# Patient Record
Sex: FEMALE | Race: WHITE | Employment: UNEMPLOYED | URBAN - METROPOLITAN AREA
[De-identification: names, ages, dates, MRNs, and addresses within clinical notes are randomized per-mention and may not be internally consistent; named-entity substitution may affect disease eponyms.]

---

## 2023-03-28 DIAGNOSIS — F41.9 ANXIETY: Primary | ICD-10-CM

## 2023-03-29 RX ORDER — DULOXETIN HYDROCHLORIDE 60 MG/1
CAPSULE, DELAYED RELEASE ORAL
Qty: 30 CAPSULE | Refills: 10 | Status: SHIPPED | OUTPATIENT
Start: 2023-03-29 | End: 2023-05-18 | Stop reason: SDUPTHER

## 2023-03-29 RX ORDER — DULOXETIN HYDROCHLORIDE 60 MG/1
1 CAPSULE, DELAYED RELEASE ORAL DAILY
COMMUNITY
Start: 2013-08-01 | End: 2023-05-18

## 2023-04-06 ENCOUNTER — APPOINTMENT (OUTPATIENT)
Dept: PRIMARY CARE | Facility: CLINIC | Age: 73
End: 2023-04-06
Payer: MEDICAID

## 2023-04-06 PROBLEM — N20.0 NEPHROLITHIASIS: Status: ACTIVE | Noted: 2023-04-06

## 2023-04-06 PROBLEM — F11.21 OPIOID USE DISORDER, MODERATE, IN SUSTAINED REMISSION (MULTI): Status: ACTIVE | Noted: 2023-04-06

## 2023-04-06 PROBLEM — B49 FUNGAL INFECTION: Status: ACTIVE | Noted: 2023-04-06

## 2023-04-06 PROBLEM — K43.5 PARASTOMAL HERNIA WITHOUT OBSTRUCTION OR GANGRENE: Status: ACTIVE | Noted: 2023-04-06

## 2023-04-06 PROBLEM — L24.A9 WOUND DRAINAGE: Status: ACTIVE | Noted: 2023-04-06

## 2023-04-06 PROBLEM — M25.551 CHRONIC PAIN OF BOTH HIPS: Status: ACTIVE | Noted: 2023-04-06

## 2023-04-06 PROBLEM — J34.3 HYPERTROPHY OF BOTH INFERIOR NASAL TURBINATES: Status: ACTIVE | Noted: 2023-04-06

## 2023-04-06 PROBLEM — R35.0 URINARY FREQUENCY: Status: ACTIVE | Noted: 2023-04-06

## 2023-04-06 PROBLEM — Z99.3 DEPENDENT ON WHEELCHAIR: Status: ACTIVE | Noted: 2023-04-06

## 2023-04-06 PROBLEM — R35.89 POLYURIA: Status: ACTIVE | Noted: 2023-04-06

## 2023-04-06 PROBLEM — N36.2 URETHRAL CARUNCLE: Status: ACTIVE | Noted: 2023-04-06

## 2023-04-06 PROBLEM — G56.02 CARPAL TUNNEL SYNDROME OF LEFT WRIST: Status: ACTIVE | Noted: 2023-04-06

## 2023-04-06 PROBLEM — E46 PROTEIN-CALORIE MALNUTRITION (MULTI): Status: ACTIVE | Noted: 2023-04-06

## 2023-04-06 PROBLEM — M47.816 DEGENERATIVE ARTHRITIS OF LUMBAR SPINE: Status: ACTIVE | Noted: 2023-04-06

## 2023-04-06 PROBLEM — G25.2 COARSE TREMOR: Status: ACTIVE | Noted: 2023-04-06

## 2023-04-06 PROBLEM — R07.89 ATYPICAL CHEST PAIN: Status: ACTIVE | Noted: 2023-04-06

## 2023-04-06 PROBLEM — R74.8 ELEVATED CREATINE KINASE: Status: ACTIVE | Noted: 2023-04-06

## 2023-04-06 PROBLEM — K64.9 HEMORRHOIDS: Status: ACTIVE | Noted: 2023-04-06

## 2023-04-06 PROBLEM — J32.8 OTHER CHRONIC SINUSITIS: Status: ACTIVE | Noted: 2023-04-06

## 2023-04-06 PROBLEM — N20.0 BILATERAL NEPHROLITHIASIS: Status: ACTIVE | Noted: 2023-04-06

## 2023-04-06 PROBLEM — D68.9 COAGULOPATHY (MULTI): Status: ACTIVE | Noted: 2023-04-06

## 2023-04-06 PROBLEM — F32.5 MAJOR DEPRESSION IN REMISSION (CMS-HCC): Status: ACTIVE | Noted: 2023-04-06

## 2023-04-06 PROBLEM — R91.8 LUNG NODULES: Status: RESOLVED | Noted: 2023-04-06 | Resolved: 2023-04-06

## 2023-04-06 PROBLEM — R00.0 RAPID HEARTBEAT: Status: ACTIVE | Noted: 2023-04-06

## 2023-04-06 PROBLEM — N95.2 ATROPHIC VAGINITIS: Status: ACTIVE | Noted: 2023-04-06

## 2023-04-06 PROBLEM — H52.00 HYPEROPIA WITH ASTIGMATISM AND PRESBYOPIA: Status: ACTIVE | Noted: 2023-04-06

## 2023-04-06 PROBLEM — F11.90 CHRONIC, CONTINUOUS USE OF OPIOIDS: Status: ACTIVE | Noted: 2023-04-06

## 2023-04-06 PROBLEM — M48.062 SPINAL STENOSIS OF LUMBAR REGION WITH NEUROGENIC CLAUDICATION: Status: ACTIVE | Noted: 2023-04-06

## 2023-04-06 PROBLEM — R30.0 DYSURIA: Status: ACTIVE | Noted: 2023-04-06

## 2023-04-06 PROBLEM — M54.30 SCIATICA: Status: ACTIVE | Noted: 2023-04-06

## 2023-04-06 PROBLEM — L85.3 XEROSIS OF SKIN: Status: ACTIVE | Noted: 2023-04-06

## 2023-04-06 PROBLEM — H35.433 PAVING STONE DEGENERATION OF BOTH RETINAS: Status: ACTIVE | Noted: 2023-04-06

## 2023-04-06 PROBLEM — N20.1 CALCULUS OF RIGHT URETER: Status: ACTIVE | Noted: 2023-04-06

## 2023-04-06 PROBLEM — M53.3 SACRO-ILIAC PAIN: Status: ACTIVE | Noted: 2023-04-06

## 2023-04-06 PROBLEM — M54.32 SCIATICA OF LEFT SIDE: Status: ACTIVE | Noted: 2023-04-06

## 2023-04-06 PROBLEM — I71.40 ABDOMINAL AORTIC ANEURYSM (AAA) WITHOUT RUPTURE (CMS-HCC): Status: ACTIVE | Noted: 2023-04-06

## 2023-04-06 PROBLEM — F41.9 ANXIETY: Status: ACTIVE | Noted: 2023-04-06

## 2023-04-06 PROBLEM — R21 PAPULAR ERUPTION: Status: ACTIVE | Noted: 2023-04-06

## 2023-04-06 PROBLEM — H25.13 NUCLEAR SCLEROSIS OF BOTH EYES: Status: ACTIVE | Noted: 2023-04-06

## 2023-04-06 PROBLEM — Z98.890 HISTORY OF LASER REFRACTIVE SURGERY: Status: ACTIVE | Noted: 2023-04-06

## 2023-04-06 PROBLEM — M25.562 ARTHRALGIA OF LEFT KNEE: Status: ACTIVE | Noted: 2023-04-06

## 2023-04-06 PROBLEM — J34.2 DEVIATED NASAL SEPTUM: Status: ACTIVE | Noted: 2023-04-06

## 2023-04-06 PROBLEM — I25.10 CAD (CORONARY ARTERY DISEASE): Status: ACTIVE | Noted: 2023-04-06

## 2023-04-06 PROBLEM — M43.16 SPONDYLOLISTHESIS, LUMBAR REGION: Status: ACTIVE | Noted: 2023-04-06

## 2023-04-06 PROBLEM — M46.1 SACROILIITIS (CMS-HCC): Status: ACTIVE | Noted: 2023-04-06

## 2023-04-06 PROBLEM — M54.16 LUMBAR RADICULOPATHY: Status: ACTIVE | Noted: 2023-04-06

## 2023-04-06 PROBLEM — N18.32 STAGE 3B CHRONIC KIDNEY DISEASE (MULTI): Status: ACTIVE | Noted: 2023-04-06

## 2023-04-06 PROBLEM — N23 RENAL COLIC ON LEFT SIDE: Status: ACTIVE | Noted: 2023-04-06

## 2023-04-06 PROBLEM — R21 RASH: Status: ACTIVE | Noted: 2023-04-06

## 2023-04-06 PROBLEM — R60.1 GENERALIZED EDEMA: Status: ACTIVE | Noted: 2023-04-06

## 2023-04-06 PROBLEM — K21.9 ESOPHAGEAL REFLUX: Status: ACTIVE | Noted: 2023-04-06

## 2023-04-06 PROBLEM — H16.223 KERATOCONJUNCTIVITIS SICCA DUE TO DECREASED TEAR PRODUCTION, BILATERAL: Status: ACTIVE | Noted: 2023-04-06

## 2023-04-06 PROBLEM — M54.50 BILATERAL LOW BACK PAIN: Status: ACTIVE | Noted: 2023-04-06

## 2023-04-06 PROBLEM — D72.829 ELEVATED WHITE BLOOD CELL COUNT: Status: ACTIVE | Noted: 2023-04-06

## 2023-04-06 PROBLEM — M81.0 OSTEOPOROSIS: Status: ACTIVE | Noted: 2023-04-06

## 2023-04-06 PROBLEM — H50.34 ALTERNATING INTERMITTENT EXOTROPIA: Status: ACTIVE | Noted: 2023-04-06

## 2023-04-06 PROBLEM — M17.12 PRIMARY OSTEOARTHRITIS OF LEFT KNEE: Status: ACTIVE | Noted: 2023-04-06

## 2023-04-06 PROBLEM — G56.03 CARPAL TUNNEL SYNDROME, BILATERAL UPPER LIMBS: Status: ACTIVE | Noted: 2023-04-06

## 2023-04-06 PROBLEM — Z93.3 COLOSTOMY IN PLACE (MULTI): Status: ACTIVE | Noted: 2023-04-06

## 2023-04-06 PROBLEM — M47.26 OSTEOARTHRITIS OF SPINE WITH RADICULOPATHY, LUMBAR REGION: Status: ACTIVE | Noted: 2023-04-06

## 2023-04-06 PROBLEM — L71.9 ACNE ROSACEA: Status: ACTIVE | Noted: 2023-04-06

## 2023-04-06 PROBLEM — J30.2 SEASONAL ALLERGIC RHINITIS: Status: ACTIVE | Noted: 2023-04-06

## 2023-04-06 PROBLEM — R31.0 GROSS HEMATURIA: Status: ACTIVE | Noted: 2023-04-06

## 2023-04-06 PROBLEM — B37.31 YEAST VAGINITIS: Status: ACTIVE | Noted: 2023-04-06

## 2023-04-06 PROBLEM — N18.9 CHRONIC KIDNEY DISEASE: Status: ACTIVE | Noted: 2023-04-06

## 2023-04-06 PROBLEM — I10 BENIGN ESSENTIAL HYPERTENSION: Status: ACTIVE | Noted: 2023-04-06

## 2023-04-06 PROBLEM — F11.90 OPIOID USE DISORDER: Status: ACTIVE | Noted: 2023-04-06

## 2023-04-06 PROBLEM — Z86.0100 PERSONAL HISTORY OF COLONIC POLYPS: Status: ACTIVE | Noted: 2023-04-06

## 2023-04-06 PROBLEM — F11.93 OPIOID USE WITH WITHDRAWAL (MULTI): Status: ACTIVE | Noted: 2023-04-06

## 2023-04-06 PROBLEM — K57.32 DIVERTICULITIS, COLON: Status: ACTIVE | Noted: 2023-04-06

## 2023-04-06 PROBLEM — H90.3 ASYMMETRICAL SENSORINEURAL HEARING LOSS: Status: ACTIVE | Noted: 2023-04-06

## 2023-04-06 PROBLEM — R39.15 URINARY URGENCY: Status: ACTIVE | Noted: 2023-04-06

## 2023-04-06 PROBLEM — K58.9 IRRITABLE BOWEL SYNDROME: Status: ACTIVE | Noted: 2023-04-06

## 2023-04-06 PROBLEM — R53.83 FATIGUE: Status: ACTIVE | Noted: 2023-04-06

## 2023-04-06 PROBLEM — E78.5 HYPERLIPIDEMIA: Status: ACTIVE | Noted: 2023-04-06

## 2023-04-06 PROBLEM — R10.9 FLANK PAIN: Status: ACTIVE | Noted: 2023-04-06

## 2023-04-06 PROBLEM — R25.1 TREMOR OF UNKNOWN ORIGIN: Status: ACTIVE | Noted: 2023-04-06

## 2023-04-06 PROBLEM — D12.4 ADENOMATOUS POLYP OF DESCENDING COLON: Status: ACTIVE | Noted: 2023-04-06

## 2023-04-06 PROBLEM — N20.1 LEFT URETERAL CALCULUS: Status: ACTIVE | Noted: 2023-04-06

## 2023-04-06 PROBLEM — H35.439 COBBLESTONE RETINAL DEGENERATION: Status: ACTIVE | Noted: 2023-04-06

## 2023-04-06 PROBLEM — M25.552 CHRONIC PAIN OF BOTH HIPS: Status: ACTIVE | Noted: 2023-04-06

## 2023-04-06 PROBLEM — R79.89 ABNORMAL TSH: Status: ACTIVE | Noted: 2023-04-06

## 2023-04-06 PROBLEM — N39.0 RECURRENT UTI: Status: ACTIVE | Noted: 2023-04-06

## 2023-04-06 PROBLEM — R31.9 HEMATURIA: Status: ACTIVE | Noted: 2023-04-06

## 2023-04-06 PROBLEM — K59.00 CONSTIPATION: Status: ACTIVE | Noted: 2023-04-06

## 2023-04-06 PROBLEM — R10.30 LOWER ABDOMINAL PAIN: Status: ACTIVE | Noted: 2023-04-06

## 2023-04-06 PROBLEM — L23.89 ALLERGIC CONTACT DERMATITIS DUE TO OTHER AGENTS: Status: ACTIVE | Noted: 2023-04-06

## 2023-04-06 PROBLEM — G25.0 FAMILIAL TREMOR: Status: ACTIVE | Noted: 2023-04-06

## 2023-04-06 PROBLEM — Z86.010 PERSONAL HISTORY OF COLONIC POLYPS: Status: ACTIVE | Noted: 2023-04-06

## 2023-04-06 PROBLEM — R63.5 ABNORMAL WEIGHT GAIN: Status: ACTIVE | Noted: 2023-04-06

## 2023-04-06 PROBLEM — H52.4 HYPEROPIA WITH ASTIGMATISM AND PRESBYOPIA: Status: ACTIVE | Noted: 2023-04-06

## 2023-04-06 PROBLEM — M17.9 OSTEOARTHRITIS OF KNEE: Status: ACTIVE | Noted: 2023-04-06

## 2023-04-06 PROBLEM — K14.3 BLACK HAIRY TONGUE: Status: ACTIVE | Noted: 2023-04-06

## 2023-04-06 PROBLEM — M79.7 FIBROMYALGIA: Status: ACTIVE | Noted: 2023-04-06

## 2023-04-06 PROBLEM — B36.9 FUNGAL DERMATITIS: Status: ACTIVE | Noted: 2023-04-06

## 2023-04-06 PROBLEM — H26.9 CATARACT: Status: ACTIVE | Noted: 2023-04-06

## 2023-04-06 PROBLEM — G89.29 CHRONIC PAIN OF BOTH HIPS: Status: ACTIVE | Noted: 2023-04-06

## 2023-04-06 PROBLEM — D12.3 ADENOMATOUS POLYP OF TRANSVERSE COLON: Status: ACTIVE | Noted: 2023-04-06

## 2023-04-06 PROBLEM — R11.2 NON-INTRACTABLE VOMITING WITH NAUSEA: Status: ACTIVE | Noted: 2023-04-06

## 2023-04-06 PROBLEM — B00.1 HERPES LABIALIS: Status: ACTIVE | Noted: 2023-04-06

## 2023-04-06 PROBLEM — N95.1 MENOPAUSAL SYMPTOMS: Status: ACTIVE | Noted: 2023-04-06

## 2023-04-06 PROBLEM — Z98.890 S/P LASIK (LASER ASSISTED IN SITU KERATOMILEUSIS) OF BOTH EYES: Status: ACTIVE | Noted: 2023-04-06

## 2023-04-06 PROBLEM — R06.00 DYSPNEA: Status: ACTIVE | Noted: 2023-04-06

## 2023-04-06 PROBLEM — R79.89 ELEVATED SERUM CREATININE: Status: ACTIVE | Noted: 2023-04-06

## 2023-04-06 PROBLEM — H52.209 HYPEROPIA WITH ASTIGMATISM AND PRESBYOPIA: Status: ACTIVE | Noted: 2023-04-06

## 2023-04-06 PROBLEM — B37.2 CANDIDAL INTERTRIGO: Status: ACTIVE | Noted: 2023-04-06

## 2023-04-06 PROBLEM — H91.90 HEARING LOSS: Status: ACTIVE | Noted: 2023-04-06

## 2023-04-06 RX ORDER — CLONIDINE HYDROCHLORIDE 0.1 MG/1
1 TABLET ORAL NIGHTLY PRN
COMMUNITY
Start: 2020-12-14 | End: 2023-05-18 | Stop reason: SDDI

## 2023-04-06 RX ORDER — LIDOCAINE 560 MG/1
1 PATCH PERCUTANEOUS; TOPICAL; TRANSDERMAL EVERY 12 HOURS
COMMUNITY
Start: 2020-12-23

## 2023-04-06 RX ORDER — NYSTATIN 100000 [USP'U]/G
POWDER TOPICAL DAILY PRN
COMMUNITY
Start: 2022-01-07 | End: 2024-04-10 | Stop reason: WASHOUT

## 2023-04-06 RX ORDER — NALOXONE HYDROCHLORIDE 4 MG/.1ML
SPRAY NASAL AS NEEDED
COMMUNITY
Start: 2020-02-24 | End: 2023-06-05 | Stop reason: SDUPTHER

## 2023-04-06 RX ORDER — KETOCONAZOLE 20 MG/G
CREAM TOPICAL 2 TIMES DAILY
COMMUNITY
End: 2024-04-10 | Stop reason: WASHOUT

## 2023-04-06 RX ORDER — TRAMADOL HYDROCHLORIDE 50 MG/1
2 TABLET ORAL 3 TIMES DAILY
COMMUNITY
Start: 2022-08-15 | End: 2023-04-28 | Stop reason: SDUPTHER

## 2023-04-06 RX ORDER — CETIRIZINE HYDROCHLORIDE 10 MG/1
1 TABLET ORAL DAILY
COMMUNITY
Start: 2013-08-01 | End: 2023-08-30 | Stop reason: SDUPTHER

## 2023-04-06 RX ORDER — CALCIUM CARB, CITRATE, MALATE 250 MG
1 CAPSULE ORAL DAILY
COMMUNITY
End: 2024-04-10 | Stop reason: WASHOUT

## 2023-04-06 RX ORDER — ASPIRIN 81 MG/1
1 TABLET ORAL DAILY
COMMUNITY
Start: 2020-07-28 | End: 2023-08-30 | Stop reason: SDUPTHER

## 2023-04-06 RX ORDER — FUROSEMIDE 20 MG/1
1 TABLET ORAL DAILY
COMMUNITY
Start: 2020-03-09 | End: 2024-04-10 | Stop reason: WASHOUT

## 2023-04-06 RX ORDER — FLUTICASONE PROPIONATE 50 MCG
1 SPRAY, SUSPENSION (ML) NASAL DAILY
COMMUNITY
Start: 2017-04-19

## 2023-04-06 RX ORDER — PROPRANOLOL HYDROCHLORIDE 20 MG/1
1 TABLET ORAL 2 TIMES DAILY
COMMUNITY
Start: 2023-03-29 | End: 2023-07-26 | Stop reason: SDUPTHER

## 2023-04-06 RX ORDER — ZOSTER VACCINE RECOMBINANT, ADJUVANTED 50 MCG/0.5
KIT INTRAMUSCULAR ONCE
COMMUNITY
Start: 2021-11-08 | End: 2024-04-10 | Stop reason: WASHOUT

## 2023-04-06 RX ORDER — ONDANSETRON 4 MG/1
1 TABLET, ORALLY DISINTEGRATING ORAL 4 TIMES DAILY
COMMUNITY
Start: 2022-05-23 | End: 2024-04-10 | Stop reason: WASHOUT

## 2023-04-06 RX ORDER — HYDROCORTISONE 25 MG/G
CREAM TOPICAL 2 TIMES DAILY
COMMUNITY
Start: 2013-08-01 | End: 2023-06-28

## 2023-04-06 RX ORDER — CLOTRIMAZOLE AND BETAMETHASONE DIPROPIONATE 10; .64 MG/G; MG/G
1 CREAM TOPICAL 2 TIMES DAILY
COMMUNITY
Start: 2020-11-25 | End: 2024-04-10 | Stop reason: WASHOUT

## 2023-04-06 RX ORDER — ALENDRONATE SODIUM 70 MG/1
1 TABLET ORAL
COMMUNITY
Start: 2021-03-26 | End: 2024-04-10 | Stop reason: WASHOUT

## 2023-04-06 RX ORDER — ASCORBIC ACID/MULTIVIT-MIN 1000 MG
EFFERVESCENT POWDER IN PACKET ORAL
COMMUNITY
End: 2024-04-10 | Stop reason: WASHOUT

## 2023-04-06 RX ORDER — ZOSTER VACCINE RECOMBINANT, ADJUVANTED 50 MCG/0.5
KIT INTRAMUSCULAR ONCE
COMMUNITY
Start: 2020-11-25 | End: 2024-04-10 | Stop reason: WASHOUT

## 2023-04-06 RX ORDER — TAMSULOSIN HYDROCHLORIDE 0.4 MG/1
1 CAPSULE ORAL DAILY
COMMUNITY
Start: 2020-01-20 | End: 2023-08-30 | Stop reason: SDUPTHER

## 2023-04-20 LAB
CALCIUM OXALATE CRYSTALS, URINE: ABNORMAL /HPF
MUCUS, URINE: ABNORMAL /LPF
RBC, URINE: 5 /HPF (ref 0–5)
SQUAMOUS EPITHELIAL CELLS, URINE: 2 /HPF
WBC, URINE: 158 /HPF (ref 0–5)

## 2023-04-23 LAB — URINE CULTURE: ABNORMAL

## 2023-04-28 ENCOUNTER — OFFICE VISIT (OUTPATIENT)
Dept: PRIMARY CARE | Facility: CLINIC | Age: 73
End: 2023-04-28
Payer: MEDICAID

## 2023-04-28 VITALS
DIASTOLIC BLOOD PRESSURE: 72 MMHG | OXYGEN SATURATION: 95 % | SYSTOLIC BLOOD PRESSURE: 122 MMHG | HEIGHT: 62 IN | TEMPERATURE: 97.6 F | WEIGHT: 184.9 LBS | BODY MASS INDEX: 34.03 KG/M2 | HEART RATE: 59 BPM

## 2023-04-28 DIAGNOSIS — R10.9 FLANK PAIN: Primary | ICD-10-CM

## 2023-04-28 DIAGNOSIS — N20.0 KIDNEY STONES: ICD-10-CM

## 2023-04-28 PROCEDURE — 3074F SYST BP LT 130 MM HG: CPT | Performed by: STUDENT IN AN ORGANIZED HEALTH CARE EDUCATION/TRAINING PROGRAM

## 2023-04-28 PROCEDURE — 1159F MED LIST DOCD IN RCRD: CPT | Performed by: STUDENT IN AN ORGANIZED HEALTH CARE EDUCATION/TRAINING PROGRAM

## 2023-04-28 PROCEDURE — 1036F TOBACCO NON-USER: CPT | Performed by: STUDENT IN AN ORGANIZED HEALTH CARE EDUCATION/TRAINING PROGRAM

## 2023-04-28 PROCEDURE — 99214 OFFICE O/P EST MOD 30 MIN: CPT | Performed by: STUDENT IN AN ORGANIZED HEALTH CARE EDUCATION/TRAINING PROGRAM

## 2023-04-28 PROCEDURE — 3078F DIAST BP <80 MM HG: CPT | Performed by: STUDENT IN AN ORGANIZED HEALTH CARE EDUCATION/TRAINING PROGRAM

## 2023-04-28 RX ORDER — TRAMADOL HYDROCHLORIDE 50 MG/1
100 TABLET ORAL 3 TIMES DAILY
Qty: 84 TABLET | Refills: 0 | Status: SHIPPED | OUTPATIENT
Start: 2023-04-28 | End: 2023-05-11 | Stop reason: SDUPTHER

## 2023-04-28 RX ORDER — TRAMADOL HYDROCHLORIDE 50 MG/1
100 TABLET ORAL 3 TIMES DAILY
Qty: 84 TABLET | Refills: 0 | Status: SHIPPED | OUTPATIENT
Start: 2023-04-28 | End: 2023-04-28 | Stop reason: SDUPTHER

## 2023-04-28 ASSESSMENT — PAIN SCALES - GENERAL: PAINLEVEL: 6

## 2023-04-28 NOTE — PROGRESS NOTES
HPI    Medication refill   - Takes Tramadol for relief of chronic kidney stones     OARRS:  I have personally reviewed the OARRS report for Sandra Velasquez. I have considered the risks of abuse, dependence, addiction and diversion    Is the patient prescribed a combination of a benzodiazepine and opioid?  No    Last Urine Drug Screen / ordered today: No  Recent Results (from the past 94738 hour(s))   OPIATE/OPIOID/BENZO PRESCRIPTION COMPLIANCE    Collection Time: 11/30/22  2:02 PM   Result Value Ref Range    DRUG SCREEN COMMENT URINE SEE BELOW     Creatine, Urine 102.9 mg/dL    Amphetamine Screen, Urine PRESUMPTIVE NEGATIVE NEGATIVE    Barbiturate Screen, Urine PRESUMPTIVE NEGATIVE NEGATIVE    Cannabinoid Screen, Urine PRESUMPTIVE NEGATIVE NEGATIVE    Cocaine Screen, Urine PRESUMPTIVE NEGATIVE NEGATIVE    PCP Screen, Urine PRESUMPTIVE NEGATIVE NEGATIVE    7-Aminoclonazepam <25 Cutoff <25 ng/mL    Alpha-Hydroxyalprazolam <25 Cutoff <25 ng/mL    Alpha-Hydroxymidazolam <25 Cutoff <25 ng/mL    Alprazolam <25 Cutoff <25 ng/mL    Chlordiazepoxide <25 Cutoff <25 ng/mL    Clonazepam <25 Cutoff <25 ng/mL    Diazepam <25 Cutoff <25 ng/mL    Lorazepam <25 Cutoff <25 ng/mL    Midazolam <25 Cutoff <25 ng/mL    Nordiazepam <25 Cutoff <25 ng/mL    Oxazepam <25 Cutoff <25 ng/mL    Temazepam <25 Cutoff <25 ng/mL    Zolpidem <25 Cutoff <25 ng/mL    Zolpidem Metabolite (ZCA) <25 Cutoff <25 ng/mL    6-Acetylmorphine <25 Cutoff <25 ng/mL    Codeine <50 Cutoff <50 ng/mL    Hydrocodone <25 Cutoff <25 ng/mL    Hydromorphone <25 Cutoff <25 ng/mL    Morphine Urine <50 Cutoff <50 ng/mL    Norhydrocodone <25 Cutoff <25 ng/mL    Noroxycodone <25 Cutoff <25 ng/mL    Oxycodone <25 Cutoff <25 ng/mL    Oxymorphone <25 Cutoff <25 ng/mL    Tramadol >1000 (A) Cutoff <50 ng/mL    O-Desmethyltramadol >1000 (A) Cutoff <50 ng/mL    Fentanyl <2.5 Cutoff<2.5 ng/mL    Norfentanyl <2.5 Cutoff<2.5 ng/mL    METHADONE CONFIRMATION,URINE <25 Cutoff <25 ng/mL     EDDP <25 Cutoff <25 ng/mL     Results are as expected.     Controlled Substance Agreement:  Date of the Last Agreement: 06/22/23  Reviewed Controlled Substance Agreement including but not limited to the benefits, risks, and alternatives to treatment with a Controlled Substance medication(s).    Opioids:  What is the patient's goal of therapy? Improving pain associated with kidney stone  Is this being achieved with current treatment? Believes that medication is helping control her kidney stone however, she instances where the medications don't help. Usually occurs when she in the process of passing a stone     I have calculated the patient's Morphine Dose Equivalent (MED):   I have considered referral to Pain Management and/or a specialist, and do not feel it is necessary at this time.    I feel that it is clinically indicated to continue this current medication regimen after consideration of alternative therapies, and other non-opioid treatment.    Pain Assessment:  - 7/10 in quality without medication  - 5/10 in quality when taking tramadol     72 year old F presenting to the clinic for refill of controlled substance. Patient has a contractual agreement with the clinic.    Discussed with Dr. Corey Wu MD PGY-3  Family Medicine   [facility]

## 2023-05-05 NOTE — PROGRESS NOTES
I reviewed with the resident the medical history and the resident’s findings on physical examination.  I discussed with the resident the patient’s diagnosis and concur with the treatment plan as documented in the resident note.     Tito Nicole MD

## 2023-05-11 DIAGNOSIS — N20.0 KIDNEY STONES: ICD-10-CM

## 2023-05-11 RX ORDER — TRAMADOL HYDROCHLORIDE 50 MG/1
100 TABLET ORAL 3 TIMES DAILY
Qty: 84 TABLET | Refills: 1 | Status: SHIPPED | OUTPATIENT
Start: 2023-05-11 | End: 2023-05-18 | Stop reason: SDUPTHER

## 2023-05-12 ENCOUNTER — APPOINTMENT (OUTPATIENT)
Dept: PRIMARY CARE | Facility: CLINIC | Age: 73
End: 2023-05-12
Payer: MEDICAID

## 2023-05-18 ENCOUNTER — OFFICE VISIT (OUTPATIENT)
Dept: PRIMARY CARE | Facility: CLINIC | Age: 73
End: 2023-05-18
Payer: MEDICAID

## 2023-05-18 VITALS
OXYGEN SATURATION: 98 % | BODY MASS INDEX: 33.73 KG/M2 | TEMPERATURE: 98.1 F | SYSTOLIC BLOOD PRESSURE: 109 MMHG | WEIGHT: 184.4 LBS | HEART RATE: 51 BPM | DIASTOLIC BLOOD PRESSURE: 74 MMHG

## 2023-05-18 DIAGNOSIS — F32.A DEPRESSION, UNSPECIFIED DEPRESSION TYPE: ICD-10-CM

## 2023-05-18 DIAGNOSIS — N20.0 KIDNEY STONES: ICD-10-CM

## 2023-05-18 DIAGNOSIS — G89.4 CHRONIC PAIN SYNDROME: Primary | ICD-10-CM

## 2023-05-18 PROCEDURE — 1159F MED LIST DOCD IN RCRD: CPT | Performed by: FAMILY MEDICINE

## 2023-05-18 PROCEDURE — 3074F SYST BP LT 130 MM HG: CPT | Performed by: FAMILY MEDICINE

## 2023-05-18 PROCEDURE — 1036F TOBACCO NON-USER: CPT | Performed by: FAMILY MEDICINE

## 2023-05-18 PROCEDURE — 99214 OFFICE O/P EST MOD 30 MIN: CPT | Performed by: FAMILY MEDICINE

## 2023-05-18 PROCEDURE — 3078F DIAST BP <80 MM HG: CPT | Performed by: FAMILY MEDICINE

## 2023-05-18 RX ORDER — TRAMADOL HYDROCHLORIDE 50 MG/1
100 TABLET ORAL 3 TIMES DAILY
Qty: 84 TABLET | Refills: 3 | Status: SHIPPED | OUTPATIENT
Start: 2023-06-08 | End: 2023-06-02 | Stop reason: SDUPTHER

## 2023-05-18 RX ORDER — ESCITALOPRAM OXALATE 10 MG/1
10 TABLET ORAL DAILY
Qty: 30 TABLET | Refills: 11 | Status: SHIPPED | OUTPATIENT
Start: 2023-05-18 | End: 2023-05-18

## 2023-05-18 RX ORDER — DULOXETIN HYDROCHLORIDE 30 MG/1
30 CAPSULE, DELAYED RELEASE ORAL SEE ADMIN INSTRUCTIONS
Qty: 21 CAPSULE | Refills: 0 | Status: SHIPPED | OUTPATIENT
Start: 2023-05-18 | End: 2023-06-20 | Stop reason: DRUGHIGH

## 2023-05-18 ASSESSMENT — PAIN SCALES - GENERAL: PAINLEVEL: 6

## 2023-05-18 NOTE — PROGRESS NOTES
HPI:  Chronic pain due to chronic nephrolithiasis, lumbar osteoarthritis without radiculopathy  2.  Reviewed preventive care needs  3.  Depression recently evaluated by psychologist, not yet in remission.  Queries if changing SSRI/SNRI would be helpful.   Patient has been on duloxetine primarily for chronic pain for several years, reports it does not seem helpful for mood and may not need for pain.     Medications reviewed and reconciled      OARRS:  Rose Shah MD on 5/18/2023  2:46 PM  I have personally reviewed the OARRS report for Sandra Eva. I have considered the risks of abuse, dependence, addiction and diversion    Is the patient prescribed a combination of a benzodiazepine and opioid?  Yes, I feel it is clincially indicated to continue the medication and have discussed with the patient risks/benefits/alternatives.    Last Urine Drug Screen / ordered today: No  Recent Results (from the past 18823 hour(s))   OPIATE/OPIOID/BENZO PRESCRIPTION COMPLIANCE    Collection Time: 11/30/22  2:02 PM   Result Value Ref Range    DRUG SCREEN COMMENT URINE SEE BELOW     Creatine, Urine 102.9 mg/dL    Amphetamine Screen, Urine PRESUMPTIVE NEGATIVE NEGATIVE    Barbiturate Screen, Urine PRESUMPTIVE NEGATIVE NEGATIVE    Cannabinoid Screen, Urine PRESUMPTIVE NEGATIVE NEGATIVE    Cocaine Screen, Urine PRESUMPTIVE NEGATIVE NEGATIVE    PCP Screen, Urine PRESUMPTIVE NEGATIVE NEGATIVE    7-Aminoclonazepam <25 Cutoff <25 ng/mL    Alpha-Hydroxyalprazolam <25 Cutoff <25 ng/mL    Alpha-Hydroxymidazolam <25 Cutoff <25 ng/mL    Alprazolam <25 Cutoff <25 ng/mL    Chlordiazepoxide <25 Cutoff <25 ng/mL    Clonazepam <25 Cutoff <25 ng/mL    Diazepam <25 Cutoff <25 ng/mL    Lorazepam <25 Cutoff <25 ng/mL    Midazolam <25 Cutoff <25 ng/mL    Nordiazepam <25 Cutoff <25 ng/mL    Oxazepam <25 Cutoff <25 ng/mL    Temazepam <25 Cutoff <25 ng/mL    Zolpidem <25 Cutoff <25 ng/mL    Zolpidem Metabolite (ZCA) <25 Cutoff <25 ng/mL     6-Acetylmorphine <25 Cutoff <25 ng/mL    Codeine <50 Cutoff <50 ng/mL    Hydrocodone <25 Cutoff <25 ng/mL    Hydromorphone <25 Cutoff <25 ng/mL    Morphine Urine <50 Cutoff <50 ng/mL    Norhydrocodone <25 Cutoff <25 ng/mL    Noroxycodone <25 Cutoff <25 ng/mL    Oxycodone <25 Cutoff <25 ng/mL    Oxymorphone <25 Cutoff <25 ng/mL    Tramadol >1000 (A) Cutoff <50 ng/mL    O-Desmethyltramadol >1000 (A) Cutoff <50 ng/mL    Fentanyl <2.5 Cutoff<2.5 ng/mL    Norfentanyl <2.5 Cutoff<2.5 ng/mL    METHADONE CONFIRMATION,URINE <25 Cutoff <25 ng/mL    EDDP <25 Cutoff <25 ng/mL     Results are as expected.     Controlled Substance Agreement:  Date of the Last Agreement: 5/18/23  Reviewed Controlled Substance Agreement including but not limited to the benefits, risks, and alternatives to treatment with a Controlled Substance medication(s).    Opioids:  What is the patient's goal of therapy? Goals include ambulation, exercise, reduction in opoid-responsive pain and improved sleep.  .  Is this being achieved with current treatment? yes    I have calculated the patient's Morphine Dose Equivalent (MED):   I have considered referral to Pain Management and/or a specialist, and do not feel it is necessary at this time.    I feel that it is clinically indicated to continue this current medication regimen after consideration of alternative therapies, and other non-opioid treatment.    Opioid Risk Screening: Previously recorded in all scripts, no change      Pain Assessment:  Analgesia  What was your pain level on average during the past week?: 7  What was your pain level at its worst during the past week?: 6  What percentage of your pain has been relieved during the past week?: 50 %  Is the amount of pain relief you are now obtaining from your current pain relievers enough to make a real difference in your life?: Y  Query to Clinician: Is the patient's pain relief clinically significant?: Yes    Activities of Daily Living  Physical  Functioning: Same  Family Relationships: Same  Mood: Better  Sleep Patterns: Same  Overall Functioning: Same    Adverse Events  Is patient experiencing any side effects from current pain relievers?: N      Assessment  Is your overall impression that this patient is benefiting from opioid therapy?: Yes  Specific Analgesic Plan: Continue present regimen    Review of Systems:   No withdrawal symptoms,  which include: chills, diaphoresis, rhinorrhea, lacrimation, sneezing, yawning, nausea, GI cramping, diarrhea, abnormal back and leg pain, tremulousness, WD associated anxiety or apprehension. No  side effects including marked constipation, sedation, or leg edema. No symptoms suggestive of intoxication or sedation.  Intermittent dysuria, none now.     Physical exam:  Well groomed, comfortable at rest.   BP: 109/74   HR 51   Eyes: JOSE, normal pupils  CV: Normal heart sounds, RRR  Neuro: Tone, power symmetric, no tremor  MSK: No joint tenderness or swelling  Integuement: No diaphoresis, no rash,   Extremities: no leg edema       Assessment and Plan:  #1. Chronic pain due to   nephrolithiasis and lumbar arthritis without radiculopathy       Responsive to opioid medication      Patient is using opioid medication as prescribed with adequate control of chronic pain to permit achievement of functional goals. There are no aberrant behaviors noted, including sedation, loss of prescriptions or medications, running out early, multiple prescribers, use of illicit drugs or alcohol, deterioration of function.   See remainder of chronic pain assessment in HPI  Controlled Medication Agreement signed, in record    Treatment Plan:  Continue medications: Tramadol 50 mg tabs, 2 tabs 3 times a day  Continue:   Urine drug screen and TA  up to date   Return 8 weeks for chronic pain/opioid management  Return to Family Medicine to address other concerns   Plan:  #2.  Preassessed  depression, chronic   Plan: Reduce duloxetine from 60 mg to 30 mg  daily for 2 weeks, then every other day for 2 weeks then stop.  Start escitalopram 10 mg now, so that it will be effective when duloxetine is tapered.  If well-tolerated, consider increasing to 20 mg at next visit, June 8 /23   #3.  Review preventive care needs at next visit, plan HM       See  me or another [facility] doctor for your next visit in  6 weeks

## 2023-05-18 NOTE — PATIENT INSTRUCTIONS
Thanks for coming to Family Medicine for assessment and care of your chronic pain  Our goal is to provide enough pain control for you to exercise, work and enjoy life.  Since opioid medications can have serious side effects and can lose their effectiveness over time we will also focus on non-opioid  medications, counselling and other ways to control chronic pain.  Use your medications as they are prescribed; do not use alcohol or other sedating medications while using an opioid medication, and tell your doctor if you feel sedated or if the medication is no longer effective.  NARCAN (Naloxone) is a medication given in the nose to reverse an overdose of an opioid  and prevent an overdose related death.  Signs of overdose are: pinpoint pupils, inability to wake a person, snoring and slow breathing.  [place] law requires you to have a prescription for this medication if you are prescribed an opioid medication like morphine, oxycodone, tramadol and others.   If you see a person with these signs, GIVE NARCAN, 1 SPRAY IN ONE NOSTRIL.  REPEAT IN 1-2  MINUTES,  AND CALL 911.  Teach a family member how to use it, in case you yourself experience an accidental overdose.   If you use your medications as they are prescribed, this is less likely to happen.     Please attend all of your scheduled appointments, to increase your safety and to get the most effective care for your chronic pain.

## 2023-05-23 ENCOUNTER — APPOINTMENT (OUTPATIENT)
Dept: LAB | Facility: LAB | Age: 73
End: 2023-05-23
Payer: MEDICAID

## 2023-05-23 LAB
ALANINE AMINOTRANSFERASE (SGPT) (U/L) IN SER/PLAS: 26 U/L (ref 7–45)
ALBUMIN (G/DL) IN SER/PLAS: 3.5 G/DL (ref 3.4–5)
ALKALINE PHOSPHATASE (U/L) IN SER/PLAS: 93 U/L (ref 33–136)
ANION GAP IN SER/PLAS: 12 MMOL/L (ref 10–20)
ASPARTATE AMINOTRANSFERASE (SGOT) (U/L) IN SER/PLAS: 29 U/L (ref 9–39)
BILIRUBIN TOTAL (MG/DL) IN SER/PLAS: 0.3 MG/DL (ref 0–1.2)
CALCIUM (MG/DL) IN SER/PLAS: 9 MG/DL (ref 8.6–10.6)
CARBON DIOXIDE, TOTAL (MMOL/L) IN SER/PLAS: 25 MMOL/L (ref 21–32)
CHLORIDE (MMOL/L) IN SER/PLAS: 109 MMOL/L (ref 98–107)
COBALAMIN (VITAMIN B12) (PG/ML) IN SER/PLAS: 1139 PG/ML (ref 211–911)
CREATININE (MG/DL) IN SER/PLAS: 1.15 MG/DL (ref 0.5–1.05)
ESTIMATED AVERAGE GLUCOSE FOR HBA1C: 117 MG/DL
GFR FEMALE: 50 ML/MIN/1.73M2
GLUCOSE (MG/DL) IN SER/PLAS: 111 MG/DL (ref 74–99)
HEMOGLOBIN A1C/HEMOGLOBIN TOTAL IN BLOOD: 5.7 %
POTASSIUM (MMOL/L) IN SER/PLAS: 4 MMOL/L (ref 3.5–5.3)
PROTEIN TOTAL: 6.7 G/DL (ref 6.4–8.2)
SEDIMENTATION RATE, ERYTHROCYTE: 62 MM/H (ref 0–30)
SODIUM (MMOL/L) IN SER/PLAS: 142 MMOL/L (ref 136–145)
THYROTROPIN (MIU/L) IN SER/PLAS BY DETECTION LIMIT <= 0.05 MIU/L: 1.07 MIU/L (ref 0.44–3.98)
UREA NITROGEN (MG/DL) IN SER/PLAS: 24 MG/DL (ref 6–23)
URINE CULTURE: NORMAL

## 2023-06-01 DIAGNOSIS — N20.0 KIDNEY STONES: Primary | ICD-10-CM

## 2023-06-01 LAB — METHYLMALONIC ACID, S: 0.21 UMOL/L (ref 0–0.4)

## 2023-06-02 DIAGNOSIS — R73.03 PREDIABETES: Primary | ICD-10-CM

## 2023-06-02 RX ORDER — TRAMADOL HYDROCHLORIDE 50 MG/1
100 TABLET ORAL 3 TIMES DAILY
Qty: 84 TABLET | Refills: 0 | Status: SHIPPED | OUTPATIENT
Start: 2023-06-04 | End: 2023-06-05 | Stop reason: SDUPTHER

## 2023-06-02 RX ORDER — METFORMIN HYDROCHLORIDE 500 MG/1
500 TABLET ORAL
Qty: 30 TABLET | Refills: 11 | OUTPATIENT
Start: 2023-06-02 | End: 2024-04-11

## 2023-06-02 NOTE — PROGRESS NOTES
Patient reports that her tramadol will be due on June 4 not June 8.  I have discontinued the prescription for June 8 and substituted 1 for Veronica for.  She has an appointment on June 8 and we will manage subsequent prescriptions at that appointment.   
Mother/Patient

## 2023-06-02 NOTE — PROGRESS NOTES
Scription sent for metformin 500 mg once daily.  If tolerated may increase as we monitor A1c which is now in prediabetic range

## 2023-06-05 DIAGNOSIS — M51.9 LUMBAR DISC DISEASE: Primary | ICD-10-CM

## 2023-06-05 DIAGNOSIS — F11.90 CHRONIC, CONTINUOUS USE OF OPIOIDS: Primary | ICD-10-CM

## 2023-06-05 RX ORDER — NALOXONE HYDROCHLORIDE 4 MG/.1ML
4 SPRAY NASAL AS NEEDED
Qty: 2 EACH | Refills: 2 | Status: SHIPPED | OUTPATIENT
Start: 2023-06-05 | End: 2023-06-05

## 2023-06-05 RX ORDER — TRAMADOL HYDROCHLORIDE 50 MG/1
100 TABLET ORAL 3 TIMES DAILY
Qty: 84 TABLET | Refills: 3 | Status: SHIPPED | OUTPATIENT
Start: 2023-06-05 | End: 2023-07-26 | Stop reason: SDUPTHER

## 2023-06-05 NOTE — PROGRESS NOTES
Today I canceled prescription for tramadol 50 mg tablets, 2 tablets 3 times a day accidentally routed to [facility] care, and reordered it to [facility] for 14 days (84 tabs) with 3 refills.  I will call the pharmacy to confirm receipt and we will notify the patient.

## 2023-06-08 ENCOUNTER — OFFICE VISIT (OUTPATIENT)
Dept: PRIMARY CARE | Facility: CLINIC | Age: 73
End: 2023-06-08
Payer: MEDICAID

## 2023-06-08 VITALS
SYSTOLIC BLOOD PRESSURE: 114 MMHG | TEMPERATURE: 97.3 F | DIASTOLIC BLOOD PRESSURE: 69 MMHG | HEART RATE: 53 BPM | OXYGEN SATURATION: 96 %

## 2023-06-08 DIAGNOSIS — Z12.31 SCREENING MAMMOGRAM, ENCOUNTER FOR: Primary | ICD-10-CM

## 2023-06-08 DIAGNOSIS — L01.00 IMPETIGO: ICD-10-CM

## 2023-06-08 DIAGNOSIS — L85.3 XEROSIS CUTIS: ICD-10-CM

## 2023-06-08 DIAGNOSIS — L29.9 GENERALIZED PRURITUS: ICD-10-CM

## 2023-06-08 DIAGNOSIS — M51.9 LUMBAR DISC DISEASE: ICD-10-CM

## 2023-06-08 PROCEDURE — 99214 OFFICE O/P EST MOD 30 MIN: CPT | Performed by: FAMILY MEDICINE

## 2023-06-08 PROCEDURE — 1036F TOBACCO NON-USER: CPT | Performed by: FAMILY MEDICINE

## 2023-06-08 PROCEDURE — 3078F DIAST BP <80 MM HG: CPT | Performed by: FAMILY MEDICINE

## 2023-06-08 PROCEDURE — 3074F SYST BP LT 130 MM HG: CPT | Performed by: FAMILY MEDICINE

## 2023-06-08 PROCEDURE — 1159F MED LIST DOCD IN RCRD: CPT | Performed by: FAMILY MEDICINE

## 2023-06-08 RX ORDER — MUPIROCIN CALCIUM 20 MG/G
CREAM TOPICAL 3 TIMES DAILY
Qty: 15 G | Refills: 0 | Status: SHIPPED | OUTPATIENT
Start: 2023-06-08 | End: 2023-06-18

## 2023-06-08 ASSESSMENT — PAIN SCALES - GENERAL: PAINLEVEL: 10-WORST PAIN EVER

## 2023-06-08 NOTE — PROGRESS NOTES
"HPI:  Tramadol for chronic pain   2. Itching - diffuse, intermittent   3. \"I'm not tracking\"-notes some short-term memory disturbance since transition to escitalopram  4. Nephrolithiasis- seeing Urology, reports stones too small for lithotripsy but still irritating  Has had pain with ambulation, attributes to for lithiasis    Medications reviewed and reconcile    OARRS:    I have personally reviewed the OARRS report for Sandra Owensgast. I have considered the risks of abuse, dependence, addiction and diversion    Is the patient prescribed a combination of a benzodiazepine and opioid?  Yes, I feel it is clincially indicated to continue the medication and have discussed with the patient risks/benefits/alternatives.    Last Urine Drug Screen / ordered today: Yes  Recent Results (from the past 65879 hour(s))   OPIATE/OPIOID/BENZO PRESCRIPTION COMPLIANCE    Collection Time: 11/30/22  2:02 PM   Result Value Ref Range    DRUG SCREEN COMMENT URINE SEE BELOW     Creatine, Urine 102.9 mg/dL    Amphetamine Screen, Urine PRESUMPTIVE NEGATIVE NEGATIVE    Barbiturate Screen, Urine PRESUMPTIVE NEGATIVE NEGATIVE    Cannabinoid Screen, Urine PRESUMPTIVE NEGATIVE NEGATIVE    Cocaine Screen, Urine PRESUMPTIVE NEGATIVE NEGATIVE    PCP Screen, Urine PRESUMPTIVE NEGATIVE NEGATIVE    7-Aminoclonazepam <25 Cutoff <25 ng/mL    Alpha-Hydroxyalprazolam <25 Cutoff <25 ng/mL    Alpha-Hydroxymidazolam <25 Cutoff <25 ng/mL    Alprazolam <25 Cutoff <25 ng/mL    Chlordiazepoxide <25 Cutoff <25 ng/mL    Clonazepam <25 Cutoff <25 ng/mL    Diazepam <25 Cutoff <25 ng/mL    Lorazepam <25 Cutoff <25 ng/mL    Midazolam <25 Cutoff <25 ng/mL    Nordiazepam <25 Cutoff <25 ng/mL    Oxazepam <25 Cutoff <25 ng/mL    Temazepam <25 Cutoff <25 ng/mL    Zolpidem <25 Cutoff <25 ng/mL    Zolpidem Metabolite (ZCA) <25 Cutoff <25 ng/mL    6-Acetylmorphine <25 Cutoff <25 ng/mL    Codeine <50 Cutoff <50 ng/mL    Hydrocodone <25 Cutoff <25 ng/mL    Hydromorphone <25 " Cutoff <25 ng/mL    Morphine Urine <50 Cutoff <50 ng/mL    Norhydrocodone <25 Cutoff <25 ng/mL    Noroxycodone <25 Cutoff <25 ng/mL    Oxycodone <25 Cutoff <25 ng/mL    Oxymorphone <25 Cutoff <25 ng/mL    Tramadol >1000 (A) Cutoff <50 ng/mL    O-Desmethyltramadol >1000 (A) Cutoff <50 ng/mL    Fentanyl <2.5 Cutoff<2.5 ng/mL    Norfentanyl <2.5 Cutoff<2.5 ng/mL    METHADONE CONFIRMATION,URINE <25 Cutoff <25 ng/mL    EDDP <25 Cutoff <25 ng/mL     Results are as expected.     Controlled Substance Agreement:  Date of the Last Agreement: 5/8/23  Reviewed Controlled Substance Agreement including but not limited to the benefits, risks, and alternatives to treatment with a Controlled Substance medication(s).    Opioids:  What is the patient's goal of therapy? Goals include ambulation, exercise, reduction in opoid-responsive pain and improved sleep.     Is this being achieved with current treatment? Yes    I have calculated the patient's Morphine Dose Equivalent (MED):   I have considered referral to Pain Management and/or a specialist, and do not feel it is necessary at this time.    I feel that it is clinically indicated to continue this current medication regimen after consideration of alternative therapies, and other non-opioid treatment.    Opioid Risk Screening:  No data recorded    Pain Assessment:  Analgesia  What was your pain level on average during the past week?: 8  What was your pain level at its worst during the past week?: 6  What percentage of your pain has been relieved during the past week?: 50 %  Is the amount of pain relief you are now obtaining from your current pain relievers enough to make a real difference in your life?: Y    Activities of Daily Living  Physical Functioning: Worse  Family Relationships: Same  Social Relationships: Same  Mood: Same  Sleep Patterns: Same  Overall Functioning: Same    Adverse Events  Is patient experiencing any side effects from current pain relievers?: N  Patient's  Overall Severity of Side Effects: None      Assessment  Is your overall impression that this patient is benefiting from opioid therapy?: Yes  Specific Analgesic Plan: Continue present regimen    Review of systems    Review of systems:   Const: No fever, fatigue,  malaise,   Eyes: No discharge/lachrimation  ENT: No congestion,  no sore throat  Neck: no mass, no swelling  CV: No chest pain, palpitations, dyspnea, new edema  Resp: No cough, sputum, wheeze, dyspnea at rest and on exertion,   GI: No dysphagia, epigastric pain, nausea, vomiting, anorexia, diarrhea,  constipation  : No Frequency, urgency, + dysuria chronic   MSK: No myalgia, joint pain , back pain,  joint swelling, joint stiffness  Integument: +itching, diffuse, no rash, axillary lesion   Neuro: No vision, change, headache, no numbness, tingling, or weakness  Psych: No confusion, + memory disturbabce, + anxiety, no depressed  affect;   Endo: No change is appetite, weight gain, weight loss, heat/cold intolerance, polydipsia, polyuria  Heme/lymph: no easy bleeding, easy bruising, recurrent infection, swollen lymph nodes    See HPI and assessment for comments on positives    Physical exam:  Well groomed, comfortable at rest.   BP: 114/69  Eyes: JOSE, normal pupils  ENT: Normal nasal  mucosa  CV: Normal heart sounds, RRR  Lungs clear, no wheeze or crackles  Neuro: Tone, power symmetric, no tremor  MSK: No joint tenderness or swelling  Integuement: No diaphoresis, several erythematous ulcerated lesions 1 in left axilla, 1 in the ear and 1 in nose  Extremities: no leg edema      Assessment and Plan:  #1.  Chronic pain, tramadol use-appears helpful for patient's accomplishment of functional goals, and not implicated in current symptoms.  OARRS, UDS and treatment agreement are in order.   She has enough to last till her next appointment, not reordered today  Plan: Return 8 weeks to reassess  #2.  Memory disturbance-not affect appear to be affecting executive  function  Ddx: Side effect of conversion from duloxetine to escitalopram; secondary to sleep disruption  Secondary to anxiety about planned move, although she is looking forward to that change.  Plan: Patient is tapering duloxetine while converting to escitalopram and will notify me when duloxetine is complete.  At that time if escitalopram is tolerated well we will increase to 20 mg and reassess.   #3.  Impetigo-strep versus staph; no major risks for MRSA but if not responsive would revise treatment  Plan Topical mupirocin TID 10 days, consider treating for MRSA if not resolving    See  me for your next visit in   8 weeks to follow-up chronic pain, memory disturbance and depression

## 2023-06-08 NOTE — PATIENT INSTRUCTIONS
Thanks for coming to Family Medicine for assessment and care of your chronic pain  Our goal is to provide enough pain control for you to exercise, work and enjoy life.  Since opioid medications can have serious side effects and can lose their effectiveness over time we will also focus on non-opioid  medications, counselling and other ways to control chronic pain.  Use your medications as they are prescribed; do not use alcohol or other sedating medications while using an opioid medication, and tell your doctor if you feel sedated or if the medication is no longer effective.  NARCAN (Naloxone) is a medication given in the nose to reverse an overdose of an opioid  and prevent an overdose related death.  Signs of overdose are: pinpoint pupils, inability to wake a person, snoring and slow breathing.  [place] law requires you to have a prescription for this medication if you are prescribed an opioid medication like morphine, oxycodone, tramadol and others.   If you see a person with these signs, GIVE NARCAN, 1 SPRAY IN ONE NOSTRIL.  REPEAT IN 1-2  MINUTES,  AND CALL 911.  Teach a family member how to use it, in case you yourself experience an accidental overdose.   If you use your medications as they are prescribed, this is less likely to happen.     Please attend all of your scheduled appointments, to increase your safety and to get the most effective care for your chronic pain.    Use mupirocin 3 times a day for 10 days on sores

## 2023-06-15 ENCOUNTER — LAB (OUTPATIENT)
Dept: LAB | Facility: LAB | Age: 73
End: 2023-06-15
Payer: MEDICAID

## 2023-06-15 DIAGNOSIS — L29.9 GENERALIZED PRURITUS: ICD-10-CM

## 2023-06-15 LAB
BASOPHILS (10*3/UL) IN BLOOD BY AUTOMATED COUNT: 0.07 X10E9/L (ref 0–0.1)
BASOPHILS/100 LEUKOCYTES IN BLOOD BY AUTOMATED COUNT: 0.7 % (ref 0–2)
EOSINOPHILS (10*3/UL) IN BLOOD BY AUTOMATED COUNT: 0.16 X10E9/L (ref 0–0.4)
EOSINOPHILS/100 LEUKOCYTES IN BLOOD BY AUTOMATED COUNT: 1.6 % (ref 0–6)
ERYTHROCYTE DISTRIBUTION WIDTH (RATIO) BY AUTOMATED COUNT: 15.9 % (ref 11.5–14.5)
ERYTHROCYTE MEAN CORPUSCULAR HEMOGLOBIN CONCENTRATION (G/DL) BY AUTOMATED: 29.5 G/DL (ref 32–36)
ERYTHROCYTE MEAN CORPUSCULAR VOLUME (FL) BY AUTOMATED COUNT: 85 FL (ref 80–100)
ERYTHROCYTES (10*6/UL) IN BLOOD BY AUTOMATED COUNT: 5.16 X10E12/L (ref 4–5.2)
HEMATOCRIT (%) IN BLOOD BY AUTOMATED COUNT: 43.8 % (ref 36–46)
HEMOGLOBIN (G/DL) IN BLOOD: 12.9 G/DL (ref 12–16)
IMMATURE GRANULOCYTES/100 LEUKOCYTES IN BLOOD BY AUTOMATED COUNT: 0.4 % (ref 0–0.9)
LEUKOCYTES (10*3/UL) IN BLOOD BY AUTOMATED COUNT: 10.3 X10E9/L (ref 4.4–11.3)
LYMPHOCYTES (10*3/UL) IN BLOOD BY AUTOMATED COUNT: 3.19 X10E9/L (ref 0.8–3)
LYMPHOCYTES/100 LEUKOCYTES IN BLOOD BY AUTOMATED COUNT: 31.1 % (ref 13–44)
MONOCYTES (10*3/UL) IN BLOOD BY AUTOMATED COUNT: 0.84 X10E9/L (ref 0.05–0.8)
MONOCYTES/100 LEUKOCYTES IN BLOOD BY AUTOMATED COUNT: 8.2 % (ref 2–10)
NEUTROPHILS (10*3/UL) IN BLOOD BY AUTOMATED COUNT: 5.95 X10E9/L (ref 1.6–5.5)
NEUTROPHILS/100 LEUKOCYTES IN BLOOD BY AUTOMATED COUNT: 58 % (ref 40–80)
NRBC (PER 100 WBCS) BY AUTOMATED COUNT: 0 /100 WBC (ref 0–0)
PLATELETS (10*3/UL) IN BLOOD AUTOMATED COUNT: 294 X10E9/L (ref 150–450)

## 2023-06-15 PROCEDURE — 85025 COMPLETE CBC W/AUTO DIFF WBC: CPT

## 2023-06-15 PROCEDURE — 36415 COLL VENOUS BLD VENIPUNCTURE: CPT

## 2023-06-20 DIAGNOSIS — G89.4 CHRONIC PAIN SYNDROME: Primary | ICD-10-CM

## 2023-06-20 RX ORDER — DULOXETIN HYDROCHLORIDE 60 MG/1
60 CAPSULE, DELAYED RELEASE ORAL DAILY
Qty: 30 CAPSULE | Refills: 11 | Status: SHIPPED | OUTPATIENT
Start: 2023-06-20 | End: 2023-06-27 | Stop reason: SDUPTHER

## 2023-06-27 DIAGNOSIS — G89.4 CHRONIC PAIN SYNDROME: ICD-10-CM

## 2023-06-27 RX ORDER — DULOXETIN HYDROCHLORIDE 60 MG/1
60 CAPSULE, DELAYED RELEASE ORAL DAILY
Qty: 30 CAPSULE | Refills: 0 | Status: SHIPPED | OUTPATIENT
Start: 2023-06-27 | End: 2023-07-26 | Stop reason: SDUPTHER

## 2023-06-27 NOTE — PROGRESS NOTES
Duloxetine 60 mg daily [facility] for [facility] days while awaiting new prescription of the same from [facility].

## 2023-06-28 DIAGNOSIS — L30.9 ECZEMA, UNSPECIFIED TYPE: Primary | ICD-10-CM

## 2023-06-28 RX ORDER — HYDROCORTISONE 25 MG/G
CREAM TOPICAL
Qty: 20 G | Refills: 10 | OUTPATIENT
Start: 2023-06-28 | End: 2024-04-11

## 2023-07-26 DIAGNOSIS — G89.4 CHRONIC PAIN SYNDROME: ICD-10-CM

## 2023-07-26 DIAGNOSIS — M51.9 LUMBAR DISC DISEASE: ICD-10-CM

## 2023-07-26 DIAGNOSIS — I25.10 CORONARY ARTERY DISEASE INVOLVING NATIVE HEART WITHOUT ANGINA PECTORIS, UNSPECIFIED VESSEL OR LESION TYPE: Primary | ICD-10-CM

## 2023-07-26 RX ORDER — DULOXETIN HYDROCHLORIDE 60 MG/1
60 CAPSULE, DELAYED RELEASE ORAL DAILY
Qty: 90 CAPSULE | Refills: 3 | Status: SHIPPED | OUTPATIENT
Start: 2023-07-26 | End: 2023-07-26 | Stop reason: SDUPTHER

## 2023-07-26 RX ORDER — TRAMADOL HYDROCHLORIDE 50 MG/1
100 TABLET ORAL 3 TIMES DAILY
Qty: 84 TABLET | Refills: 1 | Status: SHIPPED | OUTPATIENT
Start: 2023-07-26 | End: 2023-08-17 | Stop reason: SDUPTHER

## 2023-07-26 RX ORDER — DULOXETIN HYDROCHLORIDE 60 MG/1
60 CAPSULE, DELAYED RELEASE ORAL DAILY
Qty: 90 CAPSULE | Refills: 3 | Status: SHIPPED | OUTPATIENT
Start: 2023-07-26 | End: 2023-08-01 | Stop reason: SDUPTHER

## 2023-07-26 RX ORDER — PROPRANOLOL HYDROCHLORIDE 20 MG/1
20 TABLET ORAL 2 TIMES DAILY
Qty: 180 TABLET | Refills: 3 | Status: SHIPPED | OUTPATIENT
Start: 2023-07-26 | End: 2024-01-17 | Stop reason: ALTCHOICE

## 2023-08-01 RX ORDER — DULOXETIN HYDROCHLORIDE 60 MG/1
60 CAPSULE, DELAYED RELEASE ORAL DAILY
Qty: 30 CAPSULE | Refills: 0 | Status: SHIPPED | OUTPATIENT
Start: 2023-08-01 | End: 2023-08-29

## 2023-08-17 ENCOUNTER — OFFICE VISIT (OUTPATIENT)
Dept: PRIMARY CARE | Facility: CLINIC | Age: 73
End: 2023-08-17
Payer: MEDICAID

## 2023-08-17 VITALS
HEART RATE: 65 BPM | OXYGEN SATURATION: 98 % | BODY MASS INDEX: 33.07 KG/M2 | TEMPERATURE: 96.8 F | WEIGHT: 180.8 LBS | DIASTOLIC BLOOD PRESSURE: 68 MMHG | SYSTOLIC BLOOD PRESSURE: 112 MMHG

## 2023-08-17 DIAGNOSIS — L85.3 XEROSIS CUTIS: ICD-10-CM

## 2023-08-17 DIAGNOSIS — M51.9 LUMBAR DISC DISEASE: Primary | ICD-10-CM

## 2023-08-17 PROCEDURE — 99214 OFFICE O/P EST MOD 30 MIN: CPT | Performed by: FAMILY MEDICINE

## 2023-08-17 PROCEDURE — 1036F TOBACCO NON-USER: CPT | Performed by: FAMILY MEDICINE

## 2023-08-17 PROCEDURE — 3074F SYST BP LT 130 MM HG: CPT | Performed by: FAMILY MEDICINE

## 2023-08-17 PROCEDURE — 1125F AMNT PAIN NOTED PAIN PRSNT: CPT | Performed by: FAMILY MEDICINE

## 2023-08-17 PROCEDURE — 1159F MED LIST DOCD IN RCRD: CPT | Performed by: FAMILY MEDICINE

## 2023-08-17 PROCEDURE — 1160F RVW MEDS BY RX/DR IN RCRD: CPT | Performed by: FAMILY MEDICINE

## 2023-08-17 PROCEDURE — 3078F DIAST BP <80 MM HG: CPT | Performed by: FAMILY MEDICINE

## 2023-08-17 RX ORDER — TRAMADOL HYDROCHLORIDE 50 MG/1
100 TABLET ORAL 3 TIMES DAILY
Qty: 84 TABLET | Refills: 3 | Status: SHIPPED | OUTPATIENT
Start: 2023-08-20 | End: 2023-08-20

## 2023-08-17 RX ORDER — MELATONIN 3 MG
3 CAPSULE ORAL NIGHTLY
Qty: 30 CAPSULE | Refills: 2 | Status: SHIPPED | OUTPATIENT
Start: 2023-08-17 | End: 2023-12-21 | Stop reason: SDUPTHER

## 2023-08-17 NOTE — PROGRESS NOTES
HPI:  Opioid prescription for chronic pain due to lumbar radiculopathy  2. Changing all prescriptions to [facility]   3. Some insomnia while  coping with stress of moving; continues counselling    Medications reviewed and reconciled     Patient presents for assessment and continuation of buprenorphine/naloxone for OUD, and other issues as described below    Review of Systems:     Const: No  chills, diaphoresis,   ENT: No rhinorrhea, lacrimation, sneezing,   GI: No nausea, GI cramping, diarrhea, abnormal back and leg pain, tremulousness,   Opioid: No  opioid side effects including marked constipation, sedation, or leg edema.   No symptoms suggestive of intoxication or sedation.   Psych: NO major dysphoria;     OARRS:  Rose Shah MD on 8/17/2023  2:34 PM  I have personally reviewed the OARRS report for Sandra Eva. I have considered the risks of abuse, dependence, addiction and diversion    Is the patient prescribed a combination of a benzodiazepine and opioid?  Yes, I feel it is clincially indicated to continue the medication and have discussed with the patient risks/benefits/alternatives.    Last Urine Drug Screen / ordered today: No  Recent Results (from the past [id number] hour(s))   OPIATE/OPIOID/BENZO PRESCRIPTION COMPLIANCE    Collection Time: 11/30/22  2:02 PM   Result Value Ref Range    DRUG SCREEN COMMENT URINE SEE BELOW     Creatine, Urine 102.9 mg/dL    Amphetamine Screen, Urine PRESUMPTIVE NEGATIVE NEGATIVE    Barbiturate Screen, Urine PRESUMPTIVE NEGATIVE NEGATIVE    Cannabinoid Screen, Urine PRESUMPTIVE NEGATIVE NEGATIVE    Cocaine Screen, Urine PRESUMPTIVE NEGATIVE NEGATIVE    PCP Screen, Urine PRESUMPTIVE NEGATIVE NEGATIVE    7-Aminoclonazepam <25 Cutoff <25 ng/mL    Alpha-Hydroxyalprazolam <25 Cutoff <25 ng/mL    Alpha-Hydroxymidazolam <25 Cutoff <25 ng/mL    Alprazolam <25 Cutoff <25 ng/mL    Chlordiazepoxide <25 Cutoff <25 ng/mL    Clonazepam <25 Cutoff <25 ng/mL    Diazepam <25 Cutoff <25  ng/mL    Lorazepam <25 Cutoff <25 ng/mL    Midazolam <25 Cutoff <25 ng/mL    Nordiazepam <25 Cutoff <25 ng/mL    Oxazepam <25 Cutoff <25 ng/mL    Temazepam <25 Cutoff <25 ng/mL    Zolpidem <25 Cutoff <25 ng/mL    Zolpidem Metabolite (ZCA) <25 Cutoff <25 ng/mL    6-Acetylmorphine <25 Cutoff <25 ng/mL    Codeine <50 Cutoff <50 ng/mL    Hydrocodone <25 Cutoff <25 ng/mL    Hydromorphone <25 Cutoff <25 ng/mL    Morphine Urine <50 Cutoff <50 ng/mL    Norhydrocodone <25 Cutoff <25 ng/mL    Noroxycodone <25 Cutoff <25 ng/mL    Oxycodone <25 Cutoff <25 ng/mL    Oxymorphone <25 Cutoff <25 ng/mL    Tramadol >1000 (A) Cutoff <50 ng/mL    O-Desmethyltramadol >1000 (A) Cutoff <50 ng/mL    Fentanyl <2.5 Cutoff<2.5 ng/mL    Norfentanyl <2.5 Cutoff<2.5 ng/mL    METHADONE CONFIRMATION,URINE <25 Cutoff <25 ng/mL    EDDP <25 Cutoff <25 ng/mL     Results are as expected.     Controlled Substance Agreement:  Date of the Last Agreement: 8=5/18/2023  Reviewed Controlled Substance Agreement including but not limited to the benefits, risks, and alternatives to treatment with a Controlled Substance medication(s).    Opioids:  What is the patient's goal of therapy? Goals include ambulation, exercise, reduction in opoid-responsive pain, improved sleep, working in studio and moving     Is this being achieved with current treatment?     I have calculated the patient's Morphine Dose Equivalent (MED):   I have considered referral to Pain Management and/or a specialist, and do not feel it is necessary at this time.    I feel that it is clinically indicated to continue this current medication regimen after consideration of alternative therapies, and other non-opioid treatment.    Opioid Risk Screening:  No data recorded    Pain Assessment:  Assessment and Treatment plan:     Physical exam:  Well groomed, comfortable at rest.   BP: 112/68  Eyes: JOSE, normal pupils  Neck: Normal thyroid contour, nontender  CV: Normal heart sounds, RRR  Lungs clear, no  wheeze or crackles  Neuro: Tone, power symmetric, no tremor  MSK: No joint tenderness or swelling  Integuement: No diaphoresis, no rash,   Extremities: no leg edema    Analgesia  What was your pain level on average during the past week?: 5  What was your pain level at its worst during the past week?: 10 - Pain as bad as it can be  What percentage of your pain has been relieved during the past week?: 50 %  Query to Clinician: Is the patient's pain relief clinically significant?: Yes    Activities of Daily Living  Physical Functioning: Same  Family Relationships: Same  Social Relationships: Same  Mood: Same  Sleep Patterns: Same  Overall Functioning: Same    Adverse Events  Is patient experiencing any side effects from current pain relievers?: N  Patient's Overall Severity of Side Effects: None    Assessment  Is your overall impression that this patient is benefiting from opioid therapy?: Yes  Specific Analgesic Plan: Continue present regimen    Chronic pain due to  lumbar radiculopathy        Responsive to opioid medication      Patient is using opioid medication as prescribed with adequate control of chronic pain to permit achievement of functional goals. There are no aberrant behaviors noted, including sedation, loss of prescriptions or medications, running out early, multiple prescribers, use of illicit drugs or alcohol, deterioration of function.   See remainder of chronic pain assessment in HPI    Controlled Medication Agreement signed, in record  Continue medications: Tramadol 50 mg, 2 tabs TID  Non-pharmacologic treatments: Continues meetings, seeking a new sponsor   Urine drug screen and TA  up to date     Insomnia  Advised against diphenhydramine   Trail of melatonin 3 mg bedtime   Continue counselling       Return 8 weeks for chronic pain/opioid management  Last YAP 1-2022; plan to schedule at next visit   Return to Family Medicine to address other concerns

## 2023-08-18 ENCOUNTER — APPOINTMENT (OUTPATIENT)
Dept: PRIMARY CARE | Facility: CLINIC | Age: 73
End: 2023-08-18
Payer: MEDICAID

## 2023-08-28 DIAGNOSIS — G89.4 CHRONIC PAIN SYNDROME: ICD-10-CM

## 2023-08-29 RX ORDER — DULOXETIN HYDROCHLORIDE 60 MG/1
60 CAPSULE, DELAYED RELEASE ORAL DAILY
Qty: 30 CAPSULE | Refills: 0 | Status: SHIPPED | OUTPATIENT
Start: 2023-08-29 | End: 2023-08-29

## 2023-08-30 DIAGNOSIS — L29.9 GENERALIZED PRURITUS: Primary | ICD-10-CM

## 2023-08-30 DIAGNOSIS — N20.0 KIDNEY STONES: ICD-10-CM

## 2023-08-30 DIAGNOSIS — I25.10 CORONARY ARTERY DISEASE INVOLVING NATIVE HEART WITHOUT ANGINA PECTORIS, UNSPECIFIED VESSEL OR LESION TYPE: ICD-10-CM

## 2023-08-30 RX ORDER — TAMSULOSIN HYDROCHLORIDE 0.4 MG/1
0.4 CAPSULE ORAL DAILY
Qty: 90 CAPSULE | Refills: 3 | Status: SHIPPED | OUTPATIENT
Start: 2023-08-30 | End: 2023-08-30

## 2023-08-30 RX ORDER — CETIRIZINE HYDROCHLORIDE 10 MG/1
10 TABLET ORAL DAILY
Qty: 90 TABLET | Refills: 1 | Status: SHIPPED | OUTPATIENT
Start: 2023-08-30 | End: 2023-08-30

## 2023-08-30 RX ORDER — ASPIRIN 81 MG/1
81 TABLET ORAL DAILY
Qty: 90 TABLET | Refills: 3 | Status: SHIPPED | OUTPATIENT
Start: 2023-08-30 | End: 2023-08-30

## 2023-10-02 ENCOUNTER — TELEMEDICINE (OUTPATIENT)
Dept: BEHAVIORAL HEALTH | Facility: CLINIC | Age: 73
End: 2023-10-02
Payer: MEDICAID

## 2023-10-02 DIAGNOSIS — F43.10 PTSD (POST-TRAUMATIC STRESS DISORDER): Primary | ICD-10-CM

## 2023-10-02 DIAGNOSIS — F11.21 OPIOID USE DISORDER, MODERATE, IN SUSTAINED REMISSION (MULTI): ICD-10-CM

## 2023-10-02 PROCEDURE — 90837 PSYTX W PT 60 MINUTES: CPT | Performed by: PSYCHOLOGIST

## 2023-10-02 NOTE — PROGRESS NOTES
START TIME:  2 PM  END TIME:  3 PM    Diagnoses/Problems  PTSD  Opioid (heroin) use disorder, in sustained remission (since 3/25/99)      Likely narcissistic personality disorder traits, borderline personality disorder      Orders  Patient agreed to return for individual psychotherapy with this provider.    Note dictated with [employer] transcription software. Completed without full typed error editing and sent to avoid delay.      Past Medical History  Problems    · History of Abdominal pain, RUQ (right upper quadrant) (789.01) (R10.11)   · Resolved Date: 10 Apr 2019   · History of Acute cystitis without hematuria (595.0) (N30.00)   · Resolved Date: 10 Apr 2019   · History of Allergic rhinitis due to pollen, unspecified seasonality (477.0) (J30.1)   · Resolved Date: 10 Apr 2019   · History of Arteriolosclerosis (440.9) (I70.90)   · History of Bilateral dry eyes (375.15) (H04.123)   · History of Blepharitis, ulcerative (373.01) (H01.019)   · Resolved Date: 02 Aug 2019   · History of Bluish skin discoloration (782.5) (R23.0)   · Resolved Date: 11 Apr 2019   · History of Cataract of left eye (366.9) (H26.9)   · History of Cataract of left eye (366.9) (H26.9)   · History of Cataract of right eye (366.9) (H26.9)   · History of Cataract of right eye (366.9) (H26.9)   · History of Cataract, nuclear sclerotic, left eye (366.16) (H25.12)   · Resolved Date: 02 Aug 2019   · History of Cataract, nuclear sclerotic, right eye (366.16) (H25.11)   · Resolved Date: 02 Aug 2019   · History of Change in bowel habits (787.99) (R19.4)   · Resolved Date: 02 Aug 2019   · History of Combined form of age-related cataract, left eye (366.19) (H25.812)   · History of Combined form of age-related cataract, left eye (366.19) (H25.812)   · History of Cortical age-related cataract of left eye (366.15) (H25.012)   · Resolved Date: 02 Aug 2019   · History of Dentalgia (525.9) (K08.89)   · Resolved Date: 02 Aug 2019   · History of Dry eyes,  bilateral (375.15) (H04.123)   · Resolved Date: 02 Aug 2019   · History of actinic keratosis (V13.3) (Z87.2)   · Resolved Date: 11 Apr 2019   · History of acute sinusitis (V12.69) (Z87.09)   · Resolved Date: 03 Feb 2017   · History of acute sinusitis (V12.69) (Z87.09)   · Resolved Date: 04 Sep 2015   · History of acute sinusitis (V12.69) (Z87.09)   · Resolved Date: 10 Apr 2019   · History of acute sinusitis (V12.69) (Z87.09)   · Resolved Date: 29 Dec 2017   · History of anemia (V12.3) (Z86.2)   · Resolved Date: 04 Sep 2015   · Added by Problem List Migration; 2013-6-13; Moved to Suppressed Nov 23 2013  9:02PM   · History of chest pain (V13.89) (Z87.898)   · Resolved Date: 11 Apr 2019   · Added by Problem List Migration; 2013-6-13; Moved to Suppressed Nov 23 2013  9:02PM   · History of constipation (V12.79) (Z87.19)   · Resolved Date: 11 Apr 2019   · History of dizziness (V13.89) (Z87.898)   · Resolved Date: 04 Sep 2015   · History of dyspnea (V12.69) (Z87.898)   · Resolved Date: 10 Aug 2017   · History of epistaxis (V12.69) (Z87.898)   · Resolved Date: 01 Jul 2019   · History of fall (V15.88) (Z91.81)   · Resolved Date: 10 Apr 2019   · History of gastroesophageal reflux (GERD) (V12.79) (Z87.19)   · History of hemoptysis (V12.69) (Z87.898)   · Resolved Date: 11 Apr 2019   · History of hypertension (V12.59) (Z86.79)   · History of influenza vaccination (V49.89) (Z92.29)   · Resolved Date: 11 Apr 2019   · History of nasal discharge (V12.69) (Z87.09)   · Resolved Date: 10 Apr 2019   · History of paranasal sinus congestion (V12.69) (Z87.09)   · Resolved Date: 11 Apr 2019   · History of pneumococcal vaccination (V49.89) (Z92.29)   · Resolved Date: 01 Jul 2019   · History of pneumonia (V12.61) (Z87.01)   · Resolved Date: 02 Aug 2019   · History of sore throat (V12.69) (Z87.09)   · Resolved Date: 11 Apr 2019   · History of tinea corporis (V12.09) (Z86.19)   · Resolved Date: 02 Aug 2019   · History of urinary tract infection  (V13.02) (Z87.440)   · Resolved Date: 02 Aug 2019   · Hyperlipidemia (272.4) (E78.5)   · History of Immunization due (V05.9) (Z23)   · Resolved Date: 02 Aug 2019   · History of Leg pain (729.5) (M79.606)   · Resolved Date: 02 Aug 2019   · History of Light stools (792.1) (R19.5)   · Resolved Date: 02 Aug 2019   · History of MGD (meibomian gland disease) (373.00) (H02.889)   · Resolved Date: 02 Aug 2019   · History of Moderate opioid use disorder (304.00) (F11.20)   · Resolved Date: 09 Mar 2020   · History of Nasal crusting (478.19) (J34.89)   · Resolved Date: 11 Apr 2019   · History of Nasal septal perforation (478.19) (J34.89)   · Resolved Date: 24 Oct 2018   · History of Nasal septal perforation (478.19) (J34.89)   · Resolved Date: 02 Aug 2019   · History of Nausea in adult (787.02) (R11.0)   · Resolved Date: 11 Apr 2019   · History of Need for vaccination for zoster (V04.89) (Z23)   · Resolved Date: 02 Aug 2019   · History of Other chronic sinusitis (473.8) (J32.8)   · Resolved Date: 15 Gus 2018   · Personal history of arthritis (V13.4) (Z87.39)   · History of Pyuria (791.9) (R82.81)   · Resolved Date: 10 Apr 2019   · History of Radial nerve irritation (354.3) (G56.30)   · Resolved Date: 02 Aug 2019   · History of Right nephrolithiasis (592.0) (N20.0)   · History of Throat pain (784.1) (R07.0)   · Resolved Date: 11 Apr 2019    Family History  Mother    · Family history of alcoholism (V17.0) (Z81.1)   · Family history of cardiac disorder (V17.49) (Z82.49)   · Family history of diabetes mellitus (V18.0) (Z83.3)   · Family history of glaucoma (V19.11) (Z83.511)   · Family history of Mesothelioma  Father    · Family history of alcoholism (V17.0) (Z81.1)   · Family history of melanoma (V16.8) (Z80.8)  Sister    · Family history of macular degeneration (V19.19) (Z83.518)   · Family history of malignant neoplasm of breast (V16.3) (Z80.3)  Aunt    · Family history of macular degeneration (V19.19) (Z83.518)  Family History     · Family history of Malignant Melanoma Of The Skin (V16.8)    Social History  Problems    · Does not use illicit drugs (V49.89) (Z78.9)   · Ex-cigarette smoker (V15.82) (Z87.891)   · Feels safe at home   · No alcohol use   · Denied: History of Social alcohol use    Current Meds    Medication Name Instruction   Alendronate Sodium 70 MG Oral Tablet 1 TABLET A WEEK IN THE AM AT LEAST 30 MIN BEFORE 1ST FOOD,BEVERAGE,ORMEDICATIONS OF DAY   Aspirin Low Dose 81 MG Oral Tablet Delayed Release TAKE 1 TABLET BY MOUTH EVERY DAY   Cetirizine HCl - 10 MG Oral Tablet take 1 tablet by mouth once daily   Clotrimazole-Betamethasone 1-0.05 % External Cream APPLY  AND RUB  IN A THIN FILM TO AFFECTED AREAS TWICE DAILY.(AM AND PM).   Disability Placard Disability placard for vehicle, duration 5 years till 12-5-2019  DX: [id number]   DULoxetine HCl - 60 MG Oral Capsule Delayed Release Particles (Cymbalta) TAKE ONE (1) CAPSULE BY MOUTH ONCE DAILY   [employer] [place] Oral Packet TAKE AS DIRECTED.   Fluticasone Propionate 50 MCG/ACT Nasal Suspension (Flonase Allergy Relief) INSTILL ONE (1) SPRAY IN EACH NOSTRIL DAILY *DO NOT USE AFTER 5 DAYS*   Furosemide 20 MG Oral Tablet TAKE 1 TABLET BY MOUTH EVERY DAY AS DIRECTED FOR 3 DAYS (REPEAT AS DIRECTED)   Hydrocortisone 2.5 % External Cream APPLY TOPICALLY TO AFFECTED AREA TWICE DAILY   Ketoconazole 2 % External Cream APPLY SPARINGLY TO AFFECTED AREA(S) TWICE DAILY   Lidocaine 5 % External Patch APPLY 1 PATCH TO AFFECTED AREA ONCE DAILY *LEAVE IN PLACE FOR 12 HOURS, THEN REMOVE AND LEAVE OFF FOR 12 HOURS   Narcan 4 MG/0.1ML Nasal Liquid (Naloxone HCl) USE ONE SPRAY AS NEEDED. REPEAT IN 2 MINUTES IF NEEDED CALL EMS   Nystatin 587433 UNIT/GM External Powder Apply to the affected area daily as needed.   Ondansetron 4 MG Oral Tablet Disintegrating TAKE 1 MG 4 times daily   PEG 3350-KCl-Na Bicarb-NaCl 420 GM Oral Solution Reconstituted Take 1/2 of prep the evening prior to the procedure and  1/2 of prep the morning of the procedure.  Stay on clear liquid diet the enitre day.   Potassium Citrate 99 MG CAPS TAKE 1 CAPSULE Daily   Propranolol HCl - 20 MG Oral Tablet 20 mg AM, 40 mg PM   Shingrix 50 MCG/0.5ML Intramuscular Suspension Reconstituted (Shingrix 50 MCG/0.5ML Intramuscular Suspension Reconstituted) One dose IM   Shingrix 50 MCG/0.5ML Intramuscular Suspension Reconstituted (Shingrix 50 MCG/0.5ML Intramuscular Suspension Reconstituted) Please administer one dose now, and the secodn dose in 2-6 months   Sulfamethoxazole-Trimethoprim 800-160 MG Oral Tablet (Bactrim DS) TAKE 1 TABLET TWICE DAILY WITH FOOD.   Tamsulosin HCl - 0.4 MG Oral Capsule take 1 capsule by mouth once daily   traMADol HCl - 50 MG Oral Tablet TAKE 2 TABLETS 3 TIMES DAILY.   Vitamin B Complex Oral Tablet TAKE 1 TABLET DAILY.   Wrist Splint/Right Medium USE AS DIRECTED.     Mental Status Exam  No suicidal ideation nor intent.       Narrative    Telephone/Televideo Informed Consent for Psychotherapy was reviewed with the patient as follows:  There are potential benefits and risks of the use of telephone or video-conferencing that differ from in-person sessions. Specifically, the telephone or televideo system we are using may not be HIPAA compliant and may present limits to patient confidentiality. Confidentiality still applies for telepsychology services, and nobody will record the session without your permission. You agree to use the telephone or video-conferencing platform selected for our virtual sessions, and I will explain how to use it.  1) You need to use a webcam or smartphone during the session.  2) It is important that you be in a quiet, private space that is free of distractions (including cell phone or other devices) during the session.  3) It is important to use a secure internet connection rather than public/free Wi-Fi.  4) It is important to be on time. If you need to cancel or change your tele-appointment, you must  notify the psychologist in advance by phone or email.  5) We need a back-up plan (e.g., phone number where you can be reached) to restart the session or to reschedule it, in the event of technical problems.  6) We need a safety plan that includes at least one emergency contact and the closest emergency room to your location, in the event of a crisis situation.  7) If you are not an adult, we need the permission of your parent or legal guardian (and their contact information) for you to participate in telepsychology sessions.  Understanding and verbal agreement was attested to by the patient.    Patient was reached via video for today's session.  Patient reported that she still has not been able to get out of the house as much as she would like.  Her scooter is still not fixed and her brother just made fun of her when she tried to send him pictures of it.  She did ask her new sponsor if she knew anyone who could help possibly fix it because this new sponsor is a .  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  Patient wondered about if she needs to get a new one or if she can lease 1.  She talked again about how she feels that no one likes her or wants to be around her and continues to believe that this is entirely her fault.  We processed the patient's feelings related to this and agreed to follow up on it some more in the next session.

## 2023-10-03 ENCOUNTER — APPOINTMENT (OUTPATIENT)
Dept: PRIMARY CARE | Facility: CLINIC | Age: 73
End: 2023-10-03
Payer: MEDICAID

## 2023-10-10 DIAGNOSIS — M51.9 LUMBAR DISC DISEASE: ICD-10-CM

## 2023-10-10 DIAGNOSIS — G89.4 CHRONIC PAIN SYNDROME: ICD-10-CM

## 2023-10-11 ENCOUNTER — TELEPHONE (OUTPATIENT)
Dept: DERMATOLOGY | Facility: CLINIC | Age: 73
End: 2023-10-11

## 2023-10-11 PROBLEM — F43.10 PTSD (POST-TRAUMATIC STRESS DISORDER): Status: ACTIVE | Noted: 2023-10-11

## 2023-10-11 RX ORDER — DULOXETIN HYDROCHLORIDE 60 MG/1
60 CAPSULE, DELAYED RELEASE ORAL DAILY
Qty: 30 CAPSULE | Refills: 0 | Status: SHIPPED | OUTPATIENT
Start: 2023-10-11 | End: 2024-02-05 | Stop reason: WASHOUT

## 2023-10-11 RX ORDER — TRAMADOL HYDROCHLORIDE 50 MG/1
TABLET ORAL
Qty: 84 TABLET | Refills: 3 | Status: SHIPPED | OUTPATIENT
Start: 2023-10-11 | End: 2023-10-12 | Stop reason: SDUPTHER

## 2023-10-11 NOTE — TELEPHONE ENCOUNTER
Pt called today requesting refill of tramadol and duloxetine. Orders pended and routed to provider. Pt also stated believes she has he flu. Encouraged pt to go to ER/urgent care for assessment. Pt refused. Will schedule with primary once absent symptoms and afebrile.

## 2023-10-12 ENCOUNTER — PHARMACY VISIT (OUTPATIENT)
Dept: PHARMACY | Facility: CLINIC | Age: 73
End: 2023-10-12
Payer: MEDICAID

## 2023-10-12 DIAGNOSIS — G89.4 CHRONIC PAIN SYNDROME: ICD-10-CM

## 2023-10-12 DIAGNOSIS — M51.9 LUMBAR DISC DISEASE: ICD-10-CM

## 2023-10-12 DIAGNOSIS — G89.4 CHRONIC PAIN SYNDROME: Primary | ICD-10-CM

## 2023-10-12 PROCEDURE — RXMED WILLOW AMBULATORY MEDICATION CHARGE

## 2023-10-12 RX ORDER — DULOXETIN HYDROCHLORIDE 60 MG/1
60 CAPSULE, DELAYED RELEASE ORAL DAILY
Qty: 30 CAPSULE | Refills: 11 | Status: SHIPPED | OUTPATIENT
Start: 2023-11-08 | End: 2024-11-07

## 2023-10-12 RX ORDER — TRAMADOL HYDROCHLORIDE 50 MG/1
50 TABLET ORAL
Qty: 56 TABLET | Refills: 3 | OUTPATIENT
Start: 2023-10-12 | End: 2023-11-02 | Stop reason: DRUGHIGH

## 2023-10-13 ENCOUNTER — PHARMACY VISIT (OUTPATIENT)
Dept: PHARMACY | Facility: CLINIC | Age: 73
End: 2023-10-13
Payer: MEDICAID

## 2023-10-13 PROCEDURE — RXMED WILLOW AMBULATORY MEDICATION CHARGE

## 2023-10-26 ENCOUNTER — TELEPHONE (OUTPATIENT)
Dept: OTHER | Age: 73
End: 2023-10-26
Payer: MEDICAID

## 2023-10-26 ENCOUNTER — PHARMACY VISIT (OUTPATIENT)
Dept: PHARMACY | Facility: CLINIC | Age: 73
End: 2023-10-26
Payer: MEDICAID

## 2023-10-26 PROCEDURE — RXMED WILLOW AMBULATORY MEDICATION CHARGE

## 2023-10-26 NOTE — TELEPHONE ENCOUNTER
Asking for a phone call when possible.   Says having a crisis so when you can speak with her?  [phone].

## 2023-10-27 ENCOUNTER — PHARMACY VISIT (OUTPATIENT)
Dept: PHARMACY | Facility: CLINIC | Age: 73
End: 2023-10-27
Payer: MEDICAID

## 2023-10-27 ENCOUNTER — OFFICE VISIT (OUTPATIENT)
Dept: PRIMARY CARE | Facility: CLINIC | Age: 73
End: 2023-10-27
Payer: MEDICAID

## 2023-10-27 VITALS
HEIGHT: 62 IN | BODY MASS INDEX: 32.2 KG/M2 | DIASTOLIC BLOOD PRESSURE: 80 MMHG | WEIGHT: 175 LBS | SYSTOLIC BLOOD PRESSURE: 138 MMHG | HEART RATE: 58 BPM | OXYGEN SATURATION: 95 % | TEMPERATURE: 98.1 F

## 2023-10-27 DIAGNOSIS — Z23 IMMUNIZATION DUE: ICD-10-CM

## 2023-10-27 DIAGNOSIS — R09.81 SINUS CONGESTION: Primary | ICD-10-CM

## 2023-10-27 DIAGNOSIS — H61.23 BILATERAL IMPACTED CERUMEN: ICD-10-CM

## 2023-10-27 PROCEDURE — 1160F RVW MEDS BY RX/DR IN RCRD: CPT | Performed by: STUDENT IN AN ORGANIZED HEALTH CARE EDUCATION/TRAINING PROGRAM

## 2023-10-27 PROCEDURE — 3079F DIAST BP 80-89 MM HG: CPT | Performed by: STUDENT IN AN ORGANIZED HEALTH CARE EDUCATION/TRAINING PROGRAM

## 2023-10-27 PROCEDURE — 1125F AMNT PAIN NOTED PAIN PRSNT: CPT | Performed by: STUDENT IN AN ORGANIZED HEALTH CARE EDUCATION/TRAINING PROGRAM

## 2023-10-27 PROCEDURE — 90471 IMMUNIZATION ADMIN: CPT | Performed by: STUDENT IN AN ORGANIZED HEALTH CARE EDUCATION/TRAINING PROGRAM

## 2023-10-27 PROCEDURE — RXMED WILLOW AMBULATORY MEDICATION CHARGE

## 2023-10-27 PROCEDURE — 99213 OFFICE O/P EST LOW 20 MIN: CPT | Performed by: STUDENT IN AN ORGANIZED HEALTH CARE EDUCATION/TRAINING PROGRAM

## 2023-10-27 PROCEDURE — 1036F TOBACCO NON-USER: CPT | Performed by: STUDENT IN AN ORGANIZED HEALTH CARE EDUCATION/TRAINING PROGRAM

## 2023-10-27 PROCEDURE — 3075F SYST BP GE 130 - 139MM HG: CPT | Performed by: STUDENT IN AN ORGANIZED HEALTH CARE EDUCATION/TRAINING PROGRAM

## 2023-10-27 PROCEDURE — 1159F MED LIST DOCD IN RCRD: CPT | Performed by: STUDENT IN AN ORGANIZED HEALTH CARE EDUCATION/TRAINING PROGRAM

## 2023-10-27 PROCEDURE — 90662 IIV NO PRSV INCREASED AG IM: CPT | Performed by: STUDENT IN AN ORGANIZED HEALTH CARE EDUCATION/TRAINING PROGRAM

## 2023-10-27 RX ORDER — PSEUDOEPHEDRINE HCL 30 MG
30 TABLET ORAL EVERY 4 HOURS PRN
Qty: 30 TABLET | Refills: 0 | OUTPATIENT
Start: 2023-10-27 | End: 2024-04-11

## 2023-10-27 ASSESSMENT — PAIN SCALES - GENERAL: PAINLEVEL: 6

## 2023-10-27 ASSESSMENT — ENCOUNTER SYMPTOMS
DEPRESSION: 1
OCCASIONAL FEELINGS OF UNSTEADINESS: 1
LOSS OF SENSATION IN FEET: 0

## 2023-10-28 PROBLEM — H61.23 BILATERAL IMPACTED CERUMEN: Status: ACTIVE | Noted: 2023-10-28

## 2023-10-28 PROBLEM — Z23 IMMUNIZATION DUE: Status: ACTIVE | Noted: 2023-10-28

## 2023-10-28 PROBLEM — R09.81 SINUS CONGESTION: Status: ACTIVE | Noted: 2023-10-28

## 2023-11-01 ENCOUNTER — TELEPHONE (OUTPATIENT)
Dept: PRIMARY CARE | Facility: CLINIC | Age: 73
End: 2023-11-01
Payer: MEDICAID

## 2023-11-01 NOTE — PROGRESS NOTES
I saw and evaluated the patient. I personally obtained the key and critical portions of the history and physical exam or was physically present for key and critical portions performed by the resident/fellow. I reviewed the resident/fellow's documentation and discussed the patient with the resident/fellow. 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     Rose Shah MD

## 2023-11-01 NOTE — TELEPHONE ENCOUNTER
Discussed prescription discrepancy with insurer.  We were able to have the correct Sigg (tramadol 50 mg 2 tabs 3 times a day) approved for today, so the patient will not have any days without medication.  Her next appointment is December 21 and I will continue to prescribe tramadol until that time at 50 mg, 2 tablets 3 times a day, number 84 tablets for 14 days.     Patient called and requested for her TraMADol medication she said her mg is wrong and you stated that you would fix it

## 2023-11-02 ENCOUNTER — PHARMACY VISIT (OUTPATIENT)
Dept: PHARMACY | Facility: CLINIC | Age: 73
End: 2023-11-02
Payer: MEDICAID

## 2023-11-02 ENCOUNTER — HOSPITAL ENCOUNTER (OUTPATIENT)
Facility: HOSPITAL | Age: 73
Setting detail: OBSERVATION
Discharge: LEFT BEFORE DISCHARGE | End: 2023-11-03
Attending: EMERGENCY MEDICINE | Admitting: EMERGENCY MEDICINE
Payer: MEDICAID

## 2023-11-02 ENCOUNTER — APPOINTMENT (OUTPATIENT)
Dept: RADIOLOGY | Facility: HOSPITAL | Age: 73
End: 2023-11-02
Payer: MEDICAID

## 2023-11-02 VITALS
SYSTOLIC BLOOD PRESSURE: 108 MMHG | OXYGEN SATURATION: 95 % | RESPIRATION RATE: 18 BRPM | HEART RATE: 66 BPM | TEMPERATURE: 99.5 F | DIASTOLIC BLOOD PRESSURE: 57 MMHG

## 2023-11-02 DIAGNOSIS — E87.6 HYPOKALEMIA: ICD-10-CM

## 2023-11-02 DIAGNOSIS — J18.9 PNEUMONIA DUE TO INFECTIOUS ORGANISM, UNSPECIFIED LATERALITY, UNSPECIFIED PART OF LUNG: Primary | ICD-10-CM

## 2023-11-02 DIAGNOSIS — Z59.819 HOUSING SITUATION UNSTABLE: ICD-10-CM

## 2023-11-02 DIAGNOSIS — M51.9 LUMBAR DISC DISEASE: Primary | ICD-10-CM

## 2023-11-02 PROBLEM — R07.9 CHEST PAIN: Status: ACTIVE | Noted: 2023-11-02

## 2023-11-02 LAB
ALBUMIN SERPL BCP-MCNC: 2.9 G/DL (ref 3.4–5)
ALP SERPL-CCNC: 81 U/L (ref 33–136)
ALT SERPL W P-5'-P-CCNC: 21 U/L (ref 7–45)
ANION GAP SERPL CALC-SCNC: 13 MMOL/L (ref 10–20)
APPEARANCE UR: ABNORMAL
AST SERPL W P-5'-P-CCNC: 66 U/L (ref 9–39)
BILIRUB SERPL-MCNC: 0.4 MG/DL (ref 0–1.2)
BILIRUB UR STRIP.AUTO-MCNC: NEGATIVE MG/DL
BUN SERPL-MCNC: 22 MG/DL (ref 6–23)
CALCIUM SERPL-MCNC: 9.6 MG/DL (ref 8.6–10.6)
CAOX CRY #/AREA UR COMP ASSIST: ABNORMAL /HPF
CARDIAC TROPONIN I PNL SERPL HS: 4 NG/L (ref 0–34)
CHLORIDE SERPL-SCNC: 102 MMOL/L (ref 98–107)
CO2 SERPL-SCNC: 26 MMOL/L (ref 21–32)
COLOR UR: YELLOW
CREAT SERPL-MCNC: 1.09 MG/DL (ref 0.5–1.05)
ERYTHROCYTE [DISTWIDTH] IN BLOOD BY AUTOMATED COUNT: 14 % (ref 11.5–14.5)
FLUAV RNA RESP QL NAA+PROBE: NOT DETECTED
FLUBV RNA RESP QL NAA+PROBE: NOT DETECTED
GFR SERPL CREATININE-BSD FRML MDRD: 54 ML/MIN/1.73M*2
GLUCOSE SERPL-MCNC: 74 MG/DL (ref 74–99)
GLUCOSE UR STRIP.AUTO-MCNC: NEGATIVE MG/DL
HCT VFR BLD AUTO: 32.6 % (ref 36–46)
HGB BLD-MCNC: 9.9 G/DL (ref 12–16)
HYALINE CASTS #/AREA URNS AUTO: ABNORMAL /LPF
KETONES UR STRIP.AUTO-MCNC: ABNORMAL MG/DL
LEUKOCYTE ESTERASE UR QL STRIP.AUTO: NEGATIVE
MAGNESIUM SERPL-MCNC: 1.18 MG/DL (ref 1.6–2.4)
MCH RBC QN AUTO: 24.5 PG (ref 26–34)
MCHC RBC AUTO-ENTMCNC: 30.4 G/DL (ref 32–36)
MCV RBC AUTO: 81 FL (ref 80–100)
MUCOUS THREADS #/AREA URNS AUTO: ABNORMAL /LPF
NITRITE UR QL STRIP.AUTO: NEGATIVE
NRBC BLD-RTO: 0 /100 WBCS (ref 0–0)
PH UR STRIP.AUTO: 6 [PH]
PLATELET # BLD AUTO: 365 X10*3/UL (ref 150–450)
POTASSIUM SERPL-SCNC: 2.7 MMOL/L (ref 3.5–5.3)
POTASSIUM SERPL-SCNC: 6.2 MMOL/L (ref 3.5–5.3)
PROT SERPL-MCNC: 7.6 G/DL (ref 6.4–8.2)
PROT UR STRIP.AUTO-MCNC: ABNORMAL MG/DL
RBC # BLD AUTO: 4.04 X10*6/UL (ref 4–5.2)
RBC # UR STRIP.AUTO: NEGATIVE /UL
RBC #/AREA URNS AUTO: >20 /HPF
SARS-COV-2 RNA RESP QL NAA+PROBE: NOT DETECTED
SODIUM SERPL-SCNC: 135 MMOL/L (ref 136–145)
SP GR UR STRIP.AUTO: 1.02
SQUAMOUS #/AREA URNS AUTO: ABNORMAL /HPF
UROBILINOGEN UR STRIP.AUTO-MCNC: 2 MG/DL
WBC # BLD AUTO: 17.7 X10*3/UL (ref 4.4–11.3)
WBC #/AREA URNS AUTO: ABNORMAL /HPF

## 2023-11-02 PROCEDURE — 71046 X-RAY EXAM CHEST 2 VIEWS: CPT | Mod: FY

## 2023-11-02 PROCEDURE — 84484 ASSAY OF TROPONIN QUANT: CPT | Performed by: EMERGENCY MEDICINE

## 2023-11-02 PROCEDURE — 36415 COLL VENOUS BLD VENIPUNCTURE: CPT | Performed by: EMERGENCY MEDICINE

## 2023-11-02 PROCEDURE — 87636 SARSCOV2 & INF A&B AMP PRB: CPT | Performed by: EMERGENCY MEDICINE

## 2023-11-02 PROCEDURE — 99285 EMERGENCY DEPT VISIT HI MDM: CPT | Performed by: EMERGENCY MEDICINE

## 2023-11-02 PROCEDURE — 80053 COMPREHEN METABOLIC PANEL: CPT | Performed by: EMERGENCY MEDICINE

## 2023-11-02 PROCEDURE — 2500000004 HC RX 250 GENERAL PHARMACY W/ HCPCS (ALT 636 FOR OP/ED): Mod: SE | Performed by: EMERGENCY MEDICINE

## 2023-11-02 PROCEDURE — 99285 EMERGENCY DEPT VISIT HI MDM: CPT | Mod: 25 | Performed by: EMERGENCY MEDICINE

## 2023-11-02 PROCEDURE — 84132 ASSAY OF SERUM POTASSIUM: CPT | Performed by: EMERGENCY MEDICINE

## 2023-11-02 PROCEDURE — 71046 X-RAY EXAM CHEST 2 VIEWS: CPT | Performed by: RADIOLOGY

## 2023-11-02 PROCEDURE — 83735 ASSAY OF MAGNESIUM: CPT | Performed by: EMERGENCY MEDICINE

## 2023-11-02 PROCEDURE — 2500000001 HC RX 250 WO HCPCS SELF ADMINISTERED DRUGS (ALT 637 FOR MEDICARE OP): Mod: SE | Performed by: EMERGENCY MEDICINE

## 2023-11-02 PROCEDURE — G0378 HOSPITAL OBSERVATION PER HR: HCPCS

## 2023-11-02 PROCEDURE — 85027 COMPLETE CBC AUTOMATED: CPT | Performed by: EMERGENCY MEDICINE

## 2023-11-02 PROCEDURE — RXMED WILLOW AMBULATORY MEDICATION CHARGE

## 2023-11-02 PROCEDURE — 81001 URINALYSIS AUTO W/SCOPE: CPT | Performed by: EMERGENCY MEDICINE

## 2023-11-02 RX ORDER — DEXTROSE 50 % IN WATER (D50W) INTRAVENOUS SYRINGE
25 ONCE
Status: CANCELLED | OUTPATIENT
Start: 2023-11-02 | End: 2023-11-02

## 2023-11-02 RX ORDER — POTASSIUM CHLORIDE 20 MEQ/1
40 TABLET, EXTENDED RELEASE ORAL ONCE
Status: COMPLETED | OUTPATIENT
Start: 2023-11-02 | End: 2023-11-02

## 2023-11-02 RX ORDER — CALCIUM GLUCONATE 20 MG/ML
2 INJECTION, SOLUTION INTRAVENOUS ONCE
Status: CANCELLED | OUTPATIENT
Start: 2023-11-02 | End: 2023-11-02

## 2023-11-02 RX ORDER — DEXTROSE MONOHYDRATE 100 MG/ML
50 INJECTION, SOLUTION INTRAVENOUS CONTINUOUS
Status: CANCELLED | OUTPATIENT
Start: 2023-11-02 | End: 2023-11-03

## 2023-11-02 RX ORDER — TRAMADOL HYDROCHLORIDE 50 MG/1
100 TABLET ORAL 3 TIMES DAILY
Qty: 84 TABLET | Refills: 0 | Status: SHIPPED | OUTPATIENT
Start: 2023-11-02 | End: 2023-11-10 | Stop reason: SDUPTHER

## 2023-11-02 RX ORDER — MIDAZOLAM HYDROCHLORIDE 1 MG/ML
5 INJECTION INTRAMUSCULAR; INTRAVENOUS ONCE
Status: COMPLETED | OUTPATIENT
Start: 2023-11-02 | End: 2023-11-03

## 2023-11-02 RX ORDER — MAGNESIUM SULFATE HEPTAHYDRATE 40 MG/ML
2 INJECTION, SOLUTION INTRAVENOUS ONCE
Status: COMPLETED | OUTPATIENT
Start: 2023-11-02 | End: 2023-11-03

## 2023-11-02 RX ORDER — AZITHROMYCIN 500 MG/1
500 TABLET, FILM COATED ORAL ONCE
Status: COMPLETED | OUTPATIENT
Start: 2023-11-02 | End: 2023-11-02

## 2023-11-02 RX ORDER — LEVOFLOXACIN 5 MG/ML
750 INJECTION, SOLUTION INTRAVENOUS EVERY 24 HOURS
Status: CANCELLED | OUTPATIENT
Start: 2023-11-02 | End: 2023-11-03

## 2023-11-02 RX ORDER — CEFTRIAXONE 1 G/50ML
1 INJECTION, SOLUTION INTRAVENOUS ONCE
Status: COMPLETED | OUTPATIENT
Start: 2023-11-02 | End: 2023-11-02

## 2023-11-02 RX ADMIN — AZITHROMYCIN DIHYDRATE 500 MG: 500 TABLET ORAL at 20:51

## 2023-11-02 RX ADMIN — CEFTRIAXONE SODIUM 1 G: 1 INJECTION, SOLUTION INTRAVENOUS at 20:13

## 2023-11-02 RX ADMIN — POTASSIUM CHLORIDE 40 MEQ: 1500 TABLET, EXTENDED RELEASE ORAL at 22:23

## 2023-11-02 SDOH — ECONOMIC STABILITY - HOUSING INSECURITY: HOUSING INSTABILITY UNSPECIFIED: Z59.819

## 2023-11-02 ASSESSMENT — COLUMBIA-SUICIDE SEVERITY RATING SCALE - C-SSRS
1. IN THE PAST MONTH, HAVE YOU WISHED YOU WERE DEAD OR WISHED YOU COULD GO TO SLEEP AND NOT WAKE UP?: NO
6. HAVE YOU EVER DONE ANYTHING, STARTED TO DO ANYTHING, OR PREPARED TO DO ANYTHING TO END YOUR LIFE?: NO
2. HAVE YOU ACTUALLY HAD ANY THOUGHTS OF KILLING YOURSELF?: NO

## 2023-11-02 ASSESSMENT — LIFESTYLE VARIABLES
REASON UNABLE TO ASSESS: YES
EVER HAD A DRINK FIRST THING IN THE MORNING TO STEADY YOUR NERVES TO GET RID OF A HANGOVER: NO
HAVE PEOPLE ANNOYED YOU BY CRITICIZING YOUR DRINKING: NO
EVER FELT BAD OR GUILTY ABOUT YOUR DRINKING: NO
HAVE YOU EVER FELT YOU SHOULD CUT DOWN ON YOUR DRINKING: NO

## 2023-11-02 ASSESSMENT — PAIN DESCRIPTION - PROGRESSION: CLINICAL_PROGRESSION: NOT CHANGED

## 2023-11-02 ASSESSMENT — PAIN - FUNCTIONAL ASSESSMENT: PAIN_FUNCTIONAL_ASSESSMENT: 0-10

## 2023-11-02 ASSESSMENT — PAIN SCALES - GENERAL: PAINLEVEL_OUTOF10: 3

## 2023-11-02 NOTE — ED TRIAGE NOTES
Pt arrives via EMS. When patient arrived to her ball bed - she became verbally aggressive and threatened to leave because she refused to be in a ball bed and only wanted to stay if she was in a regular room. RN informed patient that there was no rooms available at this time and if she wanted to leave she was able to.     Pt reports left sided CP that began yesterday. She is unable to describe the pain any further but states it goes up her neck and down her arm.     At this time, patient is refusing all care except an EKG. She is aware of her low grade fever and refused tylenol. She is also refusing blood work.
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
## 2023-11-03 ENCOUNTER — CLINICAL SUPPORT (OUTPATIENT)
Dept: EMERGENCY MEDICINE | Facility: HOSPITAL | Age: 73
End: 2023-11-03
Payer: MEDICAID

## 2023-11-03 LAB
ATRIAL RATE: 66 BPM
ATRIAL RATE: 72 BPM
P AXIS: 17 DEGREES
P AXIS: 33 DEGREES
P OFFSET: 203 MS
P OFFSET: 207 MS
P ONSET: 155 MS
P ONSET: 171 MS
PR INTERVAL: 126 MS
PR INTERVAL: 94 MS
Q ONSET: 218 MS
Q ONSET: 218 MS
QRS COUNT: 11 BEATS
QRS COUNT: 12 BEATS
QRS DURATION: 72 MS
QRS DURATION: 74 MS
QT INTERVAL: 380 MS
QT INTERVAL: 390 MS
QTC CALCULATION(BAZETT): 398 MS
QTC CALCULATION(BAZETT): 427 MS
QTC FREDERICIA: 392 MS
QTC FREDERICIA: 414 MS
R AXIS: 76 DEGREES
R AXIS: 77 DEGREES
T AXIS: 16 DEGREES
T AXIS: 33 DEGREES
T OFFSET: 408 MS
T OFFSET: 413 MS
VENTRICULAR RATE: 66 BPM
VENTRICULAR RATE: 72 BPM

## 2023-11-03 PROCEDURE — 96366 THER/PROPH/DIAG IV INF ADDON: CPT

## 2023-11-03 PROCEDURE — 93005 ELECTROCARDIOGRAM TRACING: CPT

## 2023-11-03 PROCEDURE — 96375 TX/PRO/DX INJ NEW DRUG ADDON: CPT

## 2023-11-03 PROCEDURE — G0378 HOSPITAL OBSERVATION PER HR: HCPCS

## 2023-11-03 PROCEDURE — 2500000004 HC RX 250 GENERAL PHARMACY W/ HCPCS (ALT 636 FOR OP/ED): Mod: SE | Performed by: EMERGENCY MEDICINE

## 2023-11-03 PROCEDURE — 96367 TX/PROPH/DG ADDL SEQ IV INF: CPT

## 2023-11-03 PROCEDURE — 96365 THER/PROPH/DIAG IV INF INIT: CPT

## 2023-11-03 PROCEDURE — 93005 ELECTROCARDIOGRAM TRACING: CPT | Mod: 59

## 2023-11-03 RX ADMIN — MIDAZOLAM HYDROCHLORIDE 5 MG: 1 INJECTION, SOLUTION INTRAMUSCULAR; INTRAVENOUS at 00:17

## 2023-11-03 RX ADMIN — MAGNESIUM SULFATE HEPTAHYDRATE 2 G: 40 INJECTION, SOLUTION INTRAVENOUS at 00:20

## 2023-11-03 NOTE — ED NOTES
Patient yelling in hallway stating we are keeping her against her will. Patient is alert and oriented x4 upset she is admitted and does not have an admission bed. Patient is verbally abusive and aggressive to staff. Therapeutic communication attempted by multiple RN's without success. ED providers aware. Patient stated she is leaving and refuses to sign AMA paperwork. Patient was told she would have to find her own ride home if leaving AMA. Patient said she doesn't have anyone but wanted to leave anyway. Patient was advised she would be provided a ride home if she stayed and continued to receive care. Patient refused and attempted to roll herself via the wheelchair to the waiting room. Patient removed her own IV and escorted to the waiting room to set-up her own transportation with the assistance of PD. Note patient ambulated in the hallway independently with the use of her cane.      Claudia Cook RN  11/03/23 [id number]
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
## 2023-11-10 DIAGNOSIS — M51.9 LUMBAR DISC DISEASE: ICD-10-CM

## 2023-11-10 RX ORDER — TRAMADOL HYDROCHLORIDE 50 MG/1
100 TABLET ORAL 3 TIMES DAILY
Qty: 84 TABLET | Refills: 2 | Status: SHIPPED | OUTPATIENT
Start: 2023-11-16 | End: 2023-12-21 | Stop reason: SDUPTHER

## 2023-11-14 ENCOUNTER — TELEPHONE (OUTPATIENT)
Dept: PRIMARY CARE | Facility: CLINIC | Age: 73
End: 2023-11-14
Payer: MEDICAID

## 2023-11-14 DIAGNOSIS — J18.9 PNEUMONIA OF BOTH LOWER LOBES DUE TO INFECTIOUS ORGANISM: Primary | ICD-10-CM
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
## 2023-11-16 ENCOUNTER — PHARMACY VISIT (OUTPATIENT)
Dept: PHARMACY | Facility: CLINIC | Age: 73
End: 2023-11-16
Payer: MEDICAID

## 2023-11-16 PROCEDURE — RXMED WILLOW AMBULATORY MEDICATION CHARGE

## 2023-11-16 RX ORDER — LEVOFLOXACIN 750 MG/1
750 TABLET ORAL DAILY
Qty: 7 TABLET | Refills: 0 | Status: SHIPPED | OUTPATIENT
Start: 2023-11-16 | End: 2023-11-29

## 2023-11-19 ENCOUNTER — PHARMACY VISIT (OUTPATIENT)
Dept: PHARMACY | Facility: CLINIC | Age: 73
End: 2023-11-19
Payer: MEDICAID

## 2023-11-19 PROCEDURE — RXMED WILLOW AMBULATORY MEDICATION CHARGE

## 2023-11-30 ENCOUNTER — PHARMACY VISIT (OUTPATIENT)
Dept: PHARMACY | Facility: CLINIC | Age: 73
End: 2023-11-30
Payer: MEDICAID

## 2023-11-30 PROCEDURE — RXMED WILLOW AMBULATORY MEDICATION CHARGE

## 2023-12-11 ENCOUNTER — APPOINTMENT (OUTPATIENT)
Dept: PRIMARY CARE | Facility: CLINIC | Age: 73
End: 2023-12-11
Payer: MEDICAID

## 2023-12-13 ENCOUNTER — PHARMACY VISIT (OUTPATIENT)
Dept: PHARMACY | Facility: CLINIC | Age: 73
End: 2023-12-13
Payer: MEDICAID

## 2023-12-13 PROCEDURE — RXMED WILLOW AMBULATORY MEDICATION CHARGE

## 2023-12-21 ENCOUNTER — OFFICE VISIT (OUTPATIENT)
Dept: PRIMARY CARE | Facility: CLINIC | Age: 73
End: 2023-12-21
Payer: MEDICAID

## 2023-12-21 ENCOUNTER — PHARMACY VISIT (OUTPATIENT)
Dept: PHARMACY | Facility: CLINIC | Age: 73
End: 2023-12-21
Payer: MEDICAID

## 2023-12-21 VITALS
TEMPERATURE: 97.5 F | BODY MASS INDEX: 31.88 KG/M2 | WEIGHT: 174.3 LBS | OXYGEN SATURATION: 97 % | HEART RATE: 87 BPM | DIASTOLIC BLOOD PRESSURE: 79 MMHG | SYSTOLIC BLOOD PRESSURE: 161 MMHG

## 2023-12-21 DIAGNOSIS — J34.89 TENDERNESS OVER FRONTAL SINUS: ICD-10-CM

## 2023-12-21 DIAGNOSIS — M51.9 LUMBAR DISC DISEASE: ICD-10-CM

## 2023-12-21 DIAGNOSIS — R05.8 SPUTUM PRODUCTION: ICD-10-CM

## 2023-12-21 DIAGNOSIS — F43.24 ADJUSTMENT DISORDER WITH DISTURBANCE OF CONDUCT: ICD-10-CM

## 2023-12-21 DIAGNOSIS — Z79.899 HIGH RISK MEDICATION USE: Primary | ICD-10-CM

## 2023-12-21 PROCEDURE — 1160F RVW MEDS BY RX/DR IN RCRD: CPT | Performed by: FAMILY MEDICINE

## 2023-12-21 PROCEDURE — 1159F MED LIST DOCD IN RCRD: CPT | Performed by: FAMILY MEDICINE

## 2023-12-21 PROCEDURE — 1036F TOBACCO NON-USER: CPT | Performed by: FAMILY MEDICINE

## 2023-12-21 PROCEDURE — RXMED WILLOW AMBULATORY MEDICATION CHARGE

## 2023-12-21 PROCEDURE — 1125F AMNT PAIN NOTED PAIN PRSNT: CPT | Performed by: FAMILY MEDICINE

## 2023-12-21 PROCEDURE — 99215 OFFICE O/P EST HI 40 MIN: CPT | Performed by: FAMILY MEDICINE

## 2023-12-21 PROCEDURE — 3078F DIAST BP <80 MM HG: CPT | Performed by: FAMILY MEDICINE

## 2023-12-21 PROCEDURE — 3077F SYST BP >= 140 MM HG: CPT | Performed by: FAMILY MEDICINE

## 2023-12-21 RX ORDER — TRAMADOL HYDROCHLORIDE 50 MG/1
100 TABLET ORAL 3 TIMES DAILY
Qty: 84 TABLET | Refills: 2 | Status: SHIPPED | OUTPATIENT
Start: 2023-12-27 | End: 2024-02-05 | Stop reason: SDUPTHER

## 2023-12-21 RX ORDER — TALC
3 POWDER (GRAM) TOPICAL NIGHTLY
Qty: 30 TABLET | Refills: 2 | Status: SHIPPED | OUTPATIENT
Start: 2023-12-21 | End: 2024-03-13 | Stop reason: SDUPTHER

## 2023-12-21 ASSESSMENT — ENCOUNTER SYMPTOMS
EYE PAIN: 0
CONSTIPATION: 0
BACK PAIN: 1
HEADACHES: 0
SORE THROAT: 0
ACTIVITY CHANGE: 1
DYSPHORIC MOOD: 1
SINUS PRESSURE: 1
CHOKING: 0
CONFUSION: 0
SHORTNESS OF BREATH: 0
DYSURIA: 0
ADENOPATHY: 0
STRIDOR: 0
DIARRHEA: 0
COUGH: 1
APPETITE CHANGE: 1
PALPITATIONS: 0
UNEXPECTED WEIGHT CHANGE: 0
CHEST TIGHTNESS: 0
WHEEZING: 0
APNEA: 0
TROUBLE SWALLOWING: 0
SLEEP DISTURBANCE: 1
FEVER: 0
EYE DISCHARGE: 0
WEAKNESS: 1
FATIGUE: 1
NERVOUS/ANXIOUS: 0
EYE REDNESS: 0
ARTHRALGIAS: 0
LIGHT-HEADEDNESS: 1
FACIAL SWELLING: 0
DIZZINESS: 0
SINUS PAIN: 1
RHINORRHEA: 0
VOICE CHANGE: 0
AGITATION: 0

## 2023-12-21 NOTE — LETTER
To whom it may concern,    Sandra Velasquez is our patient in the [facility]. She is severely ill and has been referred for treatment for her illness-related stress and for anger management.    Sincerely,     Rose Shah MD

## 2023-12-21 NOTE — PROGRESS NOTES
"Subjective   Patient ID: Sandra Velasquez is a 73 y.o. female who presents for Med Refill and Follow-up.    HPI:  Cough with sputum like \"pieeces of plastic with fibers\" since treated for pneumonia in early November.  COVID-negative at that time.  She was treated with azithromycin and ceftriaxone, noting penicillin allergy, we had phone call November 20 indicating she was still symptomatic with cough sputum congestion and frontal tenderness although somewhat better than she had been.   2.  She describes increased anger and outbursts like the one in the ED that led to her leaving AMA.  She feels that these episodes are due to increased irritability because she is not feeling well.  She would like to seek specific counseling for anger management and has been required to do so by the manager of the building where she now lives  3.  Tramadol continues to be effective for chronic pain and she would like to continue, see note below.       OARRS:  No data recorded  I have personally reviewed the OARRS report for Sandra Velasquez. I have considered the risks of abuse, dependence, addiction and diversion    Is the patient prescribed a combination of a benzodiazepine and opioid?  No    Last Urine Drug Screen / ordered today: Yes    N/A      Controlled Substance Agreement:  Date of the Last Agreement: 5/18/23  Reviewed Controlled Substance Agreement including but not limited to the benefits, risks, and alternatives to treatment with a Controlled Substance medication(s).    Opioids:  What is the patient's goal of therapy? Goals include remaining independent, able to use transport, reduction in opoid-responsive pain and improved sleep.     Is this being achieved with current treatment? Yes    I have calculated the patient's Morphine Dose Equivalent (MED):   I have considered referral to Pain Management and/or a specialist, and do not feel it is necessary at this time.    I feel that it is clinically indicated to continue this " current medication regimen after consideration of alternative therapies, and other non-opioid treatment.    Opioid Risk Screening:  No data recorded    Pain Assessment:  Analgesia  What was your pain level on average during the past week?: 3  What was your pain level at its worst during the past week?: 7  Is the amount of pain relief you are now obtaining from your current pain relievers enough to make a real difference in your life?: Y  Query to Clinician: Is the patient's pain relief clinically significant?: Yes    Activities of Daily Living  Physical Functioning: Same  Family Relationships: Same  Social Relationships: Worse  Mood: Worse  Sleep Patterns: Same  Overall Functioning: Same    Adverse Events  Is patient experiencing any side effects from current pain relievers?: N  Patient's Overall Severity of Side Effects: None      Assessment  Is your overall impression that this patient is benefiting from opioid therapy?: Yes  Specific Analgesic Plan: Continue present regimen      Medications reviewed and reconciled      Review of Systems   Constitutional:  Positive for activity change, appetite change and fatigue. Negative for fever and unexpected weight change.   HENT:  Positive for congestion, sinus pressure and sinus pain. Negative for facial swelling, hearing loss, nosebleeds, rhinorrhea, sneezing, sore throat, trouble swallowing and voice change.    Eyes:  Negative for pain, discharge, redness and visual disturbance.   Respiratory:  Positive for cough. Negative for apnea, choking, chest tightness, shortness of breath, wheezing and stridor.    Cardiovascular:  Negative for chest pain, palpitations and leg swelling.   Gastrointestinal:  Negative for constipation and diarrhea.   Genitourinary:  Negative for dysuria.   Musculoskeletal:  Positive for back pain. Negative for arthralgias.   Neurological:  Positive for weakness and light-headedness. Negative for dizziness, syncope and headaches.   Hematological:   Negative for adenopathy.   Psychiatric/Behavioral:  Positive for behavioral problems, dysphoric mood and sleep disturbance. Negative for agitation, confusion, self-injury and suicidal ideas. The patient is not nervous/anxious.        Objective   /79 (BP Location: Left arm, Patient Position: Sitting, BP Cuff Size: Adult)   Pulse 87   Temp 36.4 °C (97.5 °F) (Temporal)   Wt 79.1 kg (174 lb 4.8 oz)   SpO2 97%   BMI 31.88 kg/m²     Physical exam:  Well groomed, comfortable at rest.   BP: 161/79, afebrile RR 18  Eyes: JOSE, normal pupils, no discharge  ENT: Normal nasal  mucosa  Bifrontal tenderness   Neck: Normal thyroid contour, nontender  CV: Normal heart sounds, RRR  Lungs clear, no wheeze or crackles; + upper airway noise   Abdomen soft, colostomy bag in place; intertrigo  Neuro: Tone, power symmetric, no tremor  MSK: No joint tenderness or swelling  Integuement: Intertrigo at pannus; full thickness ulcer R upper arm   Extremities: no leg edema          Assessment/Plan     Oioid for chronic pain:       Responsive to opioid medication      Patient is using opioid medication as prescribed with adequate control of chronic pain to permit achievement of functional goals. There are no aberrant behaviors noted, including sedation, loss of prescriptions or medications, running out early, multiple prescribers, use of illicit drugs or alcohol, deterioration of function.   See remainder of chronic pain assessment in HPI    Controlled Medication Agreement signed, in record  Continue medications: Tramadol 50 mg, 2 tabs TID   Non-paharmacologic treatments:   Urine drug screen and TA  up to date   Return 8 weeks for chronic pain/opioid management  Return to Family Medicine to address other concerns    Sputum production, fatigue, malaise, Tenderness over frontal sinus  Exam suggests an upper respiratory source, Ddx viral   pneumonia; longrecovery from viral bronchitis/sinusitis     -     Renal function panel;   -     CBC  and Auto Differential;   -     Legionella Antigen, Urine;   -     Fungal Culture/Smear  -     Respiratory Culture/Smear  -     CT sinus w and wo IV contrast;     Defer further antibiotics since pt is afebrile (had ceftriaxone, azithro) till workup determines need.     Adjustment disorder with disturbance of conduct  -     New referral to Psychology foranger management- patient has an appointment on 1/2/23  Letter released to patient electronically     Other orders  -     Follow Up In Primary Care - Has melissa't in January with Dr. Corrales.

## 2023-12-27 ENCOUNTER — PHARMACY VISIT (OUTPATIENT)
Dept: PHARMACY | Facility: CLINIC | Age: 73
End: 2023-12-27
Payer: MEDICAID

## 2023-12-27 PROCEDURE — RXMED WILLOW AMBULATORY MEDICATION CHARGE

## 2024-01-02 ENCOUNTER — TELEMEDICINE (OUTPATIENT)
Dept: BEHAVIORAL HEALTH | Facility: CLINIC | Age: 74
End: 2024-01-02
Payer: MEDICAID

## 2024-01-02 DIAGNOSIS — F11.21 OPIOID USE DISORDER, MODERATE, IN SUSTAINED REMISSION (MULTI): ICD-10-CM

## 2024-01-02 DIAGNOSIS — F43.10 PTSD (POST-TRAUMATIC STRESS DISORDER): Primary | ICD-10-CM

## 2024-01-02 PROCEDURE — 90837 PSYTX W PT 60 MINUTES: CPT | Performed by: PSYCHOLOGIST

## 2024-01-02 NOTE — PROGRESS NOTES
START TIME:  11 AM  END TIME:  11:55 AM    Diagnoses/Problems  PTSD  Opioid (heroin) use disorder, in sustained remission (since 3/25/99)      Likely narcissistic personality disorder traits, borderline personality disorder      Orders  Patient agreed to return for individual psychotherapy with this provider.    Note dictated with [email or web address] transcription software. Completed without full typed error editing and sent to avoid delay.      Past Medical History  Problems    · History of Abdominal pain, RUQ (right upper quadrant) (789.01) (R10.11)   · Resolved Date: 10 Apr 2019   · History of Acute cystitis without hematuria (595.0) (N30.00)   · Resolved Date: 10 Apr 2019   · History of Allergic rhinitis due to pollen, unspecified seasonality (477.0) (J30.1)   · Resolved Date: 10 Apr 2019   · History of Arteriolosclerosis (440.9) (I70.90)   · History of Bilateral dry eyes (375.15) (H04.123)   · History of Blepharitis, ulcerative (373.01) (H01.019)   · Resolved Date: 02 Aug 2019   · History of Bluish skin discoloration (782.5) (R23.0)   · Resolved Date: 11 Apr 2019   · History of Cataract of left eye (366.9) (H26.9)   · History of Cataract of left eye (366.9) (H26.9)   · History of Cataract of right eye (366.9) (H26.9)   · History of Cataract of right eye (366.9) (H26.9)   · History of Cataract, nuclear sclerotic, left eye (366.16) (H25.12)   · Resolved Date: 02 Aug 2019   · History of Cataract, nuclear sclerotic, right eye (366.16) (H25.11)   · Resolved Date: 02 Aug 2019   · History of Change in bowel habits (787.99) (R19.4)   · Resolved Date: 02 Aug 2019   · History of Combined form of age-related cataract, left eye (366.19) (H25.812)   · History of Combined form of age-related cataract, left eye (366.19) (H25.812)   · History of Cortical age-related cataract of left eye (366.15) (H25.012)   · Resolved Date: 02 Aug 2019   · History of Dentalgia (525.9) (K08.89)   · Resolved Date: 02 Aug 2019   · History of Dry  eyes, bilateral (375.15) (H04.123)   · Resolved Date: 02 Aug 2019   · History of actinic keratosis (V13.3) (Z87.2)   · Resolved Date: 11 Apr 2019   · History of acute sinusitis (V12.69) (Z87.09)   · Resolved Date: 03 Feb 2017   · History of acute sinusitis (V12.69) (Z87.09)   · Resolved Date: 04 Sep 2015   · History of acute sinusitis (V12.69) (Z87.09)   · Resolved Date: 10 Apr 2019   · History of acute sinusitis (V12.69) (Z87.09)   · Resolved Date: 29 Dec 2017   · History of anemia (V12.3) (Z86.2)   · Resolved Date: 04 Sep 2015   · Added by Problem List Migration; 2013-6-13; Moved to Suppressed Nov 23 2013  9:02PM   · History of chest pain (V13.89) (Z87.898)   · Resolved Date: 11 Apr 2019   · Added by Problem List Migration; 2013-6-13; Moved to Suppressed Nov 23 2013  9:02PM   · History of constipation (V12.79) (Z87.19)   · Resolved Date: 11 Apr 2019   · History of dizziness (V13.89) (Z87.898)   · Resolved Date: 04 Sep 2015   · History of dyspnea (V12.69) (Z87.898)   · Resolved Date: 10 Aug 2017   · History of epistaxis (V12.69) (Z87.898)   · Resolved Date: 01 Jul 2019   · History of fall (V15.88) (Z91.81)   · Resolved Date: 10 Apr 2019   · History of gastroesophageal reflux (GERD) (V12.79) (Z87.19)   · History of hemoptysis (V12.69) (Z87.898)   · Resolved Date: 11 Apr 2019   · History of hypertension (V12.59) (Z86.79)   · History of influenza vaccination (V49.89) (Z92.29)   · Resolved Date: 11 Apr 2019   · History of nasal discharge (V12.69) (Z87.09)   · Resolved Date: 10 Apr 2019   · History of paranasal sinus congestion (V12.69) (Z87.09)   · Resolved Date: 11 Apr 2019   · History of pneumococcal vaccination (V49.89) (Z92.29)   · Resolved Date: 01 Jul 2019   · History of pneumonia (V12.61) (Z87.01)   · Resolved Date: 02 Aug 2019   · History of sore throat (V12.69) (Z87.09)   · Resolved Date: 11 Apr 2019   · History of tinea corporis (V12.09) (Z86.19)   · Resolved Date: 02 Aug 2019   · History of urinary tract  infection (V13.02) (Z87.440)   · Resolved Date: 02 Aug 2019   · Hyperlipidemia (272.4) (E78.5)   · History of Immunization due (V05.9) (Z23)   · Resolved Date: 02 Aug 2019   · History of Leg pain (729.5) (M79.606)   · Resolved Date: 02 Aug 2019   · History of Light stools (792.1) (R19.5)   · Resolved Date: 02 Aug 2019   · History of MGD (meibomian gland disease) (373.00) (H02.889)   · Resolved Date: 02 Aug 2019   · History of Moderate opioid use disorder (304.00) (F11.20)   · Resolved Date: 09 Mar 2020   · History of Nasal crusting (478.19) (J34.89)   · Resolved Date: 11 Apr 2019   · History of Nasal septal perforation (478.19) (J34.89)   · Resolved Date: 24 Oct 2018   · History of Nasal septal perforation (478.19) (J34.89)   · Resolved Date: 02 Aug 2019   · History of Nausea in adult (787.02) (R11.0)   · Resolved Date: 11 Apr 2019   · History of Need for vaccination for zoster (V04.89) (Z23)   · Resolved Date: 02 Aug 2019   · History of Other chronic sinusitis (473.8) (J32.8)   · Resolved Date: 15 Gus 2018   · Personal history of arthritis (V13.4) (Z87.39)   · History of Pyuria (791.9) (R82.81)   · Resolved Date: 10 Apr 2019   · History of Radial nerve irritation (354.3) (G56.30)   · Resolved Date: 02 Aug 2019   · History of Right nephrolithiasis (592.0) (N20.0)   · History of Throat pain (784.1) (R07.0)   · Resolved Date: 11 Apr 2019    Family History  Mother    · Family history of alcoholism (V17.0) (Z81.1)   · Family history of cardiac disorder (V17.49) (Z82.49)   · Family history of diabetes mellitus (V18.0) (Z83.3)   · Family history of glaucoma (V19.11) (Z83.511)   · Family history of Mesothelioma  Father    · Family history of alcoholism (V17.0) (Z81.1)   · Family history of melanoma (V16.8) (Z80.8)  Sister    · Family history of macular degeneration (V19.19) (Z83.518)   · Family history of malignant neoplasm of breast (V16.3) (Z80.3)  Aunt    · Family history of macular degeneration (V19.19)  (Z83.518)  Family History    · Family history of Malignant Melanoma Of The Skin (V16.8)    Social History  Problems    · Does not use illicit drugs (V49.89) (Z78.9)   · Ex-cigarette smoker (V15.82) (Z87.891)   · Feels safe at home   · No alcohol use   · Denied: History of Social alcohol use    Mental Status Exam  No suicidal ideation nor intent.       Narrative    Telephone/Televideo Informed Consent for Psychotherapy was reviewed with the patient as follows:  There are potential benefits and risks of the use of telephone or video-conferencing that differ from in-person sessions. Specifically, the telephone or televideo system we are using may not be HIPAA compliant and may present limits to patient confidentiality. Confidentiality still applies for telepsychology services, and nobody will record the session without your permission. You agree to use the telephone or video-conferencing platform selected for our virtual sessions, and I will explain how to use it.  1) You need to use a webcam or smartphone during the session.  2) It is important that you be in a quiet, private space that is free of distractions (including cell phone or other devices) during the session.  3) It is important to use a secure internet connection rather than public/free Wi-Fi.  4) It is important to be on time. If you need to cancel or change your tele-appointment, you must notify the psychologist in advance by phone or email.  5) We need a back-up plan (e.g., phone number where you can be reached) to restart the session or to reschedule it, in the event of technical problems.  6) We need a safety plan that includes at least one emergency contact and the closest emergency room to your location, in the event of a crisis situation.  7) If you are not an adult, we need the permission of your parent or legal guardian (and their contact information) for you to participate in telepsychology sessions.  Understanding and verbal agreement was  "attested to by the patient.    Patient was reached via video for today's session.  Patient reported that she has been struggling with her health significantly over the last 3 months or so.  The last time we met was at the beginning of October.  Patient stated that she was diagnosed with pneumonia shortly after that and has been continuing to deal with severe symptoms ever since.  Patient stated that the  has threatened to evict her if she does not attend \"in person\" anger management classes and provide her with a certificate.  We talked about the legality of this (it does not seem legal or enforceable).  Patient does not seem to want to deal with challenging it.  Initially, she thought that she would have to see someone else for this.  I informed her that I could very easily do anger management work with her as well.  We agreed that I would and I would send her  a letter.  The letter was typed up in today's session and included asking for written confirmation of this requirement from the .  This was mailed to the patient's apartment building after the session.  We plan to follow up on this and continue our discussion about her anger and begin working on this in the next session.  "

## 2024-01-09 ENCOUNTER — PHARMACY VISIT (OUTPATIENT)
Dept: PHARMACY | Facility: CLINIC | Age: 74
End: 2024-01-09
Payer: MEDICAID

## 2024-01-09 PROCEDURE — RXMED WILLOW AMBULATORY MEDICATION CHARGE

## 2024-01-14 PROCEDURE — RXMED WILLOW AMBULATORY MEDICATION CHARGE

## 2024-01-16 DIAGNOSIS — G25.0 ESSENTIAL TREMOR: ICD-10-CM

## 2024-01-16 DIAGNOSIS — I10 BENIGN ESSENTIAL HYPERTENSION: Primary | ICD-10-CM

## 2024-01-17 ENCOUNTER — APPOINTMENT (OUTPATIENT)
Dept: BEHAVIORAL HEALTH | Facility: CLINIC | Age: 74
End: 2024-01-17
Payer: MEDICAID

## 2024-01-17 ENCOUNTER — PHARMACY VISIT (OUTPATIENT)
Dept: PHARMACY | Facility: CLINIC | Age: 74
End: 2024-01-17
Payer: MEDICAID

## 2024-01-17 PROCEDURE — RXMED WILLOW AMBULATORY MEDICATION CHARGE

## 2024-01-17 RX ORDER — PROPRANOLOL HYDROCHLORIDE 20 MG/1
TABLET ORAL
Qty: 270 TABLET | Refills: 2 | Status: SHIPPED | OUTPATIENT
Start: 2024-01-17

## 2024-01-22 ENCOUNTER — APPOINTMENT (OUTPATIENT)
Dept: BEHAVIORAL HEALTH | Facility: CLINIC | Age: 74
End: 2024-01-22
Payer: MEDICAID

## 2024-01-22 ENCOUNTER — PHARMACY VISIT (OUTPATIENT)
Dept: PHARMACY | Facility: CLINIC | Age: 74
End: 2024-01-22
Payer: MEDICAID

## 2024-01-22 PROCEDURE — RXMED WILLOW AMBULATORY MEDICATION CHARGE

## 2024-01-25 PROCEDURE — RXMED WILLOW AMBULATORY MEDICATION CHARGE

## 2024-01-30 ENCOUNTER — OFFICE VISIT (OUTPATIENT)
Dept: PRIMARY CARE | Facility: CLINIC | Age: 74
End: 2024-01-30
Payer: MEDICAID

## 2024-01-30 ENCOUNTER — PHARMACY VISIT (OUTPATIENT)
Dept: PHARMACY | Facility: CLINIC | Age: 74
End: 2024-01-30
Payer: MEDICAID

## 2024-01-30 VITALS
DIASTOLIC BLOOD PRESSURE: 72 MMHG | OXYGEN SATURATION: 96 % | WEIGHT: 170 LBS | BODY MASS INDEX: 31.28 KG/M2 | SYSTOLIC BLOOD PRESSURE: 127 MMHG | HEIGHT: 62 IN | TEMPERATURE: 97 F | HEART RATE: 55 BPM

## 2024-01-30 DIAGNOSIS — R05.8 RECURRENT PRODUCTIVE COUGH: Primary | ICD-10-CM

## 2024-01-30 PROCEDURE — 1159F MED LIST DOCD IN RCRD: CPT | Performed by: STUDENT IN AN ORGANIZED HEALTH CARE EDUCATION/TRAINING PROGRAM

## 2024-01-30 PROCEDURE — 3074F SYST BP LT 130 MM HG: CPT | Performed by: STUDENT IN AN ORGANIZED HEALTH CARE EDUCATION/TRAINING PROGRAM

## 2024-01-30 PROCEDURE — 1036F TOBACCO NON-USER: CPT | Performed by: STUDENT IN AN ORGANIZED HEALTH CARE EDUCATION/TRAINING PROGRAM

## 2024-01-30 PROCEDURE — 1125F AMNT PAIN NOTED PAIN PRSNT: CPT | Performed by: STUDENT IN AN ORGANIZED HEALTH CARE EDUCATION/TRAINING PROGRAM

## 2024-01-30 PROCEDURE — 99213 OFFICE O/P EST LOW 20 MIN: CPT | Performed by: STUDENT IN AN ORGANIZED HEALTH CARE EDUCATION/TRAINING PROGRAM

## 2024-01-30 PROCEDURE — 3078F DIAST BP <80 MM HG: CPT | Performed by: STUDENT IN AN ORGANIZED HEALTH CARE EDUCATION/TRAINING PROGRAM

## 2024-01-30 RX ORDER — MUPIROCIN 20 MG/G
OINTMENT TOPICAL
COMMUNITY
Start: 2023-06-08 | End: 2024-04-10 | Stop reason: WASHOUT

## 2024-01-30 RX ORDER — CLONIDINE HYDROCHLORIDE 0.1 MG/1
0.1 TABLET ORAL NIGHTLY PRN
COMMUNITY
End: 2024-04-10 | Stop reason: WASHOUT

## 2024-01-30 RX ORDER — CITALOPRAM 40 MG/1
40 TABLET, FILM COATED ORAL
COMMUNITY
Start: 2014-12-12 | End: 2024-04-10 | Stop reason: WASHOUT

## 2024-01-30 RX ORDER — SILVER SULFADIAZINE 10 G/1000G
CREAM TOPICAL
COMMUNITY
Start: 2020-12-29 | End: 2024-04-10 | Stop reason: WASHOUT

## 2024-01-30 RX ORDER — FLUOCINONIDE 0.5 MG/G
CREAM TOPICAL
COMMUNITY
Start: 2020-12-29 | End: 2024-04-10 | Stop reason: WASHOUT

## 2024-01-30 RX ORDER — HYOSCYAMINE SULFATE 0.12 MG/1
0.12 TABLET, ORALLY DISINTEGRATING ORAL
COMMUNITY
Start: 2019-01-11 | End: 2024-04-10 | Stop reason: WASHOUT

## 2024-01-30 RX ORDER — CLINDAMYCIN PHOSPHATE 11.9 MG/ML
SOLUTION TOPICAL
COMMUNITY
Start: 2021-05-24 | End: 2024-04-10 | Stop reason: WASHOUT

## 2024-01-30 ASSESSMENT — ENCOUNTER SYMPTOMS
DIZZINESS: 1
CONSTIPATION: 1
HEMATURIA: 0
ARTHRALGIAS: 1
HEADACHES: 1
MYALGIAS: 1
SHORTNESS OF BREATH: 1
COUGH: 1
DIARRHEA: 1
FATIGUE: 1
FEVER: 0
RHINORRHEA: 1
ABDOMINAL PAIN: 1
VOMITING: 0
SORE THROAT: 0
BLOOD IN STOOL: 0
NAUSEA: 1
CHILLS: 0
SLEEP DISTURBANCE: 1

## 2024-01-30 ASSESSMENT — PAIN SCALES - GENERAL: PAINLEVEL: 8

## 2024-01-30 NOTE — PROGRESS NOTES
"Subjective   Patient ID: Sandra Velasquez is a 73 y.o. female who presents for Follow-up.    HPI     #Cough and sputum production  - continued cough since pneumonia diagnosis 11/2023 in ED; left, was not admitted; given PO levoquine outpatient w/ Dr. Shah  - green sputum production with cough with occasional blood  - endorses fatigue, cough, green sputum production, SOB and orthopnea   - Denies fever, chills    #Sinusitis  - ibuprofen and flonase given last visit 10/27/23  - no sinus pain; rhinorrhea present with green-color    Review of Systems   Constitutional:  Positive for fatigue. Negative for chills and fever.   HENT:  Positive for rhinorrhea. Negative for sore throat.    Respiratory:  Positive for cough and shortness of breath.    Cardiovascular:  Negative for chest pain.   Gastrointestinal:  Positive for abdominal pain, constipation, diarrhea and nausea. Negative for blood in stool and vomiting.   Genitourinary:  Negative for hematuria.   Musculoskeletal:  Positive for arthralgias and myalgias.   Neurological:  Positive for dizziness and headaches.   Psychiatric/Behavioral:  Positive for sleep disturbance.        Objective   /72 (BP Location: Left arm, Patient Position: Sitting)   Pulse 55   Temp 36.1 °C (97 °F) (Temporal)   Ht 1.575 m (5' 2\")   Wt 77.1 kg (170 lb)   SpO2 96%   BMI 31.09 kg/m²     Physical Exam  Constitutional:       Appearance: She is well-developed and overweight.      Comments:   Pt in mild distress, visibly in pain with specific movements   HENT:      Head: Normocephalic and atraumatic.      Nose: Nose normal.      Mouth/Throat:      Mouth: Mucous membranes are moist.      Pharynx: Oropharynx is clear. No oropharyngeal exudate or posterior oropharyngeal erythema.   Cardiovascular:      Rate and Rhythm: Normal rate and regular rhythm.      Heart sounds: Normal heart sounds.   Pulmonary:      Effort: Pulmonary effort is normal.      Breath sounds: Rhonchi (able to be cleared " after coughing) present.   Abdominal:      Palpations: Abdomen is soft.   Musculoskeletal:      Cervical back: Normal. No tenderness or bony tenderness.      Thoracic back: No bony tenderness.      Lumbar back: No bony tenderness.   Neurological:      Mental Status: She is alert.   Psychiatric:         Behavior: Behavior is cooperative.       Assessment/Plan   Sandra Velasquez is a 73 year old female with complex pmhx including pneumonia (diag. 11/2023), sinus infection, kidney stones, and headache who presents for follow-up appointment with acute concerns for cough with green sputum production      #Cough  Hx and PE are concerning for prolonged cough, sputum production, and rales on lung auscultation. Concern for secondary lung infection vs recovery of previous pneumonia vs bronchitis. CXR ordered for further investigation. Also, CT sinus ordered in previous visit still standing for evaluation of sinusitis and continued rhinorrhea.     Diagnoses and all orders for this visit:  Recurrent productive cough  Comments:  Excessive mucus production and cough. Dx w/ PNA 11/2023. Sx likely untreated PNA resolving PNA, or new PNA. Ordered CXR for evaluation.  Orders:  -     Follow Up In Primary Care - Established  -     XR chest 2 views; Future  -     Follow Up In Primary Care - Established; Future      Seen and discussed with Dr. Pablo Rodriguez, MS3  I saw and evaluated the patient. I personally obtained the key and critical portions of the history and physical exam. I reviewed and modified the student's documentation and discussed the patient with the medical student. I agree with the above documentation and medical decision making.    Follow up/Return to the clinic in 4-6 weeks    Bambi Corrales MD  Family Medicine, PGY-3

## 2024-01-30 NOTE — PROGRESS NOTES
"Subjective   Patient ID: Sandra Velasquez is a 73 y.o. female who presents for Follow-up.    HPI    #Pain  - c/f   - lower back  - r kidney pain?  - gravel in toliet?  - cough  - sharp, dull achy pain  - 8/10 pain  - no blood in urine  - tramadol  - constipation      #Sinusitis  - dark green mucus  - needs sputum test  - SOB  - cannot lie flat    #PNA  - 11/2023 CXR w/    Review of Systems   All other systems reviewed and are negative.    Objective   /72 (BP Location: Left arm, Patient Position: Sitting)   Pulse 55   Temp 36.1 °C (97 °F) (Temporal)   Ht 1.575 m (5' 2\")   Wt 77.1 kg (170 lb)   SpO2 96%   BMI 31.09 kg/m²      Physical Exam  Constitutional:       General: She is not in acute distress.  HENT:      Head: Normocephalic and atraumatic.   Eyes:      Conjunctiva/sclera: Conjunctivae normal.   Cardiovascular:      Heart sounds: Normal heart sounds. No murmur heard.  Pulmonary:      Effort: Pulmonary effort is normal.      Breath sounds: Rhonchi (cleared by cough and mucus excretion) present. No wheezing.      Comments: Productive cough w/ yellow, greenish tinged mucus  Abdominal:      Palpations: Abdomen is soft.   Musculoskeletal:         General: Normal range of motion.   Neurological:      Mental Status: She is alert and oriented to person, place, and time.      Comments: CN 2-12 grossly intact   Psychiatric:         Mood and Affect: Mood normal.       Assessment/Plan   Diagnoses and all orders for this visit:  Recurrent productive cough  Comments:  Excessive mucus production and cough. Dx w/ PNA 11/2023. Sx likely untreated PNA resolving PNA, or new PNA. Ordered CXR for evaluation.  Orders:  -     Follow Up In Primary Care - Established  -     XR chest 2 views; Future  -     Follow Up In Primary Care - Established; Future      Follow up/Return to the clinic in 4-6 weeks    Attending Supervision: discussed with attending physician, Dr. Pablo Corrales MD  Family Medicine, PGY-3    "

## 2024-02-01 ENCOUNTER — APPOINTMENT (OUTPATIENT)
Dept: BEHAVIORAL HEALTH | Facility: CLINIC | Age: 74
End: 2024-02-01
Payer: MEDICAID

## 2024-02-02 DIAGNOSIS — M51.9 LUMBAR DISC DISEASE: ICD-10-CM

## 2024-02-05 ENCOUNTER — PHARMACY VISIT (OUTPATIENT)
Dept: PHARMACY | Facility: CLINIC | Age: 74
End: 2024-02-05
Payer: MEDICAID

## 2024-02-05 DIAGNOSIS — M51.9 LUMBAR DISC DISEASE: ICD-10-CM

## 2024-02-05 PROBLEM — N20.1 LEFT URETERAL CALCULUS: Status: RESOLVED | Noted: 2023-04-06 | Resolved: 2024-02-05

## 2024-02-05 PROBLEM — R21 PAPULAR ERUPTION: Status: RESOLVED | Noted: 2023-04-06 | Resolved: 2024-02-05

## 2024-02-05 PROBLEM — B37.31 YEAST VAGINITIS: Status: RESOLVED | Noted: 2023-04-06 | Resolved: 2024-02-05

## 2024-02-05 PROBLEM — R05.8 RECURRENT PRODUCTIVE COUGH: Status: ACTIVE | Noted: 2024-02-05

## 2024-02-05 PROBLEM — R21 RASH: Status: RESOLVED | Noted: 2023-04-06 | Resolved: 2024-02-05

## 2024-02-05 PROBLEM — N20.0 NEPHROLITHIASIS: Status: RESOLVED | Noted: 2023-04-06 | Resolved: 2024-02-05

## 2024-02-05 PROBLEM — K14.3 BLACK HAIRY TONGUE: Status: RESOLVED | Noted: 2023-04-06 | Resolved: 2024-02-05

## 2024-02-05 PROBLEM — N20.1 CALCULUS OF RIGHT URETER: Status: RESOLVED | Noted: 2023-04-06 | Resolved: 2024-02-05

## 2024-02-05 PROBLEM — R07.9 CHEST PAIN: Status: RESOLVED | Noted: 2023-11-02 | Resolved: 2024-02-05

## 2024-02-05 PROCEDURE — RXMED WILLOW AMBULATORY MEDICATION CHARGE

## 2024-02-05 RX ORDER — TRAMADOL HYDROCHLORIDE 50 MG/1
100 TABLET ORAL 3 TIMES DAILY
Qty: 84 TABLET | Refills: 2 | Status: SHIPPED | OUTPATIENT
Start: 2024-02-07 | End: 2024-03-13 | Stop reason: SDUPTHER

## 2024-02-05 RX ORDER — TRAMADOL HYDROCHLORIDE 50 MG/1
100 TABLET ORAL 3 TIMES DAILY
Qty: 84 TABLET | Refills: 2 | OUTPATIENT
Start: 2024-02-05 | End: 2024-03-18

## 2024-02-05 NOTE — PROGRESS NOTES
Renewed tramadol 50 mg tablets, 2 tablets 3 times a day for 14 days with 2 refills to [facility]

## 2024-02-06 DIAGNOSIS — N20.1 URETERAL STONE: Primary | ICD-10-CM

## 2024-02-06 PROCEDURE — RXMED WILLOW AMBULATORY MEDICATION CHARGE

## 2024-02-06 RX ORDER — OXYCODONE HYDROCHLORIDE 5 MG/1
5 TABLET ORAL EVERY 6 HOURS PRN
Qty: 15 TABLET | Refills: 0 | Status: SHIPPED | OUTPATIENT
Start: 2024-02-06 | End: 2024-02-12

## 2024-02-06 NOTE — TELEPHONE ENCOUNTER
Pt called requesting refill of tramadol. Noted rx at pharmacy ready to dispense tomorrow. Pt made aware.

## 2024-02-07 NOTE — PROGRESS NOTES
I saw and evaluated the patient. I personally obtained the key and critical portions of the history and physical exam or was physically present for key and critical portions performed by the resident/fellow. I reviewed the resident/fellow's documentation and discussed the patient with the resident/fellow. I agree with the resident/fellow's medical decision making as documented in the note with the addition of the following:  Patient is afebrile and saturating well- agree there is no indication for ED or admission at this time         Rose Shah MD

## 2024-02-08 ENCOUNTER — PHARMACY VISIT (OUTPATIENT)
Dept: PHARMACY | Facility: CLINIC | Age: 74
End: 2024-02-08
Payer: MEDICAID

## 2024-02-12 ENCOUNTER — OFFICE VISIT (OUTPATIENT)
Dept: BEHAVIORAL HEALTH | Facility: CLINIC | Age: 74
End: 2024-02-12
Payer: MEDICAID

## 2024-02-12 DIAGNOSIS — F11.21 OPIOID USE DISORDER, MODERATE, IN SUSTAINED REMISSION (MULTI): ICD-10-CM

## 2024-02-12 DIAGNOSIS — F43.10 PTSD (POST-TRAUMATIC STRESS DISORDER): Primary | ICD-10-CM

## 2024-02-12 PROCEDURE — 1125F AMNT PAIN NOTED PAIN PRSNT: CPT | Performed by: PSYCHOLOGIST

## 2024-02-12 PROCEDURE — 90837 PSYTX W PT 60 MINUTES: CPT | Performed by: PSYCHOLOGIST

## 2024-02-12 PROCEDURE — 1036F TOBACCO NON-USER: CPT | Performed by: PSYCHOLOGIST

## 2024-02-12 PROCEDURE — 1159F MED LIST DOCD IN RCRD: CPT | Performed by: PSYCHOLOGIST

## 2024-02-12 NOTE — PROGRESS NOTES
START TIME:  10 AM  END TIME:  10:55 AM    Diagnoses/Problems  PTSD  Opioid (heroin) use disorder, in sustained remission (since 3/25/99)      Likely narcissistic personality disorder traits, borderline personality disorder      Orders  Patient agreed to return for individual psychotherapy with this provider.    Note dictated with [employer] transcription software. Completed without full typed error editing and sent to avoid delay.      Past Medical History  Problems    · History of Abdominal pain, RUQ (right upper quadrant) (789.01) (R10.11)   · Resolved Date: 10 Apr 2019   · History of Acute cystitis without hematuria (595.0) (N30.00)   · Resolved Date: 10 Apr 2019   · History of Allergic rhinitis due to pollen, unspecified seasonality (477.0) (J30.1)   · Resolved Date: 10 Apr 2019   · History of Arteriolosclerosis (440.9) (I70.90)   · History of Bilateral dry eyes (375.15) (H04.123)   · History of Blepharitis, ulcerative (373.01) (H01.019)   · Resolved Date: 02 Aug 2019   · History of Bluish skin discoloration (782.5) (R23.0)   · Resolved Date: 11 Apr 2019   · History of Cataract of left eye (366.9) (H26.9)   · History of Cataract of left eye (366.9) (H26.9)   · History of Cataract of right eye (366.9) (H26.9)   · History of Cataract of right eye (366.9) (H26.9)   · History of Cataract, nuclear sclerotic, left eye (366.16) (H25.12)   · Resolved Date: 02 Aug 2019   · History of Cataract, nuclear sclerotic, right eye (366.16) (H25.11)   · Resolved Date: 02 Aug 2019   · History of Change in bowel habits (787.99) (R19.4)   · Resolved Date: 02 Aug 2019   · History of Combined form of age-related cataract, left eye (366.19) (H25.812)   · History of Combined form of age-related cataract, left eye (366.19) (H25.812)   · History of Cortical age-related cataract of left eye (366.15) (H25.012)   · Resolved Date: 02 Aug 2019   · History of Dentalgia (525.9) (K08.89)   · Resolved Date: 02 Aug 2019   · History of Dry  eyes, bilateral (375.15) (H04.123)   · Resolved Date: 02 Aug 2019   · History of actinic keratosis (V13.3) (Z87.2)   · Resolved Date: 11 Apr 2019   · History of acute sinusitis (V12.69) (Z87.09)   · Resolved Date: 03 Feb 2017   · History of acute sinusitis (V12.69) (Z87.09)   · Resolved Date: 04 Sep 2015   · History of acute sinusitis (V12.69) (Z87.09)   · Resolved Date: 10 Apr 2019   · History of acute sinusitis (V12.69) (Z87.09)   · Resolved Date: 29 Dec 2017   · History of anemia (V12.3) (Z86.2)   · Resolved Date: 04 Sep 2015   · Added by Problem List Migration; 2013-6-13; Moved to Suppressed Nov 23 2013  9:02PM   · History of chest pain (V13.89) (Z87.898)   · Resolved Date: 11 Apr 2019   · Added by Problem List Migration; 2013-6-13; Moved to Suppressed Nov 23 2013  9:02PM   · History of constipation (V12.79) (Z87.19)   · Resolved Date: 11 Apr 2019   · History of dizziness (V13.89) (Z87.898)   · Resolved Date: 04 Sep 2015   · History of dyspnea (V12.69) (Z87.898)   · Resolved Date: 10 Aug 2017   · History of epistaxis (V12.69) (Z87.898)   · Resolved Date: 01 Jul 2019   · History of fall (V15.88) (Z91.81)   · Resolved Date: 10 Apr 2019   · History of gastroesophageal reflux (GERD) (V12.79) (Z87.19)   · History of hemoptysis (V12.69) (Z87.898)   · Resolved Date: 11 Apr 2019   · History of hypertension (V12.59) (Z86.79)   · History of influenza vaccination (V49.89) (Z92.29)   · Resolved Date: 11 Apr 2019   · History of nasal discharge (V12.69) (Z87.09)   · Resolved Date: 10 Apr 2019   · History of paranasal sinus congestion (V12.69) (Z87.09)   · Resolved Date: 11 Apr 2019   · History of pneumococcal vaccination (V49.89) (Z92.29)   · Resolved Date: 01 Jul 2019   · History of pneumonia (V12.61) (Z87.01)   · Resolved Date: 02 Aug 2019   · History of sore throat (V12.69) (Z87.09)   · Resolved Date: 11 Apr 2019   · History of tinea corporis (V12.09) (Z86.19)   · Resolved Date: 02 Aug 2019   · History of urinary tract  infection (V13.02) (Z87.440)   · Resolved Date: 02 Aug 2019   · Hyperlipidemia (272.4) (E78.5)   · History of Immunization due (V05.9) (Z23)   · Resolved Date: 02 Aug 2019   · History of Leg pain (729.5) (M79.606)   · Resolved Date: 02 Aug 2019   · History of Light stools (792.1) (R19.5)   · Resolved Date: 02 Aug 2019   · History of MGD (meibomian gland disease) (373.00) (H02.889)   · Resolved Date: 02 Aug 2019   · History of Moderate opioid use disorder (304.00) (F11.20)   · Resolved Date: 09 Mar 2020   · History of Nasal crusting (478.19) (J34.89)   · Resolved Date: 11 Apr 2019   · History of Nasal septal perforation (478.19) (J34.89)   · Resolved Date: 24 Oct 2018   · History of Nasal septal perforation (478.19) (J34.89)   · Resolved Date: 02 Aug 2019   · History of Nausea in adult (787.02) (R11.0)   · Resolved Date: 11 Apr 2019   · History of Need for vaccination for zoster (V04.89) (Z23)   · Resolved Date: 02 Aug 2019   · History of Other chronic sinusitis (473.8) (J32.8)   · Resolved Date: 15 Gus 2018   · Personal history of arthritis (V13.4) (Z87.39)   · History of Pyuria (791.9) (R82.81)   · Resolved Date: 10 Apr 2019   · History of Radial nerve irritation (354.3) (G56.30)   · Resolved Date: 02 Aug 2019   · History of Right nephrolithiasis (592.0) (N20.0)   · History of Throat pain (784.1) (R07.0)   · Resolved Date: 11 Apr 2019    Family History  Mother    · Family history of alcoholism (V17.0) (Z81.1)   · Family history of cardiac disorder (V17.49) (Z82.49)   · Family history of diabetes mellitus (V18.0) (Z83.3)   · Family history of glaucoma (V19.11) (Z83.511)   · Family history of Mesothelioma  Father    · Family history of alcoholism (V17.0) (Z81.1)   · Family history of melanoma (V16.8) (Z80.8)  Sister    · Family history of macular degeneration (V19.19) (Z83.518)   · Family history of malignant neoplasm of breast (V16.3) (Z80.3)  Aunt    · Family history of macular degeneration (V19.19)  "(Z83.518)  Family History    · Family history of Malignant Melanoma Of The Skin (V16.8)    Social History  Problems    · Does not use illicit drugs (V49.89) (Z78.9)   · Ex-cigarette smoker (V15.82) (Z87.891)   · Feels safe at home   · No alcohol use   · Denied: History of Social alcohol use    Mental Status Exam  No suicidal ideation nor intent.       Narrative  Patient arrived on time and in person for today's appointment.  This is the first time we have met since the beginning of January.  Patient said that she continues to be anxious about her  who is apparently still \"requiring\" that she have anger management classes in person.  I did send a letter to the  but never received a response.  Patient said that she feels like she has been crying more lately.  She said that she believes this is a good thing actually because she is coming to terms with some things and is more easily able to access her emotions.  She stated that she discovered that her trust fund money ran out about a year ago but she was still getting payments.  Apparently, these payments stopped as well and her rent has not been getting paid.  It is unclear exactly what she means by this because she did not seem concerned about being evicted.  Patient talked about her family and what they are currently doing.  She is the only one who has a sibling that is in charge of paying her the money from the trust rather than getting a lump-sum like the rest of her siblings.  They also never told her how much she had in the account.  Patient is amazed that she turned out so much different than the rest of her family.  We agreed to meet again in person next week.  We plan to follow up on these things more at that time    "

## 2024-02-16 ENCOUNTER — PHARMACY VISIT (OUTPATIENT)
Dept: PHARMACY | Facility: CLINIC | Age: 74
End: 2024-02-16
Payer: MEDICAID

## 2024-02-16 PROCEDURE — RXMED WILLOW AMBULATORY MEDICATION CHARGE

## 2024-02-19 ENCOUNTER — PHARMACY VISIT (OUTPATIENT)
Dept: PHARMACY | Facility: CLINIC | Age: 74
End: 2024-02-19
Payer: MEDICAID

## 2024-02-19 PROCEDURE — RXMED WILLOW AMBULATORY MEDICATION CHARGE

## 2024-02-20 ENCOUNTER — TELEPHONE (OUTPATIENT)
Dept: UROLOGY | Facility: CLINIC | Age: 74
End: 2024-02-20
Payer: MEDICAID
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
## 2024-02-21 ENCOUNTER — PHARMACY VISIT (OUTPATIENT)
Dept: PHARMACY | Facility: CLINIC | Age: 74
End: 2024-02-21
Payer: MEDICAID

## 2024-02-21 ENCOUNTER — OFFICE VISIT (OUTPATIENT)
Dept: BEHAVIORAL HEALTH | Facility: CLINIC | Age: 74
End: 2024-02-21
Payer: MEDICAID

## 2024-02-21 DIAGNOSIS — F43.10 PTSD (POST-TRAUMATIC STRESS DISORDER): Primary | ICD-10-CM

## 2024-02-21 DIAGNOSIS — F11.21 OPIOID USE DISORDER, MODERATE, IN SUSTAINED REMISSION (MULTI): ICD-10-CM

## 2024-02-21 DIAGNOSIS — N20.0 NEPHROLITHIASIS: ICD-10-CM

## 2024-02-21 PROCEDURE — 90837 PSYTX W PT 60 MINUTES: CPT | Performed by: PSYCHOLOGIST

## 2024-02-21 PROCEDURE — RXMED WILLOW AMBULATORY MEDICATION CHARGE

## 2024-02-21 PROCEDURE — 1159F MED LIST DOCD IN RCRD: CPT | Performed by: PSYCHOLOGIST

## 2024-02-21 PROCEDURE — 1036F TOBACCO NON-USER: CPT | Performed by: PSYCHOLOGIST

## 2024-02-21 PROCEDURE — 1125F AMNT PAIN NOTED PAIN PRSNT: CPT | Performed by: PSYCHOLOGIST

## 2024-02-21 RX ORDER — OXYCODONE HYDROCHLORIDE 5 MG/1
5 TABLET ORAL EVERY 6 HOURS PRN
Qty: 12 TABLET | Refills: 0 | Status: SHIPPED | OUTPATIENT
Start: 2024-02-21 | End: 2024-02-28

## 2024-02-21 NOTE — PROGRESS NOTES
START TIME:  1 PM  END TIME:  1:55 PM    Diagnoses/Problems  PTSD  Opioid (heroin) use disorder, in sustained remission (since 3/25/99)      Likely narcissistic personality disorder traits, borderline personality disorder      Orders  Patient agreed to return for individual psychotherapy with this provider.    Note dictated with Ref[employer] transcription software. Completed without full typed error editing and sent to avoid delay.      Past Medical History  Problems    · History of Abdominal pain, RUQ (right upper quadrant) (789.01) (R10.11)   · Resolved Date: 10 Apr 2019   · History of Acute cystitis without hematuria (595.0) (N30.00)   · Resolved Date: 10 Apr 2019   · History of Allergic rhinitis due to pollen, unspecified seasonality (477.0) (J30.1)   · Resolved Date: 10 Apr 2019   · History of Arteriolosclerosis (440.9) (I70.90)   · History of Bilateral dry eyes (375.15) (H04.123)   · History of Blepharitis, ulcerative (373.01) (H01.019)   · Resolved Date: 02 Aug 2019   · History of Bluish skin discoloration (782.5) (R23.0)   · Resolved Date: 11 Apr 2019   · History of Cataract of left eye (366.9) (H26.9)   · History of Cataract of left eye (366.9) (H26.9)   · History of Cataract of right eye (366.9) (H26.9)   · History of Cataract of right eye (366.9) (H26.9)   · History of Cataract, nuclear sclerotic, left eye (366.16) (H25.12)   · Resolved Date: 02 Aug 2019   · History of Cataract, nuclear sclerotic, right eye (366.16) (H25.11)   · Resolved Date: 02 Aug 2019   · History of Change in bowel habits (787.99) (R19.4)   · Resolved Date: 02 Aug 2019   · History of Combined form of age-related cataract, left eye (366.19) (H25.812)   · History of Combined form of age-related cataract, left eye (366.19) (H25.812)   · History of Cortical age-related cataract of left eye (366.15) (H25.012)   · Resolved Date: 02 Aug 2019   · History of Dentalgia (525.9) (K08.89)   · Resolved Date: 02 Aug 2019   · History of Dry  eyes, bilateral (375.15) (H04.123)   · Resolved Date: 02 Aug 2019   · History of actinic keratosis (V13.3) (Z87.2)   · Resolved Date: 11 Apr 2019   · History of acute sinusitis (V12.69) (Z87.09)   · Resolved Date: 03 Feb 2017   · History of acute sinusitis (V12.69) (Z87.09)   · Resolved Date: 04 Sep 2015   · History of acute sinusitis (V12.69) (Z87.09)   · Resolved Date: 10 Apr 2019   · History of acute sinusitis (V12.69) (Z87.09)   · Resolved Date: 29 Dec 2017   · History of anemia (V12.3) (Z86.2)   · Resolved Date: 04 Sep 2015   · Added by Problem List Migration; 2013-6-13; Moved to Suppressed Nov 23 2013  9:02PM   · History of chest pain (V13.89) (Z87.898)   · Resolved Date: 11 Apr 2019   · Added by Problem List Migration; 2013-6-13; Moved to Suppressed Nov 23 2013  9:02PM   · History of constipation (V12.79) (Z87.19)   · Resolved Date: 11 Apr 2019   · History of dizziness (V13.89) (Z87.898)   · Resolved Date: 04 Sep 2015   · History of dyspnea (V12.69) (Z87.898)   · Resolved Date: 10 Aug 2017   · History of epistaxis (V12.69) (Z87.898)   · Resolved Date: 01 Jul 2019   · History of fall (V15.88) (Z91.81)   · Resolved Date: 10 Apr 2019   · History of gastroesophageal reflux (GERD) (V12.79) (Z87.19)   · History of hemoptysis (V12.69) (Z87.898)   · Resolved Date: 11 Apr 2019   · History of hypertension (V12.59) (Z86.79)   · History of influenza vaccination (V49.89) (Z92.29)   · Resolved Date: 11 Apr 2019   · History of nasal discharge (V12.69) (Z87.09)   · Resolved Date: 10 Apr 2019   · History of paranasal sinus congestion (V12.69) (Z87.09)   · Resolved Date: 11 Apr 2019   · History of pneumococcal vaccination (V49.89) (Z92.29)   · Resolved Date: 01 Jul 2019   · History of pneumonia (V12.61) (Z87.01)   · Resolved Date: 02 Aug 2019   · History of sore throat (V12.69) (Z87.09)   · Resolved Date: 11 Apr 2019   · History of tinea corporis (V12.09) (Z86.19)   · Resolved Date: 02 Aug 2019   · History of urinary tract  infection (V13.02) (Z87.440)   · Resolved Date: 02 Aug 2019   · Hyperlipidemia (272.4) (E78.5)   · History of Immunization due (V05.9) (Z23)   · Resolved Date: 02 Aug 2019   · History of Leg pain (729.5) (M79.606)   · Resolved Date: 02 Aug 2019   · History of Light stools (792.1) (R19.5)   · Resolved Date: 02 Aug 2019   · History of MGD (meibomian gland disease) (373.00) (H02.889)   · Resolved Date: 02 Aug 2019   · History of Moderate opioid use disorder (304.00) (F11.20)   · Resolved Date: 09 Mar 2020   · History of Nasal crusting (478.19) (J34.89)   · Resolved Date: 11 Apr 2019   · History of Nasal septal perforation (478.19) (J34.89)   · Resolved Date: 24 Oct 2018   · History of Nasal septal perforation (478.19) (J34.89)   · Resolved Date: 02 Aug 2019   · History of Nausea in adult (787.02) (R11.0)   · Resolved Date: 11 Apr 2019   · History of Need for vaccination for zoster (V04.89) (Z23)   · Resolved Date: 02 Aug 2019   · History of Other chronic sinusitis (473.8) (J32.8)   · Resolved Date: 15 Gus 2018   · Personal history of arthritis (V13.4) (Z87.39)   · History of Pyuria (791.9) (R82.81)   · Resolved Date: 10 Apr 2019   · History of Radial nerve irritation (354.3) (G56.30)   · Resolved Date: 02 Aug 2019   · History of Right nephrolithiasis (592.0) (N20.0)   · History of Throat pain (784.1) (R07.0)   · Resolved Date: 11 Apr 2019    Family History  Mother    · Family history of alcoholism (V17.0) (Z81.1)   · Family history of cardiac disorder (V17.49) (Z82.49)   · Family history of diabetes mellitus (V18.0) (Z83.3)   · Family history of glaucoma (V19.11) (Z83.511)   · Family history of Mesothelioma  Father    · Family history of alcoholism (V17.0) (Z81.1)   · Family history of melanoma (V16.8) (Z80.8)  Sister    · Family history of macular degeneration (V19.19) (Z83.518)   · Family history of malignant neoplasm of breast (V16.3) (Z80.3)  Aunt    · Family history of macular degeneration (V19.19)  (Z83.518)  Family History    · Family history of Malignant Melanoma Of The Skin (V16.8)    Social History  Problems    · Does not use illicit drugs (V49.89) (Z78.9)   · Ex-cigarette smoker (V15.82) (Z87.891)   · Feels safe at home   · No alcohol use   · Denied: History of Social alcohol use    Mental Status Exam  No suicidal ideation nor intent.       Narrative  Patient arrived on time and in person for today's appointment.  She stated that she believes she might have experienced a TIA last week.  She said that her memory has been poor ever since.  Patient has no other evidence for this but plans to talk to her doctors about it.  Her voice seemed to be even more hoarse than it was in the last session.  She did have difficulty staying focused, with word-finding and simple forgetfulness but it is unclear how much of this is caused by a significant increase in her pain as well.  Patient said that there is a  that she is apparently working with from [facility].  She would like me to send this  a note saying that she arrived and participated in treatment.  I asked her to give this  my email address so I can further understand why she needs this and to potentially send it to her.  Overall, patient is becoming increasingly more concerned about her autonomy.  We plan to follow up on this more in the next session.

## 2024-02-21 NOTE — TELEPHONE ENCOUNTER
Pt calling for pain medication from kidney stones. Dr Gonzales alerted . Pt given oxycodone for severe pain as needed . Pt has a clinic apt 2/26

## 2024-02-26 ENCOUNTER — APPOINTMENT (OUTPATIENT)
Dept: UROLOGY | Facility: CLINIC | Age: 74
End: 2024-02-26
Payer: MEDICAID

## 2024-02-27 ENCOUNTER — HOSPITAL ENCOUNTER (OUTPATIENT)
Dept: RADIOLOGY | Facility: HOSPITAL | Age: 74
End: 2024-02-27
Payer: MEDICAID

## 2024-03-02 ENCOUNTER — PHARMACY VISIT (OUTPATIENT)
Dept: PHARMACY | Facility: CLINIC | Age: 74
End: 2024-03-02
Payer: MEDICAID

## 2024-03-02 PROCEDURE — RXMED WILLOW AMBULATORY MEDICATION CHARGE

## 2024-03-04 ENCOUNTER — OFFICE VISIT (OUTPATIENT)
Dept: BEHAVIORAL HEALTH | Facility: CLINIC | Age: 74
End: 2024-03-04
Payer: MEDICAID

## 2024-03-04 ENCOUNTER — APPOINTMENT (OUTPATIENT)
Dept: RADIOLOGY | Facility: HOSPITAL | Age: 74
End: 2024-03-04
Payer: MEDICAID

## 2024-03-04 DIAGNOSIS — F11.21 OPIOID USE DISORDER, MODERATE, IN SUSTAINED REMISSION (MULTI): ICD-10-CM

## 2024-03-04 DIAGNOSIS — F43.10 PTSD (POST-TRAUMATIC STRESS DISORDER): Primary | ICD-10-CM

## 2024-03-04 PROCEDURE — 1125F AMNT PAIN NOTED PAIN PRSNT: CPT | Performed by: PSYCHOLOGIST

## 2024-03-04 PROCEDURE — 90834 PSYTX W PT 45 MINUTES: CPT | Performed by: PSYCHOLOGIST

## 2024-03-04 PROCEDURE — 1036F TOBACCO NON-USER: CPT | Performed by: PSYCHOLOGIST

## 2024-03-04 PROCEDURE — 1159F MED LIST DOCD IN RCRD: CPT | Performed by: PSYCHOLOGIST

## 2024-03-04 NOTE — PROGRESS NOTES
START TIME:  12 PM  END TIME:  12:45 PM    Diagnoses/Problems  PTSD  Opioid (heroin) use disorder, in sustained remission (since 3/25/99)      Likely narcissistic personality disorder traits, borderline personality disorder      Orders  Patient agreed to return for individual psychotherapy with this provider.    Note dictated with Pro[employer] transcription software. Completed without full typed error editing and sent to avoid delay.      Past Medical History  Problems    · History of Abdominal pain, RUQ (right upper quadrant) (789.01) (R10.11)   · Resolved Date: 10 Apr 2019   · History of Acute cystitis without hematuria (595.0) (N30.00)   · Resolved Date: 10 Apr 2019   · History of Allergic rhinitis due to pollen, unspecified seasonality (477.0) (J30.1)   · Resolved Date: 10 Apr 2019   · History of Arteriolosclerosis (440.9) (I70.90)   · History of Bilateral dry eyes (375.15) (H04.123)   · History of Blepharitis, ulcerative (373.01) (H01.019)   · Resolved Date: 02 Aug 2019   · History of Bluish skin discoloration (782.5) (R23.0)   · Resolved Date: 11 Apr 2019   · History of Cataract of left eye (366.9) (H26.9)   · History of Cataract of left eye (366.9) (H26.9)   · History of Cataract of right eye (366.9) (H26.9)   · History of Cataract of right eye (366.9) (H26.9)   · History of Cataract, nuclear sclerotic, left eye (366.16) (H25.12)   · Resolved Date: 02 Aug 2019   · History of Cataract, nuclear sclerotic, right eye (366.16) (H25.11)   · Resolved Date: 02 Aug 2019   · History of Change in bowel habits (787.99) (R19.4)   · Resolved Date: 02 Aug 2019   · History of Combined form of age-related cataract, left eye (366.19) (H25.812)   · History of Combined form of age-related cataract, left eye (366.19) (H25.812)   · History of Cortical age-related cataract of left eye (366.15) (H25.012)   · Resolved Date: 02 Aug 2019   · History of Dentalgia (525.9) (K08.89)   · Resolved Date: 02 Aug 2019   · History of Dry  eyes, bilateral (375.15) (H04.123)   · Resolved Date: 02 Aug 2019   · History of actinic keratosis (V13.3) (Z87.2)   · Resolved Date: 11 Apr 2019   · History of acute sinusitis (V12.69) (Z87.09)   · Resolved Date: 03 Feb 2017   · History of acute sinusitis (V12.69) (Z87.09)   · Resolved Date: 04 Sep 2015   · History of acute sinusitis (V12.69) (Z87.09)   · Resolved Date: 10 Apr 2019   · History of acute sinusitis (V12.69) (Z87.09)   · Resolved Date: 29 Dec 2017   · History of anemia (V12.3) (Z86.2)   · Resolved Date: 04 Sep 2015   · Added by Problem List Migration; 2013-6-13; Moved to Suppressed Nov 23 2013  9:02PM   · History of chest pain (V13.89) (Z87.898)   · Resolved Date: 11 Apr 2019   · Added by Problem List Migration; 2013-6-13; Moved to Suppressed Nov 23 2013  9:02PM   · History of constipation (V12.79) (Z87.19)   · Resolved Date: 11 Apr 2019   · History of dizziness (V13.89) (Z87.898)   · Resolved Date: 04 Sep 2015   · History of dyspnea (V12.69) (Z87.898)   · Resolved Date: 10 Aug 2017   · History of epistaxis (V12.69) (Z87.898)   · Resolved Date: 01 Jul 2019   · History of fall (V15.88) (Z91.81)   · Resolved Date: 10 Apr 2019   · History of gastroesophageal reflux (GERD) (V12.79) (Z87.19)   · History of hemoptysis (V12.69) (Z87.898)   · Resolved Date: 11 Apr 2019   · History of hypertension (V12.59) (Z86.79)   · History of influenza vaccination (V49.89) (Z92.29)   · Resolved Date: 11 Apr 2019   · History of nasal discharge (V12.69) (Z87.09)   · Resolved Date: 10 Apr 2019   · History of paranasal sinus congestion (V12.69) (Z87.09)   · Resolved Date: 11 Apr 2019   · History of pneumococcal vaccination (V49.89) (Z92.29)   · Resolved Date: 01 Jul 2019   · History of pneumonia (V12.61) (Z87.01)   · Resolved Date: 02 Aug 2019   · History of sore throat (V12.69) (Z87.09)   · Resolved Date: 11 Apr 2019   · History of tinea corporis (V12.09) (Z86.19)   · Resolved Date: 02 Aug 2019   · History of urinary tract  infection (V13.02) (Z87.440)   · Resolved Date: 02 Aug 2019   · Hyperlipidemia (272.4) (E78.5)   · History of Immunization due (V05.9) (Z23)   · Resolved Date: 02 Aug 2019   · History of Leg pain (729.5) (M79.606)   · Resolved Date: 02 Aug 2019   · History of Light stools (792.1) (R19.5)   · Resolved Date: 02 Aug 2019   · History of MGD (meibomian gland disease) (373.00) (H02.889)   · Resolved Date: 02 Aug 2019   · History of Moderate opioid use disorder (304.00) (F11.20)   · Resolved Date: 09 Mar 2020   · History of Nasal crusting (478.19) (J34.89)   · Resolved Date: 11 Apr 2019   · History of Nasal septal perforation (478.19) (J34.89)   · Resolved Date: 24 Oct 2018   · History of Nasal septal perforation (478.19) (J34.89)   · Resolved Date: 02 Aug 2019   · History of Nausea in adult (787.02) (R11.0)   · Resolved Date: 11 Apr 2019   · History of Need for vaccination for zoster (V04.89) (Z23)   · Resolved Date: 02 Aug 2019   · History of Other chronic sinusitis (473.8) (J32.8)   · Resolved Date: 15 Gus 2018   · Personal history of arthritis (V13.4) (Z87.39)   · History of Pyuria (791.9) (R82.81)   · Resolved Date: 10 Apr 2019   · History of Radial nerve irritation (354.3) (G56.30)   · Resolved Date: 02 Aug 2019   · History of Right nephrolithiasis (592.0) (N20.0)   · History of Throat pain (784.1) (R07.0)   · Resolved Date: 11 Apr 2019    Family History  Mother    · Family history of alcoholism (V17.0) (Z81.1)   · Family history of cardiac disorder (V17.49) (Z82.49)   · Family history of diabetes mellitus (V18.0) (Z83.3)   · Family history of glaucoma (V19.11) (Z83.511)   · Family history of Mesothelioma  Father    · Family history of alcoholism (V17.0) (Z81.1)   · Family history of melanoma (V16.8) (Z80.8)  Sister    · Family history of macular degeneration (V19.19) (Z83.518)   · Family history of malignant neoplasm of breast (V16.3) (Z80.3)  Aunt    · Family history of macular degeneration (V19.19)  (Z83.518)  Family History    · Family history of Malignant Melanoma Of The Skin (V16.8)    Social History  Problems    · Does not use illicit drugs (V49.89) (Z78.9)   · Ex-cigarette smoker (V15.82) (Z87.891)   · Feels safe at home   · No alcohol use   · Denied: History of Social alcohol use    Mental Status Exam  No suicidal ideation nor intent.       Narrative  Patient arrived over an hour early and unaccompanied for today's session.  She was very exhausted today.  She was nearly falling asleep initially at the start of the session but as we talked, she seemed to get a little bit more energy.  Patient said that she was grateful for 2 of her friends who helped her move things from her studio into her apartment.  Patient said that she had to give up her studio.  She stated that the owner of the building and the board of directors agreed that she would still have access to the building and one of the open spaces whenever she wants.  These 2 friends are also artists in their early 30s who had studio space in the building.  She was surprised that they were willing to help her move things.  Patient said that one of her friends asked her why she was surprised.  She talked about how she always believes that no one cares about her and this is primarily because she is alone so often and people do not often reach out to her.  She did say that these experiences with the friends who helped her move and also the building owner have helped her feel more cared about.  She said that she recently learned that some people talk about her in public in a good way which made her feel good.  We agreed to continue meeting on a weekly basis.  We plan to follow up on these things more next time.

## 2024-03-11 ENCOUNTER — APPOINTMENT (OUTPATIENT)
Dept: BEHAVIORAL HEALTH | Facility: CLINIC | Age: 74
End: 2024-03-11
Payer: MEDICAID

## 2024-03-13 DIAGNOSIS — M51.9 LUMBAR DISC DISEASE: ICD-10-CM

## 2024-03-13 PROCEDURE — RXMED WILLOW AMBULATORY MEDICATION CHARGE

## 2024-03-13 RX ORDER — TALC
3 POWDER (GRAM) TOPICAL NIGHTLY
Qty: 30 TABLET | Refills: 2 | Status: SHIPPED | OUTPATIENT
Start: 2024-03-13 | End: 2024-03-13 | Stop reason: SDUPTHER

## 2024-03-13 RX ORDER — TRAMADOL HYDROCHLORIDE 50 MG/1
100 TABLET ORAL 3 TIMES DAILY
Qty: 84 TABLET | Refills: 3 | Status: SHIPPED | OUTPATIENT
Start: 2024-03-16 | End: 2024-04-24 | Stop reason: SDUPTHER

## 2024-03-13 RX ORDER — TALC
3 POWDER (GRAM) TOPICAL NIGHTLY
Qty: 30 TABLET | Refills: 2 | Status: SHIPPED | OUTPATIENT
Start: 2024-03-13 | End: 2024-06-13

## 2024-03-13 NOTE — PROGRESS NOTES
Patient called with update. She continues a wet, paroxysmal cough without fever or malaise, but now with dysphagia for solids.   A/ now consider laryngeal or esophageal dysfunction affecting swallowing and resulting in coulg due to accumulating secretions  CT sinus scheduled 3-15-24 to exclude chronic sinusitis, polyp as source of secretions   Follow up plost studies

## 2024-03-14 ENCOUNTER — TELEPHONE (OUTPATIENT)
Dept: PRIMARY CARE | Facility: CLINIC | Age: 74
End: 2024-03-14
Payer: MEDICAID

## 2024-03-14 NOTE — TELEPHONE ENCOUNTER
Copied from CRM #[id number]. Topic: Transfer to Department for Scheduling  >> Mar 12, 2024 12:49 PM Raven MCGREGOR wrote:  CRM created and been sent to appropriate department.  Pt has pneumonia since November --cannot make todays apptmt, needs meds ordered.   Please call patient--[phone]

## 2024-03-15 ENCOUNTER — PHARMACY VISIT (OUTPATIENT)
Dept: PHARMACY | Facility: CLINIC | Age: 74
End: 2024-03-15
Payer: MEDICAID

## 2024-03-15 ENCOUNTER — APPOINTMENT (OUTPATIENT)
Dept: RADIOLOGY | Facility: HOSPITAL | Age: 74
End: 2024-03-15
Payer: MEDICAID

## 2024-03-15 PROCEDURE — RXMED WILLOW AMBULATORY MEDICATION CHARGE

## 2024-03-29 ENCOUNTER — PHARMACY VISIT (OUTPATIENT)
Dept: PHARMACY | Facility: CLINIC | Age: 74
End: 2024-03-29
Payer: MEDICAID

## 2024-03-29 PROCEDURE — RXMED WILLOW AMBULATORY MEDICATION CHARGE

## 2024-04-02 ENCOUNTER — APPOINTMENT (OUTPATIENT)
Dept: UROLOGY | Facility: CLINIC | Age: 74
End: 2024-04-02
Payer: MEDICAID

## 2024-04-10 ENCOUNTER — CLINICAL SUPPORT (OUTPATIENT)
Dept: EMERGENCY MEDICINE | Facility: HOSPITAL | Age: 74
End: 2024-04-10
Payer: MEDICAID

## 2024-04-10 ENCOUNTER — HOSPITAL ENCOUNTER (OUTPATIENT)
Facility: HOSPITAL | Age: 74
Setting detail: OBSERVATION
Discharge: HOME HEALTH | End: 2024-04-12
Attending: STUDENT IN AN ORGANIZED HEALTH CARE EDUCATION/TRAINING PROGRAM | Admitting: STUDENT IN AN ORGANIZED HEALTH CARE EDUCATION/TRAINING PROGRAM
Payer: MEDICAID

## 2024-04-10 ENCOUNTER — APPOINTMENT (OUTPATIENT)
Dept: RADIOLOGY | Facility: HOSPITAL | Age: 74
End: 2024-04-10
Payer: MEDICAID

## 2024-04-10 DIAGNOSIS — N28.1 RENAL CYST: ICD-10-CM

## 2024-04-10 DIAGNOSIS — R11.2 NAUSEA AND VOMITING, UNSPECIFIED VOMITING TYPE: Primary | ICD-10-CM

## 2024-04-10 DIAGNOSIS — J18.9 COMMUNITY ACQUIRED PNEUMONIA, UNSPECIFIED LATERALITY: ICD-10-CM

## 2024-04-10 DIAGNOSIS — L73.2 HIDRADENITIS: ICD-10-CM

## 2024-04-10 DIAGNOSIS — R00.1 BRADYCARDIA: ICD-10-CM

## 2024-04-10 PROBLEM — A41.9 SEPSIS (MULTI): Status: ACTIVE | Noted: 2024-04-10

## 2024-04-10 LAB
ALBUMIN SERPL BCP-MCNC: 2.7 G/DL (ref 3.4–5)
ALP SERPL-CCNC: 71 U/L (ref 33–136)
ALT SERPL W P-5'-P-CCNC: 16 U/L (ref 7–52)
ANION GAP SERPL CALC-SCNC: 14 MMOL/L (ref 10–20)
APPEARANCE UR: ABNORMAL
AST SERPL W P-5'-P-CCNC: 27 U/L (ref 9–39)
BACTERIA #/AREA URNS AUTO: ABNORMAL /HPF
BASOPHILS # BLD AUTO: 0.09 X10*3/UL (ref 0–0.1)
BASOPHILS NFR BLD AUTO: 0.7 %
BILIRUB SERPL-MCNC: 0.3 MG/DL (ref 0–1.2)
BILIRUB UR STRIP.AUTO-MCNC: NEGATIVE MG/DL
BUN SERPL-MCNC: 13 MG/DL (ref 6–23)
CALCIUM SERPL-MCNC: 8.7 MG/DL (ref 8.6–10.6)
CAOX CRY #/AREA UR COMP ASSIST: ABNORMAL /HPF
CARDIAC TROPONIN I PNL SERPL HS: 30 NG/L (ref 0–53)
CHLORIDE SERPL-SCNC: 104 MMOL/L (ref 98–107)
CO2 SERPL-SCNC: 26 MMOL/L (ref 21–32)
COLOR UR: ABNORMAL
CREAT SERPL-MCNC: 1.06 MG/DL (ref 0.5–1.3)
EGFRCR SERPLBLD CKD-EPI 2021: 56 ML/MIN/1.73M*2
EOSINOPHIL # BLD AUTO: 0.05 X10*3/UL (ref 0–0.4)
EOSINOPHIL NFR BLD AUTO: 0.4 %
ERYTHROCYTE [DISTWIDTH] IN BLOOD BY AUTOMATED COUNT: 18.4 % (ref 11.5–14.5)
FLUAV RNA RESP QL NAA+PROBE: NOT DETECTED
FLUBV RNA RESP QL NAA+PROBE: NOT DETECTED
GLUCOSE BLD MANUAL STRIP-MCNC: 109 MG/DL (ref 74–99)
GLUCOSE BLD MANUAL STRIP-MCNC: 109 MG/DL (ref 74–99)
GLUCOSE SERPL-MCNC: 140 MG/DL (ref 74–99)
GLUCOSE UR STRIP.AUTO-MCNC: NORMAL MG/DL
HCT VFR BLD AUTO: 34.1 % (ref 36–52)
HGB BLD-MCNC: 10.5 G/DL (ref 12–17.5)
IMM GRANULOCYTES # BLD AUTO: 0.09 X10*3/UL (ref 0–0.5)
IMM GRANULOCYTES NFR BLD AUTO: 0.7 % (ref 0–0.9)
KETONES UR STRIP.AUTO-MCNC: NEGATIVE MG/DL
LEUKOCYTE ESTERASE UR QL STRIP.AUTO: ABNORMAL
LYMPHOCYTES # BLD AUTO: 3.07 X10*3/UL (ref 0.8–3)
LYMPHOCYTES NFR BLD AUTO: 22.3 %
MAGNESIUM SERPL-MCNC: 1.9 MG/DL (ref 1.6–2.4)
MCH RBC QN AUTO: 22.4 PG (ref 26–34)
MCHC RBC AUTO-ENTMCNC: 30.8 G/DL (ref 32–36)
MCV RBC AUTO: 73 FL (ref 80–100)
MONOCYTES # BLD AUTO: 0.82 X10*3/UL (ref 0.05–0.8)
MONOCYTES NFR BLD AUTO: 6 %
MRSA DNA SPEC QL NAA+PROBE: NOT DETECTED
MUCOUS THREADS #/AREA URNS AUTO: ABNORMAL /LPF
NEUTROPHILS # BLD AUTO: 9.64 X10*3/UL (ref 1.6–5.5)
NEUTROPHILS NFR BLD AUTO: 69.9 %
NITRITE UR QL STRIP.AUTO: NEGATIVE
NRBC BLD-RTO: 0 /100 WBCS (ref 0–0)
PH UR STRIP.AUTO: 7.5 [PH]
PLATELET # BLD AUTO: 393 X10*3/UL (ref 150–450)
POTASSIUM SERPL-SCNC: 4 MMOL/L (ref 3.5–5.3)
PROT SERPL-MCNC: 7.6 G/DL (ref 6.4–8.2)
PROT UR STRIP.AUTO-MCNC: ABNORMAL MG/DL
RBC # BLD AUTO: 4.68 X10*6/UL (ref 4–5.9)
RBC # UR STRIP.AUTO: ABNORMAL /UL
RBC #/AREA URNS AUTO: ABNORMAL /HPF
SARS-COV-2 RNA RESP QL NAA+PROBE: NOT DETECTED
SODIUM SERPL-SCNC: 140 MMOL/L (ref 136–145)
SP GR UR STRIP.AUTO: >1.05
SQUAMOUS #/AREA URNS AUTO: ABNORMAL /HPF
TSH SERPL-ACNC: 0.45 MIU/L (ref 0.44–3.98)
UROBILINOGEN UR STRIP.AUTO-MCNC: NORMAL MG/DL
WBC # BLD AUTO: 13.8 X10*3/UL (ref 4.4–11.3)
WBC #/AREA URNS AUTO: ABNORMAL /HPF

## 2024-04-10 PROCEDURE — 80053 COMPREHEN METABOLIC PANEL: CPT

## 2024-04-10 PROCEDURE — 74177 CT ABD & PELVIS W/CONTRAST: CPT

## 2024-04-10 PROCEDURE — 81001 URINALYSIS AUTO W/SCOPE: CPT

## 2024-04-10 PROCEDURE — 2500000004 HC RX 250 GENERAL PHARMACY W/ HCPCS (ALT 636 FOR OP/ED): Mod: SE | Performed by: NURSE PRACTITIONER

## 2024-04-10 PROCEDURE — 2500000005 HC RX 250 GENERAL PHARMACY W/O HCPCS: Mod: SE

## 2024-04-10 PROCEDURE — 83735 ASSAY OF MAGNESIUM: CPT

## 2024-04-10 PROCEDURE — G0378 HOSPITAL OBSERVATION PER HR: HCPCS

## 2024-04-10 PROCEDURE — 2500000001 HC RX 250 WO HCPCS SELF ADMINISTERED DRUGS (ALT 637 FOR MEDICARE OP): Mod: SE

## 2024-04-10 PROCEDURE — 96376 TX/PRO/DX INJ SAME DRUG ADON: CPT | Mod: 59

## 2024-04-10 PROCEDURE — 93005 ELECTROCARDIOGRAM TRACING: CPT

## 2024-04-10 PROCEDURE — 87636 SARSCOV2 & INF A&B AMP PRB: CPT

## 2024-04-10 PROCEDURE — 99285 EMERGENCY DEPT VISIT HI MDM: CPT | Mod: 25

## 2024-04-10 PROCEDURE — 99285 EMERGENCY DEPT VISIT HI MDM: CPT | Performed by: STUDENT IN AN ORGANIZED HEALTH CARE EDUCATION/TRAINING PROGRAM

## 2024-04-10 PROCEDURE — 2500000004 HC RX 250 GENERAL PHARMACY W/ HCPCS (ALT 636 FOR OP/ED): Mod: SE

## 2024-04-10 PROCEDURE — 87340 HEPATITIS B SURFACE AG IA: CPT | Performed by: STUDENT IN AN ORGANIZED HEALTH CARE EDUCATION/TRAINING PROGRAM

## 2024-04-10 PROCEDURE — 36415 COLL VENOUS BLD VENIPUNCTURE: CPT

## 2024-04-10 PROCEDURE — 87040 BLOOD CULTURE FOR BACTERIA: CPT

## 2024-04-10 PROCEDURE — 2500000004 HC RX 250 GENERAL PHARMACY W/ HCPCS (ALT 636 FOR OP/ED): Mod: SE | Performed by: STUDENT IN AN ORGANIZED HEALTH CARE EDUCATION/TRAINING PROGRAM

## 2024-04-10 PROCEDURE — 71260 CT THORAX DX C+: CPT | Performed by: RADIOLOGY

## 2024-04-10 PROCEDURE — 82947 ASSAY GLUCOSE BLOOD QUANT: CPT | Mod: 91

## 2024-04-10 PROCEDURE — 96375 TX/PRO/DX INJ NEW DRUG ADDON: CPT | Mod: 59

## 2024-04-10 PROCEDURE — 84443 ASSAY THYROID STIM HORMONE: CPT

## 2024-04-10 PROCEDURE — 87040 BLOOD CULTURE FOR BACTERIA: CPT | Performed by: NURSE PRACTITIONER

## 2024-04-10 PROCEDURE — 84484 ASSAY OF TROPONIN QUANT: CPT

## 2024-04-10 PROCEDURE — 96365 THER/PROPH/DIAG IV INF INIT: CPT | Mod: 59

## 2024-04-10 PROCEDURE — 2500000002 HC RX 250 W HCPCS SELF ADMINISTERED DRUGS (ALT 637 FOR MEDICARE OP, ALT 636 FOR OP/ED): Mod: SE

## 2024-04-10 PROCEDURE — 87449 NOS EACH ORGANISM AG IA: CPT

## 2024-04-10 PROCEDURE — 93010 ELECTROCARDIOGRAM REPORT: CPT | Performed by: STUDENT IN AN ORGANIZED HEALTH CARE EDUCATION/TRAINING PROGRAM

## 2024-04-10 PROCEDURE — 87899 AGENT NOS ASSAY W/OPTIC: CPT

## 2024-04-10 PROCEDURE — 2550000001 HC RX 255 CONTRASTS: Mod: SE | Performed by: STUDENT IN AN ORGANIZED HEALTH CARE EDUCATION/TRAINING PROGRAM

## 2024-04-10 PROCEDURE — 87640 STAPH A DNA AMP PROBE: CPT | Mod: 59

## 2024-04-10 PROCEDURE — 85025 COMPLETE CBC W/AUTO DIFF WBC: CPT

## 2024-04-10 PROCEDURE — 99214 OFFICE O/P EST MOD 30 MIN: CPT | Performed by: STUDENT IN AN ORGANIZED HEALTH CARE EDUCATION/TRAINING PROGRAM

## 2024-04-10 PROCEDURE — 74177 CT ABD & PELVIS W/CONTRAST: CPT | Performed by: RADIOLOGY

## 2024-04-10 PROCEDURE — 96367 TX/PROPH/DG ADDL SEQ IV INF: CPT | Mod: 59

## 2024-04-10 PROCEDURE — 99222 1ST HOSP IP/OBS MODERATE 55: CPT

## 2024-04-10 RX ORDER — CARBOXYMETHYLCELLULOSE SODIUM 5 MG/ML
1 SOLUTION/ DROPS OPHTHALMIC AS NEEDED
COMMUNITY

## 2024-04-10 RX ORDER — ACETAMINOPHEN 325 MG/1
975 TABLET ORAL EVERY 8 HOURS PRN
Status: DISCONTINUED | OUTPATIENT
Start: 2024-04-10 | End: 2024-04-12 | Stop reason: HOSPADM

## 2024-04-10 RX ORDER — TALC
3 POWDER (GRAM) TOPICAL NIGHTLY
Status: DISCONTINUED | OUTPATIENT
Start: 2024-04-10 | End: 2024-04-12 | Stop reason: HOSPADM

## 2024-04-10 RX ORDER — LEVOFLOXACIN 5 MG/ML
750 INJECTION, SOLUTION INTRAVENOUS EVERY 24 HOURS
Status: DISCONTINUED | OUTPATIENT
Start: 2024-04-10 | End: 2024-04-10

## 2024-04-10 RX ORDER — NALOXONE HYDROCHLORIDE 4 MG/.1ML
4 SPRAY NASAL AS NEEDED
Status: CANCELLED | OUTPATIENT
Start: 2024-04-10

## 2024-04-10 RX ORDER — VANCOMYCIN HYDROCHLORIDE 1 G/20ML
INJECTION, POWDER, LYOPHILIZED, FOR SOLUTION INTRAVENOUS DAILY PRN
Status: DISCONTINUED | OUTPATIENT
Start: 2024-04-10 | End: 2024-04-11

## 2024-04-10 RX ORDER — ASPIRIN 81 MG/1
81 TABLET ORAL DAILY
Status: DISCONTINUED | OUTPATIENT
Start: 2024-04-10 | End: 2024-04-12 | Stop reason: HOSPADM

## 2024-04-10 RX ORDER — TRAMADOL HYDROCHLORIDE 50 MG/1
100 TABLET ORAL 3 TIMES DAILY
Status: DISCONTINUED | OUTPATIENT
Start: 2024-04-10 | End: 2024-04-12 | Stop reason: HOSPADM

## 2024-04-10 RX ORDER — ASCORBIC ACID 500 MG
500 TABLET ORAL DAILY
COMMUNITY

## 2024-04-10 RX ORDER — METFORMIN HYDROCHLORIDE 500 MG/1
500 TABLET ORAL
Status: DISCONTINUED | OUTPATIENT
Start: 2024-04-11 | End: 2024-04-10

## 2024-04-10 RX ORDER — TAMSULOSIN HYDROCHLORIDE 0.4 MG/1
0.4 CAPSULE ORAL DAILY
Status: DISCONTINUED | OUTPATIENT
Start: 2024-04-10 | End: 2024-04-12 | Stop reason: HOSPADM

## 2024-04-10 RX ORDER — LEVOFLOXACIN 750 MG/1
750 TABLET ORAL
Status: DISCONTINUED | OUTPATIENT
Start: 2024-04-10 | End: 2024-04-10

## 2024-04-10 RX ORDER — ONDANSETRON HYDROCHLORIDE 2 MG/ML
4 INJECTION, SOLUTION INTRAVENOUS ONCE
Status: COMPLETED | OUTPATIENT
Start: 2024-04-10 | End: 2024-04-10

## 2024-04-10 RX ORDER — DULOXETIN HYDROCHLORIDE 30 MG/1
60 CAPSULE, DELAYED RELEASE ORAL DAILY
Status: DISCONTINUED | OUTPATIENT
Start: 2024-04-10 | End: 2024-04-12 | Stop reason: HOSPADM

## 2024-04-10 RX ORDER — VANCOMYCIN HYDROCHLORIDE 750 MG/150ML
1500 INJECTION, SOLUTION INTRAVENOUS ONCE
Status: COMPLETED | OUTPATIENT
Start: 2024-04-10 | End: 2024-04-10

## 2024-04-10 RX ORDER — METFORMIN HYDROCHLORIDE 500 MG/1
500 TABLET ORAL
Status: CANCELLED | OUTPATIENT
Start: 2024-04-11

## 2024-04-10 RX ORDER — LIDOCAINE 560 MG/1
1 PATCH PERCUTANEOUS; TOPICAL; TRANSDERMAL EVERY 12 HOURS
Status: DISCONTINUED | OUTPATIENT
Start: 2024-04-10 | End: 2024-04-12 | Stop reason: HOSPADM

## 2024-04-10 RX ORDER — ENOXAPARIN SODIUM 100 MG/ML
40 INJECTION SUBCUTANEOUS EVERY 24 HOURS
Status: DISCONTINUED | OUTPATIENT
Start: 2024-04-10 | End: 2024-04-12 | Stop reason: HOSPADM

## 2024-04-10 RX ORDER — ONDANSETRON HYDROCHLORIDE 2 MG/ML
INJECTION, SOLUTION INTRAVENOUS
Status: COMPLETED
Start: 2024-04-10 | End: 2024-04-10

## 2024-04-10 RX ORDER — INSULIN LISPRO 100 [IU]/ML
0-10 INJECTION, SOLUTION INTRAVENOUS; SUBCUTANEOUS
Status: DISCONTINUED | OUTPATIENT
Start: 2024-04-10 | End: 2024-04-12 | Stop reason: HOSPADM

## 2024-04-10 RX ORDER — DEXTROSE 50 % IN WATER (D50W) INTRAVENOUS SYRINGE
25
Status: DISCONTINUED | OUTPATIENT
Start: 2024-04-10 | End: 2024-04-10

## 2024-04-10 RX ORDER — METOCLOPRAMIDE HYDROCHLORIDE 5 MG/ML
10 INJECTION INTRAMUSCULAR; INTRAVENOUS EVERY 6 HOURS PRN
Status: DISCONTINUED | OUTPATIENT
Start: 2024-04-10 | End: 2024-04-12 | Stop reason: HOSPADM

## 2024-04-10 RX ORDER — DEXTROSE 50 % IN WATER (D50W) INTRAVENOUS SYRINGE
12.5
Status: DISCONTINUED | OUTPATIENT
Start: 2024-04-10 | End: 2024-04-12 | Stop reason: HOSPADM

## 2024-04-10 RX ORDER — TALC
3 POWDER (GRAM) TOPICAL NIGHTLY
Status: CANCELLED | OUTPATIENT
Start: 2024-04-10

## 2024-04-10 RX ORDER — ASPIRIN 81 MG/1
81 TABLET ORAL DAILY
Status: CANCELLED | OUTPATIENT
Start: 2024-04-10

## 2024-04-10 RX ORDER — LEVOFLOXACIN 750 MG/1
750 TABLET ORAL
Status: DISCONTINUED | OUTPATIENT
Start: 2024-04-12 | End: 2024-04-12 | Stop reason: HOSPADM

## 2024-04-10 RX ORDER — METOCLOPRAMIDE HYDROCHLORIDE 5 MG/ML
10 INJECTION INTRAMUSCULAR; INTRAVENOUS ONCE
Status: COMPLETED | OUTPATIENT
Start: 2024-04-10 | End: 2024-04-10

## 2024-04-10 RX ORDER — NALOXONE HYDROCHLORIDE 4 MG/.1ML
4 SPRAY NASAL AS NEEDED
Status: DISCONTINUED | OUTPATIENT
Start: 2024-04-10 | End: 2024-04-12 | Stop reason: HOSPADM

## 2024-04-10 RX ORDER — ONDANSETRON HYDROCHLORIDE 2 MG/ML
4 INJECTION, SOLUTION INTRAVENOUS EVERY 6 HOURS PRN
Status: DISCONTINUED | OUTPATIENT
Start: 2024-04-10 | End: 2024-04-12 | Stop reason: HOSPADM

## 2024-04-10 RX ADMIN — ONDANSETRON 4 MG: 2 INJECTION INTRAMUSCULAR; INTRAVENOUS at 11:10

## 2024-04-10 RX ADMIN — MELATONIN 3 MG: 3 TAB ORAL at 20:39

## 2024-04-10 RX ADMIN — LEVOFLOXACIN 750 MG: 5 INJECTION, SOLUTION INTRAVENOUS at 10:20

## 2024-04-10 RX ADMIN — ONDANSETRON 4 MG: 2 INJECTION INTRAMUSCULAR; INTRAVENOUS at 06:19

## 2024-04-10 RX ADMIN — TRAMADOL HYDROCHLORIDE 100 MG: 50 TABLET, COATED ORAL at 20:42

## 2024-04-10 RX ADMIN — ASPIRIN 81 MG: 81 TABLET, COATED ORAL at 14:35

## 2024-04-10 RX ADMIN — LIDOCAINE 1 PATCH: 4 PATCH TOPICAL at 14:35

## 2024-04-10 RX ADMIN — TRAMADOL HYDROCHLORIDE 100 MG: 50 TABLET, COATED ORAL at 18:26

## 2024-04-10 RX ADMIN — ONDANSETRON HYDROCHLORIDE 4 MG: 2 INJECTION, SOLUTION INTRAVENOUS at 06:19

## 2024-04-10 RX ADMIN — METOCLOPRAMIDE 10 MG: 5 INJECTION, SOLUTION INTRAMUSCULAR; INTRAVENOUS at 07:24

## 2024-04-10 RX ADMIN — VANCOMYCIN HYDROCHLORIDE 1500 MG: 750 INJECTION, SOLUTION INTRAVENOUS at 14:35

## 2024-04-10 RX ADMIN — TAMSULOSIN HYDROCHLORIDE 0.4 MG: 0.4 CAPSULE ORAL at 14:35

## 2024-04-10 RX ADMIN — IOHEXOL 80 ML: 350 INJECTION, SOLUTION INTRAVENOUS at 08:56

## 2024-04-10 RX ADMIN — ONDANSETRON HYDROCHLORIDE 4 MG: 2 INJECTION, SOLUTION INTRAVENOUS at 11:10

## 2024-04-10 RX ADMIN — DULOXETINE HYDROCHLORIDE 60 MG: 30 CAPSULE, DELAYED RELEASE ORAL at 14:35

## 2024-04-10 ASSESSMENT — ENCOUNTER SYMPTOMS
CONSTIPATION: 0
BACK PAIN: 0
WEAKNESS: 0
HEADACHES: 0
POLYPHAGIA: 0
FEVER: 0
FLANK PAIN: 0
CONFUSION: 0
JOINT SWELLING: 0
DIARRHEA: 1
FREQUENCY: 0
NERVOUS/ANXIOUS: 0
NAUSEA: 1
ABDOMINAL PAIN: 0
TROUBLE SWALLOWING: 0
SORE THROAT: 0
EYE DISCHARGE: 0
EYE ITCHING: 0
POLYDIPSIA: 0
CHILLS: 0
SLEEP DISTURBANCE: 0
SEIZURES: 0
DIFFICULTY URINATING: 0
HEMATURIA: 0
VOICE CHANGE: 0
COUGH: 1
DYSURIA: 0
FACIAL SWELLING: 0
ABDOMINAL DISTENTION: 0
TREMORS: 0
VOMITING: 1
DIZZINESS: 0
CHEST TIGHTNESS: 0
SPEECH DIFFICULTY: 0
SHORTNESS OF BREATH: 0
WHEEZING: 0
EYE REDNESS: 0
FATIGUE: 1
BLOOD IN STOOL: 0
PALPITATIONS: 0
ARTHRALGIAS: 0

## 2024-04-10 ASSESSMENT — COLUMBIA-SUICIDE SEVERITY RATING SCALE - C-SSRS
2. HAVE YOU ACTUALLY HAD ANY THOUGHTS OF KILLING YOURSELF?: NO
1. IN THE PAST MONTH, HAVE YOU WISHED YOU WERE DEAD OR WISHED YOU COULD GO TO SLEEP AND NOT WAKE UP?: NO
6. HAVE YOU EVER DONE ANYTHING, STARTED TO DO ANYTHING, OR PREPARED TO DO ANYTHING TO END YOUR LIFE?: NO

## 2024-04-10 ASSESSMENT — LIFESTYLE VARIABLES
TOTAL SCORE: 0
EVER HAD A DRINK FIRST THING IN THE MORNING TO STEADY YOUR NERVES TO GET RID OF A HANGOVER: NO
HAVE YOU EVER FELT YOU SHOULD CUT DOWN ON YOUR DRINKING: NO
EVER FELT BAD OR GUILTY ABOUT YOUR DRINKING: NO
HAVE PEOPLE ANNOYED YOU BY CRITICIZING YOUR DRINKING: NO

## 2024-04-10 ASSESSMENT — PAIN SCALES - GENERAL
PAINLEVEL_OUTOF10: 0 - NO PAIN
PAINLEVEL_OUTOF10: 8

## 2024-04-10 ASSESSMENT — PAIN - FUNCTIONAL ASSESSMENT: PAIN_FUNCTIONAL_ASSESSMENT: 0-10

## 2024-04-10 NOTE — CONSULTS
"Vancomycin Dosing by Pharmacy- INITIAL    Sandra Velasquez is a 73 y.o. year old adult who Pharmacy has been consulted for vancomycin dosing for pneumonia. Based on the patient's indication and renal status this patient will be dosed based on a goal AUC of 500-600.     Renal function is currently stable.    Visit Vitals  BP (!) 170/106   Pulse 56   Temp 36.9 °C (98.4 °F) (Oral)   Resp 18        Lab Results   Component Value Date    CREATININE 1.06 04/10/2024    CREATININE 1.09 (H) 11/02/2023    CREATININE 1.15 (H) 05/23/2023    CREATININE 1.19 (H) 12/14/2022    CREATININE 1.06 (H) 07/12/2022    CREATININE 1.36 (H) 05/26/2022        Patient weight is No results found for: \"PTWEIGHT\"    No results found for: \"CULTURE\"     No intake/output data recorded.  [unfilled]    No results found for: \"PATIENTTEMP\"       Assessment/Plan     Patient will be given a loading dose of 1500 mg.  Will initiate vancomycin maintenance,  1250 mg every 24 hours.    This dosing regimen is predicted by [employer] to result in the following pharmacokinetic parameters:  Loading dose: 1500 mg at 00:00 04/10/2024.  Regimen: 1250 mg IV every 24 hours.  Start time: 00:00 on 04/11/2024  Exposure target: AUC24 (range)400-600 mg/L.hr   AUC24,ss: 540 mg/L.hr  Probability of AUC24 > 400: 81 %  Ctrough,ss: 16.2 mg/L  Probability of Ctrough,ss > 20: 32 %  Probability of nephrotoxicity (Lodise THAIS 2009): 12 %      Follow-up level will be ordered on 4/11/24 at 1st AM labs unless clinically indicated sooner.  Will continue to monitor renal function daily while on vancomycin and order serum creatinine at least every 48 hours if not already ordered.  Follow for continued vancomycin needs, clinical response, and signs/symptoms of toxicity.       Jameson Galdamez, PharmD       "

## 2024-04-10 NOTE — PROGRESS NOTES
Emergency Medicine Transition of Care Note.    I received Sandra Velasquez in signout from Dr. Brooks.  Please see the previous ED provider note for all HPI, PE and MDM up to the time of signout at 0700. This is in addition to the primary record.    In brief Sandra Velasquez is an 73 y.o. adult presenting for   Chief Complaint   Patient presents with    Nausea     At the time of signout we were awaiting:    ED Course as of 04/10/24 1051   Wed Apr 10, 2024   0622 EKG shows sinus bradycardia with a rate of 53 bpm, T wave inversions in V1 and V2.  No ST elevations concerning for ischemia.  Normal WA and QTc.  Compared to prior November 2023, T wave inversions were not present in V2, patient was not bradycardic at that point [AW]   1023 Creatinine: 1.06 [JZ]      ED Course User Index  [AW] Cely Brooks DO  [JZ] Chris Mcmillan, APRN-CNP         Diagnoses as of 04/10/24 1051   Bradycardia   Nausea and vomiting, unspecified vomiting type       Medical Decision Making  Assumes care for patient after receiving sign out from outgoing shift at 0700. Plan was to re-evaluate following labs and CT scans of her chest, abdomen, and pelvis. She reported feeling improved after receiving Zofran and Reglan. Noted to have an elevated WBC count of 13.8. Anemia is at baseline and normal platelets. Electrolytes, kidney function, TSH, and liver function were unremarkable. Normal troponin and EKG. Influenza and COVID were both negative. CT scans of her chest, abdomen, and pelvis showed extensive consolidations throughout both lungs suggestive of multifocal pneumonia. At that time, she was treated with Levaquin according to the pneumonia protocol (QTc was normal on EKG). Blood cultures x 2 were drawn prior to receiving the antibiotics and pending. Later, she had an episode of vomiting and was given an additional dose of Zofran with improvement in her symptoms. We discussed the case with the DACR who agrees to admit the patient. She  will be transferred to the medical floor in stable condition.     Diagnostic Impression:    1. Acute multifocal pneumonia    2. Intractable nausea and vomiting    3. IV meds in ED        Final diagnoses:   [R00.1] Bradycardia   [R11.2] Nausea and vomiting, unspecified vomiting type           Procedure  Procedures    Chris Mcmillan, APRN-CNP

## 2024-04-10 NOTE — ACP (ADVANCE CARE PLANNING)
ACP Note    Had a lengthy discussion with patient regarding her code status. Patient notes that due to her age, she does not want to be resuscitated nor would she want to be intubated. She also doesn't want to be a burden if she is resuscitated as she knows the recovery would be very difficult so she would not want to do that.     She notes she has no one in her life who could make decisions on her behalf and if she's unable to make medical decisions, she defers to the judgement of the doctors.     Code status changed to DNR/DNI.    ELIGIO Medley MD, PGY 3  Internal Medicine

## 2024-04-10 NOTE — ED PROVIDER NOTES
CC: Nausea     History provided by: Patient  Limitations to History: None    HPI:  Sandra Velasquez is a 73 y.o. female with past medical history of AAA, coronary artery disease status post LAD stent, CKD, colostomy, nephrolithiasis presenting with a chief complaint of nausea and vomiting.  She states this is happened in the last 12 hours.  She is unsure if she has had fevers or chills at home.  She does state that she previously had abdominal pain which is resolved.  Additionally she is concerned about multiple wounds, 1 underneath her umbilicus which she states has been there since her colectomy in 2020.  The others on the back of her arm, is not painful and not draining, she thinks this has been there for the last few weeks.  She states she lives alone.  No sick contacts.  Additionally on review of systems she does endorse chest pain, shortness of breath, cough and congestion which have been present since her diagnosis of pneumonia.    External Records Reviewed: Patient seen and evaluated on 11 2-2023 and diagnosed with pneumonia, also was notably hypokalemic on this visit.  She left AMA at this point.  ???????????????????????????????????????????????????????????????  Triage Vitals:  T 36.9 °C (98.4 °F)  HR 59  /76  RR 18  O2 99 % None (Room air)    Vital signs reviewed in nursing triage note, EMR flow sheets, and at patient's bedside.   General: Awake, alert, in no acute distress  Eyes: Gaze conjugate.  No scleral icterus or injection  HENT: Normo-cephalic, atraumatic. No stridor. No rhinorrhea or epistaxis.  CV: Regular rhythm. No murmurs appreciated. Radial pulses 2+ bilaterally  Respiratory: Breathing non-labored, speaking in full sentences.  Clear to auscultation bilaterally  GI: Soft, non-distended, non-tender.  Colostomy with liquid stool in the left lower quadrant.  There is a 4 x 3 cm wound to the mid abdomen.  No surrounding erythema, drainage.  Appears to have some packing within the  center.  MSK/Extremities: No gross bony deformities. Moving all extremities. No lower extremity edema.  : external rectal exam with chaperone present. No gómez drainage around rectum or on underwear. No sacral wounds   Skin: Warm.  Pale.  Wound to the posterior left arm that has no surrounding erythema or drainage, appears chronic but is very tender to palpation (image in chart)  Neuro: Alert. Oriented. Face symmetric. Speech is fluent.    Psych: Answering questions appropriately, not currently agitated  ???????????????????????????????????????????????????????????????  ED Course/Treatment/Medical Decision Making  MDM:  Sandra Velasquez is a 73 y.o. female presenting with a chief complaint of nausea and vomiting.  She is afebrile and hypertensive and bradycardic on arrival although has not been taking her medications.  She is saturating 99% on room air and lungs are clear to auscultation.  She has multiple medical complaints, most concerning is the active vomiting in the room and reported green discharge from the rectum after colectomy.  Along with EKG, Trop, UA, COVID flu along with a CT abdomen pelvis.     Differential diagnoses considered include but are not limited to: Flulike illness, obstruction, worsening diverticular disease, sepsis, pneumonia, ACS, osteomyelitis     Independent Interpretation of Studies:  I independently interpreted: See ED Course Below  -EKG    ED Course:  ED Course as of 04/10/24 0656   Wed Apr 10, 2024   0622 EKG shows sinus bradycardia with a rate of 53 bpm, T wave inversions in V1 and V2.  No ST elevations concerning for ischemia.  Normal ME and QTc.  Compared to prior November 2023, T wave inversions were not present in V2, patient was not bradycardic at that point [AW]      ED Course User Index  [AW] Cely Brooks DO         Diagnoses as of 04/10/24 0656   Bradycardia   Nausea and vomiting, unspecified vomiting type       The patient was monitored for any change in vital signs or  symptoms throughout the ED course.  Patient was pending lab work, symptomatic improvement and CT imaging.  Their care was discussed with the oncoming provider, please see their note for final diagnosis and disposition.    Social Determinants Limiting Care:  None identified        Disposition:  Signout    Assessment and plan discussed with Dr. Corey Brooks,    Emergency Medicine, PGY-1     Disclaimer: This note was dictated by speech recognition. Minor errors in transcription may be present.     Procedures ? SmartLinks last updated 4/10/2024 6:56 AM          Cely Brooks DO  Resident  04/10/24 0656

## 2024-04-10 NOTE — PROGRESS NOTES
Wound Care Progress Note     Visit Date: 4/10/2024      Patient Name: Sandra Velasquez         MRN: [id number]                Reason for Visit: Colostomy assessment and pouch change; wound to ABD LRQ; wound to posterior Right upper arm       Wound Assessment:  Wound 04/10/24 Incision Abdomen Right;Anterior;Lower (Active)   Date First Assessed/Time First Assessed: 04/10/24 1300   Present on Original Admission: Yes  Hand Hygiene Completed: Yes  Primary Wound Type: Incision  Location: Abdomen  Wound Location Orientation: Right;Anterior;Lower      Assessments 4/10/2024  2:30 PM   Site Assessment Denuded;Red   Emma-Wound Assessment (!) Denuded;Scarred   Wound Length (cm) 4.5 cm   Wound Width (cm) 1.8 cm   Wound Surface Area (cm^2) 8.1 cm^2   Wound Depth (cm) 1.8 cm   Wound Volume (cm^3) 14.58 cm^3   State of Healing Non-healing   Margins Attached edges   Drainage Description Serous;Tan;Yellow   Drainage Amount Moderate   Dressing Alginate;Silver dressing;Packed;Silicone border dressing   Dressing Changed New   Dressing Status Clean;Dry;Occlusive       No associated orders.       Wound 04/10/24 Other (comment) Arm Dorsal;Left;Proximal;Upper (Active)   Date First Assessed/Time First Assessed: 04/10/24 1300   Present on Original Admission: Yes  Hand Hygiene Completed: Yes  Primary Wound Type: Other (comment)  Location: Arm  Wound Location Orientation: Dorsal;Left;Proximal;Upper      Assessments 4/10/2024  2:33 PM   Site Assessment Fibrinous;Red;Sloughing   Emma-Wound Assessment Blanchable erythema;Dry   Non-staged Wound Description Full thickness   Wound Length (cm) 4 cm   Wound Width (cm) 4 cm   Wound Surface Area (cm^2) 16 cm^2   State of Healing Non-healing   Drainage Description None   Drainage Amount None   Dressing Alginate;Silver dressing;Silicone border dressing   Dressing Changed New   Dressing Status Clean;Dry;Occlusive       No associated orders.     Colostomy LUQ (Active)   Stomal Appliance 1 piece;Changed  04/10/24 1434   Site/Stoma Assessment Clean;Intact 04/10/24 1434   Peristomal Assessment Clean;Intact 04/10/24 1434   Treatment Pouch change 04/10/24 1434   Drainage Characteristics Brown 04/10/24 1434     Wound 04/10/24 Incision Abdomen Right;Anterior;Lower (Active)   Date First Assessed/Time First Assessed: 04/10/24 1300   Present on Original Admission: Yes  Hand Hygiene Completed: Yes  Primary Wound Type: Incision  Location: Abdomen  Wound Location Orientation: Right;Anterior;Lower   Number of days: 0       Wound 04/10/24 Other (comment) Arm Dorsal;Left;Proximal;Upper (Active)   Date First Assessed/Time First Assessed: 04/10/24 1300   Present on Original Admission: Yes  Hand Hygiene Completed: Yes  Primary Wound Type: Other (comment)  Location: Arm  Wound Location Orientation: Dorsal;Left;Proximal;Upper   Number of days: 0     Wound 04/10/24 Incision Abdomen Right;Anterior;Lower (Active)   Site Assessment Denuded;Red 04/10/24 1430   Emma-Wound Assessment Denuded;Scarred 04/10/24 1430   Wound Length (cm) 4.5 cm 04/10/24 1430   Wound Width (cm) 1.8 cm 04/10/24 1430   Wound Surface Area (cm^2) 8.1 cm^2 04/10/24 1430   Wound Depth (cm) 1.8 cm 04/10/24 1430   Wound Volume (cm^3) 14.58 cm^3 04/10/24 1430   State of Healing Non-healing 04/10/24 1430   Margins Attached edges 04/10/24 1430   Drainage Description Serous;Tan;Yellow 04/10/24 1430   Drainage Amount Moderate 04/10/24 1430   Dressing Alginate;Silver dressing;Packed;Silicone border dressing 04/10/24 1430   Dressing Changed New 04/10/24 1430   Dressing Status Clean;Dry;Occlusive 04/10/24 1430       Wound 04/10/24 Other (comment) Arm Dorsal;Left;Proximal;Upper (Active)   Site Assessment Fibrinous;Red;Sloughing 04/10/24 1433   Emma-Wound Assessment Blanchable erythema;Dry 04/10/24 1433   Non-staged Wound Description Full thickness 04/10/24 1433   Wound Length (cm) 4 cm 04/10/24 1433   Wound Width (cm) 4 cm 04/10/24 1433   Wound Surface Area (cm^2) 16 cm^2 04/10/24  "1433   State of Healing Non-healing 04/10/24 1433   Drainage Description None 04/10/24 1433   Drainage Amount None 04/10/24 1433   Dressing Alginate;Silver dressing;Silicone border dressing 04/10/24 1433   Dressing Changed New 04/10/24 1433   Dressing Status Clean;Dry;Occlusive 04/10/24 1433     Wound location: Right posterior upper arm         Recommendations: DAILY      Irrigate with normal saline or wound cleanser      Cover with Aquacel Ag ([facility] [employer] order #[id number])       Cover with Mepilex lite dressing ([facility] [employer] order #[id number])      **HIGHLY recommend dermatology consult to assess RUE wound.     Wound location: RLQ abdomen         Recommendations: DAILY      Irrigate with normal saline or wound cleanser      Pack with Aquacel Ag (Silver) ([facility] [employer] order #[id number])       Cover with Mepilex border dressing     Ostomy type: Colostomy       Size: 1 1/4\"     Color: red  Protruding: budded  Functioning: soft brown stool  Mucocutaneous junction: Clean, dry, and intact   Peristomal skin: Clean, dry, and intact   Pouching: Flat 1 piece active life  Ostomy Education:  The patient is knowledgeable in ostomy care, but needs assistance while inpatient.   Plan: Assess stoma/pouching twice a week and as needed while inpatient.      While in bed patient should only be on one EHOB air mattress overlay, a fitted sheet, and one EHOB repositioning sheet with appropriate white chux. Please do not use brief while patient is resting in bed. Turn and reposition patient at least every 2 hours.       Wound Team Plan: **HIGHLY recommend dermatology consult to assess RUE wound. Primary provider, please review recommendation. If you agree with recommendation please enter as wound orders in EMR. Thank you.       Lexis Gipson RN, CWON  4/10/2024  2:34 PM          "

## 2024-04-10 NOTE — PROGRESS NOTES
Pharmacy Medication History Review    Sandra Velasquez is a 73 y.o. adult admitted for Sepsis (CMS/[facility]). Pharmacy reviewed the patient's [id number] medications and allergies for accuracy.    Medications ADDED:  Lubricant eye drop  Medications REMOVED:   Alendronate    Vitamin C  Citalopram   Lotrisone  Clonidine  Fluocinonide cream  Furosemide   Hydrocortisone cream  Hyoscyamine  Keoconazole  Lanolin  Nystatin powder  Zofran  Potassium citrate   Zoster vaccine  Ticagrelor  Silver sulfadiazine cream     The list below reflects the updated PTA list. Comments regarding how patient may be taking medications differently can be found in the Admit Orders Activity  Prior to Admission Medications   Prescriptions Last Dose Informant   DULoxetine (Cymbalta) 60 mg DR capsule 4/5/2024 Self   Sig: Take 1 capsule (60 mg) by mouth once daily. Do not crush or chew. Do not start before November 8, 2023.   VITAMIN B COMPLEX ORAL 4/5/2024 Self   Sig: Take 1 tablet by mouth once daily.   aspirin 81 mg EC tablet 4/5/2024 Self   Sig: TAKE 1 TABLET (81 MG) BY MOUTH ONCE DAILY.   carboxymethylcellulose (Refresh Plus) 0.5 % ophthalmic solution Past Month Self   Sig: Administer 1 drop into both eyes if needed for dry eyes.   cetirizine (ZyrTEC) 10 mg tablet 4/5/2024 Self   Sig: TAKE 1 TABLET BY MOUTH (10MG) BY MOUTH ONCE DAILY   emollient combination no.92 (Lubriderm) lotion cream Not Taking Self   Sig: APPLY 1 APPLICATION TOPICALLY 3 TIMES A DAY.   Patient not taking: Reported on 4/10/2024   fluticasone (Flonase) 50 mcg/actuation nasal spray Past Week Self   Sig: Administer 1 spray into each nostril once daily.   hydrocortisone 2.5 % cream Not Taking Self   Sig: APPLY TOPICALLY TO AFFECTED AREA TWICE DAILY   Patient not taking: Reported on 4/10/2024   lanolin-mineral oil (Mimi) lotion Not Taking Self   Sig: Apply 1 Application topically 3 times a day.   Patient not taking: Reported on 4/10/2024   lidocaine 4 % patch Past Week Self    Sig: Place 1 patch on the skin every 12 hours. THEN REMOVE AND LEAVE OFF FOR 12 HOURS   melatonin 3 mg tablet 4/5/2024 Self   Sig: Take 1 tablet (3 mg) by mouth once daily at bedtime.   metFORMIN (Glucophage) 500 mg tablet Not Taking Self   Sig: Take 1 tablet (500 mg) by mouth once daily with a meal.   Patient not taking: Reported on 4/10/2024   naloxone (Narcan) 4 mg/0.1 mL nasal spray Not Taking Self   Sig: ADMINISTER 1 SPRAY (4 MG) INTO AFFECTED NOSTRIL(S) IF NEEDED FOR OPIOID REVERSAL. REPEAT IN 2 MINUTES IF NEEDED CALL EMS   Patient not taking: Reported on 4/10/2024   propranolol (Inderal) 20 mg tablet 4/5/2024 Self   Sig: Take 1 tablet by mouth every morning and take 2 tablets by mouth every evening.   pseudoephedrine (Sudafed) 30 mg tablet     Sig: Take 1 tablet (30 mg) by mouth every 4 hours if needed for congestion for up to 10 days.   tamsulosin (Flomax) 0.4 mg 24 hr capsule 4/5/2024 Self   Sig: TAKE 1 CAPSULE (0.4 MG) BY MOUTH ONCE DAILY.   traMADol (Ultram) 50 mg tablet 4/5/2024 Self   Sig: Take 2 tablets (100 mg) by mouth 3 times a day. Do not start before February 7, 2024.      Facility-Administered Medications: None        The list below reflects the updated allergy list. Please review each documented allergy for additional clarification and justification.  Allergies  Reviewed by Carlos BrownD on 4/10/2024        Severity Reactions Comments    Penicillins High Anaphylaxis, Shortness of breath, Swelling note: has tolerated ceftriaxone    Atorvastatin Not Specified Myalgia     House Dust Mite Not Specified Runny nose     Erythromycin Base Low Nausea/vomiting             Patient accepts M2B at discharge. Pharmacy has been updated to [facility].    Sources used to complete the med history include   Prior to admission med grid  Medication dispense history  [facility] (tramadol, oxycodone)  Patient message 6/28/2024 (escitalopram discontinuation)  Patient interview. Patient was a fair historian, she was  able to confirm most of her current medications that are consistent with her dispense history    Below are additional concerns with the patient's PTA list.  - Patient reported not taking metformin and alendronate due to running out of refills. Patient is a candidate for education upon discharge    Fay Sykes, PharmD   Meds PGY1 Pharmacy Resident   [facility]  Please reach out via [employer] Secure Chat for questions, or if no response call [employer] or [employer] “MedRec”

## 2024-04-10 NOTE — H&P
"Subjective     Chief concern:   Chief Complaint   Patient presents with    Nausea       History of present illness:  Sandra Velasquez is a 73 y.o. Female presenting with PMHx of recurrent UTIs, nephrolithiasis, chronic lower back pain (on tramadol f/w Dr. Shah), CAD (LAD PCI in 2019, s/p 4 stents), AAA, HS and hx of diverticular disease c/b abdominal abscess s/p laparotomy open sigmoid resection (Dora procedure), end colostomy in 5/1/20, h/o MRSA PNA, who presented to the ED with chief complaint of n/v for the last 12 hours. Pt initially noted to the ED physician that her n/v started about 12 hours prior to presentation, however, to me she endorsed that this has been happening for the last 3 months. Pt noted that she has been feeling generally unwell since the start of this year and has had multiple ED visits, but leaves AMA each time because the doctors and nurses were mean to her. Things got worse this past day as the nausea woudn't subside. She had two episodes of NBNB emesis at home and since she's been in the ED she's had about 5 more episodes. She notes nothing helps. She has also had a cough for the last 3 months, sputum producing, and has felt generally tired/sick. She isn't sure that she'll be able to walk out of the hospital in her condition. Pt also endorses having two lesions, one on her left arm and the there under her colostomy bag which are causing her distress and pt would want evaluated. Pt denies any fever, chills, CP, SOB, urinary urgency, increased urinary frequency or burning with urination.     ED Course:  Temperature: 36.9 °C (98.4 °F)  Temp Source: Oral  Heart Rate: 59  Respirations: 18  BP: 174/76  MAP (mmHg): 91  BP Location: Right arm  BP Method: Automatic  Patient Position: Sitting  Current Vitals:  /80   Pulse 70   Temp 36.9 °C (98.4 °F) (Oral)   Resp 18   Ht 1.549 m (5' 1\")   Wt 68 kg (150 lb)   SpO2 98%   BMI 28.34 kg/m²    Labs:   CBC: WBC 13.8 , HGB 10.5, PLT " 393  BMP: , K 4.0, Cl 104, HCO3 26, BUN 13, CR 1.06, Glu 140  LFTS: AST 27 , ALT 16, ALKPHOS 71 , TBILI 0.3 , DBILI No results found for requested labs within last 365 days.  TROP: 30    EKG  Encounter Date: 11/02/23   ECG 12 lead   Result Value    Ventricular Rate 66    Atrial Rate 66    CO Interval 94    QRS Duration 74    QT Interval 380    QTC Calculation(Bazett) 398    P Axis 17    R Axis 76    T Axis 16    QRS Count 11    Q Onset 218    P Onset 171    P Offset 203    T Offset 408    QTC Fredericia 392    Narrative    Please see ED Provider Note for formal interpretation  Confirmed by Annette Kaminski ([id number]) on 11/3/2023 9:38:11 PM        Imaging:  CT chest abdomen pelvis w IV contrast    Result Date: 4/10/2024  CHEST: 1. Extensive consolidations throughout both lungs suggestive of multifocal pneumonia. 2. Numerous enlarged mediastinal and bilateral hilar lymph nodes are nonspecific, however given findings in the lungs may be reactive. Attention on follow-up is recommended. 3. Small hiatal hernia. Mild thickening of the distal esophagus may relate to gastroesophageal reflux.   ABDOMEN-PELVIS: 1. No evidence of acute process in the abdomen and pelvis. 2. Status post distal colectomy and end descending colostomy with large parastomal hernia. No evidence of active bowel inflammation or obstruction. Stool is noted within the rectal stump which was also previously seen on exam dated 05/11/2022. 3. Bilateral nephrolithiasis without evidence of hydronephrosis. Few bilateral renal cysts are noted. 4. Additional chronic findings as described above.     ED Interventions:  Medications   ondansetron (Zofran) injection 4 mg (has no administration in time range)   ondansetron (Zofran) injection  - Omnicell Override Pull (has no administration in time range)   enoxaparin (Lovenox) syringe 40 mg (has no administration in time range)   glucagon (Glucagen) injection 1 mg (has no administration in time range)   dextrose 50 %  injection 12.5 g (has no administration in time range)   acetaminophen (Tylenol) tablet 975 mg (has no administration in time range)   levoFLOXacin (Levaquin) tablet 750 mg (has no administration in time range)   ondansetron (Zofran) injection 4 mg (4 mg intravenous Given 4/10/24 0619)   metoclopramide (Reglan) injection 10 mg (10 mg intravenous Given 4/10/24 0724)   iohexol (OMNIPaque) 350 mg iodine/mL solution 80 mL (80 mL intravenous Given 4/10/24 0839)       Cardiac Hx:  Last echo: No results found for this or any previous visit.   Last cath: [facility] CathPCI V5 Collection Form    Pre-Procedure Information    Cardiac Arrest  - The patient's level of consciousness was alert.      Last EKG:   Encounter Date: 11/02/23   ECG 12 lead   Result Value    Ventricular Rate 66    Atrial Rate 66    WA Interval 94    QRS Duration 74    QT Interval 380    QTC Calculation(Bazett) 398    P Axis 17    R Axis 76    T Axis 16    QRS Count 11    Q Onset 218    P Onset 171    P Offset 203    T Offset 408    QTC Fredericia 392    Narrative    Please see ED Provider Note for formal interpretation  Confirmed by Annette Kaminski ([id number]) on 11/3/2023 9:38:11 PM        GI Hx:  Last EGD: No results found for this or any previous visit.  Last Colonoscopy:  === Results for orders placed in visit on 03/14/22 ===    Colonoscopy [GI6]     Status: Normal    Past Medical History:  Sandra has a past medical history of Acute cystitis without hematuria (01/03/2019), Age-related nuclear cataract, left eye (10/31/2018), Age-related nuclear cataract, right eye (10/31/2018), Allergic rhinitis due to pollen (09/19/2018), Calculus of kidney (12/11/2018), Change in bowel habit (12/18/2018), Combined forms of age-related cataract, left eye (05/08/2017), Combined forms of age-related cataract, left eye (05/08/2017), Cortical age-related cataract, left eye (10/31/2018), Cyanosis (08/10/2017), Dry eye syndrome of bilateral lacrimal glands (10/31/2018), Dry eye  syndrome of bilateral lacrimal glands (10/31/2018), Encounter for immunization (09/17/2018), Encounter for immunization (10/01/2020), History of falling (01/03/2019), Hyperlipidemia, unspecified (08/26/2020), Lesion of radial nerve, unspecified upper limb (03/05/2016), Meibomian gland dysfunction of unspecified eye, unspecified eyelid (10/31/2018), Nausea (11/27/2018), Opioid dependence, uncomplicated (CMS/[facility]) (03/09/2020), Other chronic sinusitis (06/15/2018), Other fecal abnormalities (11/27/2018), Other specified disorders of nose and nasal sinuses (06/15/2018), Other specified disorders of nose and nasal sinuses (10/24/2018), Other specified disorders of nose and nasal sinuses (02/03/2017), Other specified disorders of teeth and supporting structures (09/04/2015), Pain in leg, unspecified (03/11/2019), Pain in throat (02/03/2017), Personal history of diseases of the blood and blood-forming organs and certain disorders involving the immune mechanism (02/03/2015), Personal history of diseases of the skin and subcutaneous tissue (08/16/2013), Personal history of other diseases of the circulatory system, Personal history of other diseases of the digestive system, Personal history of other diseases of the digestive system (07/02/2014), Personal history of other diseases of the musculoskeletal system and connective tissue (07/02/2014), Personal history of other diseases of the respiratory system (12/18/2017), Personal history of other diseases of the respiratory system (12/29/2017), Personal history of other diseases of the respiratory system (01/03/2019), Personal history of other diseases of the respiratory system (02/03/2017), Personal history of other diseases of the respiratory system (12/29/2017), Personal history of other diseases of the respiratory system (06/15/2018), Personal history of other diseases of the respiratory system (08/11/2015), Personal history of other drug therapy (02/08/2017), Personal  history of other drug therapy (11/10/2014), Personal history of other infectious and parasitic diseases (03/25/2019), Personal history of other specified conditions (07/25/2014), Personal history of other specified conditions (12/18/2017), Personal history of other specified conditions (06/18/2014), Personal history of other specified conditions (12/29/2017), Personal history of other specified conditions (08/28/2017), Personal history of pneumonia (recurrent) (03/10/2017), Personal history of urinary (tract) infections (01/03/2019), Pyuria (08/22/2016), Right upper quadrant pain (02/03/2017), Ulcerative blepharitis unspecified eye, unspecified eyelid (10/31/2018), Unspecified atherosclerosis (07/02/2014), Unspecified cataract (06/09/2015), Unspecified cataract (06/09/2015), Unspecified cataract (06/09/2015), and Unspecified cataract (06/09/2015).    Past Surgical History:  Sandra has a past surgical history that includes Lithotripsy (07/31/2013); Other surgical history (11/10/2021); Refractive surgery (06/09/2015); Tonsillectomy (07/02/2014); Other surgical history (07/02/2014); CT guided percutaneous peritoneal or retroperitoneal fluid collection drainage (4/7/2020); CT guided percutaneous peritoneal or retroperitoneal fluid collection drainage (4/22/2020); and IR CVC PICC (5/11/2020).     Social history:  Living Situation: Lives alone, has a house with multiple floors and stairs  Alcohol:   Alcohol Use: Not on file     Tobacco:   Tobacco Use: Low Risk  (4/10/2024)    Patient History     Smoking Tobacco Use: Never     Smokeless Tobacco Use: Never     Passive Exposure: Not on file      Illicit Drugs:   Social History     Substance and Sexual Activity   Drug Use Never       Sandra reports that Sandra has never smoked. Sandra has never used smokeless tobacco. Sandra reports that Sandra does not drink alcohol and does not use drugs.    Family history:  Family History   Problem Relation Name Age of Onset    Alcohol abuse  Mother      Other (CARDIAC DISORDER) Mother      Diabetes Mother      Glaucoma Mother      Other (MESOTHELIOMA) Mother      Alcohol abuse Father      Melanoma Father      Macular degeneration Sister      Breast cancer Sister      Macular degeneration Other AUNT     Skin cancer Other Family hx         Allergies:  Penicillins, Atorvastatin, Erythromycin base, and House dust mite    Home medications:  Prior to Admission medications    Medication Sig Start Date End Date Taking? Authorizing Provider   alendronate (Fosamax) 70 mg tablet Take 1 tablet (70 mg) by mouth every 7 days. IN THE AM AT LEAST 30 MIN BEFORE 1ST FOOD,BEVERAGE,ORMEDICATIONS OF DAY 3/26/21   Historical Provider, MD   ascorbic acid-multivit-min (Emergen-C) 1,000 mg powder effervescent in packet Take by mouth.    Historical Provider, MD   aspirin 81 mg EC tablet TAKE 1 TABLET (81 MG) BY MOUTH ONCE DAILY. 8/30/23 8/29/24  Rose Shah MD   cetirizine (ZyrTEC) 10 mg tablet TAKE 1 TABLET BY MOUTH (10MG) BY MOUTH ONCE DAILY 8/30/23 8/29/24  Rose Shah MD   citalopram (CeleXA) 40 mg tablet Take 1 tablet (40 mg) by mouth once daily. 12/12/14   Historical Provider, MD   clindamycin (Cleocin T) 1 % external solution 1 Application 5/24/21   Historical Provider, MD   cloNIDine (Catapres) 0.1 mg tablet Take 1 tablet (0.1 mg) by mouth as needed at bedtime.    Historical Provider, MD   clotrimazole-betamethasone (Lotrisone) cream Apply 1 Film topically in the morning and 1 Film before bedtime. 11/25/20   Historical Provider, MD   DULoxetine (Cymbalta) 60 mg DR capsule Take 1 capsule (60 mg) by mouth once daily. Do not crush or chew. Do not start before November 8, 2023. 11/8/23 11/7/24  Rose Shah MD   emollient combination no.92 (Lubriderm) lotion cream APPLY 1 APPLICATION TOPICALLY 3 TIMES A DAY. 6/8/23 6/7/24  Rose Shah MD   fluocinonide 0.05 % cream 1 Application 12/29/20   Historical Provider, MD   fluticasone (Flonase) 50 mcg/actuation nasal  spray Administer 1 spray into each nostril once daily. *DO NOT USE AFTER 5 DAYS* 4/19/17   Historical Provider, MD   furosemide (Lasix) 20 mg tablet Take 1 tablet (20 mg) by mouth once daily. AS DIRECTED FOR 3 DAYS (REPEAT AS DIRECTED) 3/9/20   Dain Provider, MD   hydrocortisone 2.5 % cream APPLY TOPICALLY TO AFFECTED AREA TWICE DAILY 6/28/23   Rose Shah MD   hyoscyamine 0.125 mg disintegrating tablet Take 1 tablet (0.125 mg) by mouth. 1/11/19   Historical Provider, MD   ketoconazole (NIZOral) 2 % cream Apply topically twice a day.    Historical Provider, MD   lanolin-mineral oil (Mimi) lotion Apply 1 Application topically 3 times a day. 6/8/23 6/2/24  Rose Shah MD   lidocaine (Lidoderm) 5 % patch Place 1 patch on the skin every 12 hours. THEN REMOVE AND LEAVE OFF FOR 12 HOURS 12/23/20   Historical Provider, MD   melatonin 3 mg tablet Take 1 tablet (3 mg) by mouth once daily at bedtime. 3/13/24 6/11/24  Rose Shah MD   metFORMIN (Glucophage) 500 mg tablet Take 1 tablet (500 mg) by mouth once daily with a meal. 6/2/23 6/1/24  Rose Shah MD   mupirocin (Bactroban) 2 % ointment  6/8/23   Historical Provider, MD   naloxone (Narcan) 4 mg/0.1 mL nasal spray ADMINISTER 1 SPRAY (4 MG) INTO AFFECTED NOSTRIL(S) IF NEEDED FOR OPIOID REVERSAL. REPEAT IN 2 MINUTES IF NEEDED CALL EMS 6/5/23 6/4/24  Rose Shah MD   nystatin (Mycostatin) 100,000 unit/gram powder Apply topically once daily as needed. 1/7/22   Historical Provider, MD   ondansetron ODT (Zofran-ODT) 4 mg disintegrating tablet Take 1 tablet (4 mg) by mouth 4 times a day. 5/23/22   Historical Provider, MD   potassium citrate 99 mg capsule Take 1 capsule by mouth once daily.    Historical Provider, MD   propranolol (Inderal) 20 mg tablet Take 1 tablet by mouth every morning and take 2 tablets by mouth every evening. 1/17/24   Cullen Fuentes MD   pseudoephedrine (Sudafed) 30 mg tablet Take 1 tablet (30 mg) by mouth every 4 hours if needed for  congestion for up to 10 days. 10/27/23 11/7/23  Bambi Corrales MD   silver sulfADIAZINE (Silvadene) 1 % cream 1 Application 12/29/20   Historical Provider, MD   tamsulosin (Flomax) 0.4 mg 24 hr capsule TAKE 1 CAPSULE (0.4 MG) BY MOUTH ONCE DAILY. 8/30/23 8/29/24  Rose Shah MD   ticagrelor (Brilinta) 90 mg tablet Take 1 tablet (90 mg) by mouth twice a day.    Historical Provider, MD   traMADol (Ultram) 50 mg tablet Take 2 tablets (100 mg) by mouth 3 times a day. Do not start before February 7, 2024. 3/16/24 5/11/24  Rose Shah MD   VITAMIN B COMPLEX ORAL Take 1 tablet by mouth once daily.    Historical Provider, MD   zoster vaccine-recombinant adjuvanted (Shingrix, PF,) 50 mcg/0.5 mL vaccine Inject into the shoulder, thigh, or buttocks 1 time. 11/8/21   Historical Provider, MD   zoster vaccine-recombinant adjuvanted (Shingrix, PF,) 50 mcg/0.5 mL vaccine Inject into the shoulder, thigh, or buttocks 1 time. Please administer one dose now, and the secodn dose in 2-6 months 11/25/20   Historical Provider, MD          Objective     Review of systems  Review of Systems   Constitutional:  Positive for fatigue. Negative for chills and fever.   HENT:  Positive for congestion. Negative for ear pain, facial swelling, hearing loss, sneezing, sore throat, trouble swallowing and voice change.    Eyes:  Negative for discharge, redness, itching and visual disturbance.   Respiratory:  Positive for cough. Negative for chest tightness, shortness of breath and wheezing.    Cardiovascular:  Negative for chest pain, palpitations and leg swelling.   Gastrointestinal:  Positive for diarrhea, nausea and vomiting. Negative for abdominal distention, abdominal pain, blood in stool and constipation.   Endocrine: Negative for polydipsia, polyphagia and polyuria.   Genitourinary:  Negative for difficulty urinating, dysuria, flank pain, frequency, hematuria and urgency.   Musculoskeletal:  Negative for arthralgias, back pain and joint  "swelling.   Skin:         Lesions present on the back of pts R arm and under her colostomy bag   Neurological:  Negative for dizziness, tremors, seizures, syncope, speech difficulty, weakness and headaches.   Psychiatric/Behavioral:  Negative for confusion and sleep disturbance. The patient is not nervous/anxious.       Vital signs:  Blood pressure 158/80, pulse 70, temperature 36.9 °C (98.4 °F), temperature source Oral, resp. rate 18, height 1.549 m (5' 1\"), weight 68 kg (150 lb), SpO2 98%.    Physical Exam  Constitutional:       General: Sandra is not in acute distress.     Appearance: Normal appearance.   HENT:      Head: Normocephalic and atraumatic.      Mouth/Throat:      Mouth: Mucous membranes are moist.      Pharynx: Oropharynx is clear. No posterior oropharyngeal erythema.   Eyes:      General: No scleral icterus.        Right eye: No discharge.         Left eye: No discharge.      Extraocular Movements: Extraocular movements intact.      Conjunctiva/sclera: Conjunctivae normal.      Pupils: Pupils are equal, round, and reactive to light.   Cardiovascular:      Rate and Rhythm: Regular rhythm. Bradycardia present.      Pulses: Normal pulses.      Heart sounds: Normal heart sounds. No murmur heard.     No gallop.   Pulmonary:      Breath sounds: No stridor. No wheezing, rhonchi or rales.      Comments: Diminished breath sounds in b/l bases  Abdominal:      General: Bowel sounds are normal.      Palpations: Abdomen is soft.      Tenderness: There is no abdominal tenderness. There is no guarding or rebound.      Comments: Colostomy bag present   Musculoskeletal:         General: No swelling or tenderness.      Right lower leg: No edema.      Left lower leg: No edema.   Skin:     Comments: Punctated lesion with erythema in an irregular shape present on pts right upper arm, on the lateral aspect, non-tender, non-weeping, no pus or secretions noted    Large open sore present under pts colostomy bag, non-tender, " non-weeping, no pus or secretions noted   Neurological:      General: No focal deficit present.      Mental Status: Sandra is alert and oriented to person, place, and time.      Motor: No weakness.   Psychiatric:         Mood and Affect: Mood normal.           Relevant Results    Meds:  No current facility-administered medications on file prior to encounter.     Current Outpatient Medications on File Prior to Encounter   Medication Sig Dispense Refill    alendronate (Fosamax) 70 mg tablet Take 1 tablet (70 mg) by mouth every 7 days. IN THE AM AT LEAST 30 MIN BEFORE 1ST FOOD,BEVERAGE,ORMEDICATIONS OF DAY      ascorbic acid-multivit-min (Emergen-C) 1,000 mg powder effervescent in packet Take by mouth.      aspirin 81 mg EC tablet TAKE 1 TABLET (81 MG) BY MOUTH ONCE DAILY. 90 tablet 3    cetirizine (ZyrTEC) 10 mg tablet TAKE 1 TABLET BY MOUTH (10MG) BY MOUTH ONCE DAILY 90 tablet 1    citalopram (CeleXA) 40 mg tablet Take 1 tablet (40 mg) by mouth once daily.      clindamycin (Cleocin T) 1 % external solution 1 Application      cloNIDine (Catapres) 0.1 mg tablet Take 1 tablet (0.1 mg) by mouth as needed at bedtime.      clotrimazole-betamethasone (Lotrisone) cream Apply 1 Film topically in the morning and 1 Film before bedtime.      DULoxetine (Cymbalta) 60 mg DR capsule Take 1 capsule (60 mg) by mouth once daily. Do not crush or chew. Do not start before November 8, 2023. 30 capsule 11    emollient combination no.92 (Lubriderm) lotion cream APPLY 1 APPLICATION TOPICALLY 3 TIMES A DAY. 473 mL 1    fluocinonide 0.05 % cream 1 Application      fluticasone (Flonase) 50 mcg/actuation nasal spray Administer 1 spray into each nostril once daily. *DO NOT USE AFTER 5 DAYS*      furosemide (Lasix) 20 mg tablet Take 1 tablet (20 mg) by mouth once daily. AS DIRECTED FOR 3 DAYS (REPEAT AS DIRECTED)      hydrocortisone 2.5 % cream APPLY TOPICALLY TO AFFECTED AREA TWICE DAILY 20 g 10    hyoscyamine 0.125 mg disintegrating tablet Take  1 tablet (0.125 mg) by mouth.      ketoconazole (NIZOral) 2 % cream Apply topically twice a day.      lanolin-mineral oil (Mimi) lotion Apply 1 Application topically 3 times a day. 250 mL 1    lidocaine (Lidoderm) 5 % patch Place 1 patch on the skin every 12 hours. THEN REMOVE AND LEAVE OFF FOR 12 HOURS      melatonin 3 mg tablet Take 1 tablet (3 mg) by mouth once daily at bedtime. 30 tablet 2    metFORMIN (Glucophage) 500 mg tablet Take 1 tablet (500 mg) by mouth once daily with a meal. 30 tablet 11    mupirocin (Bactroban) 2 % ointment       naloxone (Narcan) 4 mg/0.1 mL nasal spray ADMINISTER 1 SPRAY (4 MG) INTO AFFECTED NOSTRIL(S) IF NEEDED FOR OPIOID REVERSAL. REPEAT IN 2 MINUTES IF NEEDED CALL EMS 2 each 2    nystatin (Mycostatin) 100,000 unit/gram powder Apply topically once daily as needed.      ondansetron ODT (Zofran-ODT) 4 mg disintegrating tablet Take 1 tablet (4 mg) by mouth 4 times a day.      potassium citrate 99 mg capsule Take 1 capsule by mouth once daily.      propranolol (Inderal) 20 mg tablet Take 1 tablet by mouth every morning and take 2 tablets by mouth every evening. 270 tablet 2    pseudoephedrine (Sudafed) 30 mg tablet Take 1 tablet (30 mg) by mouth every 4 hours if needed for congestion for up to 10 days. 30 tablet 0    silver sulfADIAZINE (Silvadene) 1 % cream 1 Application      tamsulosin (Flomax) 0.4 mg 24 hr capsule TAKE 1 CAPSULE (0.4 MG) BY MOUTH ONCE DAILY. 90 capsule 3    ticagrelor (Brilinta) 90 mg tablet Take 1 tablet (90 mg) by mouth twice a day.      traMADol (Ultram) 50 mg tablet Take 2 tablets (100 mg) by mouth 3 times a day. Do not start before February 7, 2024. 84 tablet 3    VITAMIN B COMPLEX ORAL Take 1 tablet by mouth once daily.      zoster vaccine-recombinant adjuvanted (Shingrix, PF,) 50 mcg/0.5 mL vaccine Inject into the shoulder, thigh, or buttocks 1 time.      zoster vaccine-recombinant adjuvanted (Shingrix, PF,) 50 mcg/0.5 mL vaccine Inject into the shoulder,  thigh, or buttocks 1 time. Please administer one dose now, and the secodn dose in 2-6 months         Labs:  Last CBC:  Lab Results   Component Value Date    WBC 13.8 (H) 04/10/2024    HGB 10.5 (L) 04/10/2024    HCT 34.1 (L) 04/10/2024    MCV 73 (L) 04/10/2024     04/10/2024       Last RFP:  Lab Results   Component Value Date    GLUCOSE 140 (H) 04/10/2024    CALCIUM 8.7 04/10/2024     04/10/2024    K 4.0 04/10/2024    CO2 26 04/10/2024     04/10/2024    BUN 13 04/10/2024    CREATININE 1.06 04/10/2024       Last LFTs:  Lab Results   Component Value Date    ALT 16 04/10/2024    AST 27 04/10/2024    ALKPHOS 71 04/10/2024    BILITOT 0.3 04/10/2024       Last coags:  Lab Results   Component Value Date    INR 1.4 (H) 04/27/2020    APTT 26 (L) 04/27/2020       Micro/culture data:  No results found for the last 90 days.      Imaging:  CT chest abdomen pelvis w IV contrast  Narrative: Interpreted By:  Ritu Williamson,   STUDY:  CT CHEST ABDOMEN PELVIS W IV CONTRAST;  4/10/2024 8:56 am      INDICATION:  Signs/Symptoms:s/p colectomy with n/v green discharge from rectum.  Multiple new wounds to the abdomen and left posterior arm.  73-year-old female with history of colostomy presenting with nausea  vomiting. Reports output from rectum.      COMPARISON:  CT abdomen pelvis 05/11/2022.      ACCESSION NUMBER(S):  [id number]      ORDERING CLINICIAN:  RANJANA GUAJARDO      TECHNIQUE:  CT of the chest, abdomen, and pelvis was performed.  Contiguous axial  images were obtained at 3 mm slice thickness through the chest,  abdomen and pelvis. Coronal and sagittal reconstructions at 3 mm  slice thickness were performed.  80 ml of contrast Omnipaque 350 were administered intravenously  without immediate complication.      FINDINGS:  CHEST:      LUNG/PLEURA/LARGE AIRWAYS:  The trachea and central airways are patent.      There are confluent and patchy consolidative opacities throughout  both lungs, most pronounced in the  right upper lobe and left lower  lobe with air bronchograms. No pleural effusion or pneumothorax.      VESSELS:  Aorta and pulmonary arteries are normal caliber.  Moderate  atherosclerotic changes are noted of the aorta and branching vessels.  Severe coronary artery calcifications are present. There is  suggestion of coronary stents.      HEART:  The heart is normal in size.  No pericardial effusion.      MEDIASTINUM AND GERY:  There are enlarged mediastinal and bilateral hilar lymph nodes. This  includes for instance prevascular lymph nodes measuring up to 1.1 x  1.0 cm (series 201, image 28 of 220), paratracheal and subcarinal  lymph nodes measuring up to 2.6 x 1.9 cm (series 201, image 45 of  220), right hilar nodes measuring up to 1.4 x 1.2 cm, and left hilar  nodes measuring up to 1.3 x 1.1 cm.      There is a small hiatal hernia. Mild wall thickening along the distal  esophagus is noted, possibly relating to sequela of gastroesophageal  reflux.      CHEST WALL AND LOWER NECK:  The soft tissues of the chest wall demonstrate no gross abnormality.  The visualized thyroid gland appears within normal limits.      ABDOMEN:      LIVER:  The liver is normal in size and smooth in contour. No focal  parenchymal abnormality is identified.      BILE DUCTS:  The intrahepatic and extrahepatic ducts are not dilated.      GALLBLADDER:  The gallbladder is nondistended and without evidence of radiopaque  stones.      PANCREAS:  The pancreas appears unremarkable without evidence of ductal  dilatation or masses.      SPLEEN:  The spleen is normal in size without focal lesions.      ADRENAL GLANDS:  Within normal limits.      KIDNEYS AND URETERS:  Bilateral kidneys are moderately atrophic. There is symmetric renal  enhancement. A 1.5 cm cortical hypodensity in the right kidney  (series 201, image 105 of 220) measures slightly greater than simple  fluid attenuation and may represent a hemorrhagic/proteinaceous cyst,  not  significantly changed in size from previous exam on 05/11/2022. A  few additional simple fluid attenuating hypodensities in the  bilateral kidneys likely represent renal cysts, measuring up to 2.0  cm in the right kidney and 1.9 cm in the left kidney. No right-sided  hydronephrosis or nephrolithiasis. The left kidney demonstrates  multiple radiopaque calculi measuring up to 1.6 cm at the lower pole.  Note is made of a slightly dilated anterior calyx at the midpole  which may be obstructed by stones (series 201, image 103 of 220), not  significantly changed in appearance from exam on 05/11/2022.      PELVIS:      BLADDER:  Within normal limits.      REPRODUCTIVE ORGANS:  The uterus is present. No suspicious pelvic masses.      BOWEL:  Small hiatal hernia. Otherwise the stomach is unremarkable. Small  bowel loops are nondilated and demonstrate no wall thickening. Again  seen are postoperative changes status post distal colectomy and a end  descending colostomy in the left lower quadrant. The colon is  nondilated and demonstrates no wall thickening. There is a large  parastomal hernia containing a segment of distal transverse colon  similar to previous. Note is also made of a small amount of stool  within the rectal stump which is similar to previous exam 05/11/2022.  The appendix appears normal.          VESSELS:  There is no aneurysmal dilatation of the abdominal aorta. The IVC  appears normal. There are severe atherosclerotic calcifications of  the abdominal aorta and its branch vessels. The portal venous system  appears patent.      PERITONEUM/RETROPERITONEUM/LYMPH NODES:  No ascites or free air, no fluid collection.  No abdominopelvic  lymphadenopathy is present.      BONE AND SOFT TISSUE:  No acute osseous abnormality or suspicious osseous lesions. Moderate  degree multilevel degenerative changes of the visualized spine. There  is a left lower quadrant ostomy with a large parastomal hernia  similar to previous.       Impression: CHEST:  1. Extensive consolidations throughout both lungs suggestive of  multifocal pneumonia.  2. Numerous enlarged mediastinal and bilateral hilar lymph nodes are  nonspecific, however given findings in the lungs may be reactive.  Attention on follow-up is recommended.  3. Small hiatal hernia. Mild thickening of the distal esophagus may  relate to gastroesophageal reflux.      ABDOMEN-PELVIS:  1. No evidence of acute process in the abdomen and pelvis.  2. Status post distal colectomy and end descending colostomy with  large parastomal hernia. No evidence of active bowel inflammation or  obstruction. Stool is noted within the rectal stump which was also  previously seen on exam dated 05/11/2022.  3. Bilateral nephrolithiasis without evidence of hydronephrosis. Few  bilateral renal cysts are noted.  4. Additional chronic findings as described above.      I personally reviewed the images/study and I agree with the findings  as stated by resident physician Ritu Williamson MD. This study was  interpreted at [facility],  [place], [place].      MACRO:  None          Dictation workstation:   [id number]       Assessment/Plan   Sandra Velasquez is a 73 y.o. Female presenting with PMHx of recurrent UTIs, nephrolithiasis, chronic lower back pain (on tramadol f/w Dr. Shah), CAD (LAD PCI in 2019, s/p 4 stents), AAA, HS and hx of diverticular disease c/b abdominal abscess s/p laparotomy open sigmoid resection (Dora procedure), end colostomy in 5/1/20, h/o MRSA PNA, who presented to the ED with chief complaint of n/v who was found to have PNA in b/l lower lobes. Pts cause for leukocytosis and general malaise is likely her multifocal b/l lung PNA as seen on her CT chest. Causes for PNA that result in GI symptoms include legionella, COVID and severe cases of staph. Given pts hx of anaphylaxis with penicillins, and her hx of allergic reactions to macrolide, will treat pts PNA  with levofloxacin, albeit its not the best agent given pts age and her risk factors for developing tendonitis and tendinopathy (will need to monitor closely). Also, given that pt has a hx of MRSA PNA, will also add on vanc. Will follow-up blood cultures, strep/legionella urine antigens, respiratory cultures and stool pcr for weaning of abx. If infectious work-up is negative, will plan to treat pt for at least 5 days with levofloxacin and will de-escalate vanc based on MRSA nares.    #N/v and malaise likely 2/2 CAP (most prob. legionella) vs. MRSA vs. Aspiration PNA (less likely since b/l lungs)  #Hx of MRSA PNA  - Pt complaining of n/v and malaise since last 12 hours prior to presentation  - Flu / COVID negative  - Leukocytosis 13.8  - EKG (4/10): Sinus colin, Qtc 420  - CT Chest: Extensive consolidations throughout both lungs suggestive of multifocal pneumonia.   Plan  - Levofloxacin 750 mg PO (4/10 -4/14)  - Vanc (4/10 - )  - F/up MRSA nares  - F/up strep/legionella urine antigens  - F/up Blood cultures x2 (4/10)  - F/up enteric stool pathogen panel  - Zofran PRN for nausea (If pt is using regularly, would recommend checking daily EKGs given concomitant use of Fluoroquinolone)  - Will likely need repeat CT Chest 4-6 weeks to evaluate lymph nodes seen in CT Chest 4/10    #HS  #Chronic wounds  - First is underneath her umbilicus which she states has been there since her colectomy in 2020, likely 2/2 contact dermatitis  - The others is on the back of her right arm, its non-tender and non-draining, she thinks this has been there for the last 3-6 months, likely 2/2 HS vs. Tinea vs. Contact dermatitis vs. Calciphylaxis (less likely)  Plan  - Consulted derm, cont. to appreciate recs  - Consulted wound care, cont. to appreciate recs    #Hx of recurrent UTIs  #Nephrolithiasis  - Cont. Home tamsulosin    #Chronic lower back pain  - On tramadol f/w Dr. Shah outpt  Plan  - Cont. Home tramadol  - Narcan ordered PRN  -  Lidocaine patches    #New bradycardia likely 2/2 viral/bacterial illness?  #CAD s/p PCI in 2019, s/p 4 stents(LAD)  #AAA  - Will get TTE in the s/o new bradycardia  - Cont. home ASA    #Hx of diverticular disease c/b abdominal abscess  #S/p laparotomy open sigmoid resection (Dora procedure)  #End colostomy in 5/1/20  - CT A/P: No evidence of acute process in the abdomen and pelvis. No evidence of active bowel inflammation or obstruction.   Plan   CTM    #Pre-T2DM?  - A1c 5.7 (5/23/2023)  Plan  - Holding home metformin, as pt hasn't taken it in over year  - Will get repeat A1c  - Hypoglycemic protocol  - SSI  - BG checks ACHS    #MDD  - Cont. home duloxetine    F: Replete PRN  E: Replete PRN  N: Regular Diet  DVT Ppx: Lovenox  GI Ppx: None  Access/Lines: PIV  Abx: Levofloxacin, Vanc  O2: None    Pain regimen: Tylenol PRN  GI Laxative: None    Code status: DNR and No Intubation (Confirmed on admission)  Emergency contact: None, pt notes there's no one in her life but her, and if anything happens to her and she is unable to make decisions, she defers to whatever the doctors think is best       ELIGIO Medley MD, PGY 3  Internal Medicine

## 2024-04-10 NOTE — ED TRIAGE NOTES
"Patient brought in via "[employer]" from home for nausea for the past 12 hours. Patient also reports \"all over\" body cramping and \"green stuff coming out of my anus\". Patient has a colostomy bag and states \"nothing should be coming out the other end.\" States she hasn't taken any of her medication in 3 days  "

## 2024-04-11 ENCOUNTER — APPOINTMENT (OUTPATIENT)
Dept: CARDIOLOGY | Facility: HOSPITAL | Age: 74
End: 2024-04-11
Payer: MEDICAID

## 2024-04-11 VITALS
HEIGHT: 61 IN | TEMPERATURE: 98.1 F | BODY MASS INDEX: 28.32 KG/M2 | WEIGHT: 150 LBS | DIASTOLIC BLOOD PRESSURE: 65 MMHG | SYSTOLIC BLOOD PRESSURE: 99 MMHG | OXYGEN SATURATION: 98 % | HEART RATE: 102 BPM | RESPIRATION RATE: 16 BRPM

## 2024-04-11 LAB
ALBUMIN SERPL BCP-MCNC: 2.7 G/DL (ref 3.4–5)
ANION GAP SERPL CALC-SCNC: 14 MMOL/L (ref 10–20)
BASOPHILS # BLD AUTO: 0.07 X10*3/UL (ref 0–0.1)
BASOPHILS NFR BLD AUTO: 0.6 %
BUN SERPL-MCNC: 14 MG/DL (ref 6–23)
CALCIUM SERPL-MCNC: 9.1 MG/DL (ref 8.6–10.6)
CHLORIDE SERPL-SCNC: 100 MMOL/L (ref 98–107)
CO2 SERPL-SCNC: 25 MMOL/L (ref 21–32)
CREAT SERPL-MCNC: 1.05 MG/DL (ref 0.5–1.3)
EGFRCR SERPLBLD CKD-EPI 2021: 56 ML/MIN/1.73M*2
EJECTION FRACTION APICAL 4 CHAMBER: 74
EOSINOPHIL # BLD AUTO: 0.37 X10*3/UL (ref 0–0.4)
EOSINOPHIL NFR BLD AUTO: 3.2 %
ERYTHROCYTE [DISTWIDTH] IN BLOOD BY AUTOMATED COUNT: 18.3 % (ref 11.5–14.5)
GLUCOSE BLD MANUAL STRIP-MCNC: 106 MG/DL (ref 74–99)
GLUCOSE BLD MANUAL STRIP-MCNC: 94 MG/DL (ref 74–99)
GLUCOSE SERPL-MCNC: 111 MG/DL (ref 74–99)
HBV CORE AB SER QL: REACTIVE
HBV SURFACE AB SER-ACNC: <3.1 MIU/ML
HBV SURFACE AG SERPL QL IA: NONREACTIVE
HCT VFR BLD AUTO: 34.4 % (ref 36–52)
HCV AB SER QL: REACTIVE
HGB BLD-MCNC: 10.6 G/DL (ref 12–17.5)
HIV 1+2 AB+HIV1 P24 AG SERPL QL IA: NONREACTIVE
HOLD SPECIMEN: NORMAL
IMM GRANULOCYTES # BLD AUTO: 0.13 X10*3/UL (ref 0–0.5)
IMM GRANULOCYTES NFR BLD AUTO: 1.1 % (ref 0–0.9)
LEFT ATRIUM VOLUME AREA LENGTH INDEX BSA: 16.6 ML/M2
LEFT VENTRICLE INTERNAL DIMENSION DIASTOLE: 4.9 CM (ref 3.5–6)
LEGIONELLA AG UR QL: NEGATIVE
LV EJECTION FRACTION BIPLANE: 75 %
LYMPHOCYTES # BLD AUTO: 3.17 X10*3/UL (ref 0.8–3)
LYMPHOCYTES NFR BLD AUTO: 27.7 %
MAGNESIUM SERPL-MCNC: 1.89 MG/DL (ref 1.6–2.4)
MCH RBC QN AUTO: 22.6 PG (ref 26–34)
MCHC RBC AUTO-ENTMCNC: 30.8 G/DL (ref 32–36)
MCV RBC AUTO: 74 FL (ref 80–100)
MONOCYTES # BLD AUTO: 1.08 X10*3/UL (ref 0.05–0.8)
MONOCYTES NFR BLD AUTO: 9.4 %
NEUTROPHILS # BLD AUTO: 6.61 X10*3/UL (ref 1.6–5.5)
NEUTROPHILS NFR BLD AUTO: 58 %
NRBC BLD-RTO: 0 /100 WBCS (ref 0–0)
PHOSPHATE SERPL-MCNC: 3.6 MG/DL (ref 2.5–4.9)
PLATELET # BLD AUTO: 384 X10*3/UL (ref 150–450)
POTASSIUM SERPL-SCNC: 3.5 MMOL/L (ref 3.5–5.3)
RBC # BLD AUTO: 4.68 X10*6/UL (ref 4–5.9)
S PNEUM AG UR QL: NEGATIVE
SODIUM SERPL-SCNC: 135 MMOL/L (ref 136–145)
VANCOMYCIN SERPL-MCNC: 14.7 UG/ML (ref 5–20)
WBC # BLD AUTO: 11.4 X10*3/UL (ref 4.4–11.3)

## 2024-04-11 PROCEDURE — 2500000001 HC RX 250 WO HCPCS SELF ADMINISTERED DRUGS (ALT 637 FOR MEDICARE OP): Mod: SE | Performed by: STUDENT IN AN ORGANIZED HEALTH CARE EDUCATION/TRAINING PROGRAM

## 2024-04-11 PROCEDURE — G0378 HOSPITAL OBSERVATION PER HR: HCPCS

## 2024-04-11 PROCEDURE — 83735 ASSAY OF MAGNESIUM: CPT

## 2024-04-11 PROCEDURE — 36415 COLL VENOUS BLD VENIPUNCTURE: CPT | Performed by: STUDENT IN AN ORGANIZED HEALTH CARE EDUCATION/TRAINING PROGRAM

## 2024-04-11 PROCEDURE — RXMED WILLOW AMBULATORY MEDICATION CHARGE

## 2024-04-11 PROCEDURE — 87389 HIV-1 AG W/HIV-1&-2 AB AG IA: CPT | Performed by: STUDENT IN AN ORGANIZED HEALTH CARE EDUCATION/TRAINING PROGRAM

## 2024-04-11 PROCEDURE — 93308 TTE F-UP OR LMTD: CPT | Performed by: INTERNAL MEDICINE

## 2024-04-11 PROCEDURE — 80069 RENAL FUNCTION PANEL: CPT

## 2024-04-11 PROCEDURE — 82947 ASSAY GLUCOSE BLOOD QUANT: CPT

## 2024-04-11 PROCEDURE — 85025 COMPLETE CBC W/AUTO DIFF WBC: CPT

## 2024-04-11 PROCEDURE — 96372 THER/PROPH/DIAG INJ SC/IM: CPT

## 2024-04-11 PROCEDURE — 2500000005 HC RX 250 GENERAL PHARMACY W/O HCPCS: Mod: SE

## 2024-04-11 PROCEDURE — 36415 COLL VENOUS BLD VENIPUNCTURE: CPT

## 2024-04-11 PROCEDURE — 93325 DOPPLER ECHO COLOR FLOW MAPG: CPT

## 2024-04-11 PROCEDURE — 2500000004 HC RX 250 GENERAL PHARMACY W/ HCPCS (ALT 636 FOR OP/ED): Mod: SE

## 2024-04-11 PROCEDURE — 86481 TB AG RESPONSE T-CELL SUSP: CPT | Performed by: STUDENT IN AN ORGANIZED HEALTH CARE EDUCATION/TRAINING PROGRAM

## 2024-04-11 PROCEDURE — 99239 HOSP IP/OBS DSCHRG MGMT >30: CPT | Performed by: STUDENT IN AN ORGANIZED HEALTH CARE EDUCATION/TRAINING PROGRAM

## 2024-04-11 PROCEDURE — 93325 DOPPLER ECHO COLOR FLOW MAPG: CPT | Performed by: INTERNAL MEDICINE

## 2024-04-11 PROCEDURE — 86704 HEP B CORE ANTIBODY TOTAL: CPT | Performed by: STUDENT IN AN ORGANIZED HEALTH CARE EDUCATION/TRAINING PROGRAM

## 2024-04-11 PROCEDURE — 86706 HEP B SURFACE ANTIBODY: CPT | Performed by: STUDENT IN AN ORGANIZED HEALTH CARE EDUCATION/TRAINING PROGRAM

## 2024-04-11 PROCEDURE — 2500000001 HC RX 250 WO HCPCS SELF ADMINISTERED DRUGS (ALT 637 FOR MEDICARE OP): Mod: SE

## 2024-04-11 PROCEDURE — 2500000002 HC RX 250 W HCPCS SELF ADMINISTERED DRUGS (ALT 637 FOR MEDICARE OP, ALT 636 FOR OP/ED): Mod: SE

## 2024-04-11 PROCEDURE — 80202 ASSAY OF VANCOMYCIN: CPT

## 2024-04-11 PROCEDURE — 86803 HEPATITIS C AB TEST: CPT | Performed by: STUDENT IN AN ORGANIZED HEALTH CARE EDUCATION/TRAINING PROGRAM

## 2024-04-11 RX ORDER — LEVOFLOXACIN 750 MG/1
750 TABLET ORAL
Qty: 6 TABLET | Refills: 0 | Status: SHIPPED | OUTPATIENT
Start: 2024-04-12 | End: 2024-04-24

## 2024-04-11 RX ORDER — DOXYCYCLINE HYCLATE 100 MG
100 TABLET ORAL 2 TIMES DAILY
Status: DISCONTINUED | OUTPATIENT
Start: 2024-04-11 | End: 2024-04-12 | Stop reason: HOSPADM

## 2024-04-11 RX ORDER — DOXYCYCLINE 100 MG/1
100 CAPSULE ORAL 2 TIMES DAILY
Qty: 12 CAPSULE | Refills: 0 | Status: SHIPPED | OUTPATIENT
Start: 2024-04-11 | End: 2024-04-19 | Stop reason: SINTOL

## 2024-04-11 RX ORDER — VANCOMYCIN HYDROCHLORIDE 1 G/200ML
1000 INJECTION, SOLUTION INTRAVENOUS EVERY 24 HOURS
Status: DISCONTINUED | OUTPATIENT
Start: 2024-04-11 | End: 2024-04-11

## 2024-04-11 RX ADMIN — ASPIRIN 81 MG: 81 TABLET, COATED ORAL at 09:24

## 2024-04-11 RX ADMIN — TAMSULOSIN HYDROCHLORIDE 0.4 MG: 0.4 CAPSULE ORAL at 09:24

## 2024-04-11 RX ADMIN — TRAMADOL HYDROCHLORIDE 100 MG: 50 TABLET, COATED ORAL at 09:24

## 2024-04-11 RX ADMIN — TRAMADOL HYDROCHLORIDE 100 MG: 50 TABLET, COATED ORAL at 14:44

## 2024-04-11 RX ADMIN — DULOXETINE HYDROCHLORIDE 60 MG: 30 CAPSULE, DELAYED RELEASE ORAL at 09:24

## 2024-04-11 RX ADMIN — LIDOCAINE 1 PATCH: 4 PATCH TOPICAL at 14:44

## 2024-04-11 RX ADMIN — ENOXAPARIN SODIUM 40 MG: 100 INJECTION SUBCUTANEOUS at 10:23

## 2024-04-11 RX ADMIN — PERFLUTREN 1 ML OF DILUTION: 6.52 INJECTION, SUSPENSION INTRAVENOUS at 13:31

## 2024-04-11 RX ADMIN — DOXYCYCLINE HYCLATE 100 MG: 100 TABLET, COATED ORAL at 14:43

## 2024-04-11 ASSESSMENT — PAIN SCALES - GENERAL
PAINLEVEL_OUTOF10: 6
PAINLEVEL_OUTOF10: 6

## 2024-04-11 ASSESSMENT — PAIN - FUNCTIONAL ASSESSMENT: PAIN_FUNCTIONAL_ASSESSMENT: 0-10

## 2024-04-11 NOTE — PROGRESS NOTES
Vancomycin Dosing by Pharmacy- Cessation of Therapy    Consult to pharmacy for vancomycin dosing has been discontinued by the prescriber, pharmacy will sign off at this time.    Please call pharmacy if there are further questions or re-enter a consult if vancomycin is resumed.     Lo Hare, PharmD

## 2024-04-11 NOTE — HOSPITAL COURSE
Sandra Velasquez is a 73 y.o. Female presenting with PMHx of recurrent UTIs, nephrolithiasis, chronic lower back pain (on tramadol f/w Dr. Shah), CAD (LAD PCI in 2019, s/p 4 stents), AAA, HS and hx of diverticular disease c/b abdominal abscess s/p laparotomy open sigmoid resection (Dora procedure), end colostomy in 5/1/20, h/o MRSA PNA, who presented  with nasuea/vomitting and feeling generally unwell. Had multiple visits but left AMA in past. Pt found to have b/l pneumonia. Also with chronic ulcerations around peg tube and on posterior arm.  Her wounds were chronic and appeared unaffected.  Labs showed leukocytosis, chronic anemia. CT showed b/l consolidations with reactive lymphadenopathy.  CT of abdomen showed stable colectomy and end colostomy with large parastomal hernia.  There is mild reflux esophagitis.  There are a few bilateral renal cysts.  Patient was treated with vancomycin and Levaquin.  Treated with antiemetics.  Wound care evaluated patient and provided wound care recommendations.  Dermatology evaluated wounds and consistent with hidradenitis.  To, patient has failed multiple outpatient treatments, obtained testing per derm recs so that further therapy could be trialed outpatient.  Patient reported significant subjective improvement, requested to be discharged home.  Overall she was clinically improved.  Concern expressed to patient about her pneumonia, discussed monitoring additional 24 hours for stability, however patient was on room air without work of breathing and wanted to discharge home.  Discussed need for additional testing at discharge, received echocardiogram which showed hyperdynamic EF.  Encouraged patient to maintain hydration.  Patient was able to be arranged home care for wound care.  Several wound care referral was placed.  Healthy at home referral was submitted.  Patient recommended repeat CT scan to evaluate the lower lobe consolidations in 4 to 6 weeks, to follow-up with  primary to assess.  Given urology referral to assess her bilateral renal cysts.)  Referral to schedule follow-up within 2 weeks to discuss starting Stelara.  Provided recommendations for risk factor modification with follow-up with her primary care for coronary disease.  Patient to discharge complete 7-day course of Levaquin and Doxy.  Advised primary care follow-up within 1 week.

## 2024-04-11 NOTE — CONSULTS
Vancomycin Dosing by Pharmacy  FOLLOW UP    Sandra Velasquez is a 73 y.o. adult who Pharmacy is consulted to dose vancomycin for pneumonia.     Based on the patient's indication and renal status, this patient is being dosed based on a goal vancomycin AUC of 400-600.     Current vancomycin dose: 1250 mg every 24 hours    Estimated vancomycin AUC on current dose: 614 mg/L.hr    Renal function is currently stable.    Estimated Creatinine Clearance (by C-G formula based on SCr of 1.05 mg/dL)  Female: 42.1 mL/min  Male: 51.9 mL/min  The patient&#39;s sex/gender information is inconclusive; review their information before proceeding.    Vancomycin   Date Value Ref Range Status   04/11/2024 14.7 5.0 - 20.0 ug/mL Final     Vancomycin Tr   Date Value Ref Range Status   05/05/2020 14.8 5.0 - 20.0 ug/mL Final     Comment:      Vancomycin levels should be interpreted in conjunction   with the dose, disease being treated, vancomycin BERNADINE,   time of draw (trough concentrations should be   obtained just before the next dose at steady-state),   and other clinical information. Trough concentrations   of 15-20 ug/mL are desired for severe infections.   Ref.: Am J Health-Syst Pharm 66: 83-98, 2009.         Results from last 7 days   Lab Units 04/11/24  0436 04/10/24  0613   CREATININE mg/dL 1.05 1.06   BUN mg/dL 14 13   WBC AUTO x10*3/uL 11.4* 13.8*        Visit Vitals  /72   Pulse 98   Temp 36.9 °C (98.4 °F) (Oral)   Resp 20       Urine Culture   Date/Time Value Ref Range Status   05/23/2023 03:45 PM CANCELED       Comment:     Result canceled by the ancillary.     Blood Culture   Date/Time Value Ref Range Status   04/10/2024 10:51 AM Loaded on Instrument - Culture in progress  Preliminary   04/10/2024 10:51 AM Loaded on Instrument - Culture in progress  Preliminary        Assessment/Plan    AUC is above goal range. Orders placed for new vancomycin regimen of 1000 every 24 hours. This dosing regimen is predicted by [employer] to  result in the following pharmacokinetic parameters:  Loading dose: N/A  Regimen: 1000 mg IV every 24 hours.  Start time: 14:35 on 04/11/2024  Exposure target: AUC24 (range)400-600 mg/L.hr   AUC24,ss: 497 mg/L.hr  Probability of AUC24 > 400: 89 %  Ctrough,ss: 14.2 mg/L  Probability of Ctrough,ss > 20: 10 %  Probability of nephrotoxicity (Lodise THAIS 2009): 9 %    The next vancomycin level will be ordered for 4/12 at AM labs, unless clinically indicated sooner.  Will continue to monitor renal function daily while on vancomycin and order serum creatinine at least every 48 hours if not already ordered.  Will follow for continued vancomycin needs, clinical response, and signs/symptoms of toxicity.       Reji Dugan, CarlosD

## 2024-04-11 NOTE — CONSULTS
Inpatient consult to Dermatology  Consult performed by: Alley Cueva DO  Consult ordered by: Ariel Tejada MD      DERMATOLOGY DEPARTMENT CONSULTATION NOTE  Name: Sandra Velasquez  MRN: [id number]  : 1950    Reason for consultation: Wounds     History of Present Illness  Sandra Velasquez is a 73 y.o. adult  with a past medical history of hidradenitis suppurativa, recurrent UTIs, nephrolithiasis, chronic lower back pain (on tramadol f/w Dr. Shah), CAD (LAD PCI in , s/p 4 stents), AAA, HS and hx of diverticular disease c/b abdominal abscess s/p laparotomy open sigmoid resection (Dora procedure), end colostomy in 20, MRSA pneumonia, who presented to the ED with nausea and vomiting, and is now admitted for bilateral pneumonia. Dermatology was consulted for management of her HS.     Patient states she has had the open wounds on the abdomen and left arm for over one year. She is a patient in our department (Dr. Claudio), with her last appointment 2023. In the past, she has attempted Doxycycline, topical clindamycin solution, benzoyl peroxide wash, and Humira for treatment of the HS. She states she stopped using the Humira because she is not comfortable injecting herself, and does not have her own transportation to receive weekly shots in an office. Currently, she only cleanses the areas with wipes. She would like to discuss additional options for treatment.       Review of Systems  Review of Systems     Past Medical History  Past Medical History:   Diagnosis Date    Acute cystitis without hematuria 2019    Acute cystitis without hematuria    Age-related nuclear cataract, left eye 10/31/2018    Cataract, nuclear sclerotic, left eye    Age-related nuclear cataract, right eye 10/31/2018    Cataract, nuclear sclerotic, right eye    Allergic rhinitis due to pollen 2018    Allergic rhinitis due to pollen, unspecified seasonality    Calculus of kidney 2018    Right  nephrolithiasis    Change in bowel habit 12/18/2018    Change in bowel habits    Combined forms of age-related cataract, left eye 05/08/2017    Combined form of age-related cataract, left eye    Combined forms of age-related cataract, left eye 05/08/2017    Combined form of age-related cataract, left eye    Cortical age-related cataract, left eye 10/31/2018    Cortical age-related cataract of left eye    Cyanosis 08/10/2017    Bluish skin discoloration    Dry eye syndrome of bilateral lacrimal glands 10/31/2018    Bilateral dry eyes    Dry eye syndrome of bilateral lacrimal glands 10/31/2018    Dry eyes, bilateral    Encounter for immunization 09/17/2018    Immunization due    Encounter for immunization 10/01/2020    Need for vaccination for zoster    History of falling 01/03/2019    History of fall    Hyperlipidemia, unspecified 08/26/2020    Hyperlipidemia    Lesion of radial nerve, unspecified upper limb 03/05/2016    Radial nerve irritation    Meibomian gland dysfunction of unspecified eye, 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/31/2018    MGD (meibomian gland disease)    Nausea 11/27/2018    Nausea in adult    Opioid dependence, uncomplicated (CMS/[facility]) 03/09/2020    Moderate opioid use disorder    Other chronic sinusitis 06/15/2018    Other chronic sinusitis    Other fecal abnormalities 11/27/2018    Light stools    Other specified disorders of nose and nasal sinuses 06/15/2018    Nasal septal perforation    Other specified disorders of nose and nasal sinuses 10/24/2018    Nasal crusting    Other specified disorders of nose and nasal sinuses 02/03/2017    Nasal septal perforation    Other specified disorders of teeth and supporting structures 09/04/2015    Dentalgia    Pain in leg, unspecified 03/11/2019    Leg pain    Pain in throat 02/03/2017    Throat pain    Personal history of diseases of the blood and blood-forming organs and certain disorders involving the immune mechanism 02/03/2015    History of anemia    Personal  history of diseases of the skin and subcutaneous tissue 08/16/2013    History of actinic keratosis    Personal history of other diseases of the circulatory system     History of hypertension    Personal history of other diseases of the digestive system     History of constipation    Personal history of other diseases of the digestive system 07/02/2014    History of gastroesophageal reflux (GERD)    Personal history of other diseases of the musculoskeletal system and connective tissue 07/02/2014    Personal history of arthritis    Personal history of other diseases of the respiratory system 12/18/2017    History of sore throat    Personal history of other diseases of the respiratory system 12/29/2017    History of paranasal sinus congestion    Personal history of other diseases of the respiratory system 01/03/2019    History of acute sinusitis    Personal history of other diseases of the respiratory system 02/03/2017    History of acute sinusitis    Personal history of other diseases of the respiratory system 12/29/2017    History of acute sinusitis    Personal history of other diseases of the respiratory system 06/15/2018    History of nasal discharge    Personal history of other diseases of the respiratory system 08/11/2015    History of acute sinusitis    Personal history of other drug therapy 02/08/2017    History of pneumococcal vaccination    Personal history of other drug therapy 11/10/2014    History of influenza vaccination    Personal history of other infectious and parasitic diseases 03/25/2019    History of tinea corporis    Personal history of other specified conditions 07/25/2014    History of dizziness    Personal history of other specified conditions 12/18/2017    History of hemoptysis    Personal history of other specified conditions 06/18/2014    History of dyspnea    Personal history of other specified conditions 12/29/2017    History of epistaxis    Personal history of other specified conditions  08/28/2017    History of chest pain    Personal history of pneumonia (recurrent) 03/10/2017    History of pneumonia    Personal history of urinary (tract) infections 01/03/2019    History of urinary tract infection    Pyuria 08/22/2016    Pyuria    Right upper quadrant pain 02/03/2017    Abdominal pain, RUQ (right upper quadrant)    Ulcerative blepharitis unspecified eye, 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/31/2018    Blepharitis, ulcerative    Unspecified atherosclerosis 07/02/2014    Arteriolosclerosis    Unspecified cataract 06/09/2015    Cataract of right eye    Unspecified cataract 06/09/2015    Cataract of left eye    Unspecified cataract 06/09/2015    Cataract of left eye    Unspecified cataract 06/09/2015    Cataract of right eye       Past Surgical History   has a past surgical history that includes Lithotripsy (07/31/2013); Other surgical history (11/10/2021); Refractive surgery (06/09/2015); Tonsillectomy (07/02/2014); Other surgical history (07/02/2014); CT guided percutaneous peritoneal or retroperitoneal fluid collection drainage (4/7/2020); CT guided percutaneous peritoneal or retroperitoneal fluid collection drainage (4/22/2020); and IR CVC PICC (5/11/2020).     Allergies  Allergies   Allergen Reactions    Penicillins Anaphylaxis, Shortness of breath and Swelling     note: has tolerated ceftriaxone    Atorvastatin Myalgia    House Dust Mite Runny nose    Erythromycin Base Nausea/vomiting       Medications  Scheduled Meds: aspirin, 81 mg, oral, Daily  DULoxetine, 60 mg, oral, Daily  enoxaparin, 40 mg, subcutaneous, q24h  insulin lispro, 0-10 Units, subcutaneous, TID with meals  [START ON 4/12/2024] levoFLOXacin, 750 mg, oral, q48h  lidocaine, 1 patch, transdermal, q12h  melatonin, 3 mg, oral, Nightly  perflutren lipid microspheres, 0.5-10 mL of dilution, intravenous, Once in imaging  perflutren protein A microsphere, 0.5 mL, intravenous, Once in imaging  sulfur hexafluoride microsphr, 2 mL, intravenous, Once in  imaging  tamsulosin, 0.4 mg, oral, Daily  traMADol, 100 mg, oral, TID  [START ON 4/11/2024] vancomycin, 1,250 mg, intravenous, q24h       Continuous Infusions:     PRN Meds: PRN medications: acetaminophen, dextrose, glucagon, metoclopramide, naloxone, ondansetron, trimethobenzamide, vancomycin     Family History  Family History   Problem Relation Name Age of Onset    Alcohol abuse Mother      Other (CARDIAC DISORDER) Mother      Diabetes Mother      Glaucoma Mother      Other (MESOTHELIOMA) Mother      Alcohol abuse Father      Melanoma Father      Macular degeneration Sister      Breast cancer Sister      Macular degeneration Other AUNT     Skin cancer Other Family hx        Social History   reports that Sandra has never smoked. Sandra has never used smokeless tobacco. Sandra reports that Sandra does not drink alcohol and does not use drugs.     Objective    Vitals:    04/10/24 1117 04/10/24 1202 04/10/24 1449 04/10/24 1832   BP: (!) 170/106 178/86 (!) 160/43 (!) 132/106   BP Location:       Patient Position:       Pulse: 56 55 77 63   Resp: 18 20 18 18   Temp:       TempSrc:       SpO2: 98% 97% 98% 98%   Weight:       Height:            Exam    GEN: no acute distress  NEURO: moving all extremities   EYES: conjunctiva and eyelids normal. No conjunctival injection or erosions appreciated  NECK: normal and symmetric.   CV: no varicosities, warmth or tenderness of extremities.  GI: Flat abdomen. Non-tender. No hepatosplenomegaly.  EXTREMITIES: no distal digital clubbing, cyanosis, petechiae   SKIN: A full body skin exam including scalp, face, eyes, ears, neck, trunk, bilateral upper & lower extremities, toenails and fingernails were examined with the following findings:  Multiple erythematous ulcers, with evidence of sinus tracts in the pannus, as well as left upper posterior arm      Laboratory and Data  Results for orders placed or performed during the hospital encounter of 04/10/24 (from the past 24 hour(s))   CBC and  Auto Differential   Result Value Ref Range    WBC 13.8 (H) 4.4 - 11.3 x10*3/uL    nRBC 0.0 0.0 - 0.0 /100 WBCs    RBC 4.68 4.00 - 5.90 x10*6/uL    Hemoglobin 10.5 (L) 12.0 - 17.5 g/dL    Hematocrit 34.1 (L) 36.0 - 52.0 %    MCV 73 (L) 80 - 100 fL    MCH 22.4 (L) 26.0 - 34.0 pg    MCHC 30.8 (L) 32.0 - 36.0 g/dL    RDW 18.4 (H) 11.5 - 14.5 %    Platelets 393 150 - 450 x10*3/uL    Neutrophils % 69.9 40.0 - 80.0 %    Immature Granulocytes %, Automated 0.7 0.0 - 0.9 %    Lymphocytes % 22.3 13.0 - 44.0 %    Monocytes % 6.0 2.0 - 10.0 %    Eosinophils % 0.4 0.0 - 6.0 %    Basophils % 0.7 0.0 - 2.0 %    Neutrophils Absolute 9.64 (H) 1.60 - 5.50 x10*3/uL    Immature Granulocytes Absolute, Automated 0.09 0.00 - 0.50 x10*3/uL    Lymphocytes Absolute 3.07 (H) 0.80 - 3.00 x10*3/uL    Monocytes Absolute 0.82 (H) 0.05 - 0.80 x10*3/uL    Eosinophils Absolute 0.05 0.00 - 0.40 x10*3/uL    Basophils Absolute 0.09 0.00 - 0.10 x10*3/uL   Comprehensive metabolic panel   Result Value Ref Range    Glucose 140 (H) 74 - 99 mg/dL    Sodium 140 136 - 145 mmol/L    Potassium 4.0 3.5 - 5.3 mmol/L    Chloride 104 98 - 107 mmol/L    Bicarbonate 26 21 - 32 mmol/L    Anion Gap 14 10 - 20 mmol/L    Urea Nitrogen 13 6 - 23 mg/dL    Creatinine 1.06 0.50 - 1.30 mg/dL    eGFR 56 (L) >60 mL/min/1.73m*2    Calcium 8.7 8.6 - 10.6 mg/dL    Albumin 2.7 (L) 3.4 - 5.0 g/dL    Alkaline Phosphatase 71 33 - 136 U/L    Total Protein 7.6 6.4 - 8.2 g/dL    AST 27 9 - 39 U/L    Bilirubin, Total 0.3 0.0 - 1.2 mg/dL    ALT 16 7 - 52 U/L   Troponin I, High Sensitivity   Result Value Ref Range    Troponin I, High Sensitivity 30 0 - 53 ng/L   Magnesium   Result Value Ref Range    Magnesium 1.90 1.60 - 2.40 mg/dL   TSH with reflex to Free T4 if abnormal   Result Value Ref Range    Thyroid Stimulating Hormone 0.45 0.44 - 3.98 mIU/L   Sars-CoV-2 and Influenza A/B PCR   Result Value Ref Range    Flu A Result Not Detected Not Detected    Flu B Result Not Detected Not Detected     Coronavirus 2019, PCR Not Detected Not Detected   Blood Culture    Specimen: Peripheral Venipuncture; Blood culture   Result Value Ref Range    Blood Culture Loaded on Instrument - Culture in progress    Blood Culture    Specimen: Peripheral Venipuncture; Blood culture   Result Value Ref Range    Blood Culture Loaded on Instrument - Culture in progress    Blood Culture    Specimen: Peripheral Venipuncture; Blood culture   Result Value Ref Range    Blood Culture Loaded on Instrument - Culture in progress    Blood Culture    Specimen: Peripheral Venipuncture; Blood culture   Result Value Ref Range    Blood Culture Loaded on Instrument - Culture in progress    POCT GLUCOSE   Result Value Ref Range    POCT Glucose 109 (H) 74 - 99 mg/dL   Urinalysis with Reflex Microscopic   Result Value Ref Range    Color, Urine Light-Yellow Light-Yellow, Yellow, Dark-Yellow    Appearance, Urine Turbid (N) Clear    Specific Gravity, Urine >1.050 (N) 1.005 - 1.035    pH, Urine 7.5 5.0, 5.5, 6.0, 6.5, 7.0, 7.5, 8.0    Protein, Urine 20 (TRACE) NEGATIVE, 10 (TRACE), 20 (TRACE) mg/dL    Glucose, Urine Normal Normal mg/dL    Blood, Urine 0.03 (TRACE) (A) NEGATIVE    Ketones, Urine NEGATIVE NEGATIVE mg/dL    Bilirubin, Urine NEGATIVE NEGATIVE    Urobilinogen, Urine Normal Normal mg/dL    Nitrite, Urine NEGATIVE NEGATIVE    Leukocyte Esterase, Urine 500 Josette/µL (A) NEGATIVE   MRSA Surveillance for Vancomycin De-escalation, PCR    Specimen: Anterior Nares; Swab   Result Value Ref Range    MRSA PCR Not Detected Not Detected   Microscopic Only, Urine   Result Value Ref Range    WBC, Urine 21-50 (A) 1-5, NONE /HPF    RBC, Urine 6-10 (A) NONE, 1-2, 3-5 /HPF    Squamous Epithelial Cells, Urine 10-25 (FEW) Reference range not established. /HPF    Bacteria, Urine 2+ (A) NONE SEEN /HPF    Mucus, Urine FEW Reference range not established. /LPF    Calcium Oxalate Crystals, Urine 1+ NONE, 1+ /HPF   POCT GLUCOSE   Result Value Ref Range    POCT Glucose  109 (H) 74 - 99 mg/dL        Assessment/Plan   Sandra Velasquez is a 73 y.o. adult  with a past medical history of hidradenitis suppurativa, recurrent UTIs, nephrolithiasis, chronic lower back pain (on tramadol f/w Dr. Shah), CAD (LAD PCI in 2019, s/p 4 stents), AAA, HS and hx of diverticular disease c/b abdominal abscess s/p laparotomy open sigmoid resection (Dora procedure), end colostomy in 5/1/20, MRSA pneumonia, who presented to the ED with nausea and vomiting, and is now admitted for bilateral pneumonia. Dermatology was consulted for management of her HS.     Diagnosis: Hidradenitis Suppurativa, biopsy-proven ; [place] Stage 3     Impression: Hidradenitis Suppurativa is an inflammatory condition that will produce nodules, abscesses , and tunnels in the skin. The lesions are primarily seen in intertriginous areas, but can indeed be seen on other skin surfaces. There are many modes of management, as this is not a curable, but chronic condition. As patient has failed oral antibiotics, topical antibiotics, and Humira, we will consider another biologic that can be performed by nursing staff in-office less frequently. Patient also has no help with her day to day activities of daily living, including showers. She would benefit from some home aide to assist with showering and other hygiene routines.      Recommendations:  -Please draw HIV, Hepatitis C, Hepatitis BAg, Hepatitis BAb, Hepatitis BcoreAb, and Tspot in anticipation of initiating Stelara outpatient   -We will coordinate an outpatient follow up within 2 weeks for Stelara initiation   -May consider home aide upon discharge to assist with showering every 2 days if possible     The patient was seen and discussed with attending physician Dr. Tarango. The assessment and plan was communicated to the care team.    Thank you for the consultation and for the opportunity to contribute to the care of this patient.      Alley Cueva, DO   PGY2, Dermatology  Epic chat  (preferred)  Team pager [phone]     I saw and evaluated the patient. I personally obtained the key and critical portions of the history and physical exam or was physically present for key and critical portions performed by the resident. I reviewed the resident's documentation and discussed the patient with the resident. I agree with the resident's medical decision making as documented in the note.    Massimo Tarango MD

## 2024-04-11 NOTE — DISCHARGE INSTRUCTIONS
Pneumonia-you have had significant improvement in your breathing, her off oxygen, and at this time voiced that you feel ready to go home.   Please complete your antibiotics as prescribed. Given the extensive pneumonia, it is recommended that you discuss with your primary care and likely obtain a repeat CT scan of your lungs in  4 to 6 weeks to further evaluate the lung changes and lymph nodes. The lymph nodes are likely reactive to the pneumonia.    There are coronary calcifications on your CT scan, please discuss risk factor modification with your primary care continue aspirin and discuss with your primary care provider trialing an alternative cholesterol-lowering medicine (statin).  Switching to another statin like rosuvastatin or trialing every other day dosing may assist with muscle aches if you experience them with atorvastatin.    You had several cysts on your kidneys, it is recommended that you follow-up with urology to watch them.    Hidradenitis Suppurativa is an inflammatory condition that will produce nodules, abscesses , and tunnels in the skin. The lesions are primarily seen in intertriginous areas, but can indeed be seen on other skin surfaces. There are many modes of management, as this is not curable, but chronic condition.  Please follow-up with dermatology within 2 weeks to discuss initiating Stelara treatment as you have failed other modalities in past.      Wound care instructions:     Location: Arm  Recommendations: DAILY      Wash with normal saline or wound cleanser      Cover with Aquacel Ag      Cover with Mepilex lite dressing        Wound location: RLQ abdomen    Recommendations: DAILY      Wash with normal saline or wound cleanser      Pack with Aquacel Ag (Silver)     Cover with Mepilex border dressing        Follow-up with primary care within 7 to 10 days or next available for post-hospitalization follow-up and transition of care.  Follow up is needed after discharge to review the  hospital recommendations and make further recommendations for your transition of care.      Please take all medications as prescribed and keep all follow-up appointments.  Please contact your primary care physician with any questions or concerns that arise.  You may contact your outpatient specialists office for any questions regarding specifics relating to their recommendations. Please monitor your symptoms and come to the emergency department with worsening of your symptoms, severe chest pain, shortness of breath, or any other medical emergency or concerns for your health.    NOTE REGARDING FOLLOW-UP OF OUTPATIENT TESTS:  Please see your outpatient care providers to order:  - surveillance of kidney cysts  - follow up CT scan of your lungs

## 2024-04-11 NOTE — PROGRESS NOTES
"Sandra Velasquez is a 73 y.o. adult on day 0 of admission presenting with Sepsis (CMS/HCC).    Assessment/Plan   Principal Problem:    Sepsis (CMS/HCC)  SW consulted by provider for discharge planning. Met with Pt bedside. She confirmed she has [facility] with 14 hrs per week. She stated she is not currently staffed. She said an \"aide\" comes once per week to help her for one hour. Pt confirmed [employer] delivery to her home. She recalled having medication delivery in the past, but states this is now picked up by a friend at [facility].  Pt maintained she is followed by psychiatrist at  . Pt recalled she was potentially to be evicted from her [facility] apartment, however, management allowed her to remain a resident per her receiving psychiatric care.  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 Pt also receptive to referral to [facility]. Pt desiring to have more consistent personal care.   Pt stated she has [employer] for transport only. SW called Nanci @ [employer] [phone] and she denied Pt is enrolled in services.  Called Bella Kuhn, waiver coordinator at [facility] [phone]. She confirmed Pt has a history becoming argumentative with her aids staffed with the waiver program. This has resulted in lack of longevity with aids remaining in the home. Pt allegedly stopped using [facility] pharmacy delivery due to criticism of their care. Bella stated Pt last told her she was taking her scooter to obtain her medication at [facility]. She also confirmed that Pt was potentially going to be evicted when management agreed to terms with regards to psychiatric intervention.   Discharge summary emailed to [email or web address]. Bella receptive to HH intervention and [facility]. She stated she would encourage Pt to accept aid into the home to fulfill the 14 hrs with the waiver program.   HH " orders done.     FOX Roberson

## 2024-04-11 NOTE — DISCHARGE SUMMARY
Date of Admission: 4/10/2024    Date of Discharge: 4/11/2024    Discharge Diagnosis  Pneumonia  lymphadenopathy  Nausea and vomiting  Chronic wounds 2/2 hydradenitis suppurativa  Atherosclerotic cardiovascular disease  Renal cysts    Issues Requiring Follow-Up  Wound care  Follow up of pneumonia, repeat CT scan  to assess  Follow up derm for new treatment of HS  Surveillence of renal cysts  Risk factor modification, discuss starting a statin. Prev intol to atorva.     Discharge Meds     Your medication list        START taking these medications        Instructions Last Dose Given Next Dose Due   doxycycline 100 mg capsule  Commonly known as: Vibramycin      Take 1 capsule (100 mg) by mouth 2 times a day for 6 days. Take with a full glass of water and do not lie down for at least 30 minutes after.       levoFLOXacin 750 mg tablet  Commonly known as: Levaquin  Start taking on: April 12, 2024      Take 1 tablet (750 mg) by mouth every other day for 6 doses. Do not start before April 12, 2024.              CONTINUE taking these medications        Instructions Last Dose Given Next Dose Due   ascorbic acid 500 mg tablet  Commonly known as: Vitamin C           aspirin 81 mg EC tablet      TAKE 1 TABLET (81 MG) BY MOUTH ONCE DAILY.       carboxymethylcellulose 0.5 % ophthalmic solution  Commonly known as: Refresh Plus           cetirizine 10 mg tablet  Commonly known as: ZyrTEC      TAKE 1 TABLET BY MOUTH (10MG) BY MOUTH ONCE DAILY       DULoxetine 60 mg DR capsule  Commonly known as: Cymbalta      Take 1 capsule (60 mg) by mouth once daily. Do not crush or chew. Do not start before November 8, 2023.       fluticasone 50 mcg/actuation nasal spray  Commonly known as: Flonase           lidocaine 4 % patch           melatonin 3 mg tablet      Take 1 tablet (3 mg) by mouth once daily at bedtime.       propranolol 20 mg tablet  Commonly known as: Inderal      Take 1 tablet by mouth every morning and take 2 tablets by mouth every  evening.       tamsulosin 0.4 mg 24 hr capsule  Commonly known as: Flomax      TAKE 1 CAPSULE (0.4 MG) BY MOUTH ONCE DAILY.       traMADol 50 mg tablet  Commonly known as: Ultram      Take 2 tablets (100 mg) by mouth 3 times a day. Do not start before February 7, 2024.       VITAMIN B COMPLEX ORAL                  STOP taking these medications      hydrocortisone 2.5 % cream        lanolin-mineral oil lotion  Commonly known as: Mimi        Lubrisoft lotion cream  Generic drug: emollient combination no.92        metFORMIN 500 mg tablet  Commonly known as: Glucophage        Sudogest 30 mg tablet  Generic drug: pseudoephedrine               ASK your doctor about these medications        Instructions Last Dose Given Next Dose Due   naloxone 4 mg/0.1 mL nasal spray  Commonly known as: Narcan      ADMINISTER 1 SPRAY (4 MG) INTO AFFECTED NOSTRIL(S) IF NEEDED FOR OPIOID REVERSAL. REPEAT IN 2 MINUTES IF NEEDED CALL EMS                 Where to Get Your Medications        These medications were sent to [facility]  [address], [address], [place] [address]      Hours: 8AM to 6PM Mon-Fri, 8AM to 4PM Sat, 9AM to 1PM Sun Phone: [phone]   doxycycline 100 mg capsule  levoFLOXacin 750 mg tablet         Test Results Pending At Discharge  Pending Labs       Order Current Status    Hepatitis C RNA, Quantitative, PCR In process    T-Spot TB In process    Blood Culture Preliminary result    Blood Culture Preliminary result    Blood Culture Preliminary result    Blood Culture Preliminary result            Hospital Course  Sandra Velasquez is a 73 y.o. Female presenting with PMHx of recurrent UTIs, nephrolithiasis, chronic lower back pain (on tramadol f/w Dr. Shah), CAD (LAD PCI in 2019, s/p 4 stents), AAA, HS and hx of diverticular disease c/b abdominal abscess s/p laparotomy open sigmoid resection (Dora procedure), end colostomy in 5/1/20, h/o MRSA PNA, who presented  with nasuea/vomitting and  feeling generally unwell. Had multiple visits but left AMA in past. Pt found to have b/l pneumonia. Also with chronic ulcerations around peg tube and on posterior arm.  Her wounds were chronic and appeared unaffected.  Labs showed leukocytosis, chronic anemia. CT showed b/l consolidations with reactive lymphadenopathy.  CT of abdomen showed stable colectomy and end colostomy with large parastomal hernia.  There is mild reflux esophagitis.  There are a few bilateral renal cysts.  Patient was treated with vancomycin and Levaquin.  Treated with antiemetics.  Wound care evaluated patient and provided wound care recommendations.  Dermatology evaluated wounds and consistent with hidradenitis.  To, patient has failed multiple outpatient treatments, obtained testing per derm recs so that further therapy could be trialed outpatient.  Patient reported significant subjective improvement, requested to be discharged home.  Overall she was clinically improved.  Concern expressed to patient about her pneumonia, discussed monitoring additional 24 hours for stability, however patient was on room air without work of breathing and wanted to discharge home.  Discussed need for additional testing at discharge, received echocardiogram which showed hyperdynamic EF.  Encouraged patient to maintain hydration.  Patient was able to be arranged home care for wound care.  Several wound care referral was placed.  Healthy at home referral was submitted.  Patient recommended repeat CT scan to evaluate the lower lobe consolidations in 4 to 6 weeks, to follow-up with primary to assess.  Given urology referral to assess her bilateral renal cysts.)  Referral to schedule follow-up within 2 weeks to discuss starting Stelara.  Provided recommendations for risk factor modification with follow-up with her primary care for coronary disease.  Patient to discharge complete 7-day course of Levaquin and Doxy.  Advised primary care follow-up within 1 week..  Arranged [facility] for wound care, wound care referral submitted for clinic as well. Consulted healthy at home program and patient informed. Discharge plan of care discussed, education and counseling provided involving active problem care plan, warning signs,  risks/benefits of new medications or medication changes, follow-up care and testing.  Patient advised to follow-up with her primary care within 1 week of discharge or the next available for posthospitalization transition of care.  There was verbalized understanding and agreement with discharge care plan.    Pertinent Physical Exam At Time of Discharge  On the day of discharge, the patient reported feeling well and pain was controlled. Vitals and labs were stable. On exam: No acute distress, interactive, no increased work of breathing, regular rate and rhythm, abdomen soft/nontender, no edema. +ostomy, adominal wound dressed c/d/I, arm wound c/d/I.     Outpatient Follow-Up  Future Appointments   Date Time Provider Department Center   4/15/2024  1:30 PM [facility] SCC CT 1 [id number] [facility]   4/22/2024  1:00 PM Mani Gonzales MD [id number] [facility]   4/22/2024  2:50 PM Carter De Leon MD [id number] [facility]   5/16/2024  3:00 PM Rose Shah MD [id number] [facility]           Pt instructed to take all medications as prescribed.  Keep all follow-up appointments.  Contact their primary care physician with any questions or concerns that arise.  Come to the emergency department with worsening of your symptoms or any other medical emergency. .    Time spent >30 minutes on discharge management.    Ariel Tejada MD

## 2024-04-12 ENCOUNTER — HOME HEALTH ADMISSION (OUTPATIENT)
Dept: HOME HEALTH SERVICES | Facility: HOME HEALTH | Age: 74
End: 2024-04-12
Payer: MEDICAID

## 2024-04-12 ENCOUNTER — PATIENT OUTREACH (OUTPATIENT)
Dept: HOME HEALTH SERVICES | Age: 74
End: 2024-04-12
Payer: MEDICAID

## 2024-04-12 ENCOUNTER — DOCUMENTATION (OUTPATIENT)
Dept: HOME HEALTH SERVICES | Facility: HOME HEALTH | Age: 74
End: 2024-04-12
Payer: MEDICAID

## 2024-04-12 ENCOUNTER — PHARMACY VISIT (OUTPATIENT)
Dept: PHARMACY | Facility: CLINIC | Age: 74
End: 2024-04-12
Payer: MEDICAID

## 2024-04-12 PROCEDURE — G0378 HOSPITAL OBSERVATION PER HR: HCPCS

## 2024-04-12 NOTE — HH CARE COORDINATION
Home Care received a Referral for Nursing and Home Health Aide. We have processed the referral for a Start of Care on 4/13-4/14.     If you have any questions or concerns, please feel free to contact us at [phone]. Follow the prompts, enter your five digit zip code, and you will be directed to your care team on CENTL 3.

## 2024-04-13 ENCOUNTER — HOME CARE VISIT (OUTPATIENT)
Dept: HOME HEALTH SERVICES | Facility: HOME HEALTH | Age: 74
End: 2024-04-13
Payer: MEDICAID

## 2024-04-13 ENCOUNTER — PATIENT OUTREACH (OUTPATIENT)
Dept: CARE COORDINATION | Age: 74
End: 2024-04-13
Payer: MEDICAID

## 2024-04-13 VITALS
HEART RATE: 78 BPM | SYSTOLIC BLOOD PRESSURE: 128 MMHG | OXYGEN SATURATION: 98 % | HEIGHT: 62 IN | DIASTOLIC BLOOD PRESSURE: 66 MMHG | RESPIRATION RATE: 18 BRPM | WEIGHT: 160 LBS | TEMPERATURE: 97.8 F | BODY MASS INDEX: 29.44 KG/M2

## 2024-04-13 LAB
ATRIAL RATE: 69 BPM
P AXIS: 31 DEGREES
P OFFSET: 210 MS
P ONSET: 153 MS
PR INTERVAL: 134 MS
Q ONSET: 220 MS
QRS COUNT: 11 BEATS
QRS DURATION: 70 MS
QT INTERVAL: 230 MS
QTC CALCULATION(BAZETT): 246 MS
QTC FREDERICIA: 241 MS
R AXIS: 75 DEGREES
T AXIS: 203 DEGREES
T OFFSET: 335 MS
VENTRICULAR RATE: 69 BPM

## 2024-04-13 PROCEDURE — 0023 HH SOC

## 2024-04-13 PROCEDURE — G0299 HHS/HOSPICE OF RN EA 15 MIN: HCPCS

## 2024-04-13 ASSESSMENT — ENCOUNTER SYMPTOMS
PERSON REPORTING PAIN: PATIENT
PAIN: 1
PAIN LOCATION - PAIN SEVERITY: 6/10
SUBJECTIVE PAIN PROGRESSION: WAXING AND WANING
COUGH: 1
APPETITE LEVEL: FAIR
COUGH CHARACTERISTICS: CONGESTED
MUSCLE WEAKNESS: 1
CHANGE IN APPETITE: DECREASED

## 2024-04-13 ASSESSMENT — ACTIVITIES OF DAILY LIVING (ADL)
ENTERING_EXITING_HOME: NEEDS ASSISTANCE
OASIS_M1830: 03

## 2024-04-14 LAB
BACTERIA BLD CULT: NORMAL

## 2024-04-15 ENCOUNTER — APPOINTMENT (OUTPATIENT)
Dept: RADIOLOGY | Facility: HOSPITAL | Age: 74
End: 2024-04-15
Payer: MEDICAID

## 2024-04-15 ENCOUNTER — PATIENT OUTREACH (OUTPATIENT)
Dept: CARE COORDINATION | Facility: CLINIC | Age: 74
End: 2024-04-15
Payer: MEDICAID

## 2024-04-15 DIAGNOSIS — L73.2 HIDRADENITIS SUPPURATIVA: Primary | ICD-10-CM

## 2024-04-15 NOTE — PROGRESS NOTES
TELEPHONE ENCOUNTER  4/15/2024  Sandra Velasquez  [phone]    Reason for Call:  - Discussed labwork.     Patient advised:  - I called the patient and discussed that both her hepatitis B and C returned positive. Patient does endorse a past history of hepatitis B and C, but has had no activity in the last two years. I discussed we will draw additional lab work in order to ensure her labs do not show a reactivation (Hep B e antibody, Hep B viral load, Hep C viral load). Given her atypical presentation of HS, will also add on a labs to rule out hepatitis inducing vasculitis (ANCA).     I called Home Health as patient states she is very sick and upset she cannot shower or leave her home. I confirmed home health services that patient will be able to have- nursing, bloodwork, nurse aide-bathing. I discussed these with patient as well. Patient stated thank you and abruptly ended the call.     Alley Cueva, DO

## 2024-04-16 ENCOUNTER — HOME CARE VISIT (OUTPATIENT)
Dept: HOME HEALTH SERVICES | Facility: HOME HEALTH | Age: 74
End: 2024-04-16
Payer: MEDICAID

## 2024-04-16 SDOH — HEALTH STABILITY: MENTAL HEALTH: STRESS FACTORS COMMENTS: SELF PERCEPTION

## 2024-04-16 SDOH — HEALTH STABILITY: MENTAL HEALTH: SOCIAL ISOLATION: 1

## 2024-04-16 ASSESSMENT — ACTIVITIES OF DAILY LIVING (ADL)
AMBULATION_REQUIRES_ASSISTANCE: 1
SHOPPING_REQUIRES_ASSISTANCE: 1
LAUNDRY_REQUIRES_ASSISTANCE: 1

## 2024-04-16 ASSESSMENT — ENCOUNTER SYMPTOMS: AGITATION: 1

## 2024-04-17 ENCOUNTER — HOME CARE VISIT (OUTPATIENT)
Dept: HOME HEALTH SERVICES | Facility: HOME HEALTH | Age: 74
End: 2024-04-17
Payer: MEDICAID

## 2024-04-17 VITALS
OXYGEN SATURATION: 97 % | DIASTOLIC BLOOD PRESSURE: 67 MMHG | TEMPERATURE: 97.1 F | SYSTOLIC BLOOD PRESSURE: 118 MMHG | RESPIRATION RATE: 16 BRPM | HEART RATE: 61 BPM

## 2024-04-17 DIAGNOSIS — T50.905A DRUG-INDUCED NAUSEA AND VOMITING: Primary | ICD-10-CM

## 2024-04-17 DIAGNOSIS — R11.2 DRUG-INDUCED NAUSEA AND VOMITING: Primary | ICD-10-CM

## 2024-04-17 PROCEDURE — G0300 HHS/HOSPICE OF LPN EA 15 MIN: HCPCS

## 2024-04-17 ASSESSMENT — PAIN SCALES - PAIN ASSESSMENT IN ADVANCED DEMENTIA (PAINAD)
BREATHING: 0
NEGVOCALIZATION: 0 - NONE.
BODYLANGUAGE: 0 - RELAXED.
NEGVOCALIZATION: 0
TOTALSCORE: 0
FACIALEXPRESSION: 0 - SMILING OR INEXPRESSIVE.
CONSOLABILITY: 0
FACIALEXPRESSION: 0
BODYLANGUAGE: 0
CONSOLABILITY: 0 - NO NEED TO CONSOLE.

## 2024-04-17 ASSESSMENT — ACTIVITIES OF DAILY LIVING (ADL)
SHOPPING ASSESSED: 1
TRANSPORTATION: DEPENDENT
TRANSPORTATION ASSESSED: 1
BATHING ASSESSED: 1
LAUNDRY ASSESSED: 1
LAUNDRY: DEPENDENT
SHOPPING: DEPENDENT

## 2024-04-17 ASSESSMENT — ENCOUNTER SYMPTOMS
CHANGE IN APPETITE: UNCHANGED
DEPRESSION: 0
PAIN: 1
HIGHEST PAIN SEVERITY IN PAST 24 HOURS: 7/10
APPETITE LEVEL: GOOD
OCCASIONAL FEELINGS OF UNSTEADINESS: 0
LOSS OF SENSATION IN FEET: 0
DIZZINESS: 1

## 2024-04-19 ENCOUNTER — HOME CARE VISIT (OUTPATIENT)
Dept: HOME HEALTH SERVICES | Facility: HOME HEALTH | Age: 74
End: 2024-04-19
Payer: MEDICAID

## 2024-04-19 ENCOUNTER — APPOINTMENT (OUTPATIENT)
Dept: BEHAVIORAL HEALTH | Facility: CLINIC | Age: 74
End: 2024-04-19
Payer: MEDICAID

## 2024-04-19 VITALS
DIASTOLIC BLOOD PRESSURE: 62 MMHG | RESPIRATION RATE: 16 BRPM | TEMPERATURE: 98.3 F | HEART RATE: 71 BPM | OXYGEN SATURATION: 99 % | SYSTOLIC BLOOD PRESSURE: 117 MMHG

## 2024-04-19 PROCEDURE — G0299 HHS/HOSPICE OF RN EA 15 MIN: HCPCS

## 2024-04-19 RX ORDER — ONDANSETRON 4 MG/1
4 TABLET, ORALLY DISINTEGRATING ORAL EVERY 8 HOURS PRN
Qty: 20 TABLET | Refills: 0 | Status: SHIPPED | OUTPATIENT
Start: 2024-04-19 | End: 2024-04-19

## 2024-04-19 NOTE — PROGRESS NOTES
Contacted patient after hearing from her wound care nurse that she was unable to tolerate doxycycline because of nausea.  She had been prescribed Levaquin and doxycycline for pneumonia, is clinically improved and continues to use the Levaquin every other day for CKD 3a.   She is seeking a home health aide to help her get a shower, will follow-up with Dr. Douglass virtually and with urology virtually on 4/22/2024, and has an appointment with me on 5/16/2024.   A/medication induced nausea  Community acquired pneumonia may be treated adequate with Levaquin.  Oral medications unlikely to be helpful for hidradenitis if it is present.  P/complete course of Levaquin  Stop doxycycline  We will defer any additional antiemetics to reduce polypharmacy  I will try to order home health aide though this has not been helpful in the past and patient may need to work with family for a less medical caregiver who will not be covered by insurance but will provide the services that patient needs.

## 2024-04-20 ASSESSMENT — ENCOUNTER SYMPTOMS
APPETITE LEVEL: GOOD
DENIES PAIN: 1
CHANGE IN APPETITE: UNCHANGED

## 2024-04-22 ENCOUNTER — TELEMEDICINE (OUTPATIENT)
Dept: UROLOGY | Facility: CLINIC | Age: 74
End: 2024-04-22
Payer: MEDICAID

## 2024-04-22 ENCOUNTER — TELEMEDICINE (OUTPATIENT)
Dept: BEHAVIORAL HEALTH | Facility: CLINIC | Age: 74
End: 2024-04-22
Payer: MEDICAID

## 2024-04-22 ENCOUNTER — APPOINTMENT (OUTPATIENT)
Dept: DERMATOLOGY | Facility: CLINIC | Age: 74
End: 2024-04-22
Payer: MEDICAID

## 2024-04-22 ENCOUNTER — HOME CARE VISIT (OUTPATIENT)
Dept: HOME HEALTH SERVICES | Facility: HOME HEALTH | Age: 74
End: 2024-04-22
Payer: MEDICAID

## 2024-04-22 VITALS
OXYGEN SATURATION: 96 % | DIASTOLIC BLOOD PRESSURE: 60 MMHG | TEMPERATURE: 97.2 F | RESPIRATION RATE: 18 BRPM | SYSTOLIC BLOOD PRESSURE: 130 MMHG | HEART RATE: 53 BPM

## 2024-04-22 DIAGNOSIS — R35.0 URINARY FREQUENCY: Primary | ICD-10-CM

## 2024-04-22 DIAGNOSIS — F11.21 OPIOID USE DISORDER, MODERATE, IN SUSTAINED REMISSION (MULTI): ICD-10-CM

## 2024-04-22 DIAGNOSIS — F43.10 PTSD (POST-TRAUMATIC STRESS DISORDER): Primary | ICD-10-CM

## 2024-04-22 DIAGNOSIS — N20.0 BILATERAL NEPHROLITHIASIS: ICD-10-CM

## 2024-04-22 PROCEDURE — 1159F MED LIST DOCD IN RCRD: CPT | Performed by: STUDENT IN AN ORGANIZED HEALTH CARE EDUCATION/TRAINING PROGRAM

## 2024-04-22 PROCEDURE — 99213 OFFICE O/P EST LOW 20 MIN: CPT | Performed by: STUDENT IN AN ORGANIZED HEALTH CARE EDUCATION/TRAINING PROGRAM

## 2024-04-22 PROCEDURE — 1036F TOBACCO NON-USER: CPT | Performed by: STUDENT IN AN ORGANIZED HEALTH CARE EDUCATION/TRAINING PROGRAM

## 2024-04-22 PROCEDURE — 90837 PSYTX W PT 60 MINUTES: CPT | Performed by: PSYCHOLOGIST

## 2024-04-22 PROCEDURE — G0299 HHS/HOSPICE OF RN EA 15 MIN: HCPCS

## 2024-04-22 PROCEDURE — 1159F MED LIST DOCD IN RCRD: CPT | Performed by: PSYCHOLOGIST

## 2024-04-22 ASSESSMENT — ENCOUNTER SYMPTOMS
PERSON REPORTING PAIN: PATIENT
DENIES PAIN: 1
LAST BOWEL MOVEMENT: 66952

## 2024-04-22 NOTE — PROGRESS NOTES
VIRTUAL VISIT    START TIME:  10 AM  END TIME:  10:55 AM    Diagnoses/Problems  PTSD  Opioid (heroin) use disorder, in sustained remission (since 3/25/99)      Likely narcissistic personality disorder traits, borderline personality disorder      Orders  Patient agreed to return for individual psychotherapy with this provider.    Note dictated with [email or web address] transcription software. Completed without full typed error editing and sent to avoid delay.      Past Medical History  Problems    · History of Abdominal pain, RUQ (right upper quadrant) (789.01) (R10.11)   · Resolved Date: 10 Apr 2019   · History of Acute cystitis without hematuria (595.0) (N30.00)   · Resolved Date: 10 Apr 2019   · History of Allergic rhinitis due to pollen, unspecified seasonality (477.0) (J30.1)   · Resolved Date: 10 Apr 2019   · History of Arteriolosclerosis (440.9) (I70.90)   · History of Bilateral dry eyes (375.15) (H04.123)   · History of Blepharitis, ulcerative (373.01) (H01.019)   · Resolved Date: 02 Aug 2019   · History of Bluish skin discoloration (782.5) (R23.0)   · Resolved Date: 11 Apr 2019   · History of Cataract of left eye (366.9) (H26.9)   · History of Cataract of left eye (366.9) (H26.9)   · History of Cataract of right eye (366.9) (H26.9)   · History of Cataract of right eye (366.9) (H26.9)   · History of Cataract, nuclear sclerotic, left eye (366.16) (H25.12)   · Resolved Date: 02 Aug 2019   · History of Cataract, nuclear sclerotic, right eye (366.16) (H25.11)   · Resolved Date: 02 Aug 2019   · History of Change in bowel habits (787.99) (R19.4)   · Resolved Date: 02 Aug 2019   · History of Combined form of age-related cataract, left eye (366.19) (H25.812)   · History of Combined form of age-related cataract, left eye (366.19) (H25.812)   · History of Cortical age-related cataract of left eye (366.15) (H25.012)   · Resolved Date: 02 Aug 2019   · History of Dentalgia (525.9) (K08.89)   · Resolved Date: 02 Aug 2019    · History of Dry eyes, bilateral (375.15) (H04.123)   · Resolved Date: 02 Aug 2019   · History of actinic keratosis (V13.3) (Z87.2)   · Resolved Date: 11 Apr 2019   · History of acute sinusitis (V12.69) (Z87.09)   · Resolved Date: 03 Feb 2017   · History of acute sinusitis (V12.69) (Z87.09)   · Resolved Date: 04 Sep 2015   · History of acute sinusitis (V12.69) (Z87.09)   · Resolved Date: 10 Apr 2019   · History of acute sinusitis (V12.69) (Z87.09)   · Resolved Date: 29 Dec 2017   · History of anemia (V12.3) (Z86.2)   · Resolved Date: 04 Sep 2015   · Added by Problem List Migration; 2013-6-13; Moved to Suppressed Nov 23 2013  9:02PM   · History of chest pain (V13.89) (Z87.898)   · Resolved Date: 11 Apr 2019   · Added by Problem List Migration; 2013-6-13; Moved to Suppressed Nov 23 2013  9:02PM   · History of constipation (V12.79) (Z87.19)   · Resolved Date: 11 Apr 2019   · History of dizziness (V13.89) (Z87.898)   · Resolved Date: 04 Sep 2015   · History of dyspnea (V12.69) (Z87.898)   · Resolved Date: 10 Aug 2017   · History of epistaxis (V12.69) (Z87.898)   · Resolved Date: 01 Jul 2019   · History of fall (V15.88) (Z91.81)   · Resolved Date: 10 Apr 2019   · History of gastroesophageal reflux (GERD) (V12.79) (Z87.19)   · History of hemoptysis (V12.69) (Z87.898)   · Resolved Date: 11 Apr 2019   · History of hypertension (V12.59) (Z86.79)   · History of influenza vaccination (V49.89) (Z92.29)   · Resolved Date: 11 Apr 2019   · History of nasal discharge (V12.69) (Z87.09)   · Resolved Date: 10 Apr 2019   · History of paranasal sinus congestion (V12.69) (Z87.09)   · Resolved Date: 11 Apr 2019   · History of pneumococcal vaccination (V49.89) (Z92.29)   · Resolved Date: 01 Jul 2019   · History of pneumonia (V12.61) (Z87.01)   · Resolved Date: 02 Aug 2019   · History of sore throat (V12.69) (Z87.09)   · Resolved Date: 11 Apr 2019   · History of tinea corporis (V12.09) (Z86.19)   · Resolved Date: 02 Aug 2019   · History of  urinary tract infection (V13.02) (Z87.440)   · Resolved Date: 02 Aug 2019   · Hyperlipidemia (272.4) (E78.5)   · History of Immunization due (V05.9) (Z23)   · Resolved Date: 02 Aug 2019   · History of Leg pain (729.5) (M79.606)   · Resolved Date: 02 Aug 2019   · History of Light stools (792.1) (R19.5)   · Resolved Date: 02 Aug 2019   · History of MGD (meibomian gland disease) (373.00) (H02.889)   · Resolved Date: 02 Aug 2019   · History of Moderate opioid use disorder (304.00) (F11.20)   · Resolved Date: 09 Mar 2020   · History of Nasal crusting (478.19) (J34.89)   · Resolved Date: 11 Apr 2019   · History of Nasal septal perforation (478.19) (J34.89)   · Resolved Date: 24 Oct 2018   · History of Nasal septal perforation (478.19) (J34.89)   · Resolved Date: 02 Aug 2019   · History of Nausea in adult (787.02) (R11.0)   · Resolved Date: 11 Apr 2019   · History of Need for vaccination for zoster (V04.89) (Z23)   · Resolved Date: 02 Aug 2019   · History of Other chronic sinusitis (473.8) (J32.8)   · Resolved Date: 15 Gus 2018   · Personal history of arthritis (V13.4) (Z87.39)   · History of Pyuria (791.9) (R82.81)   · Resolved Date: 10 Apr 2019   · History of Radial nerve irritation (354.3) (G56.30)   · Resolved Date: 02 Aug 2019   · History of Right nephrolithiasis (592.0) (N20.0)   · History of Throat pain (784.1) (R07.0)   · Resolved Date: 11 Apr 2019    Family History  Mother    · Family history of alcoholism (V17.0) (Z81.1)   · Family history of cardiac disorder (V17.49) (Z82.49)   · Family history of diabetes mellitus (V18.0) (Z83.3)   · Family history of glaucoma (V19.11) (Z83.511)   · Family history of Mesothelioma  Father    · Family history of alcoholism (V17.0) (Z81.1)   · Family history of melanoma (V16.8) (Z80.8)  Sister    · Family history of macular degeneration (V19.19) (Z83.518)   · Family history of malignant neoplasm of breast (V16.3) (Z80.3)  Aunt    · Family history of macular degeneration (V19.19)  "(Z83.518)  Family History    · Family history of Malignant Melanoma Of The Skin (V16.8)    Social History  Problems    · Does not use illicit drugs (V49.89) (Z78.9)   · Ex-cigarette smoker (V15.82) (Z87.891)   · Feels safe at home   · No alcohol use   · Denied: History of Social alcohol use    Mental Status Exam  No suicidal ideation nor intent.       Narrative  Patient was seen via video for today's session.  She reported that she has not been doing well lately.  She continues to feel sick.  Patient has not been able to get out to meetings in person.  Patient said that she has been wanting to get rid of most of her things.  She said that this is in response to a comment that her primary care provider made about it possibly being time for her to go into a \"home.\"  Patient agrees that it may be that time but she is very upset about it.  She said she has been falling a lot more lately and this is bothering her quite a bit and is the primary reason why her PCP suggested it.  Patient continues to believe that no one really wants to be around her or is interested in her.  I reminded her about our last session in which she talked about people willingly helping her move and not wanting anything in return.  Patient remembered this and agreed that she does have people in her life who care about her.  We plan to follow up on this some more in the next session.  "

## 2024-04-22 NOTE — PROGRESS NOTES
Scribed for Dr. Mani Gonzales by Miguelangel Lucas. I, Dr. Mani Gonzales have personally reviewed and agreed with the information entered by the Virtual Scribe. 04/22/24.    This visit was completed via telemedicine. All issues as below were discussed and addressed but no physical exam was performed unless allowed by visual confirmation. If it was felt that the patient should be evaluated in clinic, then they were directed there. Patient verbal consented to the visit.    ASSESSMENT:  Problem List Items Addressed This Visit       Urinary frequency - Primary    Bilateral nephrolithiasis     Bilateral nephrolithiasis  Right ureteral calculus - resolved  Gross hematuria  Recurrent UTIs    PLAN:  #Bilateral nephrolithiasis  #R ureteral stone - resolved  Previously seen 2/2 large obstructing right renal stone (15 mm).  Underwent right ULLS with Dr. Suarez (07/19/22).   Stent removed (08/10/22).     CT A&P (04/10/24) personally reviewed.   Noted left renal nephrolithiasis without evidence of hydronephrosis or right sided stones   Stone burden appears stable.     Continue to monitor, advised to call if she becomes symptomatic and we can obtain CT scan and start her on narcotics and MET    #Renal cysts, BL  We discussed the natural history of simple renal cysts.  Reassured that these are very common and benign.   I would not recommend further workup or surveillance for this.  Patient reassured.     All questions were answered to the patient’s satisfaction.  Patient agrees with the plan and wishes to proceed.  Continue follow-up for ongoing care of her chronic medical conditions.       History of Present Illness (HPI):  Sandra presents for a follow up visit.  The patient’s EMR has been reviewed.  Lives in [place], [place].    History of nephrolithiasis, renal cysts, Adelina's, vaginal atrophy, chronic wounds 2/2 hydradenitis suppurativa, CAD (s/p LAD PCI in 2019, s/p 4x stents), AAA, CKD, diverticular disease c/b abdominal abscess  s/p lap open sigmoidectomy (Dora), end colostomy (05/2020), PNA (+MRSA), and opioid dependency.     Previously seen 2/2 large obstructing right renal stone (15 mm).  Underwent R ureteral stent placement with me. (05/27/22)  However, was unable not cleared by Cardiology for lithotripsy.   Due to staffing issues was not able to get her rescheduled [place] [facility] [place] ([place])  She was referred to Dr. Suarez for stone management.   Underwent right ULLS with Dr. Suarez (07/19/22).   Stent removed (08/10/22).     Post-op renal ultrasound (08/24/22) showed:  BL nephrolithiasis (L>R).  BL small renal cysts.  Mild fullness of left collecting system.     Last seen by Dr. Suarez (08/10/22).   Since lost to follow up.     Recent ED visit with admission. (04/10-04/11/24)  Admitted primarily for acute pneumonia and wound care.  Arranged [facility] for wound care and given wound care referral.    Given urology referral to assess her bilateral renal cysts.     TODAY: (04/22/24)  Reports she has been doing better since being discharged.   However, she is concerned she may have a acute UTI.     Denotes she may have passed a stone 1-2 months ago.   She had some discomfort and a transient episode of gross hematuria.   Shortly after the gross hematuria, all symptoms resolved.   Thus, believes she passed a stone at that time.     CT A&P (04/10/24) personally reviewed.   BL nephrolithiasis without evidence of hydronephrosis.   Few bilateral renal cysts are noted.    TO REVIEW: Per Dr. Suarez's note (08/10/22)  1. Recurrent nephrolithiasis   1a. 1.5 cm at right UPJ CT 5/11/22   1b. s/p right URS and ureteral stent placement 5/27/22 with Dr. Gonzales   1c. s/p right URS, LL of 1.5 cm renal pelvis 1 cm lower pole stones, stent exchange (partially encrusted) 7/19/22  Stone analysis 7/19/22 demonstrated composition primarily of calcium oxalate monohydrate   2. Chronic renal insufficiency   3. Lumbar region spondylolisthesis, spinal stenosis  4. Urinary  urgency-frequency    Her ureteral stent was partial encrusted but was exchanged without major complication at the time of the procedure. She has continued to pass stone fragments since the procedure. She is allergic to penicillins and had adverse reactions to erythromycin and atorvastatin. Denies flank pain. Patient is a former smoker.     Patient's genitalia were prepped and draped in the usual sterile fashion. A 17 Telugu flexible cystoscope was passed atraumatically per the urethra. We entered the bladder and identified the previously placed ureteral stent emanating from the right ureteral orifice. It was grasped with a flexible grasper and removed intact. Patient tolerated the procedure without difficulty.     There was atrophic vaginitis which was tender on palpation.      Since she is still passing small stone fragments, I asked her to continue tamsulosin until renal ultrasound is completed. I prescribed cipro 500 mg x3 days to cover cystoscopy. I asked her to complete a follow up renal ultrasound in 2 weeks. I asked her to follow up with Sandra Degroot CNP, in 6 weeks to review imaging and discuss metabolic work up in light of calcium oxalate monohydrate composition.    TO REVIEW: Last visit (07/06/22)  Has a large obstructing right renal stone  Has occasional labial bleeding from intractable fungal infection;  so she isn't sure if this is from the urine or vagina  Stream is hard to stop currently, denies overt dysuria     Now s/p right ureteral stent placement for the stone  Anesthesiology would not clear her for lithotripsy until she sees a cardiologist  Has now been seen and is cleared for surgery  Due to staffing issues I have not been able to get her scheduled at [facility] [place]  She is unable to obtain transportation for other  locations for surgery  Reports severe stent symptoms today, ran out of oxycodone  I did send her medication to her pharmacy this AM  Is taking ibuprofen was well  Is leaking twice  daily  This is debilitating for her, she doesn't leave the house due to stent symptoms  She is passing small stone fragments     Has an appointment with Dr. Suarez today to see if he has a sooner surgery date at her preferred location    TO REVIEW:  This is atypical for her, stones are usually a right-sided problem for her  Pain has been severe, having this today even  Pain meds have not helped significantly   Does not feel like sciatica or her other pain conditions, feels like a stone  No recent hematuria, there was at initial presentation a few weeks ago  Had a UTI then, completed antibiotic course, no current UTI symptoms  No right flank pain     I personally reviewed her CT A/P 6/18/21:  Right renal pelvis stone (10mm) not obstructing, multiple RLP stones  Numerous left renal stones including possible upper calyx dilation with stone burden, no obvious hydronephrosis otherwise, no ureteral stones, no ureteral calculi, no renal masses    Past Medical History:   Diagnosis Date    Acute cystitis without hematuria 01/03/2019    Acute cystitis without hematuria    Age-related nuclear cataract, left eye 10/31/2018    Cataract, nuclear sclerotic, left eye    Age-related nuclear cataract, right eye 10/31/2018    Cataract, nuclear sclerotic, right eye    Allergic rhinitis due to pollen 09/19/2018    Allergic rhinitis due to pollen, unspecified seasonality    Calculus of kidney 12/11/2018    Right nephrolithiasis    Change in bowel habit 12/18/2018    Change in bowel habits    Combined forms of age-related cataract, left eye 05/08/2017    Combined form of age-related cataract, left eye    Combined forms of age-related cataract, left eye 05/08/2017    Combined form of age-related cataract, left eye    Cortical age-related cataract, left eye 10/31/2018    Cortical age-related cataract of left eye    Cyanosis 08/10/2017    Bluish skin discoloration    Dry eye syndrome of bilateral lacrimal glands 10/31/2018    Bilateral dry  eyes    Dry eye syndrome of bilateral lacrimal glands 10/31/2018    Dry eyes, bilateral    Encounter for immunization 09/17/2018    Immunization due    Encounter for immunization 10/01/2020    Need for vaccination for zoster    History of falling 01/03/2019    History of fall    Hyperlipidemia, unspecified 08/26/2020    Hyperlipidemia    Lesion of radial nerve, unspecified upper limb 03/05/2016    Radial nerve irritation    Meibomian gland dysfunction of unspecified eye, 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/31/2018    MGD (meibomian gland disease)    Nausea 11/27/2018    Nausea in adult    Opioid dependence, uncomplicated (Multi) 03/09/2020    Moderate opioid use disorder    Other chronic sinusitis 06/15/2018    Other chronic sinusitis    Other fecal abnormalities 11/27/2018    Light stools    Other specified disorders of nose and nasal sinuses 06/15/2018    Nasal septal perforation    Other specified disorders of nose and nasal sinuses 10/24/2018    Nasal crusting    Other specified disorders of nose and nasal sinuses 02/03/2017    Nasal septal perforation    Other specified disorders of teeth and supporting structures 09/04/2015    Dentalgia    Pain in leg, unspecified 03/11/2019    Leg pain    Pain in throat 02/03/2017    Throat pain    Personal history of diseases of the blood and blood-forming organs and certain disorders involving the immune mechanism 02/03/2015    History of anemia    Personal history of diseases of the skin and subcutaneous tissue 08/16/2013    History of actinic keratosis    Personal history of other diseases of the circulatory system     History of hypertension    Personal history of other diseases of the digestive system     History of constipation    Personal history of other diseases of the digestive system 07/02/2014    History of gastroesophageal reflux (GERD)    Personal history of other diseases of the musculoskeletal system and connective tissue 07/02/2014    Personal history of arthritis     Personal history of other diseases of the respiratory system 12/18/2017    History of sore throat    Personal history of other diseases of the respiratory system 12/29/2017    History of paranasal sinus congestion    Personal history of other diseases of the respiratory system 01/03/2019    History of acute sinusitis    Personal history of other diseases of the respiratory system 02/03/2017    History of acute sinusitis    Personal history of other diseases of the respiratory system 12/29/2017    History of acute sinusitis    Personal history of other diseases of the respiratory system 06/15/2018    History of nasal discharge    Personal history of other diseases of the respiratory system 08/11/2015    History of acute sinusitis    Personal history of other drug therapy 02/08/2017    History of pneumococcal vaccination    Personal history of other drug therapy 11/10/2014    History of influenza vaccination    Personal history of other infectious and parasitic diseases 03/25/2019    History of tinea corporis    Personal history of other specified conditions 07/25/2014    History of dizziness    Personal history of other specified conditions 12/18/2017    History of hemoptysis    Personal history of other specified conditions 06/18/2014    History of dyspnea    Personal history of other specified conditions 12/29/2017    History of epistaxis    Personal history of other specified conditions 08/28/2017    History of chest pain    Personal history of pneumonia (recurrent) 03/10/2017    History of pneumonia    Personal history of urinary (tract) infections 01/03/2019    History of urinary tract infection    Pyuria 08/22/2016    Pyuria    Right upper quadrant pain 02/03/2017    Abdominal pain, RUQ (right upper quadrant)    Ulcerative blepharitis unspecified eye, 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/31/2018    Blepharitis, ulcerative    Unspecified atherosclerosis 07/02/2014    Arteriolosclerosis    Unspecified cataract 06/09/2015     Cataract of right eye    Unspecified cataract 06/09/2015    Cataract of left eye    Unspecified cataract 06/09/2015    Cataract of left eye    Unspecified cataract 06/09/2015    Cataract of right eye     Past Surgical History:   Procedure Laterality Date    CT GUIDED PERCUTANEOUS PERITONEAL OR RETROPERITONEAL FLUID COLLECTION DRAINAGE  4/7/2020    CT GUIDED PERCUTANEOUS PERITONEAL OR RETROPERITONEAL FLUID COLLECTION DRAINAGE 4/7/2020 [facility]    CT GUIDED PERCUTANEOUS PERITONEAL OR RETROPERITONEAL FLUID COLLECTION DRAINAGE  4/22/2020    CT GUIDED PERCUTANEOUS PERITONEAL OR RETROPERITONEAL FLUID COLLECTION DRAINAGE 4/22/2020 [facility]ICAL [facility]GAC[facility]    IR CVC PICC  5/11/2020    IR CVC PICC 5/11/2020 [facility]INICAL [facility]GAC[facility]    LITHOTRIPSY  07/31/2013    Renal Lithotripsy    OTHER SURGICAL HISTORY  11/10/2021    Colostomy    OTHER SURGICAL HISTORY  07/02/2014    Arterial Catheterization    REFRACTIVE SURGERY  06/09/2015    Corneal LASIK    TONSILLECTOMY  07/02/2014    Tonsillectomy     Family History   Problem Relation Name Age of Onset    Alcohol abuse Mother      Other (CARDIAC DISORDER) Mother      Diabetes Mother      Glaucoma Mother      Other (MESOTHELIOMA) Mother      Alcohol abuse Father      Melanoma Father      Macular degeneration Sister      Breast cancer Sister      Macular degeneration Other AUNT     Skin cancer Other Family hx      Social History     Tobacco Use   Smoking Status Never   Smokeless Tobacco Never     Current Outpatient Medications   Medication Sig Dispense Refill    ascorbic acid (Vitamin C) 500 mg tablet Take 1 tablet (500 mg) by mouth once daily.      aspirin 81 mg EC tablet TAKE 1 TABLET (81 MG) BY MOUTH ONCE DAILY. 90 tablet 3    carboxymethylcellulose (Refresh Plus) 0.5 % ophthalmic solution Administer 1 drop into both eyes if needed for dry eyes.      cetirizine (ZyrTEC) 10 mg tablet TAKE 1 TABLET BY MOUTH (10MG) BY MOUTH ONCE DAILY 90 tablet 1    DULoxetine (Cymbalta) 60 mg   capsule Take 1 capsule (60 mg) by mouth once daily. Do not crush or chew. Do not start before November 8, 2023. 30 capsule 11    fluticasone (Flonase) 50 mcg/actuation nasal spray Administer 1 spray into each nostril once daily.      levoFLOXacin (Levaquin) 750 mg tablet Take 1 tablet (750 mg) by mouth every other day for 6 doses. Do not start before April 12, 2024. 6 tablet 0    lidocaine 4 % patch Place 1 patch on the skin every 12 hours. APPLY TO LOWER BACK THEN REMOVE AND LEAVE OFF FOR 12 HOURS      melatonin 3 mg tablet Take 1 tablet (3 mg) by mouth once daily at bedtime. 30 tablet 2    naloxone (Narcan) 4 mg/0.1 mL nasal spray ADMINISTER 1 SPRAY (4 MG) INTO AFFECTED NOSTRIL(S) IF NEEDED FOR OPIOID REVERSAL. REPEAT IN 2 MINUTES IF NEEDED CALL EMS (Patient not taking: Reported on 4/10/2024) 2 each 2    propranolol (Inderal) 20 mg tablet Take 1 tablet by mouth every morning and take 2 tablets by mouth every evening. 270 tablet 2    tamsulosin (Flomax) 0.4 mg 24 hr capsule TAKE 1 CAPSULE (0.4 MG) BY MOUTH ONCE DAILY. 90 capsule 3    traMADol (Ultram) 50 mg tablet Take 2 tablets (100 mg) by mouth 3 times a day. Do not start before February 7, 2024. 84 tablet 3    VITAMIN B COMPLEX ORAL Take 1 tablet by mouth once daily.       No current facility-administered medications for this visit.     Allergies   Allergen Reactions    Penicillins Anaphylaxis, Shortness of breath and Swelling     note: has tolerated ceftriaxone    Atorvastatin Myalgia    House Dust Mite Runny nose    Erythromycin Base Nausea/vomiting     Past medical, surgical, family and social history in the chart was reviewed and is accurate including any additions to what is in this HPI.    REVIEW OF SYSTEMS (ROS):   Constitutional: denies any unintentional weight loss or change in strength.  Integumentary: denies any rashes or pruritus.  Eyes: denies any double vision or eye pain.  Ear/Nose/Mouth/Throat: denies any nosebleeds or gum  bleeds.  Cardiovascular: denies any chest pain or syncope.  Respiratory: denies hemoptysis.  Gastrointestinal: denies nausea or vomiting.  Musculoskeletal: denies muscle cramping or weakness.  Neurologic: denies convulsions or seizures.  Hematologic/Lymphatic: denies bleeding tendencies.  Endocrine: denies heat/cold intolerance.  All other systems have been reviewed and are negative unless otherwise noted in the HPI.     OBJECTIVE:  There were no vitals taken for this visit.  PHYSICAL EXAM:  Constitutional: No obvious distress.  Eyes: Non-injected conjunctiva, sclera clear, EOMI.  Ears/Nose/Mouth/Throat: No obvious drainage per ears or nose.  Cardiovascular: Extremities are warm and well perfused. No edema, cyanosis or pallor.  Respiratory: No audible wheezing/stridor; respirations do not appear labored.  Gastrointestinal: Abdomen soft, not distended.  Musculoskeletal: Normal ROM of extremities.  Skin: No obvious rashes or open sores.  Neurologic: Alert and oriented, CN 2-12 grossly intact.  Psychiatric: Answers questions appropriately with normal affect.  Hematologic/Lymphatic/Immunologic: No obvious bruises or sites of spontaneous bleeding.  Genitourinary: No CVA tenderness, bladder not palpable.     LABS & IMAGING:  Lab Results   Component Value Date    WBC 11.4 (H) 04/11/2024    HGB 10.6 (L) 04/11/2024    HCT 34.4 (L) 04/11/2024     04/11/2024    CHOL 180 06/17/2019    TRIG 106 05/12/2020    HDL 57.5 06/17/2019    ALT 16 04/10/2024    AST 27 04/10/2024     (L) 04/11/2024    K 3.5 04/11/2024     04/11/2024    CREATININE 1.05 04/11/2024    BUN 14 04/11/2024    CO2 25 04/11/2024    TSH 0.45 04/10/2024    INR 1.4 (H) 04/27/2020    HGBA1C 5.7 (A) 05/23/2023     Scribed for Dr. Mani Gonzales by Miguelangel Lucas.  I, Dr. Mani Gonzales have personally reviewed and agreed with the information entered by the Virtual Scribe. 04/22/24.

## 2024-04-23 ENCOUNTER — APPOINTMENT (OUTPATIENT)
Dept: PRIMARY CARE | Facility: CLINIC | Age: 74
End: 2024-04-23
Payer: MEDICAID

## 2024-04-23 ENCOUNTER — LAB REQUISITION (OUTPATIENT)
Dept: LAB | Facility: HOSPITAL | Age: 74
End: 2024-04-23
Payer: MEDICAID

## 2024-04-23 DIAGNOSIS — L29.9 GENERALIZED PRURITUS: ICD-10-CM

## 2024-04-23 DIAGNOSIS — L73.2 HIDRADENITIS SUPPURATIVA: ICD-10-CM

## 2024-04-23 PROCEDURE — RXMED WILLOW AMBULATORY MEDICATION CHARGE

## 2024-04-23 RX ORDER — CETIRIZINE HYDROCHLORIDE 10 MG/1
TABLET ORAL
Qty: 90 TABLET | Refills: 1 | Status: SHIPPED | OUTPATIENT
Start: 2024-04-23 | End: 2025-04-23

## 2024-04-23 NOTE — TELEPHONE ENCOUNTER
Pt called requesting refill of tramadol and zyrtec. Noted available tramadol. Zyrtec order pended and routed to provider. Call placed to pt to make aware to call pharmacy for tramadol refill.

## 2024-04-24 ENCOUNTER — PHARMACY VISIT (OUTPATIENT)
Dept: PHARMACY | Facility: CLINIC | Age: 74
End: 2024-04-24
Payer: MEDICAID

## 2024-04-24 ENCOUNTER — LAB (OUTPATIENT)
Dept: LAB | Facility: LAB | Age: 74
End: 2024-04-24
Payer: MEDICAID

## 2024-04-24 ENCOUNTER — HOME CARE VISIT (OUTPATIENT)
Dept: HOME HEALTH SERVICES | Facility: HOME HEALTH | Age: 74
End: 2024-04-24
Payer: MEDICAID

## 2024-04-24 VITALS
OXYGEN SATURATION: 99 % | SYSTOLIC BLOOD PRESSURE: 142 MMHG | TEMPERATURE: 97.9 F | HEART RATE: 67 BPM | DIASTOLIC BLOOD PRESSURE: 80 MMHG | RESPIRATION RATE: 16 BRPM

## 2024-04-24 DIAGNOSIS — L73.2 HIDRADENITIS SUPPURATIVA: ICD-10-CM

## 2024-04-24 DIAGNOSIS — M51.9 LUMBAR DISC DISEASE: ICD-10-CM

## 2024-04-24 PROCEDURE — RXMED WILLOW AMBULATORY MEDICATION CHARGE

## 2024-04-24 PROCEDURE — 87517 HEPATITIS B DNA QUANT: CPT

## 2024-04-24 PROCEDURE — 86707 HEPATITIS BE ANTIBODY: CPT

## 2024-04-24 PROCEDURE — G0299 HHS/HOSPICE OF RN EA 15 MIN: HCPCS

## 2024-04-24 PROCEDURE — 87522 HEPATITIS C REVRS TRNSCRPJ: CPT

## 2024-04-24 PROCEDURE — 83516 IMMUNOASSAY NONANTIBODY: CPT

## 2024-04-24 PROCEDURE — 86036 ANCA SCREEN EACH ANTIBODY: CPT

## 2024-04-24 PROCEDURE — 36415 COLL VENOUS BLD VENIPUNCTURE: CPT

## 2024-04-24 RX ORDER — TRAMADOL HYDROCHLORIDE 50 MG/1
100 TABLET ORAL 3 TIMES DAILY
Qty: 84 TABLET | Refills: 3 | Status: SHIPPED | OUTPATIENT
Start: 2024-04-24 | End: 2024-06-20

## 2024-04-25 ENCOUNTER — PHARMACY VISIT (OUTPATIENT)
Dept: PHARMACY | Facility: CLINIC | Age: 74
End: 2024-04-25
Payer: MEDICAID

## 2024-04-25 LAB
HBV DNA SERPL NAA+PROBE-ACNC: NOT DETECTED [IU]/ML
HBV DNA SERPL NAA+PROBE-LOG IU: NORMAL {LOG_IU}/ML
HCV RNA SERPL NAA+PROBE-ACNC: NOT DETECTED K[IU]/ML
HCV RNA SERPL NAA+PROBE-LOG IU: NORMAL {LOG_IU}/ML

## 2024-04-25 ASSESSMENT — ACTIVITIES OF DAILY LIVING (ADL)
AMBULATION ASSISTANCE: 1
AMBULATION ASSISTANCE: STAND BY ASSIST

## 2024-04-25 ASSESSMENT — ENCOUNTER SYMPTOMS
APPETITE LEVEL: GOOD
CHANGE IN APPETITE: UNCHANGED
DENIES PAIN: 1

## 2024-04-26 ENCOUNTER — HOME CARE VISIT (OUTPATIENT)
Dept: HOME HEALTH SERVICES | Facility: HOME HEALTH | Age: 74
End: 2024-04-26
Payer: MEDICAID

## 2024-04-26 VITALS
DIASTOLIC BLOOD PRESSURE: 78 MMHG | SYSTOLIC BLOOD PRESSURE: 121 MMHG | OXYGEN SATURATION: 99 % | RESPIRATION RATE: 16 BRPM | HEART RATE: 63 BPM | TEMPERATURE: 98.9 F

## 2024-04-26 LAB — HBV E AB SERPL QL IA: NEGATIVE

## 2024-04-26 PROCEDURE — G0299 HHS/HOSPICE OF RN EA 15 MIN: HCPCS

## 2024-04-29 ENCOUNTER — HOME CARE VISIT (OUTPATIENT)
Dept: HOME HEALTH SERVICES | Facility: HOME HEALTH | Age: 74
End: 2024-04-29
Payer: MEDICAID

## 2024-04-29 VITALS
SYSTOLIC BLOOD PRESSURE: 112 MMHG | OXYGEN SATURATION: 98 % | HEART RATE: 58 BPM | TEMPERATURE: 97.8 F | DIASTOLIC BLOOD PRESSURE: 85 MMHG | RESPIRATION RATE: 18 BRPM

## 2024-04-29 LAB
ANCA AB PATTERN SER IF-IMP: ABNORMAL
ANCA IGG TITR SER IF: ABNORMAL {TITER}
MYELOPEROXIDASE AB SER-ACNC: 0 AU/ML (ref 0–19)
PROTEINASE3 AB SER-ACNC: 1 AU/ML (ref 0–19)

## 2024-04-29 PROCEDURE — G0299 HHS/HOSPICE OF RN EA 15 MIN: HCPCS

## 2024-04-29 ASSESSMENT — ENCOUNTER SYMPTOMS
APPETITE LEVEL: GOOD
CHANGE IN APPETITE: UNCHANGED
DENIES PAIN: 1
APPETITE LEVEL: GOOD
CHANGE IN APPETITE: UNCHANGED
DENIES PAIN: 1

## 2024-04-30 ENCOUNTER — PATIENT MESSAGE (OUTPATIENT)
Dept: DERMATOLOGY | Facility: HOSPITAL | Age: 74
End: 2024-04-30
Payer: MEDICAID

## 2024-04-30 DIAGNOSIS — L73.2 HIDRADENITIS SUPPURATIVA: Primary | ICD-10-CM

## 2024-05-01 ENCOUNTER — TELEPHONE (OUTPATIENT)
Dept: DERMATOLOGY | Facility: CLINIC | Age: 74
End: 2024-05-01

## 2024-05-01 ENCOUNTER — HOME CARE VISIT (OUTPATIENT)
Dept: HOME HEALTH SERVICES | Facility: HOME HEALTH | Age: 74
End: 2024-05-01
Payer: MEDICAID

## 2024-05-01 VITALS
SYSTOLIC BLOOD PRESSURE: 128 MMHG | DIASTOLIC BLOOD PRESSURE: 81 MMHG | RESPIRATION RATE: 16 BRPM | HEART RATE: 70 BPM | OXYGEN SATURATION: 99 % | TEMPERATURE: 98.2 F

## 2024-05-01 PROCEDURE — G0299 HHS/HOSPICE OF RN EA 15 MIN: HCPCS

## 2024-05-01 ASSESSMENT — ENCOUNTER SYMPTOMS
CHANGE IN APPETITE: UNCHANGED
APPETITE LEVEL: GOOD
DENIES PAIN: 1

## 2024-05-01 NOTE — TELEPHONE ENCOUNTER
Received an In Basket message to schedule pt for FUV and Injection visit.  See Outgoing Call comment

## 2024-05-03 ENCOUNTER — HOME CARE VISIT (OUTPATIENT)
Dept: HOME HEALTH SERVICES | Facility: HOME HEALTH | Age: 74
End: 2024-05-03
Payer: MEDICAID

## 2024-05-03 VITALS
SYSTOLIC BLOOD PRESSURE: 132 MMHG | DIASTOLIC BLOOD PRESSURE: 80 MMHG | OXYGEN SATURATION: 98 % | HEART RATE: 81 BPM | TEMPERATURE: 98.9 F | RESPIRATION RATE: 18 BRPM

## 2024-05-03 PROCEDURE — G0299 HHS/HOSPICE OF RN EA 15 MIN: HCPCS

## 2024-05-04 ASSESSMENT — ENCOUNTER SYMPTOMS
DENIES PAIN: 1
CHANGE IN APPETITE: UNCHANGED
APPETITE LEVEL: GOOD

## 2024-05-06 ENCOUNTER — HOME CARE VISIT (OUTPATIENT)
Dept: HOME HEALTH SERVICES | Facility: HOME HEALTH | Age: 74
End: 2024-05-06
Payer: MEDICAID

## 2024-05-06 VITALS
TEMPERATURE: 98.2 F | RESPIRATION RATE: 16 BRPM | OXYGEN SATURATION: 96 % | HEART RATE: 69 BPM | DIASTOLIC BLOOD PRESSURE: 74 MMHG | SYSTOLIC BLOOD PRESSURE: 126 MMHG

## 2024-05-06 PROCEDURE — G0299 HHS/HOSPICE OF RN EA 15 MIN: HCPCS

## 2024-05-07 ENCOUNTER — PHARMACY VISIT (OUTPATIENT)
Dept: PHARMACY | Facility: CLINIC | Age: 74
End: 2024-05-07
Payer: MEDICAID

## 2024-05-07 PROCEDURE — RXMED WILLOW AMBULATORY MEDICATION CHARGE

## 2024-05-08 ENCOUNTER — HOME CARE VISIT (OUTPATIENT)
Dept: HOME HEALTH SERVICES | Facility: HOME HEALTH | Age: 74
End: 2024-05-08
Payer: MEDICAID

## 2024-05-08 VITALS
DIASTOLIC BLOOD PRESSURE: 65 MMHG | TEMPERATURE: 97.8 F | RESPIRATION RATE: 16 BRPM | OXYGEN SATURATION: 100 % | SYSTOLIC BLOOD PRESSURE: 126 MMHG | HEART RATE: 77 BPM

## 2024-05-08 PROCEDURE — G0299 HHS/HOSPICE OF RN EA 15 MIN: HCPCS

## 2024-05-09 ENCOUNTER — OFFICE VISIT (OUTPATIENT)
Dept: PRIMARY CARE | Facility: CLINIC | Age: 74
End: 2024-05-09
Payer: MEDICAID

## 2024-05-09 VITALS
BODY MASS INDEX: 29.26 KG/M2 | TEMPERATURE: 98.4 F | HEART RATE: 59 BPM | SYSTOLIC BLOOD PRESSURE: 123 MMHG | HEIGHT: 62 IN | OXYGEN SATURATION: 94 % | DIASTOLIC BLOOD PRESSURE: 79 MMHG

## 2024-05-09 DIAGNOSIS — J18.9 COMMUNITY ACQUIRED PNEUMONIA, UNSPECIFIED LATERALITY: ICD-10-CM

## 2024-05-09 DIAGNOSIS — R05.8 RECURRENT PRODUCTIVE COUGH: Primary | ICD-10-CM

## 2024-05-09 DIAGNOSIS — R21 RASH: ICD-10-CM

## 2024-05-09 DIAGNOSIS — Z79.899 HIGH RISK MEDICATION USE: ICD-10-CM

## 2024-05-09 PROCEDURE — 99214 OFFICE O/P EST MOD 30 MIN: CPT | Performed by: FAMILY MEDICINE

## 2024-05-09 PROCEDURE — 1126F AMNT PAIN NOTED NONE PRSNT: CPT | Performed by: FAMILY MEDICINE

## 2024-05-09 PROCEDURE — 1159F MED LIST DOCD IN RCRD: CPT | Performed by: FAMILY MEDICINE

## 2024-05-09 PROCEDURE — 3074F SYST BP LT 130 MM HG: CPT | Performed by: FAMILY MEDICINE

## 2024-05-09 PROCEDURE — 3078F DIAST BP <80 MM HG: CPT | Performed by: FAMILY MEDICINE

## 2024-05-09 PROCEDURE — 1160F RVW MEDS BY RX/DR IN RCRD: CPT | Performed by: FAMILY MEDICINE

## 2024-05-09 ASSESSMENT — PAIN SCALES - GENERAL: PAINLEVEL: 0-NO PAIN

## 2024-05-10 ENCOUNTER — OFFICE VISIT (OUTPATIENT)
Dept: BEHAVIORAL HEALTH | Facility: CLINIC | Age: 74
End: 2024-05-10
Payer: MEDICAID

## 2024-05-10 ENCOUNTER — HOME CARE VISIT (OUTPATIENT)
Dept: HOME HEALTH SERVICES | Facility: HOME HEALTH | Age: 74
End: 2024-05-10
Payer: MEDICAID

## 2024-05-10 ENCOUNTER — SPECIALTY PHARMACY (OUTPATIENT)
Dept: PHARMACY | Facility: CLINIC | Age: 74
End: 2024-05-10

## 2024-05-10 DIAGNOSIS — F11.21 OPIOID USE DISORDER, MODERATE, IN SUSTAINED REMISSION (MULTI): ICD-10-CM

## 2024-05-10 DIAGNOSIS — F43.10 PTSD (POST-TRAUMATIC STRESS DISORDER): Primary | ICD-10-CM

## 2024-05-10 PROCEDURE — 1159F MED LIST DOCD IN RCRD: CPT | Performed by: PSYCHOLOGIST

## 2024-05-10 PROCEDURE — 90837 PSYTX W PT 60 MINUTES: CPT | Performed by: PSYCHOLOGIST

## 2024-05-10 ASSESSMENT — ENCOUNTER SYMPTOMS
NERVOUS/ANXIOUS: 0
CONFUSION: 0
FACIAL SWELLING: 0
ACTIVITY CHANGE: 0
APNEA: 0
DYSURIA: 0
DIFFICULTY URINATING: 0
CHEST TIGHTNESS: 0
EYE DISCHARGE: 0
SINUS PAIN: 0
EYE PAIN: 0
HALLUCINATIONS: 0
SHORTNESS OF BREATH: 0
COUGH: 1
SORE THROAT: 0
RHINORRHEA: 1
WHEEZING: 0
FATIGUE: 0
FEVER: 0
SINUS PRESSURE: 0
WOUND: 1
BLOOD IN STOOL: 0

## 2024-05-10 NOTE — PROGRESS NOTES
IN PERSON VISIT    START TIME:  11 AM  END TIME:  11:55 AM    Diagnoses/Problems  PTSD  Opioid (heroin) use disorder, in sustained remission (since 3/25/99)      Likely narcissistic personality disorder traits, borderline personality disorder      Orders  Patient agreed to return for individual psychotherapy with this provider.    Note dictated with [employer] transcription software. Completed without full typed error editing and sent to avoid delay.      Past Medical History  Problems    · History of Abdominal pain, RUQ (right upper quadrant) (789.01) (R10.11)   · Resolved Date: 10 Apr 2019   · History of Acute cystitis without hematuria (595.0) (N30.00)   · Resolved Date: 10 Apr 2019   · History of Allergic rhinitis due to pollen, unspecified seasonality (477.0) (J30.1)   · Resolved Date: 10 Apr 2019   · History of Arteriolosclerosis (440.9) (I70.90)   · History of Bilateral dry eyes (375.15) (H04.123)   · History of Blepharitis, ulcerative (373.01) (H01.019)   · Resolved Date: 02 Aug 2019   · History of Bluish skin discoloration (782.5) (R23.0)   · Resolved Date: 11 Apr 2019   · History of Cataract of left eye (366.9) (H26.9)   · History of Cataract of left eye (366.9) (H26.9)   · History of Cataract of right eye (366.9) (H26.9)   · History of Cataract of right eye (366.9) (H26.9)   · History of Cataract, nuclear sclerotic, left eye (366.16) (H25.12)   · Resolved Date: 02 Aug 2019   · History of Cataract, nuclear sclerotic, right eye (366.16) (H25.11)   · Resolved Date: 02 Aug 2019   · History of Change in bowel habits (787.99) (R19.4)   · Resolved Date: 02 Aug 2019   · History of Combined form of age-related cataract, left eye (366.19) (H25.812)   · History of Combined form of age-related cataract, left eye (366.19) (H25.812)   · History of Cortical age-related cataract of left eye (366.15) (H25.012)   · Resolved Date: 02 Aug 2019   · History of Dentalgia (525.9) (K08.89)   · Resolved Date: 02 Aug  2019   · History of Dry eyes, bilateral (375.15) (H04.123)   · Resolved Date: 02 Aug 2019   · History of actinic keratosis (V13.3) (Z87.2)   · Resolved Date: 11 Apr 2019   · History of acute sinusitis (V12.69) (Z87.09)   · Resolved Date: 03 Feb 2017   · History of acute sinusitis (V12.69) (Z87.09)   · Resolved Date: 04 Sep 2015   · History of acute sinusitis (V12.69) (Z87.09)   · Resolved Date: 10 Apr 2019   · History of acute sinusitis (V12.69) (Z87.09)   · Resolved Date: 29 Dec 2017   · History of anemia (V12.3) (Z86.2)   · Resolved Date: 04 Sep 2015   · Added by Problem List Migration; 2013-6-13; Moved to Suppressed Nov 23 2013  9:02PM   · History of chest pain (V13.89) (Z87.898)   · Resolved Date: 11 Apr 2019   · Added by Problem List Migration; 2013-6-13; Moved to Suppressed Nov 23 2013  9:02PM   · History of constipation (V12.79) (Z87.19)   · Resolved Date: 11 Apr 2019   · History of dizziness (V13.89) (Z87.898)   · Resolved Date: 04 Sep 2015   · History of dyspnea (V12.69) (Z87.898)   · Resolved Date: 10 Aug 2017   · History of epistaxis (V12.69) (Z87.898)   · Resolved Date: 01 Jul 2019   · History of fall (V15.88) (Z91.81)   · Resolved Date: 10 Apr 2019   · History of gastroesophageal reflux (GERD) (V12.79) (Z87.19)   · History of hemoptysis (V12.69) (Z87.898)   · Resolved Date: 11 Apr 2019   · History of hypertension (V12.59) (Z86.79)   · History of influenza vaccination (V49.89) (Z92.29)   · Resolved Date: 11 Apr 2019   · History of nasal discharge (V12.69) (Z87.09)   · Resolved Date: 10 Apr 2019   · History of paranasal sinus congestion (V12.69) (Z87.09)   · Resolved Date: 11 Apr 2019   · History of pneumococcal vaccination (V49.89) (Z92.29)   · Resolved Date: 01 Jul 2019   · History of pneumonia (V12.61) (Z87.01)   · Resolved Date: 02 Aug 2019   · History of sore throat (V12.69) (Z87.09)   · Resolved Date: 11 Apr 2019   · History of tinea corporis (V12.09) (Z86.19)   · Resolved Date: 02 Aug 2019   ·  History of urinary tract infection (V13.02) (Z87.440)   · Resolved Date: 02 Aug 2019   · Hyperlipidemia (272.4) (E78.5)   · History of Immunization due (V05.9) (Z23)   · Resolved Date: 02 Aug 2019   · History of Leg pain (729.5) (M79.606)   · Resolved Date: 02 Aug 2019   · History of Light stools (792.1) (R19.5)   · Resolved Date: 02 Aug 2019   · History of MGD (meibomian gland disease) (373.00) (H02.889)   · Resolved Date: 02 Aug 2019   · History of Moderate opioid use disorder (304.00) (F11.20)   · Resolved Date: 09 Mar 2020   · History of Nasal crusting (478.19) (J34.89)   · Resolved Date: 11 Apr 2019   · History of Nasal septal perforation (478.19) (J34.89)   · Resolved Date: 24 Oct 2018   · History of Nasal septal perforation (478.19) (J34.89)   · Resolved Date: 02 Aug 2019   · History of Nausea in adult (787.02) (R11.0)   · Resolved Date: 11 Apr 2019   · History of Need for vaccination for zoster (V04.89) (Z23)   · Resolved Date: 02 Aug 2019   · History of Other chronic sinusitis (473.8) (J32.8)   · Resolved Date: 15 Gus 2018   · Personal history of arthritis (V13.4) (Z87.39)   · History of Pyuria (791.9) (R82.81)   · Resolved Date: 10 Apr 2019   · History of Radial nerve irritation (354.3) (G56.30)   · Resolved Date: 02 Aug 2019   · History of Right nephrolithiasis (592.0) (N20.0)   · History of Throat pain (784.1) (R07.0)   · Resolved Date: 11 Apr 2019    Family History  Mother    · Family history of alcoholism (V17.0) (Z81.1)   · Family history of cardiac disorder (V17.49) (Z82.49)   · Family history of diabetes mellitus (V18.0) (Z83.3)   · Family history of glaucoma (V19.11) (Z83.511)   · Family history of Mesothelioma  Father    · Family history of alcoholism (V17.0) (Z81.1)   · Family history of melanoma (V16.8) (Z80.8)  Sister    · Family history of macular degeneration (V19.19) (Z83.518)   · Family history of malignant neoplasm of breast (V16.3) (Z80.3)  Aunt    · Family history of macular degeneration  (V19.19) (Z83.518)  Family History    · Family history of Malignant Melanoma Of The Skin (V16.8)    Social History  Problems    · Does not use illicit drugs (V49.89) (Z78.9)   · Ex-cigarette smoker (V15.82) (Z87.891)   · Feels safe at home   · No alcohol use   · Denied: History of Social alcohol use    Mental Status Exam  No suicidal ideation nor intent.       Narrative  Patient arrived on time and unaccompanied for today's session.  She said that she is still sick.  Patient has had pneumonia for about 6 months now.  She stated that her primary care provider knows to her with this recently, after believing for several months that it could be a virus.  Patient was thankful that her provider changed her mind.  However, the patient believes that she will never fully recover, which may be true.  Patient said that she is working on accepting the fact that there are some people who want nothing to do with her while also accepting the fact that there are many people who have great respect for her.  We spoke about PTSD generally.  Patient believes that the best thing she can do in her life right now and share her story with others so that she can continue to help them.  We talked about Darrick Boggs's stages of psychosocial development.  Patient was not especially familiar with these stages.  We briefly talked about integrity versus despair, which is the same age that she is currently in now.  I printed out the patient some more information on this and we agreed to talk about it more in the next session.

## 2024-05-10 NOTE — PROGRESS NOTES
Subjective   Patient ID:  is a 73 y.o. female who presents for Follow-up (Bed sores , pt states she has ) and Pneumonia.    HPI:    1. Recurrent cough, fears new pneumonia   2. Hidradenitis supperativa R inguinal, L posteror UE   3. pioid for chronic pain   HPI    #Cough  - Previously, (approximately a month ago), she had been recovering from a pneumonia; she was feeling better, and then another cough arose 4-5 days ago  - Cough is sometimes productive, sometimes dry  - She has been experiencing SOB as well    #Bed sores  - Diagnosed as hidradenitis suppurativa (check for testing)  - Open wounds under her umbilicus and underneath her umbilicus and one on the back of her arm; present for at least 1 year  - Bleed onto her sheets  - Washes them with Listerine    Review of Systems  #Social isolation  - No social support, lives alone but has homecare; has trouble getting to appointments  - Multiple friends have  over the past year    Medications reviewed and reconciled     OARRS:  Rose Shah MD on 2024  5:53 PM  I have personally reviewed the OARRS report for Sandra Owensgast. I have considered the risks of abuse, dependence, addiction and diversion    Is the patient prescribed a combination of a benzodiazepine and opioid?  Yes, I feel it is clincially indicated to continue the medication and have discussed with the patient risks/benefits/alternatives.    Last Urine Drug Screen / ordered today: Yes  Previous order 23 not done     Controlled Substance Agreement:  Date of the Last Agreement: 23  Reviewed Controlled Substance Agreement including but not limited to the benefits, risks, and alternatives to treatment with a Controlled Substance medication(s). Reviewed     Opioids:  What is the patient's goal of therapy? Goals include ambulation, exercise, reduction in opoid-responsive pain and improved sleep.    Is this being achieved with current treatment? Yes    I have calculated the patient's  Morphine Dose Equivalent (MED):   I have considered referral to Pain Management and/or a specialist, and do not feel it is necessary at this time.    I feel that it is clinically indicated to continue this current medication regimen after consideration of alternative therapies, and other non-opioid treatment.    Opioid Risk Screening:  Moderate risk     Pain Assessment:  Analgesia  What was your pain level on average during the past week?: 5  What was your pain level at its worst during the past week?: 7  What percentage of your pain has been relieved during the past week?: 50 %  Is the amount of pain relief you are now obtaining from your current pain relievers enough to make a real difference in your life?: Y  Query to Clinician: Is the patient's pain relief clinically significant?: Yes    Activities of Daily Living  Physical Functioning: Same  Family Relationships: Same  Social Relationships: Same  Mood: Same  Sleep Patterns: Same    Adverse Events  Is patient experiencing any side effects from current pain relievers?: N  Patient's Overall Severity of Side Effects: None      Assessment  Is your overall impression that this patient is benefiting from opioid therapy?: Yes  Specific Analgesic Plan: Continue present regimen        Review of Systems   Constitutional:  Negative for activity change, fatigue and fever.   HENT:  Positive for rhinorrhea. Negative for congestion, ear pain, facial swelling, postnasal drip, sinus pressure, sinus pain and sore throat.    Eyes:  Negative for pain and discharge.   Respiratory:  Positive for cough. Negative for apnea, chest tightness, shortness of breath and wheezing.    Cardiovascular:  Negative for chest pain and leg swelling.   Gastrointestinal:  Negative for blood in stool.   Genitourinary:  Negative for difficulty urinating and dysuria.   Skin:  Positive for wound. Negative for rash.   Psychiatric/Behavioral:  Negative for confusion and hallucinations. The patient is not  "nervous/anxious.        Objective   /79 (BP Location: Right arm, Patient Position: Sitting, BP Cuff Size: Adult)   Pulse 59   Temp 36.9 °C (98.4 °F) (Temporal)   Ht 1.575 m (5' 2\")   SpO2 94%   BMI 29.26 kg/m²     Physical exam:  Well groomed, comfortable at rest.   BP: 123/79  Eyes: JOSE, normal pupils  ENT: Normal nasal  mucosa  Neck: Normal thyroid contour, nontender  CV: Normal heart sounds, RRR  Lungs clear, no wheeze or crackles  Abdomen soft, non-tender, normal bowel sounds  Neuro: Tone, power symmetric, no tremor  MSK: No joint tenderness or swelling  Integuement: No diaphoresis, no rash,   Extremities: no leg edema     Assessment/Plan   High risk medication use    Assessment and Treatment plan:   Chronic pain due to    lumbar osteoarthritis and disc disease, and current ulcers with underlying hidradenitis suppurativa and additional irritant dermatitis       Responsive to opioid medication      Patient is using opioid medication as prescribed with adequate control of chronic pain to permit achievement of functional goals. There are no aberrant behaviors noted, including sedation, loss of prescriptions or medications, running out early, multiple prescribers, use of illicit drugs or alcohol, deterioration of function.   See remainder of chronic pain assessment in HPI    Controlled Medication Agreement signed, in record  Continue medications: Tramadol 50 mg tablets, 2 3 times a day  Add: Refrain from cleansing ulcers with \"Listerine\"  Non-pharmacologic treatments: Changed to wet-to-dry dressings  Continue range of motion exercises as able  Urine drug screen due, not done in December.  Low risk for illicit use  Opiate/Opioid/Benzo Prescription Compliance; Future  CSA   up to date, reviewed today    Recurrent productive cough  Excessive mucus production and cough.  Mild frontal tenderness without fever, malaise, congestion. Good air entry and absence of crackles suggest against recurrent pneumonia, " treated with full course of Levaquin.    Likely residual  and appropriate to treat symptomatically   We discussed the dangers of unnecessary antibiotics including promoting resistant organisms and antibiotic side effects.     May use guaifenisin OTC, nasal saline       Hidradenitis suppurativa  Diagnosed diabetic dermatology-lesions are not in typical location and have some unusual features with bullous appearance   -     Referral to Dermatology  -     CBC and Auto Differential; Future    Return 8 weeks for chronic pain/opioid management  Return to Family Medicine to address other concerns

## 2024-05-11 RX ORDER — GUAIFENESIN 600 MG/1
1200 TABLET, EXTENDED RELEASE ORAL 2 TIMES DAILY
Qty: 120 TABLET | Refills: 11 | Status: SHIPPED | OUTPATIENT
Start: 2024-05-11 | End: 2025-05-11

## 2024-05-11 RX ORDER — SODIUM CHLORIDE 0.65 %
1 AEROSOL, SPRAY (ML) NASAL AS NEEDED
Qty: 30 ML | Refills: 12 | Status: SHIPPED | OUTPATIENT
Start: 2024-05-11 | End: 2025-05-11

## 2024-05-11 ASSESSMENT — ENCOUNTER SYMPTOMS
APPETITE LEVEL: GOOD
CHANGE IN APPETITE: UNCHANGED
DENIES PAIN: 1
DENIES PAIN: 1
APPETITE LEVEL: GOOD
CHANGE IN APPETITE: UNCHANGED

## 2024-05-13 ENCOUNTER — HOME CARE VISIT (OUTPATIENT)
Dept: HOME HEALTH SERVICES | Facility: HOME HEALTH | Age: 74
End: 2024-05-13
Payer: MEDICAID

## 2024-05-13 VITALS
OXYGEN SATURATION: 99 % | TEMPERATURE: 98 F | RESPIRATION RATE: 18 BRPM | HEART RATE: 77 BPM | SYSTOLIC BLOOD PRESSURE: 146 MMHG | DIASTOLIC BLOOD PRESSURE: 72 MMHG

## 2024-05-13 PROCEDURE — 0023 HH SOC

## 2024-05-13 PROCEDURE — G0299 HHS/HOSPICE OF RN EA 15 MIN: HCPCS

## 2024-05-15 ENCOUNTER — HOME CARE VISIT (OUTPATIENT)
Dept: HOME HEALTH SERVICES | Facility: HOME HEALTH | Age: 74
End: 2024-05-15
Payer: MEDICAID

## 2024-05-15 VITALS
RESPIRATION RATE: 16 BRPM | SYSTOLIC BLOOD PRESSURE: 143 MMHG | TEMPERATURE: 98.9 F | HEART RATE: 78 BPM | DIASTOLIC BLOOD PRESSURE: 76 MMHG | OXYGEN SATURATION: 98 %

## 2024-05-15 PROCEDURE — G0299 HHS/HOSPICE OF RN EA 15 MIN: HCPCS

## 2024-05-16 ENCOUNTER — APPOINTMENT (OUTPATIENT)
Dept: PRIMARY CARE | Facility: CLINIC | Age: 74
End: 2024-05-16
Payer: MEDICAID

## 2024-05-17 ENCOUNTER — HOME CARE VISIT (OUTPATIENT)
Dept: HOME HEALTH SERVICES | Facility: HOME HEALTH | Age: 74
End: 2024-05-17
Payer: MEDICAID

## 2024-05-17 ASSESSMENT — ENCOUNTER SYMPTOMS
DENIES PAIN: 1
CHANGE IN APPETITE: UNCHANGED
CHANGE IN APPETITE: UNCHANGED
DENIES PAIN: 1
APPETITE LEVEL: GOOD
APPETITE LEVEL: GOOD

## 2024-05-17 ASSESSMENT — ACTIVITIES OF DAILY LIVING (ADL)
AMBULATION ASSISTANCE: STAND BY ASSIST
CURRENT_FUNCTION: STAND BY ASSIST

## 2024-05-20 ENCOUNTER — LAB REQUISITION (OUTPATIENT)
Dept: LAB | Facility: LAB | Age: 74
End: 2024-05-20
Payer: MEDICAID

## 2024-05-20 ENCOUNTER — HOME CARE VISIT (OUTPATIENT)
Dept: HOME HEALTH SERVICES | Facility: HOME HEALTH | Age: 74
End: 2024-05-20
Payer: MEDICAID

## 2024-05-20 VITALS
DIASTOLIC BLOOD PRESSURE: 62 MMHG | SYSTOLIC BLOOD PRESSURE: 104 MMHG | HEART RATE: 85 BPM | OXYGEN SATURATION: 99 % | TEMPERATURE: 98.5 F | RESPIRATION RATE: 16 BRPM

## 2024-05-20 DIAGNOSIS — R21 RASH AND OTHER NONSPECIFIC SKIN ERUPTION: ICD-10-CM

## 2024-05-20 DIAGNOSIS — Z79.899 OTHER LONG TERM (CURRENT) DRUG THERAPY: ICD-10-CM

## 2024-05-20 LAB
BASOPHILS # BLD AUTO: 0.15 X10*3/UL (ref 0–0.1)
BASOPHILS NFR BLD AUTO: 0.8 %
EOSINOPHIL # BLD AUTO: 0.4 X10*3/UL (ref 0–0.4)
EOSINOPHIL NFR BLD AUTO: 2.2 %
ERYTHROCYTE [DISTWIDTH] IN BLOOD BY AUTOMATED COUNT: 18.1 % (ref 11.5–14.5)
HCT VFR BLD AUTO: 36.3 % (ref 36–46)
HGB BLD-MCNC: 10.8 G/DL (ref 12–16)
IMM GRANULOCYTES # BLD AUTO: 0.12 X10*3/UL (ref 0–0.5)
IMM GRANULOCYTES NFR BLD AUTO: 0.7 % (ref 0–0.9)
LYMPHOCYTES # BLD AUTO: 3.54 X10*3/UL (ref 0.8–3)
LYMPHOCYTES NFR BLD AUTO: 19.7 %
MCH RBC QN AUTO: 22.6 PG (ref 26–34)
MCHC RBC AUTO-ENTMCNC: 29.8 G/DL (ref 32–36)
MCV RBC AUTO: 76 FL (ref 80–100)
MONOCYTES # BLD AUTO: 1.58 X10*3/UL (ref 0.05–0.8)
MONOCYTES NFR BLD AUTO: 8.8 %
NEUTROPHILS # BLD AUTO: 12.16 X10*3/UL (ref 1.6–5.5)
NEUTROPHILS NFR BLD AUTO: 67.8 %
NRBC BLD-RTO: 0 /100 WBCS (ref 0–0)
PLATELET # BLD AUTO: 573 X10*3/UL (ref 150–450)
RBC # BLD AUTO: 4.77 X10*6/UL (ref 4–5.2)
WBC # BLD AUTO: 18 X10*3/UL (ref 4.4–11.3)

## 2024-05-20 PROCEDURE — 80346 BENZODIAZEPINES1-12: CPT

## 2024-05-20 PROCEDURE — 80368 SEDATIVE HYPNOTICS: CPT

## 2024-05-20 PROCEDURE — 80358 DRUG SCREENING METHADONE: CPT

## 2024-05-20 PROCEDURE — 80373 DRUG SCREENING TRAMADOL: CPT

## 2024-05-20 PROCEDURE — G0299 HHS/HOSPICE OF RN EA 15 MIN: HCPCS

## 2024-05-20 PROCEDURE — 82570 ASSAY OF URINE CREATININE: CPT

## 2024-05-20 PROCEDURE — 80361 OPIATES 1 OR MORE: CPT

## 2024-05-20 PROCEDURE — 80354 DRUG SCREENING FENTANYL: CPT

## 2024-05-20 PROCEDURE — 80307 DRUG TEST PRSMV CHEM ANLYZR: CPT

## 2024-05-20 PROCEDURE — 85025 COMPLETE CBC W/AUTO DIFF WBC: CPT

## 2024-05-20 PROCEDURE — 80365 DRUG SCREENING OXYCODONE: CPT

## 2024-05-20 ASSESSMENT — ENCOUNTER SYMPTOMS
CHANGE IN APPETITE: UNCHANGED
APPETITE LEVEL: GOOD
DENIES PAIN: 1

## 2024-05-21 ENCOUNTER — PHARMACY VISIT (OUTPATIENT)
Dept: PHARMACY | Facility: CLINIC | Age: 74
End: 2024-05-21
Payer: MEDICAID

## 2024-05-21 LAB
AMPHETAMINES UR QL SCN: NORMAL
BARBITURATES UR QL SCN: NORMAL
BZE UR QL SCN: NORMAL
CANNABINOIDS UR QL SCN: NORMAL
CREAT UR-MCNC: 132.8 MG/DL (ref 20–320)
PCP UR QL SCN: NORMAL

## 2024-05-21 PROCEDURE — RXMED WILLOW AMBULATORY MEDICATION CHARGE

## 2024-05-22 ENCOUNTER — HOME CARE VISIT (OUTPATIENT)
Dept: HOME HEALTH SERVICES | Facility: HOME HEALTH | Age: 74
End: 2024-05-22
Payer: MEDICAID

## 2024-05-24 ENCOUNTER — APPOINTMENT (OUTPATIENT)
Dept: BEHAVIORAL HEALTH | Facility: CLINIC | Age: 74
End: 2024-05-24
Payer: MEDICAID

## 2024-05-24 ENCOUNTER — HOME CARE VISIT (OUTPATIENT)
Dept: HOME HEALTH SERVICES | Facility: HOME HEALTH | Age: 74
End: 2024-05-24
Payer: MEDICAID

## 2024-05-24 DIAGNOSIS — L73.2 HIDRADENITIS SUPPURATIVA: Primary | ICD-10-CM

## 2024-05-24 LAB
1OH-MIDAZOLAM UR CFM-MCNC: <25 NG/ML
6MAM UR CFM-MCNC: <25 NG/ML
7AMINOCLONAZEPAM UR CFM-MCNC: <25 NG/ML
A-OH ALPRAZ UR CFM-MCNC: <25 NG/ML
ALPRAZ UR CFM-MCNC: <25 NG/ML
CHLORDIAZEP UR CFM-MCNC: <25 NG/ML
CLONAZEPAM UR CFM-MCNC: <25 NG/ML
CODEINE UR CFM-MCNC: <50 NG/ML
DIAZEPAM UR CFM-MCNC: <25 NG/ML
EDDP UR CFM-MCNC: <25 NG/ML
FENTANYL UR CFM-MCNC: <2.5 NG/ML
HYDROCODONE CTO UR CFM-MCNC: <25 NG/ML
HYDROMORPHONE UR CFM-MCNC: <25 NG/ML
LORAZEPAM UR CFM-MCNC: <25 NG/ML
METHADONE UR CFM-MCNC: <25 NG/ML
MIDAZOLAM UR CFM-MCNC: <25 NG/ML
MORPHINE UR CFM-MCNC: <50 NG/ML
NORDIAZEPAM UR CFM-MCNC: <25 NG/ML
NORFENTANYL UR CFM-MCNC: <2.5 NG/ML
NORHYDROCODONE UR CFM-MCNC: <25 NG/ML
NOROXYCODONE UR CFM-MCNC: <25 NG/ML
NORTRAMADOL UR-MCNC: >1000 NG/ML
OXAZEPAM UR CFM-MCNC: <25 NG/ML
OXYCODONE UR CFM-MCNC: <25 NG/ML
OXYMORPHONE UR CFM-MCNC: <25 NG/ML
TEMAZEPAM UR CFM-MCNC: <25 NG/ML
TRAMADOL UR CFM-MCNC: >1000 NG/ML
ZOLPIDEM UR CFM-MCNC: <25 NG/ML
ZOLPIDEM UR-MCNC: <25 NG/ML

## 2024-05-27 ENCOUNTER — HOME CARE VISIT (OUTPATIENT)
Dept: HOME HEALTH SERVICES | Facility: HOME HEALTH | Age: 74
End: 2024-05-27
Payer: MEDICAID

## 2024-05-27 PROCEDURE — G0299 HHS/HOSPICE OF RN EA 15 MIN: HCPCS

## 2024-05-28 ENCOUNTER — SPECIALTY PHARMACY (OUTPATIENT)
Dept: PHARMACY | Facility: CLINIC | Age: 74
End: 2024-05-28

## 2024-05-28 ENCOUNTER — TELEMEDICINE (OUTPATIENT)
Dept: BEHAVIORAL HEALTH | Facility: CLINIC | Age: 74
End: 2024-05-28
Payer: MEDICAID

## 2024-05-28 ENCOUNTER — DOCUMENTATION (OUTPATIENT)
Dept: DERMATOLOGY | Facility: CLINIC | Age: 74
End: 2024-05-28

## 2024-05-28 DIAGNOSIS — F11.21 OPIOID USE DISORDER, MODERATE, IN SUSTAINED REMISSION (MULTI): ICD-10-CM

## 2024-05-28 DIAGNOSIS — F43.10 PTSD (POST-TRAUMATIC STRESS DISORDER): Primary | ICD-10-CM

## 2024-05-28 DIAGNOSIS — L73.2 HIDRADENITIS SUPPURATIVA: Primary | ICD-10-CM

## 2024-05-28 PROCEDURE — 90837 PSYTX W PT 60 MINUTES: CPT | Performed by: PSYCHOLOGIST

## 2024-05-28 PROCEDURE — 1159F MED LIST DOCD IN RCRD: CPT | Performed by: PSYCHOLOGIST

## 2024-05-28 PROCEDURE — 1160F RVW MEDS BY RX/DR IN RCRD: CPT | Performed by: PSYCHOLOGIST

## 2024-05-28 NOTE — PROGRESS NOTES
The patient no longer has an active infection and is cleared to start Stelara. She has [place] Stage 3 disease and history of negative Tspot.

## 2024-05-28 NOTE — PROGRESS NOTES
PHONE SESSION    START TIME:  10 AM  END TIME:  10:55 AM    Diagnoses/Problems  PTSD  Opioid (heroin) use disorder, in sustained remission (since 3/25/99)      Likely narcissistic personality disorder traits, borderline personality disorder      Orders  Patient agreed to return for individual psychotherapy with this provider.    Note dictated with [email or web address] transcription software. Completed without full typed error editing and sent to avoid delay.      Past Medical History  Problems    · History of Abdominal pain, RUQ (right upper quadrant) (789.01) (R10.11)   · Resolved Date: 10 Apr 2019   · History of Acute cystitis without hematuria (595.0) (N30.00)   · Resolved Date: 10 Apr 2019   · History of Allergic rhinitis due to pollen, unspecified seasonality (477.0) (J30.1)   · Resolved Date: 10 Apr 2019   · History of Arteriolosclerosis (440.9) (I70.90)   · History of Bilateral dry eyes (375.15) (H04.123)   · History of Blepharitis, ulcerative (373.01) (H01.019)   · Resolved Date: 02 Aug 2019   · History of Bluish skin discoloration (782.5) (R23.0)   · Resolved Date: 11 Apr 2019   · History of Cataract of left eye (366.9) (H26.9)   · History of Cataract of left eye (366.9) (H26.9)   · History of Cataract of right eye (366.9) (H26.9)   · History of Cataract of right eye (366.9) (H26.9)   · History of Cataract, nuclear sclerotic, left eye (366.16) (H25.12)   · Resolved Date: 02 Aug 2019   · History of Cataract, nuclear sclerotic, right eye (366.16) (H25.11)   · Resolved Date: 02 Aug 2019   · History of Change in bowel habits (787.99) (R19.4)   · Resolved Date: 02 Aug 2019   · History of Combined form of age-related cataract, left eye (366.19) (H25.812)   · History of Combined form of age-related cataract, left eye (366.19) (H25.812)   · History of Cortical age-related cataract of left eye (366.15) (H25.012)   · Resolved Date: 02 Aug 2019   · History of Dentalgia (525.9) (K08.89)   · Resolved Date: 02 Aug 2019    · History of Dry eyes, bilateral (375.15) (H04.123)   · Resolved Date: 02 Aug 2019   · History of actinic keratosis (V13.3) (Z87.2)   · Resolved Date: 11 Apr 2019   · History of acute sinusitis (V12.69) (Z87.09)   · Resolved Date: 03 Feb 2017   · History of acute sinusitis (V12.69) (Z87.09)   · Resolved Date: 04 Sep 2015   · History of acute sinusitis (V12.69) (Z87.09)   · Resolved Date: 10 Apr 2019   · History of acute sinusitis (V12.69) (Z87.09)   · Resolved Date: 29 Dec 2017   · History of anemia (V12.3) (Z86.2)   · Resolved Date: 04 Sep 2015   · Added by Problem List Migration; 2013-6-13; Moved to Suppressed Nov 23 2013  9:02PM   · History of chest pain (V13.89) (Z87.898)   · Resolved Date: 11 Apr 2019   · Added by Problem List Migration; 2013-6-13; Moved to Suppressed Nov 23 2013  9:02PM   · History of constipation (V12.79) (Z87.19)   · Resolved Date: 11 Apr 2019   · History of dizziness (V13.89) (Z87.898)   · Resolved Date: 04 Sep 2015   · History of dyspnea (V12.69) (Z87.898)   · Resolved Date: 10 Aug 2017   · History of epistaxis (V12.69) (Z87.898)   · Resolved Date: 01 Jul 2019   · History of fall (V15.88) (Z91.81)   · Resolved Date: 10 Apr 2019   · History of gastroesophageal reflux (GERD) (V12.79) (Z87.19)   · History of hemoptysis (V12.69) (Z87.898)   · Resolved Date: 11 Apr 2019   · History of hypertension (V12.59) (Z86.79)   · History of influenza vaccination (V49.89) (Z92.29)   · Resolved Date: 11 Apr 2019   · History of nasal discharge (V12.69) (Z87.09)   · Resolved Date: 10 Apr 2019   · History of paranasal sinus congestion (V12.69) (Z87.09)   · Resolved Date: 11 Apr 2019   · History of pneumococcal vaccination (V49.89) (Z92.29)   · Resolved Date: 01 Jul 2019   · History of pneumonia (V12.61) (Z87.01)   · Resolved Date: 02 Aug 2019   · History of sore throat (V12.69) (Z87.09)   · Resolved Date: 11 Apr 2019   · History of tinea corporis (V12.09) (Z86.19)   · Resolved Date: 02 Aug 2019   · History of  urinary tract infection (V13.02) (Z87.440)   · Resolved Date: 02 Aug 2019   · Hyperlipidemia (272.4) (E78.5)   · History of Immunization due (V05.9) (Z23)   · Resolved Date: 02 Aug 2019   · History of Leg pain (729.5) (M79.606)   · Resolved Date: 02 Aug 2019   · History of Light stools (792.1) (R19.5)   · Resolved Date: 02 Aug 2019   · History of MGD (meibomian gland disease) (373.00) (H02.889)   · Resolved Date: 02 Aug 2019   · History of Moderate opioid use disorder (304.00) (F11.20)   · Resolved Date: 09 Mar 2020   · History of Nasal crusting (478.19) (J34.89)   · Resolved Date: 11 Apr 2019   · History of Nasal septal perforation (478.19) (J34.89)   · Resolved Date: 24 Oct 2018   · History of Nasal septal perforation (478.19) (J34.89)   · Resolved Date: 02 Aug 2019   · History of Nausea in adult (787.02) (R11.0)   · Resolved Date: 11 Apr 2019   · History of Need for vaccination for zoster (V04.89) (Z23)   · Resolved Date: 02 Aug 2019   · History of Other chronic sinusitis (473.8) (J32.8)   · Resolved Date: 15 Gus 2018   · Personal history of arthritis (V13.4) (Z87.39)   · History of Pyuria (791.9) (R82.81)   · Resolved Date: 10 Apr 2019   · History of Radial nerve irritation (354.3) (G56.30)   · Resolved Date: 02 Aug 2019   · History of Right nephrolithiasis (592.0) (N20.0)   · History of Throat pain (784.1) (R07.0)   · Resolved Date: 11 Apr 2019    Family History  Mother    · Family history of alcoholism (V17.0) (Z81.1)   · Family history of cardiac disorder (V17.49) (Z82.49)   · Family history of diabetes mellitus (V18.0) (Z83.3)   · Family history of glaucoma (V19.11) (Z83.511)   · Family history of Mesothelioma  Father    · Family history of alcoholism (V17.0) (Z81.1)   · Family history of melanoma (V16.8) (Z80.8)  Sister    · Family history of macular degeneration (V19.19) (Z83.518)   · Family history of malignant neoplasm of breast (V16.3) (Z80.3)  Aunt    · Family history of macular degeneration (V19.19)  (Z83.518)  Family History    · Family history of Malignant Melanoma Of The Skin (V16.8)    Social History  Problems    · Does not use illicit drugs (V49.89) (Z78.9)   · Ex-cigarette smoker (V15.82) (Z87.891)   · Feels safe at home   · No alcohol use   · Denied: History of Social alcohol use    Mental Status Exam  No suicidal ideation nor intent.       Narrative  Patient was reached via phone today.  She seemed irritable and on edge.  Patient did not recall our last session for the most part.  I mentioned that we briefly talked at the end about the psychosocial stages of development.  I have forgotten until now that I handed her papers explaining the theory by Darrick Boggs but she had no recollection of this.  Patient asked if I was paying attention several times during the session although I believe that I was making it clear that I was.  I was asking clarifying questions about her home health aide situation and she was under the impression that I had not been paying attention the last several sessions to her explaining this.  Ultimately, we were able to come to an understanding that her  has not been in contact with her for a few months and had been having difficulty getting reliable home health aides over to her apartment.  She gave me her contact information (her name is Bella Kuhn with the [facility] agency on aging) and I emailed her during the session.  I also reached out to the organization generally.  Patient gave me her phone numbers well.  We talked about [employer].  Patient said that she is unaware if she is on a wait list or not.  By the end of the session, patient was grateful and somewhat helpful that we came to an understanding about her  and have a plan moving forward.  We plan to follow up on this some more in the next session.

## 2024-05-29 ENCOUNTER — HOME CARE VISIT (OUTPATIENT)
Dept: HOME HEALTH SERVICES | Facility: HOME HEALTH | Age: 74
End: 2024-05-29
Payer: MEDICAID

## 2024-05-29 VITALS
OXYGEN SATURATION: 99 % | TEMPERATURE: 98.8 F | HEART RATE: 81 BPM | SYSTOLIC BLOOD PRESSURE: 105 MMHG | DIASTOLIC BLOOD PRESSURE: 77 MMHG | RESPIRATION RATE: 16 BRPM

## 2024-05-29 PROCEDURE — G0299 HHS/HOSPICE OF RN EA 15 MIN: HCPCS

## 2024-05-30 ENCOUNTER — DOCUMENTATION (OUTPATIENT)
Dept: DERMATOLOGY | Facility: CLINIC | Age: 74
End: 2024-05-30
Payer: MEDICAID

## 2024-05-30 VITALS
HEART RATE: 100 BPM | TEMPERATURE: 98 F | SYSTOLIC BLOOD PRESSURE: 117 MMHG | RESPIRATION RATE: 19 BRPM | DIASTOLIC BLOOD PRESSURE: 76 MMHG | OXYGEN SATURATION: 100 %

## 2024-05-30 SDOH — ECONOMIC STABILITY: FOOD INSECURITY: MEALS PER DAY: 2

## 2024-05-30 SDOH — ECONOMIC STABILITY: GENERAL

## 2024-05-30 ASSESSMENT — ENCOUNTER SYMPTOMS
PAIN LOCATION - PAIN QUALITY: ACHY
PAIN LOCATION: GENERALIZED
PAIN LOCATION - PAIN FREQUENCY: INTERMITTENT
PERSON REPORTING PAIN: PATIENT
PAIN LOCATION - PAIN SEVERITY: 1/10
PAIN: 1
MUSCLE WEAKNESS: 1
APPETITE LEVEL: FAIR
CHANGE IN APPETITE: UNCHANGED
LOWEST PAIN SEVERITY IN PAST 24 HOURS: 0/10
HIGHEST PAIN SEVERITY IN PAST 24 HOURS: 0/10
SUBJECTIVE PAIN PROGRESSION: UNCHANGED
PAIN SEVERITY GOAL: 0/10

## 2024-05-30 ASSESSMENT — ACTIVITIES OF DAILY LIVING (ADL): MONEY MANAGEMENT (EXPENSES/BILLS): INDEPENDENT

## 2024-05-30 NOTE — PROGRESS NOTES
Update 6/12/24: Stelara appeal approved until 9/7/2024. Patient needs updated Tspot before Stelara can be started.     [facility] prior authorization denied. [employer] requires trial of Humira. Patient was on Humira previously, had issues with injections and getting appointments scheduled. Submitted appeal to [employer]. Awaiting determination.

## 2024-05-31 ENCOUNTER — HOME CARE VISIT (OUTPATIENT)
Dept: HOME HEALTH SERVICES | Facility: HOME HEALTH | Age: 74
End: 2024-05-31
Payer: MEDICAID

## 2024-05-31 ASSESSMENT — ENCOUNTER SYMPTOMS
APPETITE LEVEL: GOOD
DENIES PAIN: 1
AGITATION: 1
CHANGE IN APPETITE: UNCHANGED

## 2024-06-03 ENCOUNTER — PHARMACY VISIT (OUTPATIENT)
Dept: PHARMACY | Facility: CLINIC | Age: 74
End: 2024-06-03
Payer: MEDICAID

## 2024-06-03 ENCOUNTER — HOME CARE VISIT (OUTPATIENT)
Dept: HOME HEALTH SERVICES | Facility: HOME HEALTH | Age: 74
End: 2024-06-03
Payer: MEDICAID

## 2024-06-03 VITALS
HEART RATE: 73 BPM | SYSTOLIC BLOOD PRESSURE: 110 MMHG | TEMPERATURE: 98 F | DIASTOLIC BLOOD PRESSURE: 60 MMHG | OXYGEN SATURATION: 97 % | RESPIRATION RATE: 16 BRPM

## 2024-06-03 PROCEDURE — G0299 HHS/HOSPICE OF RN EA 15 MIN: HCPCS

## 2024-06-03 PROCEDURE — RXMED WILLOW AMBULATORY MEDICATION CHARGE

## 2024-06-03 ASSESSMENT — ENCOUNTER SYMPTOMS
CHANGE IN APPETITE: UNCHANGED
PERSON REPORTING PAIN: PATIENT
DENIES PAIN: 1
LAST BOWEL MOVEMENT: 66994
APPETITE LEVEL: GOOD

## 2024-06-05 ENCOUNTER — HOME CARE VISIT (OUTPATIENT)
Dept: HOME HEALTH SERVICES | Facility: HOME HEALTH | Age: 74
End: 2024-06-05
Payer: MEDICAID

## 2024-06-07 ENCOUNTER — HOME CARE VISIT (OUTPATIENT)
Dept: HOME HEALTH SERVICES | Facility: HOME HEALTH | Age: 74
End: 2024-06-07
Payer: MEDICAID

## 2024-06-10 ENCOUNTER — HOME CARE VISIT (OUTPATIENT)
Dept: HOME HEALTH SERVICES | Facility: HOME HEALTH | Age: 74
End: 2024-06-10
Payer: MEDICAID

## 2024-06-10 ENCOUNTER — APPOINTMENT (OUTPATIENT)
Dept: UROLOGY | Facility: CLINIC | Age: 74
End: 2024-06-10
Payer: MEDICAID

## 2024-06-11 ENCOUNTER — HOME CARE VISIT (OUTPATIENT)
Dept: HOME HEALTH SERVICES | Facility: HOME HEALTH | Age: 74
End: 2024-06-11
Payer: MEDICAID

## 2024-06-12 NOTE — PROGRESS NOTES
I called the patient this morning and left a message. She has missed home care visits June 5 and 7, Tiff rodriguez (RN) left voice mail 6-10 and case was closed yesterday because of lack of response. The visit 6-3-24 was uneventful.  A/ Concern for welfare   P? I notified [facility]. Police so they could do a welfare check since no alternate contact information is listed.   I have contacted the patient through My Chart.

## 2024-06-13 ASSESSMENT — ACTIVITIES OF DAILY LIVING (ADL)
HOME_HEALTH_OASIS: 00
OASIS_M1830: 00

## 2024-06-13 ASSESSMENT — LIFESTYLE VARIABLES: PERSONAL HISTORY ALCOHOL: 1

## 2024-06-14 NOTE — PROGRESS NOTES
"Patient discharged from homecare, admitted to [facility]:   \"Patient refusing to go to wound clinic and patient has had multiple missed visits and refuses to perform or learn own wound care.\".   She responded to welfare check   by [place] police stating that she didn;' have transportation to appointments. This response did not address missed home care, and she has no melissa'ts scheduled at .   Patient presented to [facility] 6/12/24 . She was admitted for treatment of wound infection, developing agitation, overnight as she has in previous admissions to , requiring Haldol, Xyprexa and restraints.     She is on Cymbalta but citalopram has been discontinued.   She uses tramadol 100 mg TID as per PDMP- no aberrant behaviors.   She lives alone in an apartment in [facility], but does not have assisted living or other services and may benefit from assessment of capacity when acute infection has resolved. I can't communicate directly with [facility] hospitalist through  [facility] but could resume primary care after discharge     "

## 2024-06-19 ENCOUNTER — PHARMACY VISIT (OUTPATIENT)
Dept: PHARMACY | Facility: CLINIC | Age: 74
End: 2024-06-19
Payer: MEDICAID

## 2024-06-19 PROCEDURE — RXMED WILLOW AMBULATORY MEDICATION CHARGE

## 2024-06-19 RX ORDER — TRAMADOL HYDROCHLORIDE 50 MG/1
TABLET ORAL
Qty: 20 TABLET | Refills: 0 | OUTPATIENT
Start: 2024-06-19

## 2024-06-26 ENCOUNTER — PHARMACY VISIT (OUTPATIENT)
Dept: PHARMACY | Facility: CLINIC | Age: 74
End: 2024-06-26
Payer: MEDICAID

## 2024-06-26 ENCOUNTER — TELEPHONE (OUTPATIENT)
Dept: PRIMARY CARE | Facility: CLINIC | Age: 74
End: 2024-06-26

## 2024-06-26 DIAGNOSIS — Z79.899 HIGH RISK MEDICATION USE: ICD-10-CM

## 2024-06-26 DIAGNOSIS — M51.9 LUMBAR DISC DISEASE: Primary | ICD-10-CM

## 2024-06-26 PROCEDURE — RXMED WILLOW AMBULATORY MEDICATION CHARGE

## 2024-06-26 RX ORDER — NALOXONE HYDROCHLORIDE 4 MG/.1ML
1 SPRAY NASAL AS NEEDED
Qty: 2 EACH | Refills: 0 | Status: SHIPPED | OUTPATIENT
Start: 2024-06-26

## 2024-06-26 RX ORDER — TRAMADOL HYDROCHLORIDE 50 MG/1
100 TABLET ORAL EVERY 8 HOURS PRN
Qty: 84 TABLET | Refills: 0 | Status: SHIPPED | OUTPATIENT
Start: 2024-06-26 | End: 2024-07-10

## 2024-06-26 NOTE — TELEPHONE ENCOUNTER
Pt called in upset about her medications. She states you haven't put them in yet. [phone] thanks!

## 2024-07-08 ENCOUNTER — DOCUMENTATION (OUTPATIENT)
Dept: PRIMARY CARE | Facility: CLINIC | Age: 74
End: 2024-07-08
Payer: MEDICAID

## 2024-07-08 DIAGNOSIS — M51.9 LUMBAR DISC DISEASE: ICD-10-CM

## 2024-07-08 RX ORDER — TRAMADOL HYDROCHLORIDE 50 MG/1
100 TABLET ORAL EVERY 8 HOURS PRN
Qty: 84 TABLET | Refills: 1 | Status: SHIPPED | OUTPATIENT
Start: 2024-07-10 | End: 2024-08-07

## 2024-07-08 NOTE — PROGRESS NOTES
Patient was admitted to the hospital, discharged 6/19/2024 and has a follow-up appointment with me scheduled  July 26, 2024.  She was discharged to home with home health care  and she has been referred to dermatology for wound care and management.   She is completing a prednisone taper for pyoderma gangrenosum.  I have continued tramadol 100 mg 3 times daily because it is unlikely to be the cause of her recent delirium and she is alert and oriented with capacity today.  At her appointment, we will review her medication list and remove any that are not necessary.  She prefers to remain in her home, and simplifying her medication regimen may be helpful
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
## 2024-07-10 ENCOUNTER — PHARMACY VISIT (OUTPATIENT)
Dept: PHARMACY | Facility: CLINIC | Age: 74
End: 2024-07-10
Payer: MEDICAID

## 2024-07-10 DIAGNOSIS — L88 PYODERMA GANGRENOSUM (MULTI): Primary | ICD-10-CM

## 2024-07-10 PROCEDURE — RXMED WILLOW AMBULATORY MEDICATION CHARGE

## 2024-07-25 PROCEDURE — RXMED WILLOW AMBULATORY MEDICATION CHARGE

## 2024-07-26 ENCOUNTER — APPOINTMENT (OUTPATIENT)
Dept: PRIMARY CARE | Facility: CLINIC | Age: 74
End: 2024-07-26
Payer: MEDICAID

## 2024-07-29 ENCOUNTER — PHARMACY VISIT (OUTPATIENT)
Dept: PHARMACY | Facility: CLINIC | Age: 74
End: 2024-07-29
Payer: MEDICAID

## 2024-07-29 ENCOUNTER — TELEPHONE (OUTPATIENT)
Dept: PRIMARY CARE | Facility: CLINIC | Age: 74
End: 2024-07-29
Payer: MEDICAID

## 2024-07-29 DIAGNOSIS — M51.9 LUMBAR DISC DISEASE: ICD-10-CM

## 2024-07-29 PROCEDURE — RXMED WILLOW AMBULATORY MEDICATION CHARGE

## 2024-07-29 RX ORDER — TRAMADOL HYDROCHLORIDE 50 MG/1
100 TABLET ORAL EVERY 8 HOURS PRN
Qty: 84 TABLET | Refills: 1 | Status: SHIPPED | OUTPATIENT
Start: 2024-07-29 | End: 2024-08-26

## 2024-07-29 RX ORDER — TRAMADOL HYDROCHLORIDE 50 MG/1
100 TABLET ORAL EVERY 8 HOURS PRN
Qty: 84 TABLET | Refills: 1 | Status: SHIPPED | OUTPATIENT
Start: 2024-07-29 | End: 2024-07-29 | Stop reason: DRUGHIGH

## 2024-07-29 NOTE — TELEPHONE ENCOUNTER
Patient called and was screaming and yelling at [facility] about medication the patient no showed her appt for Friday July 26th. Bruce asked did she wanted to get scheduled for an appt and she yelled more, he asked could he place her on hold and asked Me could I schedule Ms Velasquez for an appointment and I said yes. Once I took over to schedule her she started screaming and stated that she wants her medication and I did inform her that she needs to do a follow up from her hospital [facility] so that she can get the proper medication. She starts yelling at me and tells me she doesn't need an appointment she wants her medication. This is not Ms. Velasquez first nor second or third time screaming at the staff about medication. She has came into the office to yell at the staff about medication even when we try to schedule her for an appointment. Can we please have a talk with her about this this is unprofessional to our staff when she continues to scream at us.

## 2024-07-29 NOTE — TELEPHONE ENCOUNTER
Pt called regarding getting an appt sooner than September. She was just discharged from the hospital and needs her prescriptions refilled. Thank you!!

## 2024-07-29 NOTE — PROGRESS NOTES
I sent tramadol today for 14 days with 1 refill.- Would have been prescribed at followup visit which patient missed.

## 2024-08-02 ENCOUNTER — OFFICE VISIT (OUTPATIENT)
Dept: PRIMARY CARE | Facility: CLINIC | Age: 74
End: 2024-08-02
Payer: MEDICAID

## 2024-08-02 ENCOUNTER — PHARMACY VISIT (OUTPATIENT)
Dept: PHARMACY | Facility: CLINIC | Age: 74
End: 2024-08-02
Payer: MEDICAID

## 2024-08-02 VITALS
DIASTOLIC BLOOD PRESSURE: 55 MMHG | TEMPERATURE: 97.7 F | HEART RATE: 98 BPM | WEIGHT: 160 LBS | HEIGHT: 62 IN | SYSTOLIC BLOOD PRESSURE: 89 MMHG | BODY MASS INDEX: 29.44 KG/M2 | OXYGEN SATURATION: 97 %

## 2024-08-02 DIAGNOSIS — Z76.0 ENCOUNTER FOR MEDICATION REFILL: ICD-10-CM

## 2024-08-02 DIAGNOSIS — Z09 HOSPITAL DISCHARGE FOLLOW-UP: Primary | ICD-10-CM

## 2024-08-02 DIAGNOSIS — G25.0 ESSENTIAL TREMOR: ICD-10-CM

## 2024-08-02 DIAGNOSIS — N20.0 KIDNEY STONES: ICD-10-CM

## 2024-08-02 DIAGNOSIS — Z59.41 FOOD INSECURITY: Primary | ICD-10-CM

## 2024-08-02 DIAGNOSIS — I95.9 HYPOTENSION, UNSPECIFIED HYPOTENSION TYPE: ICD-10-CM

## 2024-08-02 DIAGNOSIS — M51.9 LUMBAR DISC DISEASE: ICD-10-CM

## 2024-08-02 DIAGNOSIS — R41.3 MEMORY CHANGES: ICD-10-CM

## 2024-08-02 DIAGNOSIS — G89.4 CHRONIC PAIN SYNDROME: ICD-10-CM

## 2024-08-02 PROCEDURE — RXMED WILLOW AMBULATORY MEDICATION CHARGE

## 2024-08-02 RX ORDER — PROPRANOLOL HYDROCHLORIDE 20 MG/1
20 TABLET ORAL 2 TIMES DAILY
Qty: 180 TABLET | Refills: 3 | Status: SHIPPED | OUTPATIENT
Start: 2024-08-02 | End: 2025-08-02

## 2024-08-02 RX ORDER — MULTIVIT WITH MINERALS/HERBS
1 TABLET ORAL DAILY
Qty: 90 TABLET | Refills: 3 | Status: SHIPPED | OUTPATIENT
Start: 2024-08-02 | End: 2025-08-02

## 2024-08-02 RX ORDER — FLUTICASONE PROPIONATE 50 MCG
1 SPRAY, SUSPENSION (ML) NASAL DAILY
Qty: 16 G | Refills: 6 | Status: SHIPPED | OUTPATIENT
Start: 2024-08-02

## 2024-08-02 RX ORDER — DULOXETIN HYDROCHLORIDE 60 MG/1
60 CAPSULE, DELAYED RELEASE ORAL DAILY
Qty: 90 CAPSULE | Refills: 3 | Status: SHIPPED | OUTPATIENT
Start: 2024-08-02 | End: 2025-08-02

## 2024-08-02 RX ORDER — TAMSULOSIN HYDROCHLORIDE 0.4 MG/1
0.4 CAPSULE ORAL
Qty: 90 CAPSULE | Refills: 3 | Status: SHIPPED | OUTPATIENT
Start: 2024-08-02 | End: 2025-08-02

## 2024-08-02 RX ORDER — TALC
POWDER (GRAM) TOPICAL NIGHTLY
Qty: 30 TABLET | Refills: 3 | Status: SHIPPED | OUTPATIENT
Start: 2024-08-02 | End: 2024-09-01

## 2024-08-02 SDOH — ECONOMIC STABILITY - FOOD INSECURITY: FOOD INSECURITY: Z59.41

## 2024-08-02 ASSESSMENT — PATIENT HEALTH QUESTIONNAIRE - PHQ9
7. TROUBLE CONCENTRATING ON THINGS, SUCH AS READING THE NEWSPAPER OR WATCHING TELEVISION: NOT AT ALL
5. POOR APPETITE OR OVEREATING: NOT AT ALL
3. TROUBLE FALLING OR STAYING ASLEEP OR SLEEPING TOO MUCH: NOT AT ALL
1. LITTLE INTEREST OR PLEASURE IN DOING THINGS: NOT AT ALL
6. FEELING BAD ABOUT YOURSELF - OR THAT YOU ARE A FAILURE OR HAVE LET YOURSELF OR YOUR FAMILY DOWN: NOT AT ALL
10. IF YOU CHECKED OFF ANY PROBLEMS, HOW DIFFICULT HAVE THESE PROBLEMS MADE IT FOR YOU TO DO YOUR WORK, TAKE CARE OF THINGS AT HOME, OR GET ALONG WITH OTHER PEOPLE: NOT DIFFICULT AT ALL
SUM OF ALL RESPONSES TO PHQ9 QUESTIONS 1 AND 2: 3
SUM OF ALL RESPONSES TO PHQ QUESTIONS 1-9: 3
9. THOUGHTS THAT YOU WOULD BE BETTER OFF DEAD, OR OF HURTING YOURSELF: NOT AT ALL
8. MOVING OR SPEAKING SO SLOWLY THAT OTHER PEOPLE COULD HAVE NOTICED. OR THE OPPOSITE, BEING SO FIGETY OR RESTLESS THAT YOU HAVE BEEN MOVING AROUND A LOT MORE THAN USUAL: NOT AT ALL
4. FEELING TIRED OR HAVING LITTLE ENERGY: NOT AT ALL
2. FEELING DOWN, DEPRESSED OR HOPELESS: NEARLY EVERY DAY

## 2024-08-02 ASSESSMENT — ENCOUNTER SYMPTOMS
OCCASIONAL FEELINGS OF UNSTEADINESS: 1
LOSS OF SENSATION IN FEET: 0
DEPRESSION: 1

## 2024-08-02 ASSESSMENT — PAIN SCALES - GENERAL: PAINLEVEL: 7

## 2024-08-02 ASSESSMENT — COLUMBIA-SUICIDE SEVERITY RATING SCALE - C-SSRS
1. IN THE PAST MONTH, HAVE YOU WISHED YOU WERE DEAD OR WISHED YOU COULD GO TO SLEEP AND NOT WAKE UP?: NO
2. HAVE YOU ACTUALLY HAD ANY THOUGHTS OF KILLING YOURSELF?: NO
6. HAVE YOU EVER DONE ANYTHING, STARTED TO DO ANYTHING, OR PREPARED TO DO ANYTHING TO END YOUR LIFE?: NO

## 2024-08-02 NOTE — PROGRESS NOTES
"Subjective   Patient ID: Miss Eva Velasquez is a 74 y.o. female who presents for Follow-up.    HPI     # Hospital Follow Up  - admitted at [facility] for acute on chronic HF exacerbation. Pt was diuresed with improvement in leg edema. Ultimately pt discharging on Lasix daily PRN for weight gain or leg edema.  Per discharge note \"PT/OT recommended for HHC. HHC could not be obtained due to patient's insurance. Pt was given transport resources by case management. \"    Additionally, patient started on Augmentin 875mg BID x 3 weeks for abscess noted on CT chest and prednisone taper 10mg x3 days  - since discharge, took Lasix only because it was causing her \"to pee at night\". Was taking Lasix at night.   Still taking antibiotic but has forgot some doses. Has a pill but has not used.   - reports minimal missed medication doses; denies confusion with discharge medication list    #Misc:   -patient request for medication refills   -request boost TID since she has hard time getting transportation for food. Prefers boost    Review of Systems  10 point review negative    Objective   BP 89/55 (BP Location: Right arm, Patient Position: Sitting, BP Cuff Size: Adult)   Pulse 98   Temp 36.5 °C (97.7 °F) (Temporal)   Ht 1.575 m (5' 2\")   Wt 72.6 kg (160 lb)   SpO2 97%   BMI 29.26 kg/m²     Physical Exam  Constitutional:       General: She is not in acute distress.  HENT:      Head: Normocephalic and atraumatic.   Eyes:      Conjunctiva/sclera: Conjunctivae normal.   Cardiovascular:      Rate and Rhythm: Normal rate and regular rhythm.      Heart sounds: No murmur heard.  Pulmonary:      Effort: Pulmonary effort is normal.      Breath sounds: No wheezing or rales.   Abdominal:      General: There is distension (moderate distension).      Palpations: Abdomen is soft.      Tenderness: There is no abdominal tenderness.      Comments: Stoma clean and dry     Musculoskeletal:         General: Tenderness present.      Right lower " leg: Edema (pitting edema up to ankle) present.      Left lower leg: Edema (pitting edema up to ankle) present.   Skin:     General: Skin is warm and dry.   Neurological:      General: No focal deficit present.      Mental Status: She is alert.   Psychiatric:         Mood and Affect: Mood normal.         Behavior: Behavior normal.                 Assessment/Plan ,  Sandra Velasquez is a 74 year old female CAD, fibromyalgia, CAD, opioid use disorder, Diverticulitis s/p rodríguez procedure, pyoderma gangrenosum and hidradenitis who presents for Hospital Discharge follow up for Acute on Chronic decompensated heart failure with BL LE edema. Patient discharged with Lasix PRN but patient has not taken due to waking up to urinate at night. Patient was taking medication at night and discussed taking in the morning instead. On PE, patient with BL LE 1+pitting edema and possible abdominal bloating. Patient agreeable to taking medication in the AM to prevent hospitalization. Patient has home health that visited patient today. Provided medication refills today as well. Patient to follow up with PCP Dr. Shah as scheduled.   Diagnoses and all orders for this visit:  Hospital discharge follow-up       - pt discharged on Lasix daily PRN for weight gain or leg edema.  Hypotension         - IO BP 89/55         - asymptomatic          - Has not been taking lasix and uses tamsulosin for chronic kidney stone         - Followed by social work [facility]. Patient to follow up with TANISHA about boost drinks with high protein and low sugar        - ctm   Memory Decline          -referral to Geriatrics for memory decline evaluation and medication optimization.   Lumbar disc disease  -     DULoxetine (Cymbalta) 60 mg DR capsule; Take 1 capsule (60 mg) by mouth once daily. Do not crush or chew. Do not start before November 8, 2023.  Chronic pain syndrome  -     DULoxetine (Cymbalta) 60 mg DR capsule; Take 1 capsule (60 mg) by  mouth once daily. Do not crush or chew. Do not start before November 8, 2023.  Essential tremor  -     propranolol (Inderal) 20 mg tablet; Take 1 tablet (20 mg) by mouth 2 times a day. Take 1 tablet by mouth every morning and take 2 tablets by mouth every evening.  Kidney stones  -     tamsulosin (Flomax) 0.4 mg 24 hr capsule; TAKE 1 CAPSULE (0.4 MG) BY MOUTH ONCE DAILY.  Encounter for medication refill  -     fluticasone (Flonase) 50 mcg/actuation nasal spray; Administer 1 spray into each nostril once daily.  -     vitamin B complex tablet; Take 1 tablet by mouth once daily.  -     melatonin 3 mg capsule; Take 1 tablet by mouth once daily at bedtime.    RTC in 6-8 weeks with Dr. Shah     Discussed with Dr. Pablo Abreu DO  PGY3

## 2024-08-05 NOTE — TELEPHONE ENCOUNTER
Pt called stating that she has not eaten in awhile, has lost 40lbs since last weight taken, and informing clinic that she is in need of an authorization for her Boost. Pt gave [facility], of [facility], phone number for provider to contact and authorize nutrition. Phone number is [phone]. Message sent to provider.

## 2024-08-07 PROCEDURE — RXMED WILLOW AMBULATORY MEDICATION CHARGE

## 2024-08-07 NOTE — PROGRESS NOTES
I saw and evaluated the patient. I personally obtained the key and critical portions of the history and physical exam or was physically present for key and critical portions performed by the resident/fellow. I reviewed the resident/fellow's documentation and discussed the patient with the resident/fellow. I agree with the resident/fellow's medical decision making as documented in the note with the addition of the following:    I renewed the patient's previously effective prescription for tramadol 50 mg tablets, 2 tablets 3 times a day,  And I have deferred documentation of PAD T until the next visit given the complexity of the hospital follow-up required today.  Patient is alert, oriented, states she continues to use tramadol as prescribed.     For coordination of care, I will try to contact her  at [facility] to discuss nutritional needs and resources.     Rose Shah MD

## 2024-08-08 ENCOUNTER — PHARMACY VISIT (OUTPATIENT)
Dept: PHARMACY | Facility: CLINIC | Age: 74
End: 2024-08-08
Payer: MEDICAID

## 2024-08-09 ENCOUNTER — PHARMACY VISIT (OUTPATIENT)
Dept: PHARMACY | Facility: CLINIC | Age: 74
End: 2024-08-09
Payer: MEDICAID

## 2024-08-09 DIAGNOSIS — N20.0 PAIN DUE TO CALCULUS OF KIDNEY: ICD-10-CM

## 2024-08-09 DIAGNOSIS — N20.0 BILATERAL NEPHROLITHIASIS: ICD-10-CM

## 2024-08-09 PROCEDURE — RXMED WILLOW AMBULATORY MEDICATION CHARGE

## 2024-08-09 RX ORDER — OXYCODONE HYDROCHLORIDE 5 MG/1
5 TABLET ORAL EVERY 6 HOURS PRN
Qty: 10 TABLET | Refills: 0 | Status: SHIPPED | OUTPATIENT
Start: 2024-08-09 | End: 2024-08-09 | Stop reason: SDUPTHER

## 2024-08-09 RX ORDER — OXYCODONE HYDROCHLORIDE 5 MG/1
5 TABLET ORAL EVERY 6 HOURS PRN
Qty: 10 TABLET | Refills: 0 | Status: SHIPPED | OUTPATIENT
Start: 2024-08-09

## 2024-08-09 NOTE — PROGRESS NOTES
Pt called to report she is passing stones and in pain  tramadol is not helping .   Pt asking for week of pain meds , she is passing stones she can see them in toilet .    Dr Gonzales alerted   Order CT non contrast and   Give her oxycodone 5mg # 10   Pt is taking flomax already .

## 2024-08-13 ENCOUNTER — PHARMACY VISIT (OUTPATIENT)
Dept: PHARMACY | Facility: CLINIC | Age: 74
End: 2024-08-13
Payer: MEDICAID

## 2024-08-13 PROCEDURE — RXMED WILLOW AMBULATORY MEDICATION CHARGE

## 2024-08-21 DIAGNOSIS — N20.0 PAIN DUE TO CALCULUS OF KIDNEY: ICD-10-CM

## 2024-08-21 PROCEDURE — RXMED WILLOW AMBULATORY MEDICATION CHARGE

## 2024-08-21 RX ORDER — OXYCODONE HYDROCHLORIDE 5 MG/1
5 TABLET ORAL EVERY 6 HOURS PRN
Qty: 15 TABLET | Refills: 0 | Status: SHIPPED | OUTPATIENT
Start: 2024-08-21 | End: 2024-08-29

## 2024-08-22 ENCOUNTER — PHARMACY VISIT (OUTPATIENT)
Dept: PHARMACY | Facility: CLINIC | Age: 74
End: 2024-08-22
Payer: MEDICAID

## 2024-08-23 ENCOUNTER — TELEPHONE (OUTPATIENT)
Dept: PRIMARY CARE | Facility: CLINIC | Age: 74
End: 2024-08-23
Payer: MEDICAID

## 2024-08-23 ENCOUNTER — APPOINTMENT (OUTPATIENT)
Dept: RADIOLOGY | Facility: HOSPITAL | Age: 74
End: 2024-08-23
Payer: MEDICAID

## 2024-08-23 NOTE — TELEPHONE ENCOUNTER
I will contact TANISHA at [facility] on my return 9-3-24. Patient may not meet criteria for continued nutrition supplements though wound healing might be usable.

## 2024-08-26 ENCOUNTER — APPOINTMENT (OUTPATIENT)
Dept: BEHAVIORAL HEALTH | Facility: CLINIC | Age: 74
End: 2024-08-26
Payer: MEDICAID

## 2024-08-26 ENCOUNTER — APPOINTMENT (OUTPATIENT)
Dept: RESEARCH | Facility: HOSPITAL | Age: 74
End: 2024-08-26
Payer: MEDICAID

## 2024-08-26 ENCOUNTER — APPOINTMENT (OUTPATIENT)
Dept: RADIOLOGY | Facility: HOSPITAL | Age: 74
End: 2024-08-26
Payer: MEDICAID

## 2024-08-29 ENCOUNTER — PHARMACY VISIT (OUTPATIENT)
Dept: PHARMACY | Facility: CLINIC | Age: 74
End: 2024-08-29
Payer: MEDICAID

## 2024-08-29 ENCOUNTER — TELEPHONE (OUTPATIENT)
Dept: PRIMARY CARE | Facility: CLINIC | Age: 74
End: 2024-08-29
Payer: MEDICAID

## 2024-08-29 DIAGNOSIS — M51.9 LUMBAR DISC DISEASE: ICD-10-CM

## 2024-08-29 PROCEDURE — RXMED WILLOW AMBULATORY MEDICATION CHARGE

## 2024-08-29 RX ORDER — TRAMADOL HYDROCHLORIDE 50 MG/1
100 TABLET ORAL EVERY 8 HOURS PRN
Qty: 84 TABLET | Refills: 0 | Status: SHIPPED | OUTPATIENT
Start: 2024-08-29 | End: 2024-09-26

## 2024-08-29 NOTE — PROGRESS NOTES
Checked PDMP. Refill of tramadol appropriate at this time. Dr. Shah is on PTO currently, but she will resume visits with her.

## 2024-09-10 ENCOUNTER — CLINICAL SUPPORT (OUTPATIENT)
Dept: EMERGENCY MEDICINE | Facility: HOSPITAL | Age: 74
End: 2024-09-10
Payer: MEDICAID

## 2024-09-10 ENCOUNTER — APPOINTMENT (OUTPATIENT)
Dept: RADIOLOGY | Facility: HOSPITAL | Age: 74
End: 2024-09-10
Payer: MEDICAID

## 2024-09-10 ENCOUNTER — HOSPITAL ENCOUNTER (INPATIENT)
Facility: HOSPITAL | Age: 74
End: 2024-09-10
Attending: EMERGENCY MEDICINE | Admitting: STUDENT IN AN ORGANIZED HEALTH CARE EDUCATION/TRAINING PROGRAM
Payer: MEDICAID

## 2024-09-10 DIAGNOSIS — F41.9 ANXIETY: ICD-10-CM

## 2024-09-10 DIAGNOSIS — N17.9 AKI (ACUTE KIDNEY INJURY) (CMS-HCC): ICD-10-CM

## 2024-09-10 DIAGNOSIS — N39.0 URINARY TRACT INFECTION WITHOUT HEMATURIA, SITE UNSPECIFIED: ICD-10-CM

## 2024-09-10 DIAGNOSIS — E87.6 HYPOKALEMIA: ICD-10-CM

## 2024-09-10 DIAGNOSIS — Z76.0 ENCOUNTER FOR MEDICATION REFILL: ICD-10-CM

## 2024-09-10 DIAGNOSIS — M17.12 PRIMARY OSTEOARTHRITIS OF LEFT KNEE: ICD-10-CM

## 2024-09-10 DIAGNOSIS — Z43.3 COLOSTOMY CARE: ICD-10-CM

## 2024-09-10 DIAGNOSIS — R42 ORTHOSTATIC DIZZINESS: ICD-10-CM

## 2024-09-10 DIAGNOSIS — M79.7 FIBROMYALGIA: ICD-10-CM

## 2024-09-10 DIAGNOSIS — J16.0 COMMUNITY ACQUIRED PNEUMONIA DUE TO CHLAMYDIA SPECIES: ICD-10-CM

## 2024-09-10 DIAGNOSIS — J18.9 PNEUMONIA OF LEFT LOWER LOBE DUE TO INFECTIOUS ORGANISM: Primary | ICD-10-CM

## 2024-09-10 DIAGNOSIS — G50.0 FACIAL PAIN SYNDROME: ICD-10-CM

## 2024-09-10 LAB
ALBUMIN SERPL BCP-MCNC: 3 G/DL (ref 3.4–5)
ALP SERPL-CCNC: 77 U/L (ref 33–136)
ALT SERPL W P-5'-P-CCNC: 13 U/L (ref 7–45)
ANION GAP SERPL CALC-SCNC: 14 MMOL/L (ref 10–20)
APPEARANCE UR: CLEAR
AST SERPL W P-5'-P-CCNC: 18 U/L (ref 9–39)
ATRIAL RATE: 70 BPM
BACTERIA #/AREA URNS AUTO: ABNORMAL /HPF
BASOPHILS # BLD AUTO: 0.07 X10*3/UL (ref 0–0.1)
BASOPHILS NFR BLD AUTO: 0.4 %
BILIRUB SERPL-MCNC: 0.3 MG/DL (ref 0–1.2)
BILIRUB UR STRIP.AUTO-MCNC: NEGATIVE MG/DL
BUN SERPL-MCNC: 25 MG/DL (ref 6–23)
CALCIUM SERPL-MCNC: 9.9 MG/DL (ref 8.6–10.6)
CAOX CRY #/AREA UR COMP ASSIST: ABNORMAL /HPF
CARDIAC TROPONIN I PNL SERPL HS: 46 NG/L (ref 0–34)
CARDIAC TROPONIN I PNL SERPL HS: 47 NG/L (ref 0–34)
CHLORIDE SERPL-SCNC: 99 MMOL/L (ref 98–107)
CO2 SERPL-SCNC: 26 MMOL/L (ref 21–32)
COLOR UR: ABNORMAL
CREAT SERPL-MCNC: 1.66 MG/DL (ref 0.5–1.05)
EGFRCR SERPLBLD CKD-EPI 2021: 32 ML/MIN/1.73M*2
EOSINOPHIL # BLD AUTO: 0.04 X10*3/UL (ref 0–0.4)
EOSINOPHIL NFR BLD AUTO: 0.2 %
ERYTHROCYTE [DISTWIDTH] IN BLOOD BY AUTOMATED COUNT: 17.2 % (ref 11.5–14.5)
FLUAV RNA RESP QL NAA+PROBE: NOT DETECTED
FLUBV RNA RESP QL NAA+PROBE: NOT DETECTED
GLUCOSE SERPL-MCNC: 86 MG/DL (ref 74–99)
GLUCOSE UR STRIP.AUTO-MCNC: NORMAL MG/DL
HCT VFR BLD AUTO: 33 % (ref 36–46)
HGB BLD-MCNC: 10.1 G/DL (ref 12–16)
HOLD SPECIMEN: NORMAL
IMM GRANULOCYTES # BLD AUTO: 0.18 X10*3/UL (ref 0–0.5)
IMM GRANULOCYTES NFR BLD AUTO: 1 % (ref 0–0.9)
KETONES UR STRIP.AUTO-MCNC: ABNORMAL MG/DL
LEUKOCYTE ESTERASE UR QL STRIP.AUTO: ABNORMAL
LYMPHOCYTES # BLD AUTO: 4.33 X10*3/UL (ref 0.8–3)
LYMPHOCYTES NFR BLD AUTO: 23.9 %
MCH RBC QN AUTO: 22.5 PG (ref 26–34)
MCHC RBC AUTO-ENTMCNC: 30.6 G/DL (ref 32–36)
MCV RBC AUTO: 74 FL (ref 80–100)
MONOCYTES # BLD AUTO: 1.35 X10*3/UL (ref 0.05–0.8)
MONOCYTES NFR BLD AUTO: 7.5 %
MUCOUS THREADS #/AREA URNS AUTO: ABNORMAL /LPF
NEUTROPHILS # BLD AUTO: 12.15 X10*3/UL (ref 1.6–5.5)
NEUTROPHILS NFR BLD AUTO: 67 %
NITRITE UR QL STRIP.AUTO: NEGATIVE
NRBC BLD-RTO: 0 /100 WBCS (ref 0–0)
P AXIS: 74 DEGREES
P OFFSET: 211 MS
P ONSET: 169 MS
PH UR STRIP.AUTO: 6 [PH]
PLATELET # BLD AUTO: 528 X10*3/UL (ref 150–450)
POTASSIUM SERPL-SCNC: 3.2 MMOL/L (ref 3.5–5.3)
PR INTERVAL: 104 MS
PROT SERPL-MCNC: 7.5 G/DL (ref 6.4–8.2)
PROT UR STRIP.AUTO-MCNC: ABNORMAL MG/DL
Q ONSET: 221 MS
QRS COUNT: 11 BEATS
QRS DURATION: 108 MS
QT INTERVAL: 424 MS
QTC CALCULATION(BAZETT): 457 MS
QTC FREDERICIA: 446 MS
R AXIS: 97 DEGREES
RBC # BLD AUTO: 4.49 X10*6/UL (ref 4–5.2)
RBC # UR STRIP.AUTO: NEGATIVE /UL
RBC #/AREA URNS AUTO: ABNORMAL /HPF
SARS-COV-2 RNA RESP QL NAA+PROBE: NOT DETECTED
SODIUM SERPL-SCNC: 136 MMOL/L (ref 136–145)
SP GR UR STRIP.AUTO: 1.01
SQUAMOUS #/AREA URNS AUTO: ABNORMAL /HPF
T AXIS: 53 DEGREES
T OFFSET: 433 MS
UROBILINOGEN UR STRIP.AUTO-MCNC: NORMAL MG/DL
VENTRICULAR RATE: 70 BPM
WBC # BLD AUTO: 18.1 X10*3/UL (ref 4.4–11.3)
WBC #/AREA URNS AUTO: ABNORMAL /HPF

## 2024-09-10 PROCEDURE — 2500000002 HC RX 250 W HCPCS SELF ADMINISTERED DRUGS (ALT 637 FOR MEDICARE OP, ALT 636 FOR OP/ED): Mod: SE

## 2024-09-10 PROCEDURE — 2500000002 HC RX 250 W HCPCS SELF ADMINISTERED DRUGS (ALT 637 FOR MEDICARE OP, ALT 636 FOR OP/ED)

## 2024-09-10 PROCEDURE — 2500000004 HC RX 250 GENERAL PHARMACY W/ HCPCS (ALT 636 FOR OP/ED)

## 2024-09-10 PROCEDURE — 1210000001 HC SEMI-PRIVATE ROOM DAILY

## 2024-09-10 PROCEDURE — 96365 THER/PROPH/DIAG IV INF INIT: CPT

## 2024-09-10 PROCEDURE — 84484 ASSAY OF TROPONIN QUANT: CPT

## 2024-09-10 PROCEDURE — 36415 COLL VENOUS BLD VENIPUNCTURE: CPT | Performed by: EMERGENCY MEDICINE

## 2024-09-10 PROCEDURE — 80053 COMPREHEN METABOLIC PANEL: CPT | Performed by: EMERGENCY MEDICINE

## 2024-09-10 PROCEDURE — 93010 ELECTROCARDIOGRAM REPORT: CPT

## 2024-09-10 PROCEDURE — 99222 1ST HOSP IP/OBS MODERATE 55: CPT

## 2024-09-10 PROCEDURE — 1100000001 HC PRIVATE ROOM DAILY

## 2024-09-10 PROCEDURE — 36415 COLL VENOUS BLD VENIPUNCTURE: CPT

## 2024-09-10 PROCEDURE — 85025 COMPLETE CBC W/AUTO DIFF WBC: CPT | Performed by: EMERGENCY MEDICINE

## 2024-09-10 PROCEDURE — 93005 ELECTROCARDIOGRAM TRACING: CPT

## 2024-09-10 PROCEDURE — 71046 X-RAY EXAM CHEST 2 VIEWS: CPT | Mod: FOREIGN READ | Performed by: RADIOLOGY

## 2024-09-10 PROCEDURE — 2500000001 HC RX 250 WO HCPCS SELF ADMINISTERED DRUGS (ALT 637 FOR MEDICARE OP): Performed by: STUDENT IN AN ORGANIZED HEALTH CARE EDUCATION/TRAINING PROGRAM

## 2024-09-10 PROCEDURE — 2500000001 HC RX 250 WO HCPCS SELF ADMINISTERED DRUGS (ALT 637 FOR MEDICARE OP)

## 2024-09-10 PROCEDURE — 87636 SARSCOV2 & INF A&B AMP PRB: CPT

## 2024-09-10 PROCEDURE — 99285 EMERGENCY DEPT VISIT HI MDM: CPT

## 2024-09-10 PROCEDURE — 71046 X-RAY EXAM CHEST 2 VIEWS: CPT

## 2024-09-10 PROCEDURE — 87899 AGENT NOS ASSAY W/OPTIC: CPT | Performed by: STUDENT IN AN ORGANIZED HEALTH CARE EDUCATION/TRAINING PROGRAM

## 2024-09-10 PROCEDURE — 81001 URINALYSIS AUTO W/SCOPE: CPT | Performed by: EMERGENCY MEDICINE

## 2024-09-10 RX ORDER — PROPRANOLOL HYDROCHLORIDE 20 MG/1
20 TABLET ORAL 2 TIMES DAILY
Status: DISCONTINUED | OUTPATIENT
Start: 2024-09-10 | End: 2024-09-20 | Stop reason: HOSPADM

## 2024-09-10 RX ORDER — ENOXAPARIN SODIUM 100 MG/ML
30 INJECTION SUBCUTANEOUS EVERY 24 HOURS
Status: DISCONTINUED | OUTPATIENT
Start: 2024-09-10 | End: 2024-09-13

## 2024-09-10 RX ORDER — CEFTRIAXONE 2 G/50ML
2 INJECTION, SOLUTION INTRAVENOUS EVERY 24 HOURS
Status: DISCONTINUED | OUTPATIENT
Start: 2024-09-10 | End: 2024-09-10

## 2024-09-10 RX ORDER — POTASSIUM CHLORIDE 20 MEQ/1
20 TABLET, EXTENDED RELEASE ORAL ONCE
Status: COMPLETED | OUTPATIENT
Start: 2024-09-10 | End: 2024-09-10

## 2024-09-10 RX ORDER — CETIRIZINE HYDROCHLORIDE 10 MG/1
10 TABLET ORAL DAILY
Status: DISCONTINUED | OUTPATIENT
Start: 2024-09-10 | End: 2024-09-13

## 2024-09-10 RX ORDER — POLYETHYLENE GLYCOL 3350 17 G/17G
17 POWDER, FOR SOLUTION ORAL DAILY PRN
Status: DISCONTINUED | OUTPATIENT
Start: 2024-09-10 | End: 2024-09-20 | Stop reason: HOSPADM

## 2024-09-10 RX ORDER — ACETAMINOPHEN 500 MG
10 TABLET ORAL NIGHTLY
Status: DISCONTINUED | OUTPATIENT
Start: 2024-09-10 | End: 2024-09-20 | Stop reason: HOSPADM

## 2024-09-10 RX ORDER — TAMSULOSIN HYDROCHLORIDE 0.4 MG/1
0.4 CAPSULE ORAL DAILY
Status: DISCONTINUED | OUTPATIENT
Start: 2024-09-10 | End: 2024-09-17

## 2024-09-10 RX ORDER — FLUTICASONE PROPIONATE 50 MCG
1 SPRAY, SUSPENSION (ML) NASAL DAILY
Status: DISCONTINUED | OUTPATIENT
Start: 2024-09-10 | End: 2024-09-20

## 2024-09-10 RX ORDER — IBUPROFEN 400 MG/1
200 TABLET ORAL EVERY 8 HOURS PRN
Status: DISCONTINUED | OUTPATIENT
Start: 2024-09-10 | End: 2024-09-13

## 2024-09-10 RX ORDER — CEFTRIAXONE 2 G/50ML
2 INJECTION, SOLUTION INTRAVENOUS EVERY 24 HOURS
Status: DISCONTINUED | OUTPATIENT
Start: 2024-09-11 | End: 2024-09-12

## 2024-09-10 RX ORDER — DULOXETIN HYDROCHLORIDE 60 MG/1
60 CAPSULE, DELAYED RELEASE ORAL DAILY
Status: DISCONTINUED | OUTPATIENT
Start: 2024-09-10 | End: 2024-09-20 | Stop reason: HOSPADM

## 2024-09-10 ASSESSMENT — LIFESTYLE VARIABLES
EVER FELT BAD OR GUILTY ABOUT YOUR DRINKING: NO
TOTAL SCORE: 0
HAVE PEOPLE ANNOYED YOU BY CRITICIZING YOUR DRINKING: NO
HAVE YOU EVER FELT YOU SHOULD CUT DOWN ON YOUR DRINKING: NO
EVER HAD A DRINK FIRST THING IN THE MORNING TO STEADY YOUR NERVES TO GET RID OF A HANGOVER: NO

## 2024-09-10 ASSESSMENT — PAIN SCALES - GENERAL
PAINLEVEL_OUTOF10: 9
PAINLEVEL_OUTOF10: 9

## 2024-09-10 ASSESSMENT — PAIN - FUNCTIONAL ASSESSMENT: PAIN_FUNCTIONAL_ASSESSMENT: 0-10

## 2024-09-10 NOTE — H&P
"HPI:  Patient is a 74 year old female with PMH of CAD, Fibromyalgia, Diverticulitis, CHF, and pyoderma gangrenosum presenting due to concern for falls at home. The patient reports an extensive history of falls, with falls multiple times a day. She reports that she typically feels dizzy prior to her falls and then passes out. She reports that she has difficulty accessing food and water, and the last time she ate was several days ago. She denies hitting her head during her falls, as she usually falls on her elbows. She also endorses fevers, chills, and SOB ongoing for over a year. She denies abdominal pain, chest pain, or changes in bowel habits.   Of note, she was admitted to [facility] in July of this year for CHF exacerbation. She underwent diuresis with improvement in symptoms. During that admission she was also underwent CT Chest which showed findings consistent with bronchiolitis and bronchopneumonia in the left lung. There was also concern for development of left lung abscess. The patient was discharged home with a 3 week course of Augmentin with planned follow up with ID.    On presentation to ED:  VS: T 36.5 P 81 /73 94% on RA    Labs:  CBC: 18.1/10.1/528  RFP: 136/3.2/99/26/25/1.66<86  LFTs wnl  Troponin 46   Covid and Flu negative    CXR:   IMPRESSION:  There is an infiltrate in the left lower lobe which may represent pneumonia.    ED Interventions:  Ceftriaxone 2g  Azithromycin 500mg  K 20meq  1L LR    /73   Pulse 81   Temp 36.5 °C (97.7 °F) (Temporal)   Resp 16   Ht 1.626 m (5' 4\")   Wt 68 kg (150 lb)   SpO2 94%   BMI 25.75 kg/m²     PE:    GEN:  Oriented to self and place; did not know the date, but knew current events  EYES: EOMI, non-injected sclera.  CARDIO: Normal rate and regular rhythm. No murmurs, rubs, or gallops.   PULM: LCTAB  GI: Soft, non-tender, non-distended.   EXT: trace pitting edema in BLE  NEURO: Cranial nerves II-XII grossly intact.   PSYCH: mildly inappropriate affect "       Assessment:  Patient is a 74 year old female with PMH of CAD, Fibromyalgia, Diverticulitis, CHF, and pyoderma gangrenosum presenting due to concern for falls at home, with reported history of significantly limited intake of food and water. Leading differential for falls at this time is malnutrition and inability to care for self. Will replenish fluids and electrolytes and assess orthostatic VS and perform PT/OT evaluation after repletion. She also presents with Xray findings of consolidation concerning for pneumonia; however clinical signs and symptoms not really consistent with pneumonia, as she is afebrile, is not coughing, and lungs are clear. She did have findings of lung abscess at end of July and the current imaging findings may be sequela of that process. However, she does have a persistent elevation in WBC count so reasonable to continue treatment for CAP for now.     #CAP  :: concern for CAP based on CXR and self-reported year of fevers and cough; however presentation is not very convincing other than persistently elevated WBC  - continue Ceftriaxone x 5 days  - continue Azithromycin x 3 days  - continuous pulse ox  - sputum culture  - ID consulted due to persistent WBC elevation and recent history of lung abscess    #Falls  :: likely secondary to malnutrition and debilitation; low concern for arrythmia  - Orthostatic VS tomorrow  - PT/OT  - SW for placement concerns    #PRISCILLA  :: likely secondary to poor nutritional and fluid intake  - fluid repletion  - monitor electrolytes daily     #Essential Tremor  - continue home Propranolol 20mg BID    #Misc  - continue home Cetirizine 10mg daily  - continue home Fluticasone nasal spray  - continue home Duloxetine 60mg  - continue home Tamsulosin 0.4mg daily    DVT: Lovenox  Code status - FULL CODE

## 2024-09-10 NOTE — ED PROVIDER NOTES
"HPI   Chief Complaint   Patient presents with    Failure To Thrive    Fall       HPI  Sandra Velasquez is a 74-year-old female history of CAD, diverticulitis s/p rodríguez procedure, heart failure, and hidradenitis suppurativa presenting to the ED by EMS as  a failure to thrive.  Patient states she does not know why she was brought to the ED today.  EMS stated the patient lives by herself in an apartment and a  checks in on her every day.  EMS states the  called them because the patient has been defecating on herself and is unable to cook for self.  Patient states she has pain and \"a lot of places\" and pain specifically to the back.  Patient states she has 9 out of 10 back pain.  Patient states she has fallen 50 times in the last few weeks.  Patient denies hitting her head during the falls.  Patient states she is falling because she feels dizzy and weak.  Patient states she has also not been eating or drinking because she has no food or water in her apartment.  Patient states she is been short of breath and having a productive cough for few weeks.  Patient states she has also been having sharp chest pain on and off for the past few weeks.  Patient states the chest pain lasts a few hours.  Patient states that she has been having decreased output from the stoma and has not had a bag over the stoma for a few days because the bag does not stay on.  Patient states she has been having dysuria but denies hematuria and increased urinary frequency.  Patient denies abdominal pain, fevers, body aches, chills.      Patient History   Past Medical History:   Diagnosis Date    Acute cystitis without hematuria 01/03/2019    Acute cystitis without hematuria    Age-related nuclear cataract, left eye 10/31/2018    Cataract, nuclear sclerotic, left eye    Age-related nuclear cataract, right eye 10/31/2018    Cataract, nuclear sclerotic, right eye    Allergic rhinitis due to pollen 09/19/2018    Allergic " rhinitis due to pollen, unspecified seasonality    Calculus of kidney 12/11/2018    Right nephrolithiasis    Change in bowel habit 12/18/2018    Change in bowel habits    Combined forms of age-related cataract, left eye 05/08/2017    Combined form of age-related cataract, left eye    Combined forms of age-related cataract, left eye 05/08/2017    Combined form of age-related cataract, left eye    Cortical age-related cataract, left eye 10/31/2018    Cortical age-related cataract of left eye    Cyanosis 08/10/2017    Bluish skin discoloration    Dry eye syndrome of bilateral lacrimal glands 10/31/2018    Bilateral dry eyes    Dry eye syndrome of bilateral lacrimal glands 10/31/2018    Dry eyes, bilateral    Encounter for immunization 09/17/2018    Immunization due    Encounter for immunization 10/01/2020    Need for vaccination for zoster    History of falling 01/03/2019    History of fall    Hyperlipidemia, unspecified 08/26/2020    Hyperlipidemia    Lesion of radial nerve, unspecified upper limb 03/05/2016    Radial nerve irritation    Meibomian gland dysfunction of unspecified eye, 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/31/2018    MGD (meibomian gland disease)    Nausea 11/27/2018    Nausea in adult    Opioid dependence, uncomplicated (Multi) 03/09/2020    Moderate opioid use disorder    Other chronic sinusitis 06/15/2018    Other chronic sinusitis    Other fecal abnormalities 11/27/2018    Light stools    Other specified disorders of nose and nasal sinuses 06/15/2018    Nasal septal perforation    Other specified disorders of nose and nasal sinuses 10/24/2018    Nasal crusting    Other specified disorders of nose and nasal sinuses 02/03/2017    Nasal septal perforation    Other specified disorders of teeth and supporting structures 09/04/2015    Dentalgia    Pain in leg, unspecified 03/11/2019    Leg pain    Pain in throat 02/03/2017    Throat pain    Personal history of diseases of the blood and blood-forming organs and certain  disorders involving the immune mechanism 02/03/2015    History of anemia    Personal history of diseases of the skin and subcutaneous tissue 08/16/2013    History of actinic keratosis    Personal history of other diseases of the circulatory system     History of hypertension    Personal history of other diseases of the digestive system     History of constipation    Personal history of other diseases of the digestive system 07/02/2014    History of gastroesophageal reflux (GERD)    Personal history of other diseases of the musculoskeletal system and connective tissue 07/02/2014    Personal history of arthritis    Personal history of other diseases of the respiratory system 12/18/2017    History of sore throat    Personal history of other diseases of the respiratory system 12/29/2017    History of paranasal sinus congestion    Personal history of other diseases of the respiratory system 01/03/2019    History of acute sinusitis    Personal history of other diseases of the respiratory system 02/03/2017    History of acute sinusitis    Personal history of other diseases of the respiratory system 12/29/2017    History of acute sinusitis    Personal history of other diseases of the respiratory system 06/15/2018    History of nasal discharge    Personal history of other diseases of the respiratory system 08/11/2015    History of acute sinusitis    Personal history of other drug therapy 02/08/2017    History of pneumococcal vaccination    Personal history of other drug therapy 11/10/2014    History of influenza vaccination    Personal history of other infectious and parasitic diseases 03/25/2019    History of tinea corporis    Personal history of other specified conditions 07/25/2014    History of dizziness    Personal history of other specified conditions 12/18/2017    History of hemoptysis    Personal history of other specified conditions 06/18/2014    History of dyspnea    Personal history of other specified conditions  12/29/2017    History of epistaxis    Personal history of other specified conditions 08/28/2017    History of chest pain    Personal history of pneumonia (recurrent) 03/10/2017    History of pneumonia    Personal history of urinary (tract) infections 01/03/2019    History of urinary tract infection    Pyuria 08/22/2016    Pyuria    Right upper quadrant pain 02/03/2017    Abdominal pain, RUQ (right upper quadrant)    Ulcerative blepharitis unspecified eye, 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/31/2018    Blepharitis, ulcerative    Unspecified atherosclerosis 07/02/2014    Arteriolosclerosis    Unspecified cataract 06/09/2015    Cataract of right eye    Unspecified cataract 06/09/2015    Cataract of left eye    Unspecified cataract 06/09/2015    Cataract of left eye    Unspecified cataract 06/09/2015    Cataract of right eye     Past Surgical History:   Procedure Laterality Date    CT GUIDED PERCUTANEOUS PERITONEAL OR RETROPERITONEAL FLUID COLLECTION DRAINAGE  4/7/2020    CT GUIDED PERCUTANEOUS PERITONEAL OR RETROPERITONEAL FLUID COLLECTION DRAINAGE 4/7/2020 [facility] CLINICAL LEGACY    CT GUIDED PERCUTANEOUS PERITONEAL OR RETROPERITONEAL FLUID COLLECTION DRAINAGE  4/22/2020    CT GUIDED PERCUTANEOUS PERITONEAL OR RETROPERITONEAL FLUID COLLECTION DRAINAGE 4/22/2020 [facility] CLINICAL LEGACY    IR CVC PICC  5/11/2020    IR CVC PICC 5/11/2020 [facility] CLINICAL LEGACY    LITHOTRIPSY  07/31/2013    Renal Lithotripsy    OTHER SURGICAL HISTORY  11/10/2021    Colostomy    OTHER SURGICAL HISTORY  07/02/2014    Arterial Catheterization    REFRACTIVE SURGERY  06/09/2015    Corneal LASIK    TONSILLECTOMY  07/02/2014    Tonsillectomy     Family History   Problem Relation Name Age of Onset    Alcohol abuse Mother      Other (CARDIAC DISORDER) Mother      Diabetes Mother      Glaucoma Mother      Other (MESOTHELIOMA) Mother      Alcohol abuse Father      Melanoma Father      Macular degeneration Sister      Breast cancer Sister      Macular degeneration Other  AUNT     Skin cancer Other Family hx      Social History     Tobacco Use    Smoking status: Never    Smokeless tobacco: Never   Substance Use Topics    Alcohol use: Never    Drug use: Never       Physical Exam   ED Triage Vitals [09/10/24 1240]   Temperature Heart Rate Respirations BP   36.5 °C (97.7 °F) 81 16 107/73      Pulse Ox Temp Source Heart Rate Source Patient Position   94 % Temporal -- --      BP Location FiO2 (%)     -- --       Physical Exam  Vitals and nursing note reviewed.   Constitutional:       Appearance: Normal appearance.   Cardiovascular:      Rate and Rhythm: Normal rate and regular rhythm.      Heart sounds: Normal heart sounds. No murmur heard.     No gallop.   Pulmonary:      Effort: Pulmonary effort is normal. No respiratory distress.      Breath sounds: No stridor. Rales (left lung) present. No wheezing or rhonchi.   Abdominal:      General: Abdomen is flat. Bowel sounds are normal. There is no distension.      Palpations: Abdomen is soft. There is no mass.      Tenderness: There is no abdominal tenderness. There is no guarding or rebound.      Hernia: No hernia is present.      Comments: Stoma to the left lower abdomen.  Stoma is pink with no output during exam.  No purulent drainage or surrounding erythema to the stoma.  The stoma does not have a bag on it   Musculoskeletal:      Right lower leg: No edema.      Left lower leg: No edema.   Skin:     General: Skin is warm and dry.   Neurological:      General: No focal deficit present.      Mental Status: She is alert and oriented to person, place, and time.   Psychiatric:         Mood and Affect: Mood normal.         Behavior: Behavior normal.           ED Course & MDM   ED Course as of 09/11/24 1407   Tue Sep 10, 2024   1450 ED Attending Documentation:  This patient was seen by the advanced practice provider.  I have personally performed a substantive portion of the encounter.  I have seen and examined the patient; agree with the workup,  evaluation, MDM, management and diagnosis.  The care plan has been discussed.  I personally saw the patient and made/approved the management plan and take responsibility for the patient management. I have looked at the EKG and agree with the interpretation.   History: 74-year-old female who presents to the emergency department with falling down a lot and not being able to eat and drink at home secondary to access issues and inability to care for self.  She complains of some chest pain, she has a pneumonia, and PRISCILLA and a white count elevation.  She will be admitted to the hospital for that and  evaluation.  She does have a stoma then she says her output has been a slightly decreased although her abdomen is soft and nontender.    Attila Latif MD  EM Attending Physician   [RG]      ED Course User Index  [RG] Attila Latif MD         Diagnoses as of 09/11/24 1407   Pneumonia of left lower lobe due to infectious organism   PRISCILLA (acute kidney injury) (CMS-[facility])   Hypokalemia   Urinary tract infection without hematuria, site unspecified                 No data recorded     Brittnee Coma Scale Score: 15 (09/10/24 1252 : Huyen Lawler RN)                           Medical Decision Making  This is a 74-year-old female in the ED by EMS as a failure to thrive.  EMS states the  called them because the patient has been defecating herself and is unable to cook for herself.  Patient states she has been feeling dizzy and weak which has caused her to fall.  Patient states she is fallen 50 times in the past few weeks.  Patient denies hitting her head during falls.  Patient states she did not fall in her bathroom falls.  On exam the patient does not have any midline bony tenderness to the cervical, thoracic, lumbar spine.  Low suspicion for spine fracture or dislocation based on physical exam findings therefore imaging was not obtained today.  Patient states she has been short of breath and having a  productive cough for the past 2 weeks.  During exam the patient has coughed multiple times with a productive cough.  Right lung is clear to auscultation however Rales were heard in the left lung.  Respiratory effort is normal.  Chest x-ray was obtained today with concern for pneumonia.  Chest x-ray shows infiltrate in the left lower lobe representing pneumonia.  Patient was given ceftriaxone and azithromycin for treatment of pneumonia.  Per chart review patient has received azithromycin and ceftriaxone in the past without complication.  Influenza and COVID swabs were obtained today to determine etiology of infection and swabs are negative.  On exam the patient's abdomen is nontender in all 4 quadrants.  Patient does have a stoma to the left lower quadrant.  Stoma is pink with no output currently.  No purulent drainage or surrounding erythema to the stoma.  The stoma does not have a bag on it currently.  Patient states the stoma has not had a bag on it for the past few days because the bag has not stayed on.  Patient states the stoma also has been having decreased output.  Since abdomen is nontender and stoma does not appear infected: Low suspicion for intra-abdominal abnormalities therefore imaging is not obtained today.  Patient states she is also not been eating or drinking because she has no food while in the department.  CMP was obtained today to evaluate electrolytes as well as kidney function since patient has not been eating or drinking.  CMP shows low potassium of 3.2 and RPISCILLA with BUN of 25, creatinine 1.66, and GFR of 32.  Patient was given 20 mEq of potassium for potassium replacement in the setting of PRISCILLA.  Patient was given 1 L bolus LR for PRISCILLA.  CBC was also obtained and shows elevated white blood cell count of 18.1 and hemoglobin of 10.1.  Per chart review patient's hemoglobin is near her baseline.  Troponin and EKG were obtained for further evaluation of the patient's chest pain.  Initial troponin was  46 and repeat troponin was 47.  EKG interpretation can be found below.  UA was obtained to evaluate for possible UTI given the patient has been having dysuria.  UA shows 25 leukocytes and 1+ bacteria.  Antibiotics given in the ED for pneumonia will begin treating UTI.  Patient has remained hemodynamically stable in the emergency department today.    EKG was obtained today for further evaluation of chest pain.  EKG shows normal sinus rhythm with regular rate of 70 bpm, short FL interval of 104, QRS 70, QTc 457, right axis deviation, incomplete right bundle branch block.  No ST elevations noted.      Differential diagnosis includes and elevated to: Pneumonia, PRISCILLA, electrolyte abnormalities, COVID, influenza, ACS, UTI, anemia, pericarditis, costochondritis    Disposition: Hospital admission  Patient is admitted to medicine under Dr. Tejada for further management of PRISCILLA pneumonia as well as social work evaluation since patient is now able to care for herself by itself at home.  Patient agrees to hospital admission at this time.    Patient was discussed and staffed with Dr. Latif    Procedure  Procedures     Vladimir Benjamin PA-C  09/11/24 4694

## 2024-09-10 NOTE — ED TRIAGE NOTES
Pt from home, lives in senior [facility] that has SW, sent to ED for failure to thrive, pt reports frequent falls x50 , poor PO intake and pain all over. Pt states she doesn't have the resources to take care of herself, no money for food and inability to shop. Pt is alert and oriented x4, awake and alert upon arrival. Pt has stoma to left abdomen open to air upon arrival.

## 2024-09-11 ENCOUNTER — APPOINTMENT (OUTPATIENT)
Dept: RADIOLOGY | Facility: HOSPITAL | Age: 74
End: 2024-09-11
Payer: MEDICAID

## 2024-09-11 LAB
ALBUMIN SERPL BCP-MCNC: 2.3 G/DL (ref 3.4–5)
ALBUMIN SERPL BCP-MCNC: 2.3 G/DL (ref 3.4–5)
ALP SERPL-CCNC: 70 U/L (ref 33–136)
ALT SERPL W P-5'-P-CCNC: 13 U/L (ref 7–45)
ANION GAP SERPL CALC-SCNC: 10 MMOL/L (ref 10–20)
ANION GAP SERPL CALC-SCNC: 10 MMOL/L (ref 10–20)
AST SERPL W P-5'-P-CCNC: 14 U/L (ref 9–39)
BASOPHILS # BLD AUTO: 0.06 X10*3/UL (ref 0–0.1)
BASOPHILS NFR BLD AUTO: 0.3 %
BILIRUB SERPL-MCNC: 0.2 MG/DL (ref 0–1.2)
BUN SERPL-MCNC: 22 MG/DL (ref 6–23)
BUN SERPL-MCNC: 22 MG/DL (ref 6–23)
CALCIUM SERPL-MCNC: 8.7 MG/DL (ref 8.6–10.6)
CALCIUM SERPL-MCNC: 8.7 MG/DL (ref 8.6–10.6)
CHLORIDE SERPL-SCNC: 103 MMOL/L (ref 98–107)
CHLORIDE SERPL-SCNC: 103 MMOL/L (ref 98–107)
CO2 SERPL-SCNC: 28 MMOL/L (ref 21–32)
CO2 SERPL-SCNC: 28 MMOL/L (ref 21–32)
CREAT SERPL-MCNC: 1.22 MG/DL (ref 0.5–1.05)
CREAT SERPL-MCNC: 1.22 MG/DL (ref 0.5–1.05)
EGFRCR SERPLBLD CKD-EPI 2021: 47 ML/MIN/1.73M*2
EGFRCR SERPLBLD CKD-EPI 2021: 47 ML/MIN/1.73M*2
EOSINOPHIL # BLD AUTO: 0.3 X10*3/UL (ref 0–0.4)
EOSINOPHIL NFR BLD AUTO: 1.7 %
ERYTHROCYTE [DISTWIDTH] IN BLOOD BY AUTOMATED COUNT: 17.5 % (ref 11.5–14.5)
GLUCOSE BLD MANUAL STRIP-MCNC: 129 MG/DL (ref 74–99)
GLUCOSE SERPL-MCNC: 139 MG/DL (ref 74–99)
GLUCOSE SERPL-MCNC: 139 MG/DL (ref 74–99)
HCT VFR BLD AUTO: 31.1 % (ref 36–46)
HGB BLD-MCNC: 9 G/DL (ref 12–16)
IMM GRANULOCYTES # BLD AUTO: 0.16 X10*3/UL (ref 0–0.5)
IMM GRANULOCYTES NFR BLD AUTO: 0.9 % (ref 0–0.9)
LYMPHOCYTES # BLD AUTO: 3.25 X10*3/UL (ref 0.8–3)
LYMPHOCYTES NFR BLD AUTO: 18.6 %
MAGNESIUM SERPL-MCNC: 1.7 MG/DL (ref 1.6–2.4)
MCH RBC QN AUTO: 22.7 PG (ref 26–34)
MCHC RBC AUTO-ENTMCNC: 28.9 G/DL (ref 32–36)
MCV RBC AUTO: 79 FL (ref 80–100)
MONOCYTES # BLD AUTO: 1.13 X10*3/UL (ref 0.05–0.8)
MONOCYTES NFR BLD AUTO: 6.5 %
NEUTROPHILS # BLD AUTO: 12.56 X10*3/UL (ref 1.6–5.5)
NEUTROPHILS NFR BLD AUTO: 72 %
NRBC BLD-RTO: 0 /100 WBCS (ref 0–0)
PHOSPHATE SERPL-MCNC: 2.2 MG/DL (ref 2.5–4.9)
PLATELET # BLD AUTO: 428 X10*3/UL (ref 150–450)
POTASSIUM SERPL-SCNC: 3.2 MMOL/L (ref 3.5–5.3)
POTASSIUM SERPL-SCNC: 3.2 MMOL/L (ref 3.5–5.3)
PROT SERPL-MCNC: 6.2 G/DL (ref 6.4–8.2)
RBC # BLD AUTO: 3.96 X10*6/UL (ref 4–5.2)
SODIUM SERPL-SCNC: 138 MMOL/L (ref 136–145)
SODIUM SERPL-SCNC: 138 MMOL/L (ref 136–145)
WBC # BLD AUTO: 17.5 X10*3/UL (ref 4.4–11.3)

## 2024-09-11 PROCEDURE — 97162 PT EVAL MOD COMPLEX 30 MIN: CPT | Mod: GP | Performed by: PHYSICAL THERAPIST

## 2024-09-11 PROCEDURE — 97535 SELF CARE MNGMENT TRAINING: CPT | Mod: GO | Performed by: OCCUPATIONAL THERAPIST

## 2024-09-11 PROCEDURE — 87070 CULTURE OTHR SPECIMN AEROBIC: CPT | Performed by: STUDENT IN AN ORGANIZED HEALTH CARE EDUCATION/TRAINING PROGRAM

## 2024-09-11 PROCEDURE — 84100 ASSAY OF PHOSPHORUS: CPT | Performed by: STUDENT IN AN ORGANIZED HEALTH CARE EDUCATION/TRAINING PROGRAM

## 2024-09-11 PROCEDURE — 97165 OT EVAL LOW COMPLEX 30 MIN: CPT | Mod: GO | Performed by: OCCUPATIONAL THERAPIST

## 2024-09-11 PROCEDURE — 87449 NOS EACH ORGANISM AG IA: CPT | Performed by: STUDENT IN AN ORGANIZED HEALTH CARE EDUCATION/TRAINING PROGRAM

## 2024-09-11 PROCEDURE — 85025 COMPLETE CBC W/AUTO DIFF WBC: CPT

## 2024-09-11 PROCEDURE — 1100000001 HC PRIVATE ROOM DAILY

## 2024-09-11 PROCEDURE — 83735 ASSAY OF MAGNESIUM: CPT

## 2024-09-11 PROCEDURE — 2500000002 HC RX 250 W HCPCS SELF ADMINISTERED DRUGS (ALT 637 FOR MEDICARE OP, ALT 636 FOR OP/ED)

## 2024-09-11 PROCEDURE — 99233 SBSQ HOSP IP/OBS HIGH 50: CPT | Performed by: STUDENT IN AN ORGANIZED HEALTH CARE EDUCATION/TRAINING PROGRAM

## 2024-09-11 PROCEDURE — 99255 IP/OBS CONSLTJ NEW/EST HI 80: CPT

## 2024-09-11 PROCEDURE — 71250 CT THORAX DX C-: CPT

## 2024-09-11 PROCEDURE — 80053 COMPREHEN METABOLIC PANEL: CPT

## 2024-09-11 PROCEDURE — 2500000001 HC RX 250 WO HCPCS SELF ADMINISTERED DRUGS (ALT 637 FOR MEDICARE OP)

## 2024-09-11 PROCEDURE — 71250 CT THORAX DX C-: CPT | Performed by: RADIOLOGY

## 2024-09-11 PROCEDURE — 82947 ASSAY GLUCOSE BLOOD QUANT: CPT

## 2024-09-11 PROCEDURE — 2500000004 HC RX 250 GENERAL PHARMACY W/ HCPCS (ALT 636 FOR OP/ED)

## 2024-09-11 PROCEDURE — 2500000001 HC RX 250 WO HCPCS SELF ADMINISTERED DRUGS (ALT 637 FOR MEDICARE OP): Performed by: STUDENT IN AN ORGANIZED HEALTH CARE EDUCATION/TRAINING PROGRAM

## 2024-09-11 PROCEDURE — 36415 COLL VENOUS BLD VENIPUNCTURE: CPT

## 2024-09-11 RX ORDER — TRAMADOL HYDROCHLORIDE 50 MG/1
50 TABLET ORAL EVERY 8 HOURS PRN
Status: DISCONTINUED | OUTPATIENT
Start: 2024-09-11 | End: 2024-09-20 | Stop reason: HOSPADM

## 2024-09-11 SDOH — ECONOMIC STABILITY: TRANSPORTATION INSECURITY
IN THE PAST 12 MONTHS, HAS LACK OF TRANSPORTATION KEPT YOU FROM MEETINGS, WORK, OR FROM GETTING THINGS NEEDED FOR DAILY LIVING?: YES

## 2024-09-11 SDOH — SOCIAL STABILITY: SOCIAL INSECURITY: DOES ANYONE TRY TO KEEP YOU FROM HAVING/CONTACTING OTHER FRIENDS OR DOING THINGS OUTSIDE YOUR HOME?: NO

## 2024-09-11 SDOH — SOCIAL STABILITY: SOCIAL NETWORK: ARE YOU MARRIED, WIDOWED, DIVORCED, SEPARATED, NEVER MARRIED, OR LIVING WITH A PARTNER?: NEVER MARRIED

## 2024-09-11 SDOH — HEALTH STABILITY: MENTAL HEALTH: HOW OFTEN DO YOU HAVE 6 OR MORE DRINKS ON ONE OCCASION?: NEVER

## 2024-09-11 SDOH — ECONOMIC STABILITY: INCOME INSECURITY: IN THE PAST 12 MONTHS, HAS THE ELECTRIC, GAS, OIL, OR WATER COMPANY THREATENED TO SHUT OFF SERVICE IN YOUR HOME?: NO

## 2024-09-11 SDOH — HEALTH STABILITY: MENTAL HEALTH: HOW MANY STANDARD DRINKS CONTAINING ALCOHOL DO YOU HAVE ON A TYPICAL DAY?: PATIENT DOES NOT DRINK

## 2024-09-11 SDOH — SOCIAL STABILITY: SOCIAL INSECURITY
WITHIN THE LAST YEAR, HAVE YOU BEEN KICKED, HIT, SLAPPED, OR OTHERWISE PHYSICALLY HURT BY YOUR PARTNER OR EX-PARTNER?: NO

## 2024-09-11 SDOH — SOCIAL STABILITY: SOCIAL NETWORK: IN A TYPICAL WEEK, HOW MANY TIMES DO YOU TALK ON THE PHONE WITH FAMILY, FRIENDS, OR NEIGHBORS?: NEVER

## 2024-09-11 SDOH — SOCIAL STABILITY: SOCIAL NETWORK
DO YOU BELONG TO ANY CLUBS OR ORGANIZATIONS SUCH AS CHURCH GROUPS UNIONS, FRATERNAL OR ATHLETIC GROUPS, OR SCHOOL GROUPS?: YES

## 2024-09-11 SDOH — HEALTH STABILITY: MENTAL HEALTH: HOW OFTEN DO YOU HAVE A DRINK CONTAINING ALCOHOL?: NEVER

## 2024-09-11 SDOH — ECONOMIC STABILITY: INCOME INSECURITY: IN THE LAST 12 MONTHS, WAS THERE A TIME WHEN YOU WERE NOT ABLE TO PAY THE MORTGAGE OR RENT ON TIME?: YES

## 2024-09-11 SDOH — SOCIAL STABILITY: SOCIAL NETWORK: HOW OFTEN DO YOU GET TOGETHER WITH FRIENDS OR RELATIVES?: NEVER

## 2024-09-11 SDOH — SOCIAL STABILITY: SOCIAL INSECURITY
WITHIN THE LAST YEAR, HAVE TO BEEN RAPED OR FORCED TO HAVE ANY KIND OF SEXUAL ACTIVITY BY YOUR PARTNER OR EX-PARTNER?: NO

## 2024-09-11 SDOH — SOCIAL STABILITY: SOCIAL INSECURITY: HAVE YOU HAD ANY THOUGHTS OF HARMING ANYONE ELSE?: YES

## 2024-09-11 SDOH — HEALTH STABILITY: PHYSICAL HEALTH: ON AVERAGE, HOW MANY DAYS PER WEEK DO YOU ENGAGE IN MODERATE TO STRENUOUS EXERCISE (LIKE A BRISK WALK)?: 0 DAYS

## 2024-09-11 SDOH — SOCIAL STABILITY: SOCIAL NETWORK: HOW OFTEN DO YOU ATTENT MEETINGS OF THE CLUB OR ORGANIZATION YOU BELONG TO?: MORE THAN 4 TIMES PER YEAR

## 2024-09-11 SDOH — HEALTH STABILITY: MENTAL HEALTH
HOW OFTEN DO YOU NEED TO HAVE SOMEONE HELP YOU WHEN YOU READ INSTRUCTIONS, PAMPHLETS, OR OTHER WRITTEN MATERIAL FROM YOUR DOCTOR OR PHARMACY?: NEVER

## 2024-09-11 SDOH — ECONOMIC STABILITY: INCOME INSECURITY: HOW HARD IS IT FOR YOU TO PAY FOR THE VERY BASICS LIKE FOOD, HOUSING, MEDICAL CARE, AND HEATING?: HARD

## 2024-09-11 SDOH — ECONOMIC STABILITY: FOOD INSECURITY: WITHIN THE PAST 12 MONTHS, YOU WORRIED THAT YOUR FOOD WOULD RUN OUT BEFORE YOU GOT MONEY TO BUY MORE.: OFTEN TRUE

## 2024-09-11 SDOH — SOCIAL STABILITY: SOCIAL INSECURITY: DO YOU FEEL ANYONE HAS EXPLOITED OR TAKEN ADVANTAGE OF YOU FINANCIALLY OR OF YOUR PERSONAL PROPERTY?: YES

## 2024-09-11 SDOH — SOCIAL STABILITY: SOCIAL NETWORK: HOW OFTEN DO YOU ATTEND CHURCH OR RELIGIOUS SERVICES?: NEVER

## 2024-09-11 SDOH — HEALTH STABILITY: MENTAL HEALTH
STRESS IS WHEN SOMEONE FEELS TENSE, NERVOUS, ANXIOUS, OR CAN'T SLEEP AT NIGHT BECAUSE THEIR MIND IS TROUBLED. HOW STRESSED ARE YOU?: VERY MUCH

## 2024-09-11 SDOH — SOCIAL STABILITY: SOCIAL INSECURITY: DO YOU FEEL UNSAFE GOING BACK TO THE PLACE WHERE YOU ARE LIVING?: YES

## 2024-09-11 SDOH — ECONOMIC STABILITY: HOUSING INSECURITY: AT ANY TIME IN THE PAST 12 MONTHS, WERE YOU HOMELESS OR LIVING IN A SHELTER (INCLUDING NOW)?: NO

## 2024-09-11 SDOH — ECONOMIC STABILITY: TRANSPORTATION INSECURITY
IN THE PAST 12 MONTHS, HAS THE LACK OF TRANSPORTATION KEPT YOU FROM MEDICAL APPOINTMENTS OR FROM GETTING MEDICATIONS?: YES

## 2024-09-11 SDOH — ECONOMIC STABILITY: FOOD INSECURITY: WITHIN THE PAST 12 MONTHS, THE FOOD YOU BOUGHT JUST DIDN'T LAST AND YOU DIDN'T HAVE MONEY TO GET MORE.: OFTEN TRUE

## 2024-09-11 SDOH — SOCIAL STABILITY: SOCIAL INSECURITY: ARE YOU OR HAVE YOU BEEN THREATENED OR ABUSED PHYSICALLY, EMOTIONALLY, OR SEXUALLY BY ANYONE?: YES

## 2024-09-11 SDOH — SOCIAL STABILITY: SOCIAL INSECURITY: WITHIN THE LAST YEAR, HAVE YOU BEEN AFRAID OF YOUR PARTNER OR EX-PARTNER?: NO

## 2024-09-11 SDOH — SOCIAL STABILITY: SOCIAL INSECURITY: WITHIN THE LAST YEAR, HAVE YOU BEEN HUMILIATED OR EMOTIONALLY ABUSED IN OTHER WAYS BY YOUR PARTNER OR EX-PARTNER?: NO

## 2024-09-11 SDOH — SOCIAL STABILITY: SOCIAL INSECURITY: HAVE YOU HAD THOUGHTS OF HARMING ANYONE ELSE?: NO

## 2024-09-11 SDOH — HEALTH STABILITY: MENTAL HEALTH
EXPERIENCED ANY OF THE FOLLOWING LIFE EVENTS: DEATH OF FAMILY/FRIEND;CHILDHOOD NEGLECT;PHYSICAL ASSAULT;SEXUAL ASSAULT;TRANSPORTATION ACCIDENT;SOCIAL LOSS (BANKRUPTCY, DIVORCE, WORK-RELATED STRESS)

## 2024-09-11 SDOH — ECONOMIC STABILITY: HOUSING INSECURITY: IN THE PAST 12 MONTHS, HOW MANY TIMES HAVE YOU MOVED WHERE YOU WERE LIVING?: 2

## 2024-09-11 SDOH — SOCIAL STABILITY: SOCIAL INSECURITY: HAS ANYONE EVER THREATENED TO HURT YOUR FAMILY OR YOUR PETS?: YES

## 2024-09-11 SDOH — SOCIAL STABILITY: SOCIAL INSECURITY: ABUSE: ADULT

## 2024-09-11 SDOH — SOCIAL STABILITY: SOCIAL INSECURITY: ARE THERE ANY APPARENT SIGNS OF INJURIES/BEHAVIORS THAT COULD BE RELATED TO ABUSE/NEGLECT?: YES

## 2024-09-11 SDOH — HEALTH STABILITY: PHYSICAL HEALTH: ON AVERAGE, HOW MANY MINUTES DO YOU ENGAGE IN EXERCISE AT THIS LEVEL?: 0 MIN

## 2024-09-11 ASSESSMENT — ACTIVITIES OF DAILY LIVING (ADL)
WALKS IN HOME: NEEDS ASSISTANCE
ADEQUATE_TO_COMPLETE_ADL: NO
LACK_OF_TRANSPORTATION: YES
HEARING - LEFT EAR: FUNCTIONAL
HEARING - RIGHT EAR: FUNCTIONAL
PATIENT'S MEMORY ADEQUATE TO SAFELY COMPLETE DAILY ACTIVITIES?: YES
JUDGMENT_ADEQUATE_SAFELY_COMPLETE_DAILY_ACTIVITIES: YES
ADL_ASSISTANCE: NEEDS ASSISTANCE
BATHING_ASSISTANCE: MINIMAL
BATHING: NEEDS ASSISTANCE
GROOMING: NEEDS ASSISTANCE
DRESSING YOURSELF: NEEDS ASSISTANCE
ASSISTIVE_DEVICE: WALKER
HOME_MANAGEMENT_TIME_ENTRY: 21
FEEDING YOURSELF: INDEPENDENT
TOILETING: NEEDS ASSISTANCE
ADL_ASSISTANCE: INDEPENDENT

## 2024-09-11 ASSESSMENT — COGNITIVE AND FUNCTIONAL STATUS - GENERAL
TOILETING: A LITTLE
MOVING FROM LYING ON BACK TO SITTING ON SIDE OF FLAT BED WITH BEDRAILS: A LITTLE
WALKING IN HOSPITAL ROOM: TOTAL
TURNING FROM BACK TO SIDE WHILE IN FLAT BAD: A LITTLE
TURNING FROM BACK TO SIDE WHILE IN FLAT BAD: A LITTLE
STANDING UP FROM CHAIR USING ARMS: A LOT
PERSONAL GROOMING: A LOT
TOILETING: A LOT
DRESSING REGULAR UPPER BODY CLOTHING: A LOT
MOVING TO AND FROM BED TO CHAIR: A LOT
DRESSING REGULAR LOWER BODY CLOTHING: A LITTLE
MOVING FROM LYING ON BACK TO SITTING ON SIDE OF FLAT BED WITH BEDRAILS: A LITTLE
DAILY ACTIVITIY SCORE: 15
DRESSING REGULAR UPPER BODY CLOTHING: A LITTLE
PATIENT BASELINE BEDBOUND: NO
DAILY ACTIVITIY SCORE: 15
CLIMB 3 TO 5 STEPS WITH RAILING: TOTAL
DRESSING REGULAR UPPER BODY CLOTHING: A LOT
HELP NEEDED FOR BATHING: A LOT
CLIMB 3 TO 5 STEPS WITH RAILING: TOTAL
WALKING IN HOSPITAL ROOM: A LOT
MOVING TO AND FROM BED TO CHAIR: TOTAL
PERSONAL GROOMING: A LOT
TURNING FROM BACK TO SIDE WHILE IN FLAT BAD: A LITTLE
EATING MEALS: A LITTLE
DRESSING REGULAR LOWER BODY CLOTHING: A LITTLE
CLIMB 3 TO 5 STEPS WITH RAILING: TOTAL
TOILETING: A LOT
MOBILITY SCORE: 13
MOVING TO AND FROM BED TO CHAIR: A LOT
DRESSING REGULAR LOWER BODY CLOTHING: A LITTLE
MOBILITY SCORE: 13
PERSONAL GROOMING: A LITTLE
WALKING IN HOSPITAL ROOM: A LOT
STANDING UP FROM CHAIR USING ARMS: TOTAL
HELP NEEDED FOR BATHING: A LITTLE
DAILY ACTIVITIY SCORE: 18
MOVING FROM LYING ON BACK TO SITTING ON SIDE OF FLAT BED WITH BEDRAILS: A LITTLE
HELP NEEDED FOR BATHING: A LOT
STANDING UP FROM CHAIR USING ARMS: A LOT
MOBILITY SCORE: 10

## 2024-09-11 ASSESSMENT — LIFESTYLE VARIABLES
AUDIT-C TOTAL SCORE: 0
HOW MANY STANDARD DRINKS CONTAINING ALCOHOL DO YOU HAVE ON A TYPICAL DAY: PATIENT DOES NOT DRINK
SKIP TO QUESTIONS 9-10: 1
HOW OFTEN DO YOU HAVE A DRINK CONTAINING ALCOHOL: NEVER
HOW OFTEN DO YOU HAVE 6 OR MORE DRINKS ON ONE OCCASION: NEVER
PRESCIPTION_ABUSE_PAST_12_MONTHS: NO
SKIP TO QUESTIONS 9-10: 1
AUDIT-C TOTAL SCORE: 0
SUBSTANCE_ABUSE_PAST_12_MONTHS: YES
AUDIT-C TOTAL SCORE: 0

## 2024-09-11 ASSESSMENT — PAIN SCALES - GENERAL
PAINLEVEL_OUTOF10: 6
PAINLEVEL_OUTOF10: 8
PAINLEVEL_OUTOF10: 0 - NO PAIN
PAINLEVEL_OUTOF10: 8
PAINLEVEL_OUTOF10: 9
PAINLEVEL_OUTOF10: 6

## 2024-09-11 ASSESSMENT — PAIN DESCRIPTION - LOCATION
LOCATION: HEAD
LOCATION: BACK

## 2024-09-11 ASSESSMENT — PATIENT HEALTH QUESTIONNAIRE - PHQ9
SUM OF ALL RESPONSES TO PHQ9 QUESTIONS 1 & 2: 6
2. FEELING DOWN, DEPRESSED OR HOPELESS: NEARLY EVERY DAY
1. LITTLE INTEREST OR PLEASURE IN DOING THINGS: NEARLY EVERY DAY

## 2024-09-11 ASSESSMENT — PAIN - FUNCTIONAL ASSESSMENT
PAIN_FUNCTIONAL_ASSESSMENT: 0-10

## 2024-09-11 NOTE — PROGRESS NOTES
Recreation Therapy Note    Therapy Session  Visit Type: New visit  Session Start Time: 1340  Session End Time: 1355  Intervention Delivery: In-person  Conflict of Service: None  Number of family members present: 0  Family Present for Session: None  Family Participation: None  Number of staff members present: 1    Pre-assessment  Unable to Assess Reason: Outcomes not applicable  Mood/Affect: Appropriate, Calm  Verbalized Emotional State:  (none verbalized.)    Treatment  Areas of Focus: Coping, Self-expression, Normalization, Socialization, Stress reduction  Co-Treatment:  (none)  Interruption: No  Patient Fell Asleep at End of Session: No    Post-assessment  Unable to Assess Reason: Outcomes not applicable  Mood/Affect: Calm, Appropriate, Cooperative, Participative  Verbalized Emotional State:  (none)  Total Session Time (min): 15 minutes    Narrative  Assessment Detail: Patient was resting in bed upon arrival.  Agreeable to engaging in a recreational therapy session.  Plan: To encourage the exploration of safe and effective positive leisure coping skills to manage situational stressors.  Intervention: Patient chose to play "[employer] with this author.  Evaluation: Patient played the game appropriately and was interactive throughout the game.  Frequently laid her head on the tray table between turns. Asked patient if she was ok and patient replied that she was in pain.  Patient then called the nurse and requested her medication.  Stated that she really wanted to play the game but asked that this author return tomorrow and hopefully she will be feeling better.  Follow-up: Will continue to encourage the exploration of positive leisure coping skills.  Patient Comments: See above.    Patient is a 74 year old female with PMH of CAD, Fibromyalgia, Diverticulitis, CHF, and pyoderma gangrenosum presenting due to concern for falls at home. The patient reports an extensive history of falls, with falls multiple times a day. She  reports that she typically feels dizzy prior to her falls and then passes out. She reports that she has difficulty accessing food and water, and the last time she ate was several days ago. She denies hitting her head during her falls, as she usually falls on her elbows. She also endorses fevers, chills, and SOB ongoing for over a year. She denies abdominal pain, chest pain, or changes in bowel habits.   Of note, she was admitted to [facility] in July of this year for CHF exacerbation. She underwent diuresis with improvement in symptoms. During that admission she was also underwent CT Chest which showed findings consistent with bronchiolitis and bronchopneumonia in the left lung. There was also concern for development of left lung abscess. The patient was discharged home with a 3 week course of Augmentin with planned follow up with ID.

## 2024-09-11 NOTE — PROGRESS NOTES
"   09/11/24 1536   Discharge Planning   Living Arrangements Alone   Support Systems None  (Pt denies having children, states she has some siblings but they are \"worthless\".)   Assistance Needed Pending PT/OT recommendations   Type of Residence Private residence   Who is requesting discharge planning? Patient   Home or Post Acute Services Post acute facilities (Rehab/SNF/etc)   Type of Post Acute Facility Services Skilled nursing   Expected Discharge Disposition SNF   Financial Resource Strain   How hard is it for you to pay for the very basics like food, housing, medical care, and heating? Not very   Housing Stability   In the last 12 months, was there a time when you were not able to pay the mortgage or rent on time? N   At any time in the past 12 months, were you homeless or living in a shelter (including now)? N   Transportation Needs   In the past 12 months, has lack of transportation kept you from medical appointments or from getting medications? yes   In the past 12 months, has lack of transportation kept you from meetings, work, or from getting things needed for daily living? Yes   Patient Choice   Provider Choice list and CMS website (https://medicare.gov/care-compare#search) for post-acute Quality and Resource Measure Data were provided and reviewed with: Patient     Assessment Note:  Met with pt and introduced myself as care coordinator and member of the Care Transitions team for discharge planning.   Pt was sitting Jamaican style in her bed facing the head of bed.  Pt was pleasant and cooperative.  Pt states she lives alone in a senior apartment building.  Pt states she has siblings but \"they are worthless\" and was not able to provide contact information.  Pt states she uses her scooter (now \"broken\")for drs appts.  Pt's address, phone number and contact information was verified.  Awaiting PT/OT evaluations.  Pt was discussed with TANISHA West.  Pt does not have any questions/concerns at this time. "     Previous Home Care: None  Pharmacy: [facility]  Falls: Per pt, multiple falls due to dizziness.  PCP:   [facility] Dr. Shah (cannot recall the date of the last appt).    Nanci Nelson MSN, RN-BC  Transitional Care Coordinator (TCC)  [phone]

## 2024-09-11 NOTE — CARE PLAN
The patient's goals for the shift include  pain management    The clinical goals for the shift include Patient will remain safe for the duration of the shift

## 2024-09-11 NOTE — PROGRESS NOTES
Wound Care Consult     Visit Date: 9/11/2024      Patient Name: Miss Eva Velasquez         MRN: [id number]           YOB: 1950     Reason for Consult: colostomy assessment and pouch check       Ostomy type: colostomy       Size: not measured     Color: red  Protruding: flush/slightly budded  Functioning: soft brown stool  INTACT Pouching: Clean, dry, and intact [employer] Active life pouch with clip closure.  Ostomy Education:  The patient is knowledgeable in ostomy care, but needs assistance while inpatient.   Plan: Assess stoma/pouching twice a week and as needed. Patient reports frequent leaking at night when she is home, but states pouch remains intact when inpatient. Ostomy/wound team will follow while inpatient for colostomy care.  Additional supplies place in bedside table.      While inpatient, Secure chat with questions or if condition changes. For urgent communications please message the [facility] Wound Care Team through [employer] messaging.       Lexis Gipson RN, KAYLEY  9/11/2024  4:30 PM

## 2024-09-11 NOTE — CARE PLAN
"The patient's goals for the shift include  \"talk to doctors about closing ostomy\"    The clinical goals for the shift include maintain safety, make sure patient is comfortable and no injuries/falls.    Over the shift, the patient remained safe, comfortable and had no new injuries or falls.    "

## 2024-09-11 NOTE — PROGRESS NOTES
"Physical Therapy    Physical Therapy Evaluation    Patient Name: Sandra Velasquez \"Miss Velasquez\"  MRN: [id number]  Department: [place] [address]  Room: [address] [place] [address]  Today's Date: 9/11/2024   Time Calculation  Start Time: 1454  Stop Time: 1514  Time Calculation (min): 20 min    Assessment/Plan   PT Assessment  PT Assessment Results: Decreased strength, Decreased endurance, Impaired balance, Decreased mobility, Decreased safety awareness, Decreased cognition, Impaired judgement  Rehab Prognosis: Good  End of Session Communication: Bedside nurse  End of Session Patient Position: Bed, 3 rail up, Alarm off, caregiver present  IP OR SWING BED PT PLAN  Inpatient or Swing Bed: Inpatient  PT Plan  Treatment/Interventions: Bed mobility, Transfer training, Gait training, Balance training, Strengthening, Endurance training, Therapeutic exercise, Therapeutic activity  PT Plan: Ongoing PT  PT Frequency: 3 times per week  PT Discharge Recommendations: Moderate intensity level of continued care  Equipment Recommended upon Discharge:  (TBD)  PT Recommended Transfer Status: Assist x2  PT - OK to Discharge: Yes      Subjective   General Visit Information:  General  Reason for Referral: presenting with falls at home.  Past Medical History Relevant to Rehab: PMH of CAD , fibromyalgia, diverticulitis, CHF, pyoderma gangrenosum and recent hospitalization and treatment for pneumonia. Alternating intermittent exotropia (2023), Asymmetrical sensorineural hearing loss(2023), anxiety, Arthralgia of left knee  Prior to Session Communication: Bedside nurse  Patient Position Received: Bed, 3 rail up, Alarm off, not on at start of session  Preferred Learning Style: verbal  General Comment: Pt seated EOB upon arrival and agreeable to participate. RN notified of Low BP  Home Living:  Home Living  Type of Home: Apartment  Lives With: Alone  Home Adaptive Equipment: Cane, Wheelchair-power (pt reports W/C broken)  Home Layout: One level (laundry same " floor)  Home Access: Elevator  Bathroom Shower/Tub: Walk-in shower  Prior Level of Function:  Prior Function Per Pt/Caregiver Report  Level of [place]: Independent with ADLs and functional transfers  ADL Assistance: Independent  Ambulatory Assistance:  (reports PRN use of cane, power W/C outdoors (reports W/C broken))  Hand Dominance: Right  Prior Function Comments: reports ~50 falls in the past 6 months, more in bathroom  Precautions:  Precautions  Medical Precautions: Fall precautions (50 falls in the past 6 months).   Vital Signs (Past 2hrs)    BP: 89/36, HR: 62 bpm, SpO2: 96%        Objective   Pain:  Pain Assessment  Pain Assessment: 0-10  0-10 (Numeric) Pain Score: 6  Pain Location: Back  Cognition:  Cognition  Overall Cognitive Status: Impaired  Orientation Level: Disoriented to time, Disoriented to situation  Following Commands: Follows one step commands with repetition    General Assessments:       Sensation  Light Touch: No apparent deficits    Strength  Strength Comments: BLE's 3/5 based on mobility         Static Sitting Balance  Static Sitting-Level of Assistance: Close supervision  Dynamic Sitting Balance  Dynamic Sitting-Level of Assistance: Contact guard       Functional Assessments:  Bed Mobility  Bed Mobility: Yes  Bed Mobility 1  Bed Mobility 1: Sitting to supine  Level of Assistance 1: Close supervision    Transfers  Transfer: No (unable to assess due to safety concerns of low BP)         Stairs  Stairs: No            Outcome Measures:  [facility] Basic Mobility  Turning from your back to your side while in a flat bed without using bedrails: A little  Moving from lying on your back to sitting on the side of a flat bed without using bedrails: A little  Moving to and from bed to chair (including a wheelchair): Total  Standing up from a chair using your arms (e.g. wheelchair or bedside chair): Total  To walk in hospital room: Total  Climbing 3-5 steps with railing: Total  Basic Mobility - Total  Score: 10    Encounter Problems       Encounter Problems (Active)       Balance       Pt will score >/=24/28 on [place] to demonstrate decreased falls risk (Progressing)       Start:  09/11/24    Expected End:  09/25/24               Mobility       Pt will be [place] for ambulation 25 ft with LRAD (Progressing)       Start:  09/11/24    Expected End:  09/25/24               PT Transfers       Pt will be [place] for sit to stand and bed to chair transfers with LRAD (Progressing)       Start:  09/11/24    Expected End:  09/25/24            Pt will be [place] with bed mobility (Progressing)       Start:  09/11/24    Expected End:  09/25/24               Pain - Adult              Education Documentation  Precautions, taught by Janna Snow PT at 9/11/2024  4:58 PM.  Learner: Patient  Readiness: Acceptance  Method: Explanation  Response: Needs Reinforcement    Mobility Training, taught by Janna Snow PT at 9/11/2024  4:58 PM.  Learner: Patient  Readiness: Acceptance  Method: Explanation  Response: Needs Reinforcement    Education Comments  No comments found.

## 2024-09-11 NOTE — NURSING NOTE
"This RN took over care of Pt at 1545. Pt calm and cooperative throughout shift. Pt states she is doing \"great\" and is very happy with the care  here. Bed locked and low, call light within reach, video monitoring ongoing with  at monitor.  "

## 2024-09-11 NOTE — NURSING NOTE
Patient arrived to  40 in stable condition, belongings secured in locker in patient room, patient oriented to unit, 3/4 side rails put up, bed in lowest position, and purewick put on patient.

## 2024-09-11 NOTE — PROGRESS NOTES
"Occupational Therapy    Occupational Therapy    Evaluation and Treatment    Patient Name: Sandra Velasquez \"Miss Velasquez\"  MRN: [id number]  Today's Date: 9/11/2024  Room: [address] [place] [address]  Time Calculation  Start Time: 0900  Stop Time: 0951  Time Calculation (min): 51 min    Assessment  IP OT Assessment  OT Assessment: Pt is a 75 y/o female with decreased ADL status;Decreased safe judgment during ADL;Decreased cognition;Decreased fine motor control;Decreased functional mobility;Decreased IADLs. Pt. was accepting to treatment and needed verbal cueing for ADLs. Pt. would benefit from skilled OT services to address deficits.  Prognosis: Good  Barriers to Discharge: Decreased caregiver support (Pt. reported various falls in bathroom, lives alone, has no caretakers.)  Evaluation/Treatment Tolerance: Patient tolerated treatment well  Medical Staff Made Aware: Yes  End of Session Communication: Bedside nurse, Physician  End of Session Patient Position: Bed, 4 rail up  Plan:  Inpatient Plan  Treatment Interventions: ADL retraining, Functional transfer training, Cognitive reorientation, Fine motor coordination activities  OT Frequency: 3 times per week  OT Discharge Recommendations: Moderate intensity level of continued care  Equipment Recommended upon Discharge:  (Pt. reported having both types of canes and power wheelchair)  OT Recommended Transfer Status:  (NT)  OT - OK to Discharge: Yes  OT Assessment  Prognosis: Good  Barriers to Discharge: Decreased caregiver support (Pt. reported various falls in bathroom, lives alone, has no caretakers.)  Evaluation/Treatment Tolerance: Patient tolerated treatment well  Medical Staff Made Aware: Yes    Subjective   Current Problem:  1. Pneumonia of left lower lobe due to infectious organism        2. PRISCILLA (acute kidney injury) (CMS-HCC)        3. Hypokalemia        4. Urinary tract infection without hematuria, site unspecified          General:  Reason for Referral: Decreased ADLs, " "Safety Assessment, Functional Cognitive Evaluation  Past Medical History Relevant to Rehab: PMH of CAD , fibromyalgia, diverticulitis, CHF, pyoderma gangrenosum and recent hospitalization and treatment for pneumonia. Alternating intermittent exotropia (2023), Asymmetrical sensorineural hearing loss(2023), anxiety, Arthralgia of left knee  Prior to Session Communication: Bedside nurse  Patient Position Received: Bed, 4 rail up, Alarm off, not on at start of session  Family/Caregiver Present: No  General Comment: Pt. was supine in reverse with feet at HOB, reported being cold, and accepting to OT session.   Precautions:  Precautions Comment: Fall precautions  Vital Signs (Past 2hrs)        Date/Time Vitals Session Patient Position Pulse Resp SpO2 BP MAP (mmHg)    09/11/24 15:25:38 --  --  62  --  97 %  95/52  50                   Pain:  Pain Assessment  Pain Assessment: 0-10  0-10 (Numeric) Pain Score: 9 (Pt. reported level 11 pain for kidney stones, at end of session Pt. reported pain lvel to a 9.)  Pain Type: Acute pain  Pain Location: Back (All over)  Pain Interventions: Medication (See MAR)  Lines/Tubes/Drains:  Colostomy LUQ (Active)   Number of days:        External Urinary Catheter Female (Active)   Number of days: 0       Objective   Cognition:  Overall Cognitive Status: Impaired (Pt. reported \"missing various doctor appointments because (she) keeps falling down.\" Pt reported losing her memory.)  Arousal/Alertness: Appropriate responses to stimuli  Orientation Level: Disoriented to time  Following Commands: Follows one step commands with repetition  Safety Judgment: Decreased awareness of need for assistance  Problem Solving: Assistance required to generate solutions  Attention: Exceptions to WFL  Alternating Attention: Impaired  Selective Attention: Impaired  Memory: Exceptions to WFL  Short-Term Memory: Impaired  Impulsive: Moderately  Cognition Test Scores  Cognition Tests: Cognition Test Performed  SBT " Test Score: 17 Impairment consistent with dementia (Pt. was distracted during months backwards, stopped, focused on another task, went back to repeating months but skipped August and stopped at May. Pt. repeatedly paused when couting backwards from 20. Pt. reported todays date was September 20th.)        Home Living:  Type of Home: Apartment  Lives With: Alone  Home Adaptive Equipment: Cane, Wheelchair-power  Home Layout: One level  Home Access: Elevator  Bathroom Shower/Tub: Walk-in shower  Bathroom Toilet: Standard  Bathroom Equipment: None  Home Living Comments: Pt reported home furniture is positioned for her to stead herself. Pt. reported falling many times in her home and in her bathroom.   Prior Function:  Level of [place]: Needs assistance with ADLs, Needs assistance with homemaking, Needs assistance with functional transfers  ADL Assistance: Needs assistance  Bath:  (NT)  Vocational: Retired  Hand Dominance: Right  Prior Function Comments: Pt. claims to live alone but required assistance with ADLs, falls a lot in the bathroom at home.  IADL History:  Homemaking Responsibilities: Yes  Education: [employer]  Type of Occupation: [employer], retired.  Leisure and Hobbies: makes earrings, jewler, reads magazines.  IADL Comments: Pt. lives alone, reported needing help for all IADLs.  ADL:  Eating Assistance: Stand by  Grooming Assistance: Minimal  Bathing Assistance: Minimal  Bathing Deficit: Supervision/safety  UE Dressing Assistance: Minimal  LE Dressing Assistance: Minimal  Toileting Assistance with Device: Moderate  Toileting Deficit: Supervison/safety  Toileting  Toileting Comments: Nurse changed ostomy bag while in session  ADL Comments: Nurse required to change ostomy bag    Activity Tolerance:  Endurance: Tolerates 30+ min exercise without fatigue  Activity Tolerance Comments: Pt was accepting to ADLs in bed  Balance:  Dynamic Sitting Balance  Dynamic Sitting-Balance Support: No  upper extremity supported  Dynamic Sitting-Level of Assistance: Distant supervision  Dynamic Sitting-Balance: Reaching for objects, Reaching across midline, Forward lean  Static Sitting Balance  Static Sitting-Balance Support: No upper extremity supported  Static Sitting-Level of Assistance: Distant supervision  Bed Mobility/Transfers: Bed Mobility/Transfers: Bed Mobility  Bed Mobility: Yes  Bed Mobility 1  Bed Mobility 1: Supine to sitting  Level of Assistance 1: Distant supervision  Bed Mobility Comments 1: x 2 for dressing  Bed Mobility 2  Bed Mobility  2: Sitting to supine  Level of Assistance 2: Distant supervision   and    IADL's:   Homemaking Responsibilities: Yes  Education: [employer] ([employer])  Type of Occupation: [employer], retired.  Leisure and Hobbies: makes earrings, jewler, reads magazines.  IADL Comments: Pt. lives alone, reported needing help for all IADLs.  Vision: Vision - Basic Assessment  Current Vision: Wears glasses only for reading  Visual History: Other (Comment)   and Vision - Complex Assessment  Vision Comments: Alternating intermittent exotropia 2023    Coordination:  Movements are Fluid and Coordinated: Yes   Hand Function:  Hand Function  Gross Grasp: Functional  Coordination:  (Pt. has tremors in both hands)  Extremities:   RUE   RUE : Within Functional Limits, LUE   LUE: Within Functional Limits,  , and        Outcome Measures: [facility] Daily Activity  Putting on and taking off regular lower body clothing: A little  Bathing (including washing, rinsing, drying): A little  Putting on and taking off regular upper body clothing: A little  Toileting, which includes using toilet, bedpan or urinal: A little  Taking care of personal grooming such as brushing teeth: A little  Eating Meals: A little  Daily Activity - Total Score: 18         ,     OT Adult Other Outcome Measures  4AT: + 6    4AT  Alertness    Normal (fully alert, but not agitated, throughout assessment) 0          Mild  sleepiness for <10 seconds after waking, then normal 0  Clearly abnormal 4  Score: 0    AMT4  Age, date of birth, place (name of the hospital or building), current year  No mistakes 0  1 mistake 1  2 or more mistakes/untestable 2  Score:1    Attention  Months of the year backwards Achieves   7 months or more correctly 0  Starts but scores <7 months / refuses to start 1  Untestable (cannot start because unwell, drowsy, inattentive) 2  Score: 1    Acute change or fluctuating course  Evidence of significant change or fluctuation in: alertness, cognition, other mental function  (eg. paranoia, hallucinations) arising over the last 2 weeks and still evident in last 24hrs  No 0  Yes 4  Score: 4    Total score: 6 +  4 or above: possible delirium +/- cognitive impairment  1-3: possible cognitive impairment  0: delirium or severe cognitive impairment unlikely (but  delirium still possible if [4] information incomplete)   Education Documentation  Body Mechanics, taught by DAVID Elizalde at 9/11/2024 11:58 AM.  Learner: Patient  Readiness: Acceptance  Method: Demonstration  Response: Demonstrated Understanding    Precautions, taught by DAVID Elizalde at 9/11/2024 11:58 AM.  Learner: Patient  Readiness: Acceptance  Method: Demonstration  Response: Demonstrated Understanding    ADL Training, taught by DAVID Elizalde at 9/11/2024 11:58 AM.  Learner: Patient  Readiness: Acceptance  Method: Demonstration  Response: Demonstrated Understanding    Education Comments  No comments found.        Goals:   Encounter Problems       Encounter Problems (Active)       ADLs       Patient will perform UB and LB bathing with modified independent level of assistance and shower chair.       Start:  09/11/24    Expected End:  09/18/24            Patient with complete upper body dressing with supervision level of assistance donning and doffing all UE clothes while seated.       Start:  09/11/24    Expected End:  09/18/24            Patient  with complete lower body dressing with supervision level of assistance donning and doffing all LE clothes while seated.       Start:  09/11/24    Expected End:  09/18/24            Patient will complete daily grooming tasks brushing teeth and washing face/hair, with supervision level of assistance and PRN adaptive equipment at sink.       Start:  09/11/24    Expected End:  09/18/24            Patient will complete toileting including hygiene clothing management/hygiene with modified independent level of assistance.       Start:  09/11/24    Expected End:  09/18/24               BALANCE       Pt will maintain dynamic standing balance during ADL task with modified independent level of assistance in order to demonstrate decreased risk of falling and improved postural control.       Start:  09/11/24    Expected End:  09/18/24            Patientt will maintain static standing balance during ADL task with modified independent level of assistance drop down in order to demonstrate decreased risk of falling and improved postural control.       Start:  09/11/24    Expected End:  09/18/24               COGNITION/SAFETY       Patient will recall and adhere to fall precautions during all functional mobility/ADL tasks in order to demonstrate improved understanding.       Start:  09/11/24    Expected End:  09/18/24            Patient will score WFL on standardized cognitive assessment with verbal cues and within reasonable time frame.       Start:  09/11/24    Expected End:  09/18/24            Pt will follow Complex tasks time during OT tx session using 2 or less verbal cues.       Start:  09/11/24    Expected End:  09/18/24            Patient will demonstrated orientation x 4 with verbal cues.       Start:  09/11/24    Expected End:  09/18/24       ORIENTATION            MOBILITY       Patient will perform Functional mobility around Household distances/Community Distances with modified independent level of assistance and least  restrictive device in order to improve safety and functional mobility.       Start:  09/11/24    Expected End:  09/18/24               TRANSFERS       Patient will complete functional transfer with least restrictive device with supervision level of assistance.       Start:  09/11/24    Expected End:  09/18/24                   Treatment Completed on Evaluation       Activities of Daily Living:    Grooming  Grooming Level of Assistance: Distant supervision  Grooming Where Assessed: Bed level  Grooming Comments: Washed face, brushed hair        UE Dressing  UE Dressing Level of Assistance: Minimum assistance  UE Dressing Where Assessed: Bed level  UE Dressing Comments: Changed gown  LE Dressing  LE Dressing: Yes  Sock Level of Assistance: Distant supervision  LE Dressing Where Assessed: Bed level  LE Dressing Comments: donned socks    OT Student under direct supervision by SHANE/L   09/11/24 at 3:58 PM   DAVID LITTLEJOHN   Rehab Office: [phone]

## 2024-09-11 NOTE — CONSULTS
"Inpatient consult to Infectious Diseases  Consult performed by: Steven Grimaldo DO  Consult ordered by: Ariel Tejada MD          Reason For Consult  Persistent leukocytosis     History Of Present Illness  Miss Eva Velasquez is a 74 y.o. female presenting with falls at home. Patient has a PMH of CAD , fibromyalgia, diverticulitis, CHF, pyoderma gangrenosum and recent hospitalization and treatment for pneumonia.     Per patient she is having issues with accessing food and water, she lives by herself in an apartment (unsure if this is an assisted living situation) but she receives no help with her colostomy bag, getting food and managing her wounds. She also has issues with transport for follow up appointments. She presented with dizziness prior to her fall. She has not been eating or drinking much. She attributes most of her inability to eat food to feeling worn down from her \"pneumonia\". Patient said she was compliant with her 3 weeks course of Augmentin from her previous admission for CHF exacerbation complicated by CT aches findings of bronchopneumonia/ lung abscess and did not follow up with ID because of transport issues.    Per patient she has been having fevers, chills and night sweats for 1 month duration with cough and sputum production. Patient coughed while I was in the room and brought up thick sputum. Patient does say that her sputum is often rust-colored and has some blood in it. Patient is also having worsening shortness of breath and swelling but she said that over the year she has had over 40lbs of unintentional weight loss associated with anorexia. She denied any pleuritic chest pain. She denied any sick contacts and said that she does have chronic wounds on her axilla and groin and she doesn't take good care of them. She said that she gets dizzy often but that she never hits her head when she falls.     Of note- patient has had MRSA bacteremia before 2020 and 2022 treated with " vancomycin in OSH. On this admission patient is Influenza and COVID negative. Patient has had persistently elevated WBC most recent 17.5.         Past Medical History  She has a past medical history of Acute cystitis without hematuria (01/03/2019), Age-related nuclear cataract, left eye (10/31/2018), Age-related nuclear cataract, right eye (10/31/2018), Allergic rhinitis due to pollen (09/19/2018), Calculus of kidney (12/11/2018), Change in bowel habit (12/18/2018), Combined forms of age-related cataract, left eye (05/08/2017), Combined forms of age-related cataract, left eye (05/08/2017), Cortical age-related cataract, left eye (10/31/2018), Cyanosis (08/10/2017), Dry eye syndrome of bilateral lacrimal glands (10/31/2018), Dry eye syndrome of bilateral lacrimal glands (10/31/2018), Encounter for immunization (09/17/2018), Encounter for immunization (10/01/2020), History of falling (01/03/2019), Hyperlipidemia, unspecified (08/26/2020), Lesion of radial nerve, unspecified upper limb (03/05/2016), Meibomian gland dysfunction of unspecified eye, unspecified eyelid (10/31/2018), Nausea (11/27/2018), Opioid dependence, uncomplicated (Multi) (03/09/2020), Other chronic sinusitis (06/15/2018), Other fecal abnormalities (11/27/2018), Other specified disorders of nose and nasal sinuses (06/15/2018), Other specified disorders of nose and nasal sinuses (10/24/2018), Other specified disorders of nose and nasal sinuses (02/03/2017), Other specified disorders of teeth and supporting structures (09/04/2015), Pain in leg, unspecified (03/11/2019), Pain in throat (02/03/2017), Personal history of diseases of the blood and blood-forming organs and certain disorders involving the immune mechanism (02/03/2015), Personal history of diseases of the skin and subcutaneous tissue (08/16/2013), Personal history of other diseases of the circulatory system, Personal history of other diseases of the digestive system, Personal history of other  diseases of the digestive system (07/02/2014), Personal history of other diseases of the musculoskeletal system and connective tissue (07/02/2014), Personal history of other diseases of the respiratory system (12/18/2017), Personal history of other diseases of the respiratory system (12/29/2017), Personal history of other diseases of the respiratory system (01/03/2019), Personal history of other diseases of the respiratory system (02/03/2017), Personal history of other diseases of the respiratory system (12/29/2017), Personal history of other diseases of the respiratory system (06/15/2018), Personal history of other diseases of the respiratory system (08/11/2015), Personal history of other drug therapy (02/08/2017), Personal history of other drug therapy (11/10/2014), Personal history of other infectious and parasitic diseases (03/25/2019), Personal history of other specified conditions (07/25/2014), Personal history of other specified conditions (12/18/2017), Personal history of other specified conditions (06/18/2014), Personal history of other specified conditions (12/29/2017), Personal history of other specified conditions (08/28/2017), Personal history of pneumonia (recurrent) (03/10/2017), Personal history of urinary (tract) infections (01/03/2019), Pyuria (08/22/2016), Right upper quadrant pain (02/03/2017), Ulcerative blepharitis unspecified eye, unspecified eyelid (10/31/2018), Unspecified atherosclerosis (07/02/2014), Unspecified cataract (06/09/2015), Unspecified cataract (06/09/2015), Unspecified cataract (06/09/2015), and Unspecified cataract (06/09/2015).    Surgical History  She has a past surgical history that includes Lithotripsy (07/31/2013); Other surgical history (11/10/2021); Refractive surgery (06/09/2015); Tonsillectomy (07/02/2014); Other surgical history (07/02/2014); CT guided percutaneous peritoneal or retroperitoneal fluid collection drainage (4/7/2020); CT guided percutaneous peritoneal  or retroperitoneal fluid collection drainage (4/22/2020); and IR CVC PICC (5/11/2020).     Social History  She reports that she has never smoked. She has never used smokeless tobacco. She reports that she does not drink alcohol and does not use drugs.    Family History  Family History   Problem Relation Name Age of Onset    Alcohol abuse Mother      Other (CARDIAC DISORDER) Mother      Diabetes Mother      Glaucoma Mother      Other (MESOTHELIOMA) Mother      Alcohol abuse Father      Melanoma Father      Macular degeneration Sister      Breast cancer Sister      Macular degeneration Other AUNT     Skin cancer Other Family hx         Allergies  Penicillins, Atorvastatin, House dust mite, and Erythromycin base    Review of Systems  As mentioned above  Cardio: no chest pain, does have SOB  Pulm: SOB   Abdominal: Lower abdominal pain 2/2 to kidney stones  : no urinary symptoms (F/U/D)    Physical Exam  General: NAD   HEENT: poor dentition, otherwise atraumatic, no conjunctivitis   Cardio: RRR no bruits/rubs  Pulm: poor respiratory excursion, muffled lung sounds in left lower lobe and some crackles heard in the right lung base  Abdominal: recently cleaned colostomy tube, tender to palpation in right lower abdomen  : not examined   Skin/MSK: fecal matter on     Last Recorded Vitals  /73   Pulse 78   Temp 36.4 °C (97.5 °F)   Resp 18   Wt 68 kg (150 lb)   SpO2 97%     Relevant Results  COVID / influenza negative  Hx of MRSA bacteremia      Assessment/Plan   Miss Eva Velasquez is a 74 y.o. female presenting with falls at home. Patient has a PMH of CAD , fibromyalgia, diverticulitis, CHF, pyoderma gangrenosum and recent hospitalization and treatment for pneumonia. Patient has had persistent cough, fevers, chills and night sweats and said that she has lost over 40lbs of weight unintentionally. Pt was previously admitted for CHF exacerbation and found to have pneumonia concerning for abscess vs  bronchopneumonia and discharged with 3 weeks of augmentin which she said had been compliant with. ID was consulted for persistent leukocytosis iso pneumonia symptoms and lower low consolidation. Care is complicated by social issues.     #LLL Pneumonia aspiration vs abscess vs CAP  #Leukocytosis     ::Patient presents with productive cough, fevers, chills, and night sweats  ::CT and Chest xray concerning for LLL pneumonia  ::Given prolonged symptoms, can not rule out atypical pna   ::Poor dentition at risk for aspiration     Recommendations  -Stop Ceftriaxone  -Can start ampicillin-sulbactam 3g every six hours  -Can continue Azithro for atypical coverage for 3 days (9/11- *)   -Cons Geriatrics consult for difficult social situation  -Consider SLP eval   -Sputum cultures    Steven Grimaldo DO

## 2024-09-11 NOTE — HOSPITAL COURSE
74 year old female with PMH of CAD, Fibromyalgia, Diverticulitis, CHF, and pyoderma gangrenosum presenting due to concern for falls at home. Fall are mechanical, but having multiple a day. Falls are usually preceded by dizziness. Recent admission noted to [facility] in July for CHF and there was concern for bronchiolitis and pneumonia with possible left lung abscess and was discharged with 3 weeks of augmentin with ID follow up. IN the ED she was HDS, afebrile. Labs showed leukocytosis and PRISCILLA. CXR with left lung infiltrate consistent with prior imaging. Plan to obtain repeat CT scan. Orthostatics and pt/ot consult for falls.

## 2024-09-11 NOTE — PROGRESS NOTES
Assessment/Plan   74 year old female with PMH of CAD, Fibromyalgia, Diverticulitis, CHF, and pyoderma gangrenosum presenting due to concern for falls at home. Fall are mechanical, but having multiple a day. Falls are usually preceded by dizziness. Recent admission noted to [facility] in July for CHF and there was concern for bronchiolitis and pneumonia with possible left lung abscess and was discharged with 3 weeks of augmentin with ID follow up. IN the ED she was HDS, afebrile. Labs showed leukocytosis and PRICSILLA. CXR with left lung infiltrate consistent with prior imaging. Plan to obtain repeat CT scan. Orthostatics and pt/ot consult for falls..    #CAP v. Lung abscess  - [facility] in July for CHF and there was concern for bronchiolitis and pneumonia with possible left lung abscess and was discharged with 3 weeks of augmentin with ID follow up  - cxr with left lung infiltrate  - CT chest ordered  - leukocytosis noted and priscilla. Both down trending.   - dc augmentin  - on ceftrixone azitrho, will review abx with ID if needs anaerobic coverage after CT. Recent hospitalization, may need to broaden. Has pcn allergy.   - concern for CAP based on CXR and self-reported year of fevers and cough; however presentation is not very convincing other than persistently elevated WBC  - continuous pulse ox  - sputum culture, strep and legionella ag  - ID consulted due to persistent WBC elevation and recent history of lung abscess     #Falls  - likely element of decondition, recent hospitalization, active acute illness (pna/abscess)  - Orthostatics ordered  - PT/OT  - SW/tcc consult  - Per tcc/sw has been concern of her ability to care for herself at home.      #PRISCILLA  - fluid repletion  - repeat rfp downtrending to 1.2.     #Essential Tremor  - continue home Propranolol 20mg BID     #Allergic rhinitis  - continue home Cetirizine 10mg daily  - continue home Fluticasone nasal spray    #Anxiety and depression  - continue home Duloxetine 60mg    #urinary  "retention  - continue home Tamsulosin 0.4mg daily           Scheduled outpatient appointments in system:   Future Appointments   Date Time Provider Department Center   9/25/2024  1:00 PM Rose Shah MD [id number]   10/7/2024 10:45 AM [facility]     ---------------------------------------------------------------------------------------------------  Subjective   No events. Limited history, report falls, does not know if she has passed out. Reports occasional dizziness before falling. Reports fevers and night sweaters. States she has had several hospitalization but is unable to explain them, states all for the same thing and she is \"diagnosed with zebras\". Per tcc/sw has been concern of her ability to care for herself at home.      ---------------------------------------------------------------------------------------------------  Objective   Last Recorded Vitals  Blood pressure 107/73, pulse 78, temperature 36.4 °C (97.5 °F), resp. rate 18, height 1.626 m (5' 4\"), weight 68 kg (150 lb), SpO2 97%.  Intake/Output last 3 Shifts:  No intake/output data recorded.    Physical Exam  Vitals and nursing note reviewed.   Constitutional:       General: She is not in acute distress.     Appearance: Normal appearance. She is not ill-appearing or toxic-appearing.      Comments: Laying in bed flat   HENT:      Head: Normocephalic and atraumatic.      Mouth/Throat:      Mouth: Mucous membranes are moist.   Eyes:      General: No scleral icterus.     Extraocular Movements: Extraocular movements intact.      Conjunctiva/sclera: Conjunctivae normal.   Cardiovascular:      Rate and Rhythm: Normal rate and regular rhythm.      Heart sounds: S1 normal and S2 normal. No murmur heard.  Pulmonary:      Effort: Pulmonary effort is normal. No respiratory distress.      Breath sounds: No wheezing, rhonchi or rales.      Comments: Diminished left sided lung sounds.   Abdominal:      General: Bowel sounds are " normal. There is no distension.      Palpations: Abdomen is soft.      Tenderness: There is no abdominal tenderness. There is no guarding or rebound.   Musculoskeletal:         General: No swelling or deformity.      Cervical back: Neck supple.   Skin:     General: Skin is warm and dry.      Findings: No rash.   Neurological:      General: No focal deficit present.      Mental Status: She is alert. Mental status is at baseline.   Psychiatric:         Mood and Affect: Mood normal.         Relevant Results  Lab Results   Component Value Date    WBC 17.5 (H) 09/11/2024    HGB 9.0 (L) 09/11/2024    HCT 31.1 (L) 09/11/2024    MCV 79 (L) 09/11/2024     09/11/2024      Lab Results   Component Value Date    GLUCOSE 139 (H) 09/11/2024    CALCIUM 8.7 09/11/2024     09/11/2024    K 3.2 (L) 09/11/2024    CO2 28 09/11/2024     09/11/2024    BUN 22 09/11/2024    CREATININE 1.22 (H) 09/11/2024     Scheduled medications  azithromycin, 500 mg, intravenous, q24h  cefTRIAXone, 2 g, intravenous, q24h  cetirizine, 10 mg, oral, Daily  DULoxetine, 60 mg, oral, Daily  enoxaparin, 30 mg, subcutaneous, q24h  fluticasone, 1 spray, Each Nostril, Daily  melatonin, 10 mg, oral, Nightly  propranolol, 20 mg, oral, BID  tamsulosin, 0.4 mg, oral, Daily      Continuous medications     PRN medications  PRN medications: ibuprofen, polyethylene glycol    Ariel Tejada MD

## 2024-09-12 LAB
ERYTHROCYTE [DISTWIDTH] IN BLOOD BY AUTOMATED COUNT: 17.3 % (ref 11.5–14.5)
HCT VFR BLD AUTO: 29.3 % (ref 36–46)
HGB BLD-MCNC: 8.5 G/DL (ref 12–16)
LEGIONELLA AG UR QL: NEGATIVE
MCH RBC QN AUTO: 23.3 PG (ref 26–34)
MCHC RBC AUTO-ENTMCNC: 29 G/DL (ref 32–36)
MCV RBC AUTO: 80 FL (ref 80–100)
NRBC BLD-RTO: 0 /100 WBCS (ref 0–0)
PLATELET # BLD AUTO: 409 X10*3/UL (ref 150–450)
RBC # BLD AUTO: 3.65 X10*6/UL (ref 4–5.2)
S PNEUM AG UR QL: NEGATIVE
WBC # BLD AUTO: 18 X10*3/UL (ref 4.4–11.3)

## 2024-09-12 PROCEDURE — 2500000002 HC RX 250 W HCPCS SELF ADMINISTERED DRUGS (ALT 637 FOR MEDICARE OP, ALT 636 FOR OP/ED): Performed by: STUDENT IN AN ORGANIZED HEALTH CARE EDUCATION/TRAINING PROGRAM

## 2024-09-12 PROCEDURE — 99233 SBSQ HOSP IP/OBS HIGH 50: CPT | Performed by: STUDENT IN AN ORGANIZED HEALTH CARE EDUCATION/TRAINING PROGRAM

## 2024-09-12 PROCEDURE — 2500000001 HC RX 250 WO HCPCS SELF ADMINISTERED DRUGS (ALT 637 FOR MEDICARE OP): Performed by: STUDENT IN AN ORGANIZED HEALTH CARE EDUCATION/TRAINING PROGRAM

## 2024-09-12 PROCEDURE — 2500000004 HC RX 250 GENERAL PHARMACY W/ HCPCS (ALT 636 FOR OP/ED): Performed by: STUDENT IN AN ORGANIZED HEALTH CARE EDUCATION/TRAINING PROGRAM

## 2024-09-12 PROCEDURE — 85027 COMPLETE CBC AUTOMATED: CPT | Performed by: STUDENT IN AN ORGANIZED HEALTH CARE EDUCATION/TRAINING PROGRAM

## 2024-09-12 PROCEDURE — 99232 SBSQ HOSP IP/OBS MODERATE 35: CPT

## 2024-09-12 PROCEDURE — 36415 COLL VENOUS BLD VENIPUNCTURE: CPT | Performed by: STUDENT IN AN ORGANIZED HEALTH CARE EDUCATION/TRAINING PROGRAM

## 2024-09-12 PROCEDURE — 1100000001 HC PRIVATE ROOM DAILY

## 2024-09-12 ASSESSMENT — COGNITIVE AND FUNCTIONAL STATUS - GENERAL
CLIMB 3 TO 5 STEPS WITH RAILING: TOTAL
MOVING FROM LYING ON BACK TO SITTING ON SIDE OF FLAT BED WITH BEDRAILS: A LITTLE
HELP NEEDED FOR BATHING: A LOT
MOBILITY SCORE: 13
PERSONAL GROOMING: A LOT
DAILY ACTIVITIY SCORE: 15
TURNING FROM BACK TO SIDE WHILE IN FLAT BAD: A LITTLE
DRESSING REGULAR LOWER BODY CLOTHING: A LITTLE
MOVING TO AND FROM BED TO CHAIR: A LOT
DRESSING REGULAR UPPER BODY CLOTHING: A LOT
STANDING UP FROM CHAIR USING ARMS: A LOT
WALKING IN HOSPITAL ROOM: A LOT
TOILETING: A LOT

## 2024-09-12 ASSESSMENT — PAIN - FUNCTIONAL ASSESSMENT
PAIN_FUNCTIONAL_ASSESSMENT: 0-10
PAIN_FUNCTIONAL_ASSESSMENT: 0-10

## 2024-09-12 ASSESSMENT — PAIN SCALES - GENERAL
PAINLEVEL_OUTOF10: 5 - MODERATE PAIN
PAINLEVEL_OUTOF10: 0 - NO PAIN
PAINLEVEL_OUTOF10: 4
PAINLEVEL_OUTOF10: 6

## 2024-09-12 NOTE — NURSING NOTE
"The patient's IV went bad after arriving to the unit. The writer attempted to start a new peripheral IV and was unsuccessful. The writer called IV team to come start an IV and they were unsuccessful. IV team is recommending a midline be placed. The writer reached out to the attending Dr. Tejada for midline orders at 1523.     1537: No response from Dr. Tejada    1621: The writer reached out Dr. Tejada again with no response    The writer was doing cluster care in the patient and brought in her medications. The patient was pulling off her gown and searching for something. She asked the writer if she had a phone. The writer responded \"yes Ma'am\". The patient stated \"I will slap you upside the head if you call me Ma'am again\".   "

## 2024-09-12 NOTE — CARE PLAN
The patient's goals for the shift include pt will rate pain 5/10 or less -progressing    The clinical goals for the shift include pt will remain safe and utilize call light -progressing

## 2024-09-12 NOTE — CONSULTS
"Nutrition Initial Assessment:   Nutrition Assessment    Reason for Assessment: Admission nursing screening    Patient is a 74 y.o. female presenting with concern for falls at home. Noted to have community acquired pneumonia due to chlamydia species. Undergoing work up for lung abscess and infection.     PMH: CAD, Fibromyalgia, Diverticulitis, CHF, ostomy bag, and pyoderma gangrenosum     Nutrition History:  Food and Nutrient History: Initially tried to meet with pt yesterday however she was sleeping. Pt now on airbrone precautions for TB rule out. On admission screening assessment, pt reported she has had poor intake r/t decreased appetite. She also endorses unintentional weight loss of > 34lbs. She has consumed 50% x 1 meal and 75% x 1 meal thus far. Of note EMS stated that the social workercalled them because the patient has been defecating on herself and is unable to cook for self.      Anthropometrics:  Height: 162.6 cm (5' 4\")   Weight: 68 kg (150 lb)   BMI (Calculated): 25.73  IBW/kg (Dietitian Calculated): 54.5 kg  Percent of IBW: 125 %       Weight History:   Wt Readings from Last 10 Encounters:   09/10/24 68 kg (150 lb)   08/02/24 72.6 kg (160 lb)   04/13/24 72.6 kg (160 lb)   04/10/24 68 kg (150 lb)   01/30/24 77.1 kg (170 lb)   12/21/23 79.1 kg (174 lb 4.8 oz)   10/27/23 79.4 kg (175 lb)   08/17/23 82 kg (180 lb 12.8 oz)   05/24/23 81.6 kg (180 lb)   05/18/23 83.6 kg (184 lb 6.4 oz)       Weight Change %:  Weight History / % Weight Change: weight is down 4.6kg (6.3%) in 1 month  Significant Weight Loss: Yes  Interpretation of Weight Loss: >5% in 1 month    Nutrition Focused Physical Exam Findings:  defer: pt on TB rule out   Edema:  Edema: none  Physical Findings:  Skin:  (ventricular wound to L arm)    Nutrition Significant Labs:  CBC Trend:   Results from last 7 days   Lab Units 09/12/24  0600 09/11/24  0856 09/10/24  1322   WBC AUTO x10*3/uL 18.0* 17.5* 18.1*   RBC AUTO x10*6/uL 3.65* 3.96* 4.49 "   HEMOGLOBIN g/dL 8.5* 9.0* 10.1*   HEMATOCRIT % 29.3* 31.1* 33.0*   MCV fL 80 79* 74*   PLATELETS AUTO x10*3/uL 409 428 528*    , BMP Trend:   Results from last 7 days   Lab Units 09/11/24  0856 09/10/24  1322   GLUCOSE mg/dL 139*  139* 86   CALCIUM mg/dL 8.7  8.7 9.9   SODIUM mmol/L 138  138 136   POTASSIUM mmol/L 3.2*  3.2* 3.2*   CO2 mmol/L 28  28 26   CHLORIDE mmol/L 103  103 99   BUN mg/dL 22  22 25*   CREATININE mg/dL 1.22*  1.22* 1.66*    , Renal Lab Trend:   Results from last 7 days   Lab Units 09/11/24  0856 09/10/24  1322   POTASSIUM mmol/L 3.2*  3.2* 3.2*   PHOSPHORUS mg/dL 2.2*  --    SODIUM mmol/L 138  138 136   MAGNESIUM mg/dL 1.70  --    EGFR mL/min/1.73m*2 47*  47* 32*   BUN mg/dL 22  22 25*   CREATININE mg/dL 1.22*  1.22* 1.66*    , Vit D:   Lab Results   Component Value Date    VITD25 38 12/18/2019    , Vit B12:   Lab Results   Component Value Date    PIQMMKVC13 1,139 (H) 05/23/2023        Nutrition Specific Medications:  Scheduled medications  ampicillin-sulbactam, 3 g, intravenous, q6h  azithromycin, 500 mg, intravenous, q24h  cetirizine, 10 mg, oral, Daily  DULoxetine, 60 mg, oral, Daily  enoxaparin, 30 mg, subcutaneous, q24h  fluticasone, 1 spray, Each Nostril, Daily  melatonin, 10 mg, oral, Nightly  propranolol, 20 mg, oral, BID  tamsulosin, 0.4 mg, oral, Daily      Continuous medications     PRN medications  PRN medications: ibuprofen, polyethylene glycol, traMADol      I/O:   Last BM Date: 09/12/24 (From ostomy); Stool Appearance: Loose (09/11/24 0844)    Dietary Orders (From admission, onward)       Start     Ordered    09/10/24 1702  Adult diet Regular  Diet effective now        Question:  Diet type  Answer:  Regular    09/10/24 1701                     Estimated Needs:   Total Energy Estimated Needs (kCal): 1400 kCal  Method for Estimating Needs: MSJ (1170) x 1.2  Total Protein Estimated Needs (g):  (60-71)  Method for Estimating Needs: 1.1-1.3g/kg IBW  Total Fluid Estimated  Needs (mL):  (per team)  Method for Estimating Needs: per team        Nutrition Diagnosis   Malnutrition Diagnosis  Patient has Malnutrition Diagnosis: Yes  Diagnosis Status: New  Malnutrition Diagnosis: Severe malnutrition related to acute disease or injury  As Evidenced by: > 5% weight loss in 1 month, reported inadequate energy intake(predicted < 75% for 1 month)    Nutrition Diagnosis  Patient has Nutrition Diagnosis: Yes  Diagnosis Status (1): New  Nutrition Diagnosis 1: Inability to manage self care  Related to (1): phsyical inability  As Evidenced by (1): social work report pt cannot care for herself       Nutrition Interventions/Recommendations         Nutrition Prescription:  Individualized Nutrition Prescription Provided for : ONS  Ensure Plus BID (monitor tolerance/ ostomy output)   Recommend daily MVI-M   Obtain Vit D lab and supplement if insufficient   Further recommendations pending home going needs once discharge plan is verified         Nutrition Interventions:   Interventions: Medical food supplement  Medical Food Supplement: Commercial beverage  Goal: Ensure Plus BID (700kcal and 26gm protein)      Nutrition Monitoring and Evaluation   Food/Nutrient Related History Monitoring  Monitoring and Evaluation Plan: Energy intake  Energy Intake: Estimated energy intake  Criteria: meet > 75% est energy needs         Biochemical Data, Medical Tests and Procedures  Monitoring and Evaluation Plan: Electrolyte/renal panel, Glucose/endocrine profile  Criteria: labs WNL              Time Spent (min): 30 minutes

## 2024-09-12 NOTE — CONSULTS
Wound Care Consult     Visit Date: 9/12/2024      Patient Name: Miss Eva Velasquez         MRN: [id number]           YOB: 1950     Reason for Consult: Assessment of ostomy and pouch change       Stoma Type: End Colostomy  Marcial: No  Diameter: 38mm  Location: LLQ  Protrusion: Budded  Mucosal Condition and Color: Moist, Red  Mucocutaneous Junction: Intact  Peristomal Skin: Clear, intact  Peristomal Contour: Bulged  Supportive Tissue: Soft  Character of Output: Brown  Removed/Current Pouching System: Removed 1 piece flat  Current Wearing Time: patient unsure, guesses 3-4 days     Recommendations:   Skin Care: Cavilon, barrier ring  Pouching System: 1 piece flat piper with ring        Wound Team Summary Assessment: Patient awake and alert. Very verbally rude and sarcastic. Pouch had been shifted and was not over stoma. Brown formed stool on her, gown and bed. Pouch changed without incident.      Wound Team Plan: Ostomy team will follow and change 2 times a week  Will revisit Monday 9/16/24     SUZANNE GERHARDT, RN, BSN  9/12/2024  7:06 PM

## 2024-09-12 NOTE — PROGRESS NOTES
Assessment/Plan   74 year old female with PMH of CAD, Fibromyalgia, Diverticulitis, CHF, and pyoderma gangrenosum presenting due to concern for falls at home. Fall are mechanical, but having multiple a day. Falls are usually preceded by dizziness. Recent admission noted to [facility] in July for CHF and there was concern for bronchiolitis and pneumonia with possible left lung abscess and was discharged with 3 weeks of augmentin with ID follow up. IN the ED she was HDS, afebrile. Labs showed leukocytosis and PRISCILLA. CXR with left lung infiltrate consistent with prior imaging. Plan to obtain repeat CT scan. Orthostatics and pt/ot consult for falls..    #CAP v. Lung abscess  - [facility] in July for CHF and there was concern for bronchiolitis and pneumonia with possible left lung abscess and was discharged with 3 weeks of augmentin with ID follow up  - cxr with left lung infiltrate  - CT chest ordered, pending  - leukocytosis noted and priscilla. Both down trending.   - dc augmentin  - change to unasyn, continue azithro per ID.   - concern for CAP based on CXR and self-reported year of fevers and cough; however presentation is not very convincing other than persistently elevated WBC  - continuous pulse ox  - sputum culture, strep and legionella ag, sputum for rsv  - ID consulted due to persistent WBC elevation and recent history of lung abscess  - obtain differential with next cbc     #Falls  - likely element of decondition, recent hospitalization, active acute illness (pna/abscess)  - Orthostatics ordered  - PT/OT  - SW/tcc consult  - Per tcc/sw has been concern of her ability to care for herself at home.      #PRISCILLA  - fluid repletion  - repeat rfp downtrending to 1.2.     #Essential Tremor  - continue home Propranolol 20mg BID     #Allergic rhinitis  - continue home Cetirizine 10mg daily  - continue home Fluticasone nasal spray    #Anxiety and depression  - continue home Duloxetine 60mg    #urinary retention  - continue home Tamsulosin  "0.4mg daily    Continued social concenrs for ability to care for self. Will likely eventually involve marcella after further infectious work up.        Scheduled outpatient appointments in system:   Future Appointments   Date Time Provider Department Center   9/25/2024  1:00 PM Rose Shah MD [id number]   10/7/2024 10:45 AM Mani Gonzales MD [facility]     ---------------------------------------------------------------------------------------------------  Subjective   No events. Still limited history, states she is doing worse due to a cough. Does not feel sob. Has subjective fevers. No gi or gu complaints. Tolerating diet. Asked for kleenex, informed pt that an rn was about to come in for a glucose check and will have her take it to her and she stated, \"what she going to do...bitch slap me\". There have been intermittant odd comments like this.      ---------------------------------------------------------------------------------------------------  Objective   Last Recorded Vitals  Blood pressure 102/57, pulse 72, temperature 36.2 °C (97.2 °F), resp. rate 18, height 1.626 m (5' 4\"), weight 68 kg (150 lb), SpO2 94%.  Intake/Output last 3 Shifts:  I/O last 3 completed shifts:  In: 730 (10.7 mL/kg) [P.O.:480; IV Piggyback:250]  Out: 250 (3.7 mL/kg) [Urine:250 (0.1 mL/kg/hr)]  Weight: 68 kg     Physical Exam  Vitals and nursing note reviewed.   Constitutional:       General: She is not in acute distress.     Appearance: Normal appearance. She is not ill-appearing or toxic-appearing.      Comments: Laying in bed flat   HENT:      Head: Normocephalic and atraumatic.      Mouth/Throat:      Mouth: Mucous membranes are moist.   Eyes:      General: No scleral icterus.     Extraocular Movements: Extraocular movements intact.      Conjunctiva/sclera: Conjunctivae normal.   Cardiovascular:      Rate and Rhythm: Normal rate and regular rhythm.      Heart sounds: S1 normal and S2 normal. No murmur " heard.  Pulmonary:      Effort: Pulmonary effort is normal. No respiratory distress.      Breath sounds: No wheezing, rhonchi or rales.      Comments: Diminished left sided lung sounds.   Abdominal:      General: Bowel sounds are normal. There is no distension.      Palpations: Abdomen is soft.      Tenderness: There is no abdominal tenderness. There is no guarding or rebound.   Musculoskeletal:         General: No swelling or deformity.      Cervical back: Neck supple.   Skin:     General: Skin is warm and dry.      Findings: No rash.   Neurological:      General: No focal deficit present.      Mental Status: She is alert. Mental status is at baseline.   Psychiatric:         Mood and Affect: Mood normal.         Relevant Results  Lab Results   Component Value Date    WBC 17.5 (H) 09/11/2024    HGB 9.0 (L) 09/11/2024    HCT 31.1 (L) 09/11/2024    MCV 79 (L) 09/11/2024     09/11/2024      Lab Results   Component Value Date    GLUCOSE 139 (H) 09/11/2024    GLUCOSE 139 (H) 09/11/2024    CALCIUM 8.7 09/11/2024    CALCIUM 8.7 09/11/2024     09/11/2024     09/11/2024    K 3.2 (L) 09/11/2024    K 3.2 (L) 09/11/2024    CO2 28 09/11/2024    CO2 28 09/11/2024     09/11/2024     09/11/2024    BUN 22 09/11/2024    BUN 22 09/11/2024    CREATININE 1.22 (H) 09/11/2024    CREATININE 1.22 (H) 09/11/2024     Scheduled medications  ampicillin-sulbactam, 3 g, intravenous, q6h  azithromycin, 500 mg, intravenous, q24h  cetirizine, 10 mg, oral, Daily  DULoxetine, 60 mg, oral, Daily  enoxaparin, 30 mg, subcutaneous, q24h  fluticasone, 1 spray, Each Nostril, Daily  melatonin, 10 mg, oral, Nightly  propranolol, 20 mg, oral, BID  tamsulosin, 0.4 mg, oral, Daily      Continuous medications     PRN medications  PRN medications: ibuprofen, polyethylene glycol, traMADol    Ariel Tejada MD

## 2024-09-12 NOTE — NURSING NOTE
4 eyes skin check completed with Trish BAKER. Pt has skin lesions on L underarm and groin area. No other areas of breakdown/wounds noted besides pt's ostomy stoma

## 2024-09-12 NOTE — CARE PLAN
Problem: Skin  Goal: Decreased wound size/increased tissue granulation at next dressing change  Outcome: Progressing  Goal: Participates in plan/prevention/treatment measures  Outcome: Progressing  Goal: Prevent/manage excess moisture  Outcome: Progressing  Goal: Prevent/minimize sheer/friction injuries  Outcome: Progressing  Goal: Promote/optimize nutrition  Outcome: Progressing  Goal: Promote skin healing  Outcome: Progressing     Problem: Fall/Injury  Goal: Not fall by end of shift  Outcome: Progressing  Goal: Be free from injury by end of the shift  Outcome: Progressing  Goal: Verbalize understanding of personal risk factors for fall in the hospital  Outcome: Progressing  Goal: Verbalize understanding of risk factor reduction measures to prevent injury from fall in the home  Outcome: Progressing  Goal: Use assistive devices by end of the shift  Outcome: Progressing  Goal: Pace activities to prevent fatigue by end of the shift  Outcome: Progressing     Problem: Pain - Adult  Goal: Verbalizes/displays adequate comfort level or baseline comfort level  Outcome: Progressing     Problem: Safety - Adult  Goal: Free from fall injury  Outcome: Progressing     Problem: Discharge Planning  Goal: Discharge to home or other facility with appropriate resources  Outcome: Progressing     Problem: Chronic Conditions and Co-morbidities  Goal: Patient's chronic conditions and co-morbidity symptoms are monitored and maintained or improved  Outcome: Progressing

## 2024-09-12 NOTE — PROGRESS NOTES
"Sandra Velasquez \"Miss Velasquez\" is a 74 y.o. female on day 2 of admission presenting with Community acquired pneumonia due to Chlamydia species.      Subjective   Patient sitting in bed eating. Patient was Aox2, could not tell me the date and she was asking where her cat was. Patient said that she is coughing but denied fevers, chills, headache, night sweats and abdominal pain. Patient did complain of more abdominal gas .        Objective     Last Recorded Vitals  BP (!) 95/40   Pulse 62   Temp 36.5 °C (97.7 °F)   Resp 18   Wt 68 kg (150 lb)   SpO2 97%   Intake/Output last 3 Shifts:    Intake/Output Summary (Last 24 hours) at 9/12/2024 1016  Last data filed at 9/11/2024 2300  Gross per 24 hour   Intake 490 ml   Output 250 ml   Net 240 ml       Admission Weight  Weight: 68 kg (150 lb) (09/10/24 1240)    Daily Weight  09/10/24 : 68 kg (150 lb)    Image Results  ECG 12 lead  Sinus rhythm with short TN  Rightward axis  Incomplete right bundle branch block  Possible Anterior infarct (cited on or before 10-APR-2024)  Abnormal ECG  When compared with ECG of 10-APR-2024 20:54,  Significant changes have occurred    See ED provider note for full interpretation and clinical correlation  Confirmed by Becca Doyle ([id number]) on 9/10/2024 11:09:20 PM  XR chest 2 views  Narrative: STUDY:  Chest Radiographs;  9/10/24 at 1:37 PM  INDICATION:  Chest pain.  COMPARISON:  Chest XR 11/2/23.  ACCESSION NUMBER(S):  [id number]  ORDERING CLINICIAN:  LYNDSAY BALBUENA  TECHNIQUE:  Frontal and lateral chest.   FINDINGS:  CARDIOMEDIASTINAL SILHOUETTE:  Cardiomediastinal silhouette is normal in size and configuration.     LUNGS:  There is an infiltrate in the left lower lobe.     ABDOMEN:  No remarkable upper abdominal findings.     BONES:  No acute osseous changes.  Impression: There is an infiltrate in the left lower lobe which may represent  pneumonia.  Signed by Kumar Lazar MD        Physical Exam  General: NAD   HEENT: poor " dentition, otherwise atraumatic, no conjunctivitis   Cardio: RRR no bruits/rubs  Pulm: poor respiratory excursion, muffled lung sounds in left lower lobe and some crackles heard in the right lung base  Abdominal: recently cleaned colostomy tube, tender to palpation in right lower abdomen  : not examined   Skin/MSK: fecal matter on      Last Recorded Vitals  /73   Pulse 78   Temp 36.4 °C (97.5 °F)   Resp 18   Wt 68 kg (150 lb)   SpO2 97%      Relevant Results  COVID / influenza negative  Hx of MRSA bacteremia         Assessment/Plan  Miss Eva Velasquez is a 74 y.o. female presenting with falls at home. Patient has a PMH of CAD , fibromyalgia, diverticulitis, CHF, pyoderma gangrenosum and recent hospitalization and treatment for pneumonia. Patient has had persistent cough, fevers, chills and night sweats and said that she has lost over 40lbs of weight unintentionally. Pt was previously admitted for CHF exacerbation and found to have pneumonia concerning for abscess vs bronchopneumonia and discharged with 3 weeks of augmentin which she said had been compliant with. ID was consulted for persistent leukocytosis iso pneumonia symptoms and lower lobe consolidation. Care is complicated by social issues.      #LLL Pneumonia aspiration vs abscess vs CAP  #Leukocytosis   #Hidradenitis suppurativa   #Pyoderma Gangrenosum      ::Patient presents with productive cough, fevers, chills, and night sweats  ::CT and Chest xray concerning for LLL pneumonia  ::Given prolonged symptoms, can not rule out atypical pna   ::Poor dentition at risk for aspiration        Recommendations  -Continue ampicillin-sulbactam 3g every six hours  -Can continue Azithro for atypical coverage for 3 days (9/11- *)   -consider Geriatrics consult for difficult social situation  -Consider SLP eval   -Sputum cultures with AFB for NTM work up   -Please get 2 sputum samples  8 hours apart for Xpert/RIF PCR   -Please obtain 2 sets  of bcx     Steven Grimaldo DO

## 2024-09-13 LAB
ALBUMIN SERPL BCP-MCNC: 2.2 G/DL (ref 3.4–5)
ANION GAP SERPL CALC-SCNC: 12 MMOL/L (ref 10–20)
BACTERIA SPEC RESP CULT: NORMAL
BASOPHILS # BLD AUTO: 0.1 X10*3/UL (ref 0–0.1)
BASOPHILS NFR BLD AUTO: 0.8 %
BUN SERPL-MCNC: 15 MG/DL (ref 6–23)
CALCIUM SERPL-MCNC: 8.8 MG/DL (ref 8.6–10.6)
CHLORIDE SERPL-SCNC: 104 MMOL/L (ref 98–107)
CO2 SERPL-SCNC: 27 MMOL/L (ref 21–32)
CREAT SERPL-MCNC: 1.13 MG/DL (ref 0.5–1.05)
EGFRCR SERPLBLD CKD-EPI 2021: 51 ML/MIN/1.73M*2
EOSINOPHIL # BLD AUTO: 0.46 X10*3/UL (ref 0–0.4)
EOSINOPHIL NFR BLD AUTO: 3.8 %
ERYTHROCYTE [DISTWIDTH] IN BLOOD BY AUTOMATED COUNT: 17.3 % (ref 11.5–14.5)
GLUCOSE SERPL-MCNC: 96 MG/DL (ref 74–99)
GRAM STN SPEC: NORMAL
GRAM STN SPEC: NORMAL
HCT VFR BLD AUTO: 28.6 % (ref 36–46)
HGB BLD-MCNC: 8.5 G/DL (ref 12–16)
HOLD SPECIMEN: NORMAL
HOLD SPECIMEN: NORMAL
IMM GRANULOCYTES # BLD AUTO: 0.11 X10*3/UL (ref 0–0.5)
IMM GRANULOCYTES NFR BLD AUTO: 0.9 % (ref 0–0.9)
LYMPHOCYTES # BLD AUTO: 4.16 X10*3/UL (ref 0.8–3)
LYMPHOCYTES NFR BLD AUTO: 34.5 %
MCH RBC QN AUTO: 23.4 PG (ref 26–34)
MCHC RBC AUTO-ENTMCNC: 29.7 G/DL (ref 32–36)
MCV RBC AUTO: 79 FL (ref 80–100)
MONOCYTES # BLD AUTO: 1.19 X10*3/UL (ref 0.05–0.8)
MONOCYTES NFR BLD AUTO: 9.9 %
NEUTROPHILS # BLD AUTO: 6.03 X10*3/UL (ref 1.6–5.5)
NEUTROPHILS NFR BLD AUTO: 50.1 %
NRBC BLD-RTO: 0 /100 WBCS (ref 0–0)
PHOSPHATE SERPL-MCNC: 3.7 MG/DL (ref 2.5–4.9)
PLATELET # BLD AUTO: 430 X10*3/UL (ref 150–450)
POTASSIUM SERPL-SCNC: 3.5 MMOL/L (ref 3.5–5.3)
RBC # BLD AUTO: 3.63 X10*6/UL (ref 4–5.2)
SODIUM SERPL-SCNC: 139 MMOL/L (ref 136–145)
WBC # BLD AUTO: 12.1 X10*3/UL (ref 4.4–11.3)

## 2024-09-13 PROCEDURE — 99232 SBSQ HOSP IP/OBS MODERATE 35: CPT

## 2024-09-13 PROCEDURE — 99223 1ST HOSP IP/OBS HIGH 75: CPT | Performed by: INTERNAL MEDICINE

## 2024-09-13 PROCEDURE — 2500000001 HC RX 250 WO HCPCS SELF ADMINISTERED DRUGS (ALT 637 FOR MEDICARE OP): Performed by: STUDENT IN AN ORGANIZED HEALTH CARE EDUCATION/TRAINING PROGRAM

## 2024-09-13 PROCEDURE — 1100000001 HC PRIVATE ROOM DAILY

## 2024-09-13 PROCEDURE — 31720 CLEARANCE OF AIRWAYS: CPT

## 2024-09-13 PROCEDURE — 2500000002 HC RX 250 W HCPCS SELF ADMINISTERED DRUGS (ALT 637 FOR MEDICARE OP, ALT 636 FOR OP/ED): Performed by: STUDENT IN AN ORGANIZED HEALTH CARE EDUCATION/TRAINING PROGRAM

## 2024-09-13 PROCEDURE — 36415 COLL VENOUS BLD VENIPUNCTURE: CPT | Performed by: STUDENT IN AN ORGANIZED HEALTH CARE EDUCATION/TRAINING PROGRAM

## 2024-09-13 PROCEDURE — 87798 DETECT AGENT NOS DNA AMP: CPT | Performed by: STUDENT IN AN ORGANIZED HEALTH CARE EDUCATION/TRAINING PROGRAM

## 2024-09-13 PROCEDURE — 99233 SBSQ HOSP IP/OBS HIGH 50: CPT | Performed by: STUDENT IN AN ORGANIZED HEALTH CARE EDUCATION/TRAINING PROGRAM

## 2024-09-13 PROCEDURE — 85025 COMPLETE CBC W/AUTO DIFF WBC: CPT | Performed by: STUDENT IN AN ORGANIZED HEALTH CARE EDUCATION/TRAINING PROGRAM

## 2024-09-13 PROCEDURE — 99418 PROLNG IP/OBS E/M EA 15 MIN: CPT | Performed by: INTERNAL MEDICINE

## 2024-09-13 PROCEDURE — 2500000004 HC RX 250 GENERAL PHARMACY W/ HCPCS (ALT 636 FOR OP/ED): Performed by: STUDENT IN AN ORGANIZED HEALTH CARE EDUCATION/TRAINING PROGRAM

## 2024-09-13 PROCEDURE — 84100 ASSAY OF PHOSPHORUS: CPT | Performed by: STUDENT IN AN ORGANIZED HEALTH CARE EDUCATION/TRAINING PROGRAM

## 2024-09-13 PROCEDURE — 87116 MYCOBACTERIA CULTURE: CPT | Performed by: STUDENT IN AN ORGANIZED HEALTH CARE EDUCATION/TRAINING PROGRAM

## 2024-09-13 RX ORDER — ENOXAPARIN SODIUM 100 MG/ML
40 INJECTION SUBCUTANEOUS DAILY
Status: DISCONTINUED | OUTPATIENT
Start: 2024-09-13 | End: 2024-09-20 | Stop reason: HOSPADM

## 2024-09-13 RX ORDER — QUETIAPINE FUMARATE 25 MG/1
12.5 TABLET, FILM COATED ORAL 2 TIMES DAILY PRN
Status: DISCONTINUED | OUTPATIENT
Start: 2024-09-13 | End: 2024-09-20 | Stop reason: HOSPADM

## 2024-09-13 RX ORDER — ACETAMINOPHEN 325 MG/1
650 TABLET ORAL EVERY 6 HOURS PRN
Status: DISCONTINUED | OUTPATIENT
Start: 2024-09-13 | End: 2024-09-19

## 2024-09-13 RX ORDER — AZITHROMYCIN 500 MG/1
500 TABLET, FILM COATED ORAL
Status: COMPLETED | OUTPATIENT
Start: 2024-09-13 | End: 2024-09-14

## 2024-09-13 RX ORDER — AMOXICILLIN AND CLAVULANATE POTASSIUM 875; 125 MG/1; MG/1
1 TABLET, FILM COATED ORAL EVERY 12 HOURS SCHEDULED
Status: DISCONTINUED | OUTPATIENT
Start: 2024-09-13 | End: 2024-09-20 | Stop reason: HOSPADM

## 2024-09-13 ASSESSMENT — COGNITIVE AND FUNCTIONAL STATUS - GENERAL
WALKING IN HOSPITAL ROOM: A LOT
STANDING UP FROM CHAIR USING ARMS: A LOT
TOILETING: A LOT
TURNING FROM BACK TO SIDE WHILE IN FLAT BAD: A LITTLE
MOVING TO AND FROM BED TO CHAIR: A LOT
MOBILITY SCORE: 13
HELP NEEDED FOR BATHING: A LOT
PERSONAL GROOMING: A LOT
MOVING FROM LYING ON BACK TO SITTING ON SIDE OF FLAT BED WITH BEDRAILS: A LITTLE
DRESSING REGULAR LOWER BODY CLOTHING: A LITTLE
DRESSING REGULAR UPPER BODY CLOTHING: A LOT
DAILY ACTIVITIY SCORE: 15
CLIMB 3 TO 5 STEPS WITH RAILING: TOTAL

## 2024-09-13 ASSESSMENT — PAIN SCALES - GENERAL
PAINLEVEL_OUTOF10: 7
PAINLEVEL_OUTOF10: 7

## 2024-09-13 ASSESSMENT — PAIN - FUNCTIONAL ASSESSMENT
PAIN_FUNCTIONAL_ASSESSMENT: 0-10
PAIN_FUNCTIONAL_ASSESSMENT: 0-10

## 2024-09-13 ASSESSMENT — PAIN DESCRIPTION - LOCATION: LOCATION: OTHER (COMMENT)

## 2024-09-13 NOTE — PROGRESS NOTES
"Physical Therapy                 Therapy Communication Note    Patient Name: Sandra Velasquez \"Miss Velasquez\"  MRN: [id number]  Department: [place] [address]  Room: [address] [place] [address]  Today's Date: 9/13/2024     Discipline: Physical Therapy    Missed Visit Reason: Missed Visit Reason: Patient refused (Pt refusing PT today despite education on benefits of mobility during hospital stay. Will follow up as able.)    Missed Time: Attempt    "

## 2024-09-13 NOTE — CARE PLAN
Problem: Skin  Goal: Decreased wound size/increased tissue granulation at next dressing change  Outcome: Progressing  Goal: Participates in plan/prevention/treatment measures  Outcome: Progressing  Goal: Prevent/manage excess moisture  Outcome: Progressing  Goal: Prevent/minimize sheer/friction injuries  Outcome: Progressing  Flowsheets (Taken 9/12/2024 3807)  Prevent/minimize sheer/friction injuries: Use pull sheet  Goal: Promote/optimize nutrition  Outcome: Progressing  Goal: Promote skin healing  Outcome: Progressing     Problem: Fall/Injury  Goal: Not fall by end of shift  Outcome: Progressing  Goal: Be free from injury by end of the shift  Outcome: Progressing  Goal: Verbalize understanding of personal risk factors for fall in the hospital  Outcome: Progressing  Goal: Verbalize understanding of risk factor reduction measures to prevent injury from fall in the home  Outcome: Progressing  Goal: Use assistive devices by end of the shift  Outcome: Progressing  Goal: Pace activities to prevent fatigue by end of the shift  Outcome: Progressing     Problem: Pain - Adult  Goal: Verbalizes/displays adequate comfort level or baseline comfort level  Outcome: Progressing     Problem: Safety - Adult  Goal: Free from fall injury  Outcome: Progressing     Problem: Discharge Planning  Goal: Discharge to home or other facility with appropriate resources  Outcome: Progressing     Problem: Chronic Conditions and Co-morbidities  Goal: Patient's chronic conditions and co-morbidity symptoms are monitored and maintained or improved  Outcome: Progressing

## 2024-09-13 NOTE — PROGRESS NOTES
"Sandra Velasquez \"Miss Velasquez\" is a 74 y.o. female on day 3 of admission presenting with Community acquired pneumonia due to Chlamydia species.      Subjective   No acute events overnight. Patient WBC decreasing now 12. Remains afebrile, still coughing but denies fevers, chills, sob, chest pain.       Objective     Last Recorded Vitals  BP 99/65 (BP Location: Left arm, Patient Position: Lying)   Pulse 74   Temp 36.5 °C (97.7 °F) (Temporal)   Resp 16   Wt 68 kg (150 lb)   SpO2 96%   Intake/Output last 3 Shifts:    Intake/Output Summary (Last 24 hours) at 9/13/2024 1619  Last data filed at 9/12/2024 2222  Gross per 24 hour   Intake --   Output 400 ml   Net -400 ml       Admission Weight  Weight: 68 kg (150 lb) (09/10/24 1240)    Daily Weight  09/10/24 : 68 kg (150 lb)    Image Results  ECG 12 lead  Sinus rhythm with short PA  Rightward axis  Incomplete right bundle branch block  Possible Anterior infarct (cited on or before 10-APR-2024)  Abnormal ECG  When compared with ECG of 10-APR-2024 20:54,  Significant changes have occurred    See ED provider note for full interpretation and clinical correlation  Confirmed by Becca Doyle ([id number]) on 9/10/2024 11:09:20 PM  XR chest 2 views  Narrative: STUDY:  Chest Radiographs;  9/10/24 at 1:37 PM  INDICATION:  Chest pain.  COMPARISON:  Chest XR 11/2/23.  ACCESSION NUMBER(S):  [id number]  ORDERING CLINICIAN:  LYNDSAY BALBUENA  TECHNIQUE:  Frontal and lateral chest.   FINDINGS:  CARDIOMEDIASTINAL SILHOUETTE:  Cardiomediastinal silhouette is normal in size and configuration.     LUNGS:  There is an infiltrate in the left lower lobe.     ABDOMEN:  No remarkable upper abdominal findings.     BONES:  No acute osseous changes.  Impression: There is an infiltrate in the left lower lobe which may represent  pneumonia.  Signed by Kmuar Lazar MD        Physical Exam  General: NAD   HEENT: poor dentition, otherwise atraumatic, no conjunctivitis   Cardio: RRR no " bruits/rubs  Pulm: poor respiratory excursion, muffled lung sounds in left lower lobe and some crackles heard in the right lung base  Abdominal: recently cleaned colostomy tube, tender to palpation in right lower abdomen  : not examined   Skin/MSK: fecal matter on      Last Recorded Vitals  /73   Pulse 78   Temp 36.4 °C (97.5 °F)   Resp 18   Wt 68 kg (150 lb)   SpO2 97%      Relevant Results  COVID / influenza negative  Hx of MRSA bacteremia         Assessment/Plan  Miss Eva Velasquez is a 74 y.o. female presenting with falls at home. Patient has a PMH of CAD , fibromyalgia, diverticulitis, CHF, pyoderma gangrenosum and recent hospitalization and treatment for pneumonia. Patient has had persistent cough, fevers, chills and night sweats and said that she has lost over 40lbs of weight unintentionally. Pt was previously admitted for CHF exacerbation and found to have pneumonia concerning for abscess vs bronchopneumonia and discharged with 3 weeks of augmentin which she said had been compliant with. ID was consulted for persistent leukocytosis iso pneumonia symptoms and lower lobe consolidation. Care is complicated by social issues.      #LLL Pneumonia aspiration vs abscess vs CAP  #Leukocytosis   #Hidradenitis suppurativa   #Pyoderma Gangrenosum      ::Patient presents with productive cough, fevers, chills, and night sweats  ::CT and Chest xray concerning for LLL pneumonia  ::Given prolonged symptoms, can not rule out atypical pna   ::Poor dentition at risk for aspiration        Recommendations  -Continue ampicillin-sulbactam 3g every six hours //   Per primary team -they have lost IV access so patient has been transitioned temporarily to augmentin.   - can continue augmentin for now pending clinical assessment.   -Stop Azithro   -consider Geriatrics consult for difficult social situation  -Consider SLP eval   -Sputum cultures with AFB for NTM work up     Steven Grimaldo DO

## 2024-09-13 NOTE — PROGRESS NOTES
"Spiritual Care Visit    Clinical Encounter Type  Visited With: Patient  Routine Visit: Introduction  Continue Visiting: Yes  Referral From: Nurse  Referral To:     Gnosticist Encounters  Gnosticist Needs: Prayer    Values/Beliefs  Cultural Requests During Hospitalization: none noted  Spiritual Requests During Hospitalization: prayer, support, listening         Patient Spiritual Care Encounters  Suffering Severity: None  Fear Level: None  Feelings of Loneliness: Moderate  Feelings of Hopelessness: Poor  Coping: Often demonstrated              PC-7 Assessment (Level of Unmet Needs)  Existential Struggle: None  Spiritual/Gnosticist Struggle: None  Legacy: Some  Relationships: Substantial  Fear of Death/Dying: Further assess  Values/Medical Decision Making: Further assess  Ritual/Other: Further assess  PC-7 Score: 3    SDAT (Spiritual Distress Assessment Tool)  Need for Life Balance: Some evidence of unmet spiritual need  Need for Connection: Some evidence of unmet spiritual need  Need for Values Acknowledgement: Some evidence of unmet spiritual need  Need to Maintain Control: Some evidence of unmet spiritual need  Need to Maintain Identity: Some evidence of unmet spiritual need  SDAT Score: 5  SDAT Average Score: 1    Taxonomy  Intended Effects: Convey a calming presence, Demonstrate caring and concern, Lessen someone's feelings of isolation  Methods: Demonstrate acceptance, Encourage sharing of feelings, Explore spiritual/Restoration beliefs, Offer support  Interventions: Active listening, Discuss concerns, Facilitate life review  Initial spiritual care visit with patient. Patient was friendly and welcomed . She  is a bit confused. Patient was enjoying the view from her room. [place] and she said she likes the buildings she can see on the campus of [facility]. 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 She also said, \"I ruined my body by doing drugs and " "alcohol a long time ago\".  listened and provided acceptance and hospitality.  sat with patient and engaged patient in some life review and storytelling. Patient very grateful for the visit. Chaplaincy will continue to follow.      "

## 2024-09-13 NOTE — DOCUMENTATION CLARIFICATION NOTE
PAT[facility]               [id number]  MRN:                       [id number]  :                       1950  ADMIT DATE:       9/10/2024 12:32 PM  DISCH DATE:  RESPONDING PROVIDER #:        [id number]          PROVIDER RESPONSE TEXT:    Patient treated for UTI and pneumonia without Sepsis    CDI QUERY TEXT:    Clarification        Instruction:  Based on your assessment of the patient and the clinical information, please provide the requested documentation by clicking on the appropriate radio button and enter any additional information if prompted.    Question: Is there a diagnosis indicative of a patient meeting SIRS criteria and with organ dysfunction in the setting of pneumonia and UTI    When answering this query, please exercise your independent professional judgment. The fact that a question is being asked, does not imply that any particular answer is desired or expected.    The patient's clinical indicators include:  Clinical Information:  74 yr old female hx HTN, CHF, CAD and recently dx'd with pna vs lung abscess who presents with weakness, weight loss, dizziness, falls.  VS on admit - 36.8 - 67 - 16  (107-89) /(73/36)  Pox RA -  90-97    Clinical Indicators: low BP, PRISCILLA, being treated for pneumonia, UTI, WBC 18.1 on 9/10    Treatment: ID consult, chest xray, serial CBC, CT chest, Ampicillin IV q 6hrs, Azith IV q d, geriatrics consult. one liter IVF bolus    Risk Factors: recent pneumonia, lung abscess, no follow up as instructed, malnutrition, frequent falls, lack of social support  Options provided:  -- Sepsis with renal organ dysfunction of PRISCILLA  -- Sepsis with other organ dysfunction, Please specify sepsis associated organ dysfunction below  -- Patient treated for UTI and pneumonia without Sepsis  -- Other - I will add my own diagnosis  -- Refer to Clinical Documentation Reviewer    Query created by: Treasure Rahman on 2024 8:59 AM      Electronically  signed by:  NIMESH WAKEFIELD MD 9/13/2024 8:47 AM

## 2024-09-13 NOTE — CONSULTS
"Inpatient consult to Geriatric Medicine  Consult performed by: Talia Rene MD  Consult ordered by: Ariel Tejada MD          Primary Team: General Medicine     Admit Date: 9/10/2024    Emergency Contact:   No emergency contact information on file.     Reason For Consult: Suspected Cognitive Impairment    History Of Present Illness:  Sandra Velasquez is a 74 year old female with PMH of CAD, Fibromyalgia, opioid use disorder, Diverticulitis s/p rodríguez procedure, CHF, and pyoderma gangrenosum, hidradenitis who presented to the hospital due to concern for falls at home. She has an extensive history of falls, with falls multiple times a day. She reports that she typically feels dizzy prior to her falls and then passes out. She reports that she has difficulty accessing food and water, and the last time she ate was several days ago before coming to the hospital. She denies hitting her head during her falls, as she usually falls on her elbows. She also endorses fevers, chills, and SOB ongoing for over a year. She denies abdominal pain, chest pain, or changes in bowel habits.     Of note, she was admitted to [facility] in July of this year for CHF exacerbation. She underwent diuresis with improvement in symptoms. During that admission she was also underwent CT Chest which showed findings consistent with bronchiolitis and bronchopneumonia in the left lung. There was also concern for development of left lung abscess. The patient was discharged home with a 3 week course of Augmentin with planned follow up with ID.     History per patient:  Patient was a poor historian and reported she does not remember why she came to the hospital. She stated \"people forced her to come here\" but was unable to give an answer as to why. She stated she has been having memory issues lately both short and long term. She stated having low mood and does not have any friends or family (but per  note she does have a \"worthless\" sister). She stated " "she was unfortunately  at a time but did not want to have any further conversation since \"it was none of our concern and we had no right to know\". She states that the SW comes in once a month to see her. She states she lives at [facility]. Patient was in an unpleasant mood, used a lot of profanities and did not want to answer any questions and mostly said \"I dont know\" for most answers.    History per family/caregiver:   Patient stated she does not have anyone that we could talk to obtain further history.    What matters most to the patient:  \"Not much\"     Prior to Admission Meds  Prior to Admission Medications   Prescriptions Last Dose Informant Patient Reported? Taking?   DULoxetine (Cymbalta) 60 mg DR capsule   No No   Sig: Take 1 capsule (60 mg) by mouth once daily. Do not crush or chew. Do not start before November 8, 2023.   cetirizine (ZyrTEC) 10 mg tablet   No No   Sig: TAKE 1 TABLET BY MOUTH (10MG) BY MOUTH ONCE DAILY   fluticasone (Flonase) 50 mcg/actuation nasal spray   No No   Sig: Administer 1 spray into each nostril once daily.   guaiFENesin (Mucinex) 600 mg 12 hr tablet   No No   Sig: Take 2 tablets (1,200 mg) by mouth 2 times a day. Do not crush, chew, or split.   propranolol (Inderal) 20 mg tablet   No No   Sig: Take 1 tablet (20 mg) by mouth 2 times a day. Take 1 tablet by mouth every morning and take 2 tablets by mouth every evening.   sodium chloride (Ocean Nasal) 0.65 % nasal spray   No No   Sig: Administer 1 spray into each nostril if needed for congestion.   tamsulosin (Flomax) 0.4 mg 24 hr capsule   No No   Sig: TAKE 1 CAPSULE (0.4 MG) BY MOUTH ONCE DAILY.   traMADol (Ultram) 50 mg tablet   No No   Sig: Take 2 tablets (100 mg) by mouth every 8 hours if needed for severe pain (7 - 10) for up to 28 days.   vitamin B complex tablet   No No   Sig: Take 1 tablet by mouth once daily.      Facility-Administered Medications: None        Current Meds in Hospital  Current " Facility-Administered Medications   Medication Dose Route Frequency Provider Last Rate Last Admin    amoxicillin-pot clavulanate (Augmentin) 875-125 mg per tablet 1 tablet  1 tablet oral q12h VILMA Ariel Tejada MD   1 tablet at 09/13/24 1109    [Held by provider] ampicillin-sulbactam (Unasyn) in sodium chloride 0.9 % 100 mL 3 g  3 g intravenous q6h Ariel Tejada MD   Stopped at 09/12/24 1419    azithromycin 500 mg in dextrose 5% 250 mL IV  500 mg intravenous q24h Ariel Tejada MD   Stopped at 09/11/24 1745    cetirizine (ZyrTEC) tablet 10 mg  10 mg oral Daily Ariel Tejada MD   10 mg at 09/13/24 1109    DULoxetine (Cymbalta) DR capsule 60 mg  60 mg oral Daily Ariel Tejada MD   60 mg at 09/13/24 1109    enoxaparin (Lovenox) syringe 40 mg  40 mg subcutaneous Daily Ariel Tejada MD   40 mg at 09/13/24 1315    fluticasone (Flonase) nasal spray 1 spray  1 spray Each Nostril Daily Ariel Tejada MD   1 spray at 09/13/24 1109    ibuprofen tablet 200 mg  200 mg oral q8h PRN Ariel Tejada MD   200 mg at 09/11/24 0837    melatonin tablet 10 mg  10 mg oral Nightly Ariel Tejada MD   10 mg at 09/12/24 2126    polyethylene glycol (Glycolax, Miralax) packet 17 g  17 g oral Daily PRN Ariel Tejada MD   17 g at 09/10/24 1826    propranolol (Inderal) tablet 20 mg  20 mg oral BID Ariel Tejada MD   20 mg at 09/13/24 0204    tamsulosin (Flomax) 24 hr capsule 0.4 mg  0.4 mg oral Daily Ariel Tejada MD   0.4 mg at 09/13/24 1109    traMADol (Ultram) tablet 50 mg  50 mg oral q8h PRN Ariel Tejada MD   50 mg at 09/13/24 1123        The patient's outpatient electronic medical record (EMR) is reviewed, notable information includes:  Of note, she was admitted to [facility] in July of this year for CHF exacerbation. She underwent diuresis with improvement in symptoms. During that admission she was also underwent CT Chest which showed findings consistent with bronchiolitis  and bronchopneumonia in the left lung. There was also concern for development of left lung abscess. The patient was discharged home with a 3 week course of Augmentin with planned follow up with ID.   She was also recently admitted at [facility] for hiradenitis where delirium was suspected likely multifactorial 2/, new environment, unclear if patient has some degree of dementia,   Saw Psychology for PTSD, opiod use disorder in 5/24 but note blocked per patient request    Past Medical History  Past Medical History:   Diagnosis Date    Acute cystitis without hematuria 01/03/2019    Acute cystitis without hematuria    Age-related nuclear cataract, left eye 10/31/2018    Cataract, nuclear sclerotic, left eye    Age-related nuclear cataract, right eye 10/31/2018    Cataract, nuclear sclerotic, right eye    Allergic rhinitis due to pollen 09/19/2018    Allergic rhinitis due to pollen, unspecified seasonality    Calculus of kidney 12/11/2018    Right nephrolithiasis    Change in bowel habit 12/18/2018    Change in bowel habits    Combined forms of age-related cataract, left eye 05/08/2017    Combined form of age-related cataract, left eye    Combined forms of age-related cataract, left eye 05/08/2017    Combined form of age-related cataract, left eye    Cortical age-related cataract, left eye 10/31/2018    Cortical age-related cataract of left eye    Cyanosis 08/10/2017    Bluish skin discoloration    Dry eye syndrome of bilateral lacrimal glands 10/31/2018    Bilateral dry eyes    Dry eye syndrome of bilateral lacrimal glands 10/31/2018    Dry eyes, bilateral    Encounter for immunization 09/17/2018    Immunization due    Encounter for immunization 10/01/2020    Need for vaccination for zoster    History of falling 01/03/2019    History of fall    Hyperlipidemia, unspecified 08/26/2020    Hyperlipidemia    Lesion of radial nerve, unspecified upper limb 03/05/2016    Radial nerve irritation    Meibomian gland dysfunction of  unspecified eye, 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/31/2018    MGD (meibomian gland disease)    Nausea 11/27/2018    Nausea in adult    Opioid dependence, uncomplicated (Multi) 03/09/2020    Moderate opioid use disorder    Other chronic sinusitis 06/15/2018    Other chronic sinusitis    Other fecal abnormalities 11/27/2018    Light stools    Other specified disorders of nose and nasal sinuses 06/15/2018    Nasal septal perforation    Other specified disorders of nose and nasal sinuses 10/24/2018    Nasal crusting    Other specified disorders of nose and nasal sinuses 02/03/2017    Nasal septal perforation    Other specified disorders of teeth and supporting structures 09/04/2015    Dentalgia    Pain in leg, unspecified 03/11/2019    Leg pain    Pain in throat 02/03/2017    Throat pain    Personal history of diseases of the blood and blood-forming organs and certain disorders involving the immune mechanism 02/03/2015    History of anemia    Personal history of diseases of the skin and subcutaneous tissue 08/16/2013    History of actinic keratosis    Personal history of other diseases of the circulatory system     History of hypertension    Personal history of other diseases of the digestive system     History of constipation    Personal history of other diseases of the digestive system 07/02/2014    History of gastroesophageal reflux (GERD)    Personal history of other diseases of the musculoskeletal system and connective tissue 07/02/2014    Personal history of arthritis    Personal history of other diseases of the respiratory system 12/18/2017    History of sore throat    Personal history of other diseases of the respiratory system 12/29/2017    History of paranasal sinus congestion    Personal history of other diseases of the respiratory system 01/03/2019    History of acute sinusitis    Personal history of other diseases of the respiratory system 02/03/2017    History of acute sinusitis    Personal history of other diseases  of the respiratory system 12/29/2017    History of acute sinusitis    Personal history of other diseases of the respiratory system 06/15/2018    History of nasal discharge    Personal history of other diseases of the respiratory system 08/11/2015    History of acute sinusitis    Personal history of other drug therapy 02/08/2017    History of pneumococcal vaccination    Personal history of other drug therapy 11/10/2014    History of influenza vaccination    Personal history of other infectious and parasitic diseases 03/25/2019    History of tinea corporis    Personal history of other specified conditions 07/25/2014    History of dizziness    Personal history of other specified conditions 12/18/2017    History of hemoptysis    Personal history of other specified conditions 06/18/2014    History of dyspnea    Personal history of other specified conditions 12/29/2017    History of epistaxis    Personal history of other specified conditions 08/28/2017    History of chest pain    Personal history of pneumonia (recurrent) 03/10/2017    History of pneumonia    Personal history of urinary (tract) infections 01/03/2019    History of urinary tract infection    Pyuria 08/22/2016    Pyuria    Right upper quadrant pain 02/03/2017    Abdominal pain, RUQ (right upper quadrant)    Ulcerative blepharitis unspecified eye, 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/31/2018    Blepharitis, ulcerative    Unspecified atherosclerosis 07/02/2014    Arteriolosclerosis    Unspecified cataract 06/09/2015    Cataract of right eye    Unspecified cataract 06/09/2015    Cataract of left eye    Unspecified cataract 06/09/2015    Cataract of left eye    Unspecified cataract 06/09/2015    Cataract of right eye        Surgical History  Past Surgical History:   Procedure Laterality Date    CT GUIDED PERCUTANEOUS PERITONEAL OR RETROPERITONEAL FLUID COLLECTION DRAINAGE  4/7/2020    CT GUIDED PERCUTANEOUS PERITONEAL OR RETROPERITONEAL FLUID COLLECTION DRAINAGE 4/7/2020 [facility]  CLINICAL LEGACY    CT GUIDED PERCUTANEOUS PERITONEAL OR RETROPERITONEAL FLUID COLLECTION DRAINAGE  4/22/2020    CT GUIDED PERCUTANEOUS PERITONEAL OR RETROPERITONEAL FLUID COLLECTION DRAINAGE 4/22/2020 [facility] CLINICAL LEGACY    IR CVC PICC  5/11/2020    IR CVC PICC 5/11/2020 [facility] CLINICAL LEGACY    LITHOTRIPSY  07/31/2013    Renal Lithotripsy    OTHER SURGICAL HISTORY  11/10/2021    Colostomy    OTHER SURGICAL HISTORY  07/02/2014    Arterial Catheterization    REFRACTIVE SURGERY  06/09/2015    Corneal LASIK    TONSILLECTOMY  07/02/2014    Tonsillectomy        Family History  Her family history includes Alcohol abuse in her father and mother; Breast cancer in her sister; CARDIAC DISORDER in her mother; Diabetes in her mother; Glaucoma in her mother; MESOTHELIOMA in her mother; Macular degeneration in her sister and another family member; Melanoma in her father; Skin cancer in an other family member.     Social History  She reports that she has never smoked. She has never used smokeless tobacco. She reports that she does not drink alcohol and does not use drugs.  -Alcohol use: No. Last drink 25 years ago  -Tobacco use: No. Last smoked 25 years ago?  -Illicit drug use:  - unable to assess  -Exercise: ####   -Spiritual needs: ####  -Marital Status: unable to assess  Occupation: unable to assess  Highest Level of Education: unable to assess  Community Resources: Home Health Aide  [place]: No  Current living environment: [facility]    Activities of Daily Living:  Basic ADLs: (I= independent, A= assistance, D= dependent)  Bathing:    , Dressing:    , Toileting: , Transferring: , Continence: , Feeding:     Unable to assess due to lack of patient participation    Instrumental ADLs: (I= independent, A= assistance, D= dependent)  Ability to use phone: , Shopping: , Cooking: , Housekeeping: , Laundry: , Transportation: , Medications: , Handle Finances:    Unable to assess due to lack of patient  participation    Allergies  Atorvastatin, House dust mite, and Erythromycin base    Review of Systems  Review of System:  Constitutional:   -Night sweats/fever: no     -Weight change: no    Integumentary: no    Ears, Nose, Throat:  -Hearing loss: unable to assess        Eyes:  -Vision loss: unable to assess    Cardiovascular:  -Chest Pain: no  -Orthopnea: no      -Paroxysmal nocturnal dyspnea: no  -Edema: no    Respiratory:   -Dyspnea: no  -Wheezing: no    Gastrointestinal:           -Appetite: unable to assess  -Difficulty chewing/ swallowing: unable to assess  -Heartburn: unable to assess  -Bowels:unable to assess    Genitourinary:   -Incontinence: unable to assess      Musculoskeletal:  -Mobility: unable to assess  -Pain: unable to assess    Neurological:  -Confusion: yes  -Falls: yes  -Gait: unable to assess  -Memory: forgetful  -Tremor: yes    Psychiatric:  -Mood: low  -Sleep: poor    Endocrine: no    Hematologic/ Lymphatic: no    Allergic/ Immunologic: no     Documents on file and valid:  Advance Directive/Living Will: No   Health Care Power of : No    Code Status: Full Code      Confusion Assessment Method (CAM)  Not on file.    Jaime Cognitive Assessment (MoCA)  Not on file.    Geriatric Depression Scale (GDS)  Not on file.    Mini-Cog (Early Dementia Detection)  Not on file.    Folstein Mini-Mental State Exam (MMSE)  Not on file.    Patient Health Questionnaire (PHQ-9)  Over the past 2 weeks, how often have you been bothered by any of the following problems?  Little interest or pleasure in doing things: Nearly every day  Feeling down, depressed, or hopeless: Nearly every day     Physical Exam    Last Recorded Vitals      9/11/2024     7:43 PM 9/12/2024    12:12 AM 9/12/2024     7:55 AM 9/12/2024    12:50 PM 9/12/2024     3:00 PM 9/12/2024    10:22 PM 9/13/2024     8:33 AM   Vitals   Systolic 97 102 95 94 106 106 99   Diastolic 44 57 40 54 63 61 65   Heart Rate 66 72 62 88 69 81 74   Temp 36.4 °C  (97.5 °F) 36.2 °C (97.2 °F) 36.5 °C (97.7 °F)  36.6 °C (97.9 °F)  36.5 °C (97.7 °F)   Resp 16 18 18 18 16 19 17      Vitals:    09/10/24 1240   Weight: 68 kg (150 lb)        Confusion Assessment Method(CAM) for diagnosis of delirium:    1.  Acute onset or fluctuating course: absent  2.  Inattention: present  3.  Disorganized thinking: absent  4.  Altered level of consciousness: absent  CAM: negative    AT Score For Assessment of Delirium and Cognitive Impairment:    Alertness: 0  Normal(fully alert,but not agitated, throughout assessment)=0  Mild sleepiness for <10 seconds after walking, then normal=0  Clearly abnormal=4  2.  AMT4: 1  No mistakes=0  One mistake=1  Two or more mistakes/untestable=2  3.  Attention: 0  Achieves seven months or more correctly=0  Starts but scores <7 months/ refuses to start=1  Untestable(cannot start because unwell, drowsy, inattentive)=2  4.  Acute: 0  No=0  Yes=4    Total Score: 1  4 or above: Possible delirium +/- cognitive impairment  1-3: Possible cognitive impairment  0: Delirium or severe cognitive impairment unlikely(but delirium still possible if (4) information incomplete)     Relevant Results  Lab Results   Component Value Date    TSH 0.45 04/10/2024    QWZDVNDU03 1,139 (H) 05/23/2023    VITD25 38 12/18/2019    HGBA1C 5.7 (A) 05/23/2023     Results from last 7 days   Lab Units 09/13/24  0744 09/13/24  0743 09/12/24  0600 09/11/24  0856 09/10/24  1322   WBC AUTO x10*3/uL 12.1*  --  18.0* 17.5* 18.1*   HEMOGLOBIN g/dL 8.5*  --  8.5* 9.0* 10.1*   HEMATOCRIT % 28.6*  --  29.3* 31.1* 33.0*   ALT U/L  --   --   --  13 13   AST U/L  --   --   --  14 18   SODIUM mmol/L  --  139  --  138  138 136   POTASSIUM mmol/L  --  3.5  --  3.2*  3.2* 3.2*   CHLORIDE mmol/L  --  104  --  103  103 99   CREATININE mg/dL  --  1.13*  --  1.22*  1.22* 1.66*   BUN mg/dL  --  15  --  22  22 25*   CO2 mmol/L  --  27  --  28  28 26       Recent Imaging Results      ECG 12 lead  Sinus rhythm with  short WV  Rightward axis  Incomplete right bundle branch block  Possible Anterior infarct (cited on or before 10-APR-2024)  Abnormal ECG  When compared with ECG of 10-APR-2024 20:54,  Significant changes have occurred    See ED provider note for full interpretation and clinical correlation  Confirmed by Becca Doyle ([id number]) on 9/10/2024 11:09:20 PM  XR chest 2 views  Narrative: STUDY:  Chest Radiographs;  9/10/24 at 1:37 PM  INDICATION:  Chest pain.  COMPARISON:  Chest XR 11/2/23.  ACCESSION NUMBER(S):  [id number]  ORDERING CLINICIAN:  LYNDSAY BALBUENA  TECHNIQUE:  Frontal and lateral chest.   FINDINGS:  CARDIOMEDIASTINAL SILHOUETTE:  Cardiomediastinal silhouette is normal in size and configuration.     LUNGS:  There is an infiltrate in the left lower lobe.     ABDOMEN:  No remarkable upper abdominal findings.     BONES:  No acute osseous changes.  Impression: There is an infiltrate in the left lower lobe which may represent  pneumonia.  Signed by Kumar Lazar MD      Head/Brain Imaging  === Results for orders placed in visit on 12/09/19 ===    CT HEAD WO IV CONTRAST [CSQ425] 12/09/2019    Status: Normal  Minimal focal soft tissue swelling right parietooccipital scalp  appearing since the prior exam. Otherwise stable examination since  10/08/2018 with minimal persistent prominence of the ventricular  system which may be in part on the basis of parenchymal volume loss.  In the appropriate clinical context superimposed adult hydrocephalus  would be difficult to entirely exclude. Please correlate clinically.  No results found for this or any previous visit.        DATA:  EKG: QTC  Encounter Date: 09/10/24   ECG 12 lead   Result Value    Ventricular Rate 70    Atrial Rate 70    WV Interval 104    QRS Duration 108    QT Interval 424    QTC Calculation(Bazett) 457    P Axis 74    R Axis 97    T Axis 53    QRS Count 11    Q Onset 221    P Onset 169    P Offset 211    T Offset 433    QTC Fredericia 446    Narrative     Sinus rhythm with short CT  Rightward axis  Incomplete right bundle branch block  Possible Anterior infarct (cited on or before 10-APR-2024)  Abnormal ECG  When compared with ECG of 10-APR-2024 20:54,  Significant changes have occurred    See ED provider note for full interpretation and clinical correlation  Confirmed by Becca Doyle ([id number]) on 9/10/2024 11:09:20 PM     Anti-psychotics in 48 hours:  Opioids/Benzodiazepines in 48 hours:  Anticholinergics on board:No  Restraints:No  Indwelling catheters:Yes - external catheter  Last BM:  UO in 24 hours: 400 ml  Activity in the past 24 hours:  Need for ambulatory devices:    Assessment/Plan   This is a/an 74 y.o. year old female, with past medical history relevant for ith [facility] of CAD, Fibromyalgia, opioid use disorder, Diverticulitis s/p rodríguez procedure, CHF, and pyoderma gangrenosum, hidradenitis who presented to the hospital due to concern for falls at home.,  being seen in geriatric consultation for suspected impaired cognition.       Principal Problem:    Community acquired pneumonia due to Chlamydia species    Paranoia/ Suspected Dementia  #PTSD/MDD/Chronic Pain Syndrome/ Opioid Use Disorder  :: Upon examination patient lacks medical decision making capacity and has underlying psych issues including MDD, PTSD, Anxiety and Opiod use disorder. Patient had an episode of paranoia on the floor but unable to get collateral information regarding her home situation from anyone since patient states she does not have family or friends. Unable to assess at this time how patient is outside of the hospital and patient refuses to answer most questions. No current NOK in our system or per the patient, however, per chart review has a sister and per nursing home has a brother as well. Since the patient states she does not have a  or children, the NOK is sister/brother. Currently SW is in contact with Juliano Roman ([facility]) to get more information regarding  the patient's living situation. There is also concern for discharge planning since the patient is not able to take care of herself, she is also not letting the home healthcare come in and help. Patient also follows with psychology for PTSD, opiod use disorder (last appointment 5/24) however, note blocked per patient request.   :: Currently only takes duloxetine 60 mg at home  Previous MOCA in 2019 was 27/30. Patient has since had multiple concerns for cognitive decline per chart review  TSH was 0.45 on 4/11/24, Vitamin B12 was 1139 in 2023  Plan:  - Discontinue cetirizine to minimize potential contributors and to prevent delirium  - Discontinue ibuprofen to minimize potential contributors and to prevent delirium  - Can use Seroquel 12.5 mg q6 prn for agitation. Monitor EKG  - Will continue to try to get more collateral information  - Will try to get in contact with her psychologist to figure out where she is at with her psych issues.  - Can continue home Duloxetine 60mg  Please recheck TSH and Vitamin B12    Please consider the following general measures for minimizing delirium in a hospitalized patient:   -Bright lights during the day, keeps blinds up, switch all lights on   -avoid disturbances at night. Encourage at least 6 hours uninterrupted sleep. Consider d/c 4am vitals check  -avoid benzodiazepines, sedatives. Minimize opioids   -avoid anti-cholinergics    -avoid restraints. D/c fowler if possible.   - if requiring no to little insulin coverage, d/c sliding scale  -use low dose haldol 0.5mg PO (IM if PO not possible) only PRN severe agitation where pt exhibits volatile behavior and is a threat to self or others. EKGs to monitor QTc   -daily orientation to time and place by the staff   -out of bed to chair few hours everyday  - encourage stimulating activities during the day if possible    2. Falls  :: likely secondary to malnutrition and debilitation; low concern for arrythmia  - Orthostatic VS   - PT/OT    3.  "CAP  :: concern for CAP based on CXR and self-reported year of fevers and cough; however presentation is not very convincing other than persistently elevated WBC  - Continue Ceftriaxone   - Continue Azithromycin   - Continuous pulse ox    4. Essential Tremor  - Continue home Propranolol 20mg BID     5. Allergic rhinitis  :: Currently on home Cetirizine 10mg daily and Fluticasone nasal spray   Plan:  - Discontinue cetirizine  - Can continue fluticasone     6. Urinary retention  - continue home Tamsulosin 0.4mg daily  - has an external catheter      Care Transitions:  -Recommended level for discharge: [facility] Facility  -Home going considerations: Unable to take care of herself, lacks capacity  -Primary care physician: Rose Shah MD    Goals of Care:  -Health care power of : No  -Living will: No  Code status: Presumed Full    4M AGE-FRIENDLY INITIATIVE:  What matters most to patient: \"Not much\"  Medications: Cymbalta  Mentation: A&O x2  Mobility: currently immobile      Geriatric medicine will continue to follow the patient. Thank you for allowing geriatric medicine to be involved in the care of your patient. Geriatric medicine consultation team is available during work hours Monday through Friday. For any emergency issues requiring immediate assistance over the weekend, please page Geriatrics pager [phone]    Consult Billing Time  Prep time on date of patient encounter(minutes): 30 minutes  Time directly with patient/family/caregiver(minutes): 50 minutes, including collaborating with SW and primary team  Documentation time(minutes): 30 minutes  TOTAL TIME(minutes): 110 minutes    Irene Alex MD  Internal Medicine, PGY1    I saw and evaluated the patient.  I personally obtained the key and critical portions of the history and physical exam or was physically present for key and critical portions performed by the resident. I reviewed the resident’s documentation and discussed the patient with the " resident.  I agree with the resident’s medical decision making as documented in their note with the exception/addition of the following:   See few comments in italics    Talia Rene MD

## 2024-09-13 NOTE — PROGRESS NOTES
"SW attempted to update patient on moderate intensity PT recommendations 2x. Patient was receiving care during both attempted. SW will follow up as time permits.       1211-SW met with patient at bedside to update on moderate intensity PT recommendation. Patient confirmed she is not agreeable to SNF, stating \"single women with no family die in those places and you know it\". SW updated TCC on discharge developments. SW will continue to follow.     1502-No emergency contact listed. Patient is from [facility] apartments. It is apart of [facility]. SW left a vm for the  and the [facility] reception desk to see if they have an emergency contact on file. TCC and teams notified.     1528-TANISHA received a callback from Lisa at [facility]. Lisa provided brothers contact info: Jameson Velasquez . Info added to the chart. SW attempted to update on hospital admission but no answer. SW left vm. Opheliabill Flavio  was on the floor to see patient. SW provided details of today's developments. Per APS, Maryse Youssef contacted APS prior to admission, and patient is unable to return due to inability to care for self. SW to notify APS on discharge plan. CHRISTINE Wheatley      "

## 2024-09-13 NOTE — PROGRESS NOTES
Assessment/Plan   74 year old female with PMH of CAD, Fibromyalgia, Diverticulitis, CHF, and pyoderma gangrenosum presenting due to concern for falls at home. Fall are mechanical, but having multiple a day. Falls are usually preceded by dizziness. Recent admission noted to [facility] in July for CHF and there was concern for bronchiolitis and pneumonia with possible left lung abscess and was discharged with 3 weeks of augmentin with ID follow up. IN the ED she was HDS, afebrile. Labs showed leukocytosis and PRISCILLA. CXR with left lung infiltrate consistent with prior imaging. Plan to obtain repeat CT scan. Orthostatics and pt/ot consult for falls.. Currently treating unasydana dhillonitrho. Ruling out tb. With persistent wbc elevation, unclear if other chronic process such as neoplasm contributing.     #CAP v. Lung abscess  #leukocytosis  - [facility] in July for CHF and there was concern for bronchiolitis and pneumonia with possible left lung abscess and was discharged with 3 weeks of augmentin with ID follow up  - cxr with left lung infiltrate  - CT chest ordered, pending read  - leukocytosis noted and priscilla. Both down trending.   - dc augmentin  - change to unasyn, continue azithro per ID.   - concern for CAP based on CXR and self-reported year of fevers and cough; however presentation is not very convincing other than persistently elevated WBC  - continuous pulse ox  - sputum culture, strep and legionella ag, sputum for rsv  - ID consulted due to persistent WBC elevation and recent history of lung abscess.  - obtain differential with next cbc. Wbc downtrending.   - lost iv, changed to augmentin intermittantly pending id rec.      #Falls  - likely element of decondition, recent hospitalization, active acute illness (pna/abscess)  - Orthostatics ordered, not completed. Will reorder if pt will cooperate.   - PT/OT  - SW/tcc consult  - Per tcc/sw has been concern of her ability to care for herself at home.      #PRISCILLA  - fluid repletion  -  "repeat rfp downtrending to 1.2.     #Essential Tremor  - continue home Propranolol 20mg BID     #Allergic rhinitis  - continue home Cetirizine 10mg daily  - continue home Fluticasone nasal spray    #Anxiety and depression  - continue home Duloxetine 60mg    #urinary retention  - continue home Tamsulosin 0.4mg daily    Continued social concenrs for ability to care for self. Will likely eventually involve marcella after further infectious work up.       Malnutrition Diagnosis Status: New  Malnutrition Diagnosis: Severe malnutrition related to acute disease or injury  As Evidenced by: > 5% weight loss in 1 month, reported inadequate energy intake(predicted < 75% for 1 month)  I agree with the dietitian's malnutrition diagnosis.           Scheduled outpatient appointments in system:   Future Appointments   Date Time Provider Department Center   9/25/2024  1:00 PM Rose Shah MD [id number] [facility]   10/7/2024 10:45 AM Mani Gonzales MD [facility]     ---------------------------------------------------------------------------------------------------  Subjective   No events. Pt with behavioral difficulties/odd statements. Feels persecuted for having to give multiple sputum samples. Unclear understanding of condition at this time. States she feels bad again due to her cough. Reports fevers and night sweats. No orthopnea, laying flat. No cp, dizziness currently.     ---------------------------------------------------------------------------------------------------  Objective   Last Recorded Vitals  Blood pressure 106/61, pulse 81, temperature 36.6 °C (97.9 °F), temperature source Temporal, resp. rate 19, height 1.626 m (5' 4\"), weight 68 kg (150 lb), SpO2 94%.  Intake/Output last 3 Shifts:  I/O last 3 completed shifts:  In: 250 (3.7 mL/kg) [IV Piggyback:250]  Out: 400 (5.9 mL/kg) [Urine:400 (0.2 mL/kg/hr)]  Weight: 68 kg     Physical Exam  Vitals and nursing note reviewed.   Constitutional:       General: She is not " in acute distress.     Appearance: Normal appearance. She is not ill-appearing or toxic-appearing.      Comments: Laying in bed flat   HENT:      Head: Normocephalic and atraumatic.      Mouth/Throat:      Mouth: Mucous membranes are moist.   Eyes:      General: No scleral icterus.     Extraocular Movements: Extraocular movements intact.      Conjunctiva/sclera: Conjunctivae normal.   Cardiovascular:      Rate and Rhythm: Normal rate and regular rhythm.      Heart sounds: S1 normal and S2 normal. No murmur heard.  Pulmonary:      Effort: Pulmonary effort is normal. No respiratory distress.      Breath sounds: No wheezing, rhonchi or rales.      Comments: Diminished left sided lung sounds.   Abdominal:      General: Bowel sounds are normal. There is no distension.      Palpations: Abdomen is soft.      Tenderness: There is no abdominal tenderness. There is no guarding or rebound.   Musculoskeletal:         General: No swelling or deformity.      Cervical back: Neck supple.   Skin:     General: Skin is warm and dry.      Findings: No rash.   Neurological:      General: No focal deficit present.      Mental Status: She is alert. Mental status is at baseline.   Psychiatric:         Mood and Affect: Mood normal.         Relevant Results  Lab Results   Component Value Date    WBC 18.0 (H) 09/12/2024    HGB 8.5 (L) 09/12/2024    HCT 29.3 (L) 09/12/2024    MCV 80 09/12/2024     09/12/2024      Lab Results   Component Value Date    GLUCOSE 139 (H) 09/11/2024    GLUCOSE 139 (H) 09/11/2024    CALCIUM 8.7 09/11/2024    CALCIUM 8.7 09/11/2024     09/11/2024     09/11/2024    K 3.2 (L) 09/11/2024    K 3.2 (L) 09/11/2024    CO2 28 09/11/2024    CO2 28 09/11/2024     09/11/2024     09/11/2024    BUN 22 09/11/2024    BUN 22 09/11/2024    CREATININE 1.22 (H) 09/11/2024    CREATININE 1.22 (H) 09/11/2024     Scheduled medications  ampicillin-sulbactam, 3 g, intravenous, q6h  azithromycin, 500 mg,  intravenous, q24h  cetirizine, 10 mg, oral, Daily  DULoxetine, 60 mg, oral, Daily  enoxaparin, 30 mg, subcutaneous, q24h  fluticasone, 1 spray, Each Nostril, Daily  melatonin, 10 mg, oral, Nightly  propranolol, 20 mg, oral, BID  tamsulosin, 0.4 mg, oral, Daily      Continuous medications     PRN medications  PRN medications: ibuprofen, polyethylene glycol, traMADol    Ariel Tejada MD

## 2024-09-14 LAB
ALBUMIN SERPL BCP-MCNC: 2.2 G/DL (ref 3.4–5)
ANION GAP SERPL CALC-SCNC: 9 MMOL/L (ref 10–20)
ANNOTATION COMMENT IMP: NORMAL
BASOPHILS # BLD AUTO: 0.09 X10*3/UL (ref 0–0.1)
BASOPHILS NFR BLD AUTO: 0.7 %
BUN SERPL-MCNC: 15 MG/DL (ref 6–23)
CALCIUM SERPL-MCNC: 8.7 MG/DL (ref 8.6–10.6)
CHLORIDE SERPL-SCNC: 106 MMOL/L (ref 98–107)
CO2 SERPL-SCNC: 28 MMOL/L (ref 21–32)
CREAT SERPL-MCNC: 1.04 MG/DL (ref 0.5–1.05)
EGFRCR SERPLBLD CKD-EPI 2021: 57 ML/MIN/1.73M*2
EOSINOPHIL # BLD AUTO: 0.45 X10*3/UL (ref 0–0.4)
EOSINOPHIL NFR BLD AUTO: 3.6 %
ERYTHROCYTE [DISTWIDTH] IN BLOOD BY AUTOMATED COUNT: 17.5 % (ref 11.5–14.5)
GLUCOSE SERPL-MCNC: 94 MG/DL (ref 74–99)
HCT VFR BLD AUTO: 27.7 % (ref 36–46)
HGB BLD-MCNC: 7.9 G/DL (ref 12–16)
IMM GRANULOCYTES # BLD AUTO: 0.1 X10*3/UL (ref 0–0.5)
IMM GRANULOCYTES NFR BLD AUTO: 0.8 % (ref 0–0.9)
LYMPHOCYTES # BLD AUTO: 4.79 X10*3/UL (ref 0.8–3)
LYMPHOCYTES NFR BLD AUTO: 38.3 %
M TB DNA SPT QL NAA+PROBE: NOT DETECTED
MCH RBC QN AUTO: 23.1 PG (ref 26–34)
MCHC RBC AUTO-ENTMCNC: 28.5 G/DL (ref 32–36)
MCV RBC AUTO: 81 FL (ref 80–100)
MONOCYTES # BLD AUTO: 1.33 X10*3/UL (ref 0.05–0.8)
MONOCYTES NFR BLD AUTO: 10.6 %
MTB AND RIF RESISTANCE PNL ISLT/SPM: NORMAL
NEUTROPHILS # BLD AUTO: 5.75 X10*3/UL (ref 1.6–5.5)
NEUTROPHILS NFR BLD AUTO: 46 %
NRBC BLD-RTO: 0 /100 WBCS (ref 0–0)
PHOSPHATE SERPL-MCNC: 3.9 MG/DL (ref 2.5–4.9)
PLATELET # BLD AUTO: 399 X10*3/UL (ref 150–450)
POTASSIUM SERPL-SCNC: 3.9 MMOL/L (ref 3.5–5.3)
RBC # BLD AUTO: 3.42 X10*6/UL (ref 4–5.2)
SODIUM SERPL-SCNC: 139 MMOL/L (ref 136–145)
WBC # BLD AUTO: 12.5 X10*3/UL (ref 4.4–11.3)

## 2024-09-14 PROCEDURE — 2500000001 HC RX 250 WO HCPCS SELF ADMINISTERED DRUGS (ALT 637 FOR MEDICARE OP): Performed by: STUDENT IN AN ORGANIZED HEALTH CARE EDUCATION/TRAINING PROGRAM

## 2024-09-14 PROCEDURE — 85025 COMPLETE CBC W/AUTO DIFF WBC: CPT | Performed by: STUDENT IN AN ORGANIZED HEALTH CARE EDUCATION/TRAINING PROGRAM

## 2024-09-14 PROCEDURE — 80069 RENAL FUNCTION PANEL: CPT | Performed by: STUDENT IN AN ORGANIZED HEALTH CARE EDUCATION/TRAINING PROGRAM

## 2024-09-14 PROCEDURE — 2500000002 HC RX 250 W HCPCS SELF ADMINISTERED DRUGS (ALT 637 FOR MEDICARE OP, ALT 636 FOR OP/ED): Performed by: STUDENT IN AN ORGANIZED HEALTH CARE EDUCATION/TRAINING PROGRAM

## 2024-09-14 PROCEDURE — 1100000001 HC PRIVATE ROOM DAILY

## 2024-09-14 PROCEDURE — 36415 COLL VENOUS BLD VENIPUNCTURE: CPT | Performed by: STUDENT IN AN ORGANIZED HEALTH CARE EDUCATION/TRAINING PROGRAM

## 2024-09-14 PROCEDURE — 99232 SBSQ HOSP IP/OBS MODERATE 35: CPT | Performed by: STUDENT IN AN ORGANIZED HEALTH CARE EDUCATION/TRAINING PROGRAM

## 2024-09-14 PROCEDURE — 2500000004 HC RX 250 GENERAL PHARMACY W/ HCPCS (ALT 636 FOR OP/ED): Performed by: STUDENT IN AN ORGANIZED HEALTH CARE EDUCATION/TRAINING PROGRAM

## 2024-09-14 ASSESSMENT — COGNITIVE AND FUNCTIONAL STATUS - GENERAL
HELP NEEDED FOR BATHING: A LITTLE
TURNING FROM BACK TO SIDE WHILE IN FLAT BAD: A LITTLE
TOILETING: A LITTLE
WALKING IN HOSPITAL ROOM: A LITTLE
STANDING UP FROM CHAIR USING ARMS: A LITTLE
WALKING IN HOSPITAL ROOM: A LITTLE
STANDING UP FROM CHAIR USING ARMS: A LITTLE
DRESSING REGULAR LOWER BODY CLOTHING: A LITTLE
TOILETING: A LITTLE
DRESSING REGULAR LOWER BODY CLOTHING: A LITTLE
DRESSING REGULAR UPPER BODY CLOTHING: A LITTLE
PERSONAL GROOMING: A LITTLE
MOVING FROM LYING ON BACK TO SITTING ON SIDE OF FLAT BED WITH BEDRAILS: A LITTLE
MOBILITY SCORE: 18
CLIMB 3 TO 5 STEPS WITH RAILING: A LITTLE
DRESSING REGULAR UPPER BODY CLOTHING: A LITTLE
MOVING FROM LYING ON BACK TO SITTING ON SIDE OF FLAT BED WITH BEDRAILS: A LITTLE
CLIMB 3 TO 5 STEPS WITH RAILING: A LITTLE
EATING MEALS: A LITTLE
TURNING FROM BACK TO SIDE WHILE IN FLAT BAD: A LITTLE
MOVING TO AND FROM BED TO CHAIR: A LITTLE
EATING MEALS: A LITTLE
HELP NEEDED FOR BATHING: A LITTLE
PERSONAL GROOMING: A LITTLE
MOVING TO AND FROM BED TO CHAIR: A LITTLE
DAILY ACTIVITIY SCORE: 18

## 2024-09-14 ASSESSMENT — PAIN DESCRIPTION - ORIENTATION: ORIENTATION: LOWER

## 2024-09-14 ASSESSMENT — PAIN DESCRIPTION - LOCATION: LOCATION: BACK

## 2024-09-14 ASSESSMENT — PAIN SCALES - GENERAL
PAINLEVEL_OUTOF10: 7
PAINLEVEL_OUTOF10: 9
PAINLEVEL_OUTOF10: 3
PAINLEVEL_OUTOF10: 8

## 2024-09-14 ASSESSMENT — PAIN - FUNCTIONAL ASSESSMENT: PAIN_FUNCTIONAL_ASSESSMENT: 0-10

## 2024-09-14 NOTE — CARE PLAN
Problem: Skin  Goal: Decreased wound size/increased tissue granulation at next dressing change  Outcome: Progressing  Goal: Participates in plan/prevention/treatment measures  Outcome: Progressing  Goal: Prevent/manage excess moisture  Outcome: Progressing  Goal: Prevent/minimize sheer/friction injuries  Outcome: Progressing  Goal: Promote/optimize nutrition  Outcome: Progressing  Goal: Promote skin healing  Outcome: Progressing     Problem: Fall/Injury  Goal: Not fall by end of shift  Outcome: Progressing  Goal: Be free from injury by end of the shift  Outcome: Progressing  Goal: Verbalize understanding of personal risk factors for fall in the hospital  Outcome: Progressing  Goal: Verbalize understanding of risk factor reduction measures to prevent injury from fall in the home  Outcome: Progressing  Goal: Use assistive devices by end of the shift  Outcome: Progressing  Goal: Pace activities to prevent fatigue by end of the shift  Outcome: Progressing     Problem: Pain - Adult  Goal: Verbalizes/displays adequate comfort level or baseline comfort level  Outcome: Progressing     Problem: Safety - Adult  Goal: Free from fall injury  Outcome: Progressing     Problem: Discharge Planning  Goal: Discharge to home or other facility with appropriate resources  Outcome: Progressing     Problem: Chronic Conditions and Co-morbidities  Goal: Patient's chronic conditions and co-morbidity symptoms are monitored and maintained or improved  Outcome: Progressing   The patient's goals for the shift include      The clinical goals for the shift include Pt will remain free of falls this shift

## 2024-09-14 NOTE — CARE PLAN
The patient's goals for the shift include      The clinical goals for the shift include Pt will remain free of falls this shift      Problem: Skin  Goal: Decreased wound size/increased tissue granulation at next dressing change  Outcome: Progressing  Goal: Participates in plan/prevention/treatment measures  Outcome: Progressing  Goal: Prevent/manage excess moisture  Outcome: Progressing  Goal: Prevent/minimize sheer/friction injuries  Outcome: Progressing  Goal: Promote/optimize nutrition  Outcome: Progressing  Goal: Promote skin healing  Outcome: Progressing     Problem: Fall/Injury  Goal: Not fall by end of shift  Outcome: Progressing  Goal: Be free from injury by end of the shift  Outcome: Progressing  Goal: Verbalize understanding of personal risk factors for fall in the hospital  Outcome: Progressing  Goal: Verbalize understanding of risk factor reduction measures to prevent injury from fall in the home  Outcome: Progressing  Goal: Use assistive devices by end of the shift  Outcome: Progressing  Goal: Pace activities to prevent fatigue by end of the shift  Outcome: Progressing     Problem: Pain - Adult  Goal: Verbalizes/displays adequate comfort level or baseline comfort level  Outcome: Progressing     Problem: Safety - Adult  Goal: Free from fall injury  Outcome: Progressing     Problem: Discharge Planning  Goal: Discharge to home or other facility with appropriate resources  Outcome: Progressing     Problem: Chronic Conditions and Co-morbidities  Goal: Patient's chronic conditions and co-morbidity symptoms are monitored and maintained or improved  Outcome: Progressing

## 2024-09-14 NOTE — CARE PLAN
The patient's goals for the shift include      The clinical goals for the shift include Maintain free from falls and injury this shift.    Over the shift, the patient did make progress toward the following goals. Barriers to progression include forgetting limitations. Recommendations to address these barriers include frequent rounds and assistance when out of bed..

## 2024-09-14 NOTE — PROGRESS NOTES
Assessment/Plan   74 year old female with PMH of CAD, Fibromyalgia, Diverticulitis, CHF, and pyoderma gangrenosum presenting due to concern for falls at home. Fall are mechanical, but having multiple a day. Falls are usually preceded by dizziness. Recent admission noted to [facility] in July for CHF and there was concern for bronchiolitis and pneumonia with possible left lung abscess and was discharged with 3 weeks of augmentin with ID follow up. IN the ED she was HDS, afebrile. Labs showed leukocytosis and PRISCILLA. CXR with left lung infiltrate consistent with prior imaging. Plan to obtain repeat CT scan. Orthostatics and pt/ot consult for falls.. Currently treating unasyn, azitrho. Ruling out tb. With persistent wbc elevation, unclear if other chronic process such as neoplasm contributing.     #CAP v. Lung abscess  #leukocytosis  - [facility] in July for CHF and there was concern for bronchiolitis and pneumonia with possible left lung abscess and was discharged with 3 weeks of augmentin with ID follow up  - cxr with left lung infiltrate  - CT chest ordered, c/w LLL pneumonia  - leukocytosis noted and priscilla. Both down trending.   - changde to unasyn, continue azithro per ID on admission.   - concern for CAP based on CXR and self-reported year of fevers and cough; however presentation is not very convincing other than persistently elevated WBC  - continuous pulse ox  - sputum culture, strep and legionella ag, sputum for rsv  - ID consulted due to persistent WBC elevation and recent history of lung abscess.  - obtain differential with next cbc. Wbc downtrending to around 12.   - lost iv on 9/13, changed to augmentin and azithro oral intermittantly pending id rec. Will need further clinical assessment to determine if if needs line.   - continuing NTM work up with sputum studies, on isolation precautions      #Falls  - likely element of decondition, recent hospitalization, active acute illness (pna/abscess)  - Orthostatics ordered, not  "completed. Will reorder if pt will cooperate.   - PT/OT  - SW/tcc consult  - Per tcc/sw has been concern of her ability to care for herself at home.      #PRISCILLA  - fluid repletion  - repeat rfp downtrending to 1.2.     #Essential Tremor  - continue home Propranolol 20mg BID     #Allergic rhinitis  - continue home Cetirizine 10mg daily  - continue home Fluticasone nasal spray    #Anxiety and depression  - continue home Duloxetine 60mg    #urinary retention  - continue home Tamsulosin 0.4mg daily    Continued social concenrs for ability to care for self. Geriatrics consulted to assist with dispo planning. PT/OT rec mod intensity          Scheduled outpatient appointments in system:   Future Appointments   Date Time Provider Department Center   9/25/2024  1:00 PM Rose Shah MD [id number] [facility]   10/7/2024 10:45 AM Mani Gonzales MD [facility]     ---------------------------------------------------------------------------------------------------  Subjective   No events. Feeling better overall, still with cough. No fevers. Denies dizziness. Eating well.   ---------------------------------------------------------------------------------------------------  Objective   Last Recorded Vitals  Blood pressure 98/57, pulse 72, temperature 36.7 °C (98.1 °F), temperature source Temporal, resp. rate 15, height 1.626 m (5' 4\"), weight 68 kg (150 lb), SpO2 95%.  Intake/Output last 3 Shifts:  I/O last 3 completed shifts:  In: - (0 mL/kg)   Out: 400 (5.9 mL/kg) [Urine:400 (0.2 mL/kg/hr)]  Weight: 68 kg     Physical Exam  Vitals and nursing note reviewed.   Constitutional:       General: She is not in acute distress.     Appearance: Normal appearance. She is not ill-appearing or toxic-appearing.      Comments: Laying in bed flat   HENT:      Head: Normocephalic and atraumatic.      Mouth/Throat:      Mouth: Mucous membranes are moist.   Eyes:      General: No scleral icterus.     Extraocular Movements: Extraocular " movements intact.      Conjunctiva/sclera: Conjunctivae normal.   Cardiovascular:      Rate and Rhythm: Normal rate and regular rhythm.      Heart sounds: S1 normal and S2 normal. No murmur heard.  Pulmonary:      Effort: Pulmonary effort is normal. No respiratory distress.      Breath sounds: No wheezing, rhonchi or rales.      Comments: Diminished left sided lung sounds.   Abdominal:      General: Bowel sounds are normal. There is no distension.      Palpations: Abdomen is soft.      Tenderness: There is no abdominal tenderness. There is no guarding or rebound.   Musculoskeletal:         General: No swelling or deformity.      Cervical back: Neck supple.   Skin:     General: Skin is warm and dry.      Findings: No rash.   Neurological:      General: No focal deficit present.      Mental Status: She is alert. Mental status is at baseline.   Psychiatric:         Mood and Affect: Mood normal.         Relevant Results  Lab Results   Component Value Date    WBC 12.5 (H) 09/14/2024    HGB 7.9 (L) 09/14/2024    HCT 27.7 (L) 09/14/2024    MCV 81 09/14/2024     09/14/2024      Lab Results   Component Value Date    GLUCOSE 94 09/14/2024    CALCIUM 8.7 09/14/2024     09/14/2024    K 3.9 09/14/2024    CO2 28 09/14/2024     09/14/2024    BUN 15 09/14/2024    CREATININE 1.04 09/14/2024     Scheduled medications  amoxicillin-pot clavulanate, 1 tablet, oral, q12h VILMA  [Held by provider] ampicillin-sulbactam, 3 g, intravenous, q6h  azithromycin, 500 mg, oral, q24h VILMA  DULoxetine, 60 mg, oral, Daily  enoxaparin, 40 mg, subcutaneous, Daily  fluticasone, 1 spray, Each Nostril, Daily  melatonin, 10 mg, oral, Nightly  propranolol, 20 mg, oral, BID  tamsulosin, 0.4 mg, oral, Daily      Continuous medications     PRN medications  PRN medications: acetaminophen, polyethylene glycol, QUEtiapine, traMADol    Ariel Tejada MD

## 2024-09-14 NOTE — CARE PLAN
Problem: Fall/Injury  Goal: Use assistive devices by end of the shift  Outcome: Not Progressing  Goal: Pace activities to prevent fatigue by end of the shift  Outcome: Not Progressing     Problem: Skin  Goal: Participates in plan/prevention/treatment measures  Outcome: Progressing  Goal: Prevent/manage excess moisture  Outcome: Progressing  Goal: Prevent/minimize sheer/friction injuries  Outcome: Progressing  Goal: Promote/optimize nutrition  Outcome: Progressing  Goal: Promote skin healing  Outcome: Progressing     Problem: Fall/Injury  Goal: Not fall by end of shift  Outcome: Progressing  Goal: Be free from injury by end of the shift  Outcome: Progressing  Goal: Verbalize understanding of personal risk factors for fall in the hospital  Outcome: Progressing     Problem: Pain - Adult  Goal: Verbalizes/displays adequate comfort level or baseline comfort level  Outcome: Progressing     The clinical goals for the shift include Patient's pain controlled to tolerable level. Patient compliant with DVT prophylaxis. Patient remains safe and free from falls. Patient compliant with skin prevention measures.      Problem: Fall/Injury  Goal: Use assistive devices by end of the shift  Outcome: Not Progressing  Goal: Pace activities to prevent fatigue by end of the shift  Outcome: Not Progressing

## 2024-09-14 NOTE — NURSING NOTE
Miss. Velasquez is very concern about living arrangements. Patient stated that if she get evicted she will not be able to stay in [employer] housing. She states she has no family or place to go to.

## 2024-09-15 VITALS
SYSTOLIC BLOOD PRESSURE: 92 MMHG | DIASTOLIC BLOOD PRESSURE: 51 MMHG | RESPIRATION RATE: 16 BRPM | TEMPERATURE: 99 F | BODY MASS INDEX: 25.61 KG/M2 | OXYGEN SATURATION: 95 % | HEIGHT: 64 IN | WEIGHT: 150 LBS | HEART RATE: 64 BPM

## 2024-09-15 LAB
ALBUMIN SERPL BCP-MCNC: 2.3 G/DL (ref 3.4–5)
ANION GAP SERPL CALC-SCNC: 10 MMOL/L (ref 10–20)
BASOPHILS # BLD AUTO: 0.12 X10*3/UL (ref 0–0.1)
BASOPHILS NFR BLD AUTO: 0.9 %
BUN SERPL-MCNC: 15 MG/DL (ref 6–23)
CALCIUM SERPL-MCNC: 8.9 MG/DL (ref 8.6–10.6)
CHLORIDE SERPL-SCNC: 101 MMOL/L (ref 98–107)
CO2 SERPL-SCNC: 28 MMOL/L (ref 21–32)
CREAT SERPL-MCNC: 1.25 MG/DL (ref 0.5–1.05)
EGFRCR SERPLBLD CKD-EPI 2021: 45 ML/MIN/1.73M*2
EOSINOPHIL # BLD AUTO: 0.39 X10*3/UL (ref 0–0.4)
EOSINOPHIL NFR BLD AUTO: 2.8 %
ERYTHROCYTE [DISTWIDTH] IN BLOOD BY AUTOMATED COUNT: 17.7 % (ref 11.5–14.5)
GLUCOSE SERPL-MCNC: 94 MG/DL (ref 74–99)
HCT VFR BLD AUTO: 29.8 % (ref 36–46)
HGB BLD-MCNC: 8.4 G/DL (ref 12–16)
HOLD SPECIMEN: NORMAL
IMM GRANULOCYTES # BLD AUTO: 0.11 X10*3/UL (ref 0–0.5)
IMM GRANULOCYTES NFR BLD AUTO: 0.8 % (ref 0–0.9)
LYMPHOCYTES # BLD AUTO: 5.14 X10*3/UL (ref 0.8–3)
LYMPHOCYTES NFR BLD AUTO: 37.1 %
MCH RBC QN AUTO: 23.1 PG (ref 26–34)
MCHC RBC AUTO-ENTMCNC: 28.2 G/DL (ref 32–36)
MCV RBC AUTO: 82 FL (ref 80–100)
MONOCYTES # BLD AUTO: 1.37 X10*3/UL (ref 0.05–0.8)
MONOCYTES NFR BLD AUTO: 9.9 %
NEUTROPHILS # BLD AUTO: 6.71 X10*3/UL (ref 1.6–5.5)
NEUTROPHILS NFR BLD AUTO: 48.5 %
NRBC BLD-RTO: 0.1 /100 WBCS (ref 0–0)
PHOSPHATE SERPL-MCNC: 3.7 MG/DL (ref 2.5–4.9)
PLATELET # BLD AUTO: 404 X10*3/UL (ref 150–450)
POTASSIUM SERPL-SCNC: 4.5 MMOL/L (ref 3.5–5.3)
RBC # BLD AUTO: 3.64 X10*6/UL (ref 4–5.2)
SODIUM SERPL-SCNC: 134 MMOL/L (ref 136–145)
WBC # BLD AUTO: 13.8 X10*3/UL (ref 4.4–11.3)

## 2024-09-15 PROCEDURE — 1100000001 HC PRIVATE ROOM DAILY

## 2024-09-15 PROCEDURE — 36415 COLL VENOUS BLD VENIPUNCTURE: CPT | Performed by: STUDENT IN AN ORGANIZED HEALTH CARE EDUCATION/TRAINING PROGRAM

## 2024-09-15 PROCEDURE — 2500000004 HC RX 250 GENERAL PHARMACY W/ HCPCS (ALT 636 FOR OP/ED): Performed by: STUDENT IN AN ORGANIZED HEALTH CARE EDUCATION/TRAINING PROGRAM

## 2024-09-15 PROCEDURE — 99233 SBSQ HOSP IP/OBS HIGH 50: CPT | Performed by: STUDENT IN AN ORGANIZED HEALTH CARE EDUCATION/TRAINING PROGRAM

## 2024-09-15 PROCEDURE — 2500000002 HC RX 250 W HCPCS SELF ADMINISTERED DRUGS (ALT 637 FOR MEDICARE OP, ALT 636 FOR OP/ED): Performed by: STUDENT IN AN ORGANIZED HEALTH CARE EDUCATION/TRAINING PROGRAM

## 2024-09-15 PROCEDURE — 80069 RENAL FUNCTION PANEL: CPT | Performed by: STUDENT IN AN ORGANIZED HEALTH CARE EDUCATION/TRAINING PROGRAM

## 2024-09-15 PROCEDURE — 87798 DETECT AGENT NOS DNA AMP: CPT | Performed by: STUDENT IN AN ORGANIZED HEALTH CARE EDUCATION/TRAINING PROGRAM

## 2024-09-15 PROCEDURE — 85025 COMPLETE CBC W/AUTO DIFF WBC: CPT | Performed by: STUDENT IN AN ORGANIZED HEALTH CARE EDUCATION/TRAINING PROGRAM

## 2024-09-15 PROCEDURE — 2500000001 HC RX 250 WO HCPCS SELF ADMINISTERED DRUGS (ALT 637 FOR MEDICARE OP): Performed by: STUDENT IN AN ORGANIZED HEALTH CARE EDUCATION/TRAINING PROGRAM

## 2024-09-15 PROCEDURE — 87116 MYCOBACTERIA CULTURE: CPT | Performed by: STUDENT IN AN ORGANIZED HEALTH CARE EDUCATION/TRAINING PROGRAM

## 2024-09-15 PROCEDURE — 31720 CLEARANCE OF AIRWAYS: CPT

## 2024-09-15 RX ORDER — SODIUM CHLORIDE FOR INHALATION 3 %
3 VIAL, NEBULIZER (ML) INHALATION ONCE
Status: DISCONTINUED | OUTPATIENT
Start: 2024-09-16 | End: 2024-09-20 | Stop reason: HOSPADM

## 2024-09-15 RX ORDER — PSEUDOEPHEDRINE HYDROCHLORIDE 60 MG/1
60 TABLET ORAL EVERY 6 HOURS
Status: DISPENSED | OUTPATIENT
Start: 2024-09-15 | End: 2024-09-16

## 2024-09-15 RX ORDER — KETOROLAC TROMETHAMINE 15 MG/ML
15 INJECTION, SOLUTION INTRAMUSCULAR; INTRAVENOUS ONCE
Status: COMPLETED | OUTPATIENT
Start: 2024-09-15 | End: 2024-09-15

## 2024-09-15 ASSESSMENT — COGNITIVE AND FUNCTIONAL STATUS - GENERAL
WALKING IN HOSPITAL ROOM: A LITTLE
TURNING FROM BACK TO SIDE WHILE IN FLAT BAD: A LITTLE
MOVING TO AND FROM BED TO CHAIR: A LITTLE
MOVING TO AND FROM BED TO CHAIR: A LITTLE
HELP NEEDED FOR BATHING: A LITTLE
STANDING UP FROM CHAIR USING ARMS: A LITTLE
CLIMB 3 TO 5 STEPS WITH RAILING: A LITTLE
TURNING FROM BACK TO SIDE WHILE IN FLAT BAD: A LITTLE
TOILETING: A LITTLE
WALKING IN HOSPITAL ROOM: A LITTLE
CLIMB 3 TO 5 STEPS WITH RAILING: A LITTLE
HELP NEEDED FOR BATHING: A LITTLE
DRESSING REGULAR LOWER BODY CLOTHING: A LITTLE
DRESSING REGULAR UPPER BODY CLOTHING: A LITTLE
MOVING FROM LYING ON BACK TO SITTING ON SIDE OF FLAT BED WITH BEDRAILS: A LITTLE
DRESSING REGULAR LOWER BODY CLOTHING: A LITTLE
DRESSING REGULAR UPPER BODY CLOTHING: A LITTLE
DAILY ACTIVITIY SCORE: 19
PERSONAL GROOMING: A LITTLE
MOBILITY SCORE: 18
STANDING UP FROM CHAIR USING ARMS: A LITTLE
DAILY ACTIVITIY SCORE: 18
MOBILITY SCORE: 18
EATING MEALS: A LITTLE
TOILETING: A LITTLE
PERSONAL GROOMING: A LITTLE
MOVING FROM LYING ON BACK TO SITTING ON SIDE OF FLAT BED WITH BEDRAILS: A LITTLE

## 2024-09-15 ASSESSMENT — PAIN SCALES - GENERAL
PAINLEVEL_OUTOF10: 7
PAINLEVEL_OUTOF10: 7
PAINLEVEL_OUTOF10: 0 - NO PAIN
PAINLEVEL_OUTOF10: 9

## 2024-09-15 ASSESSMENT — PAIN DESCRIPTION - LOCATION: LOCATION: BACK

## 2024-09-15 ASSESSMENT — PAIN DESCRIPTION - ORIENTATION: ORIENTATION: LOWER

## 2024-09-15 ASSESSMENT — PAIN - FUNCTIONAL ASSESSMENT
PAIN_FUNCTIONAL_ASSESSMENT: 0-10

## 2024-09-15 NOTE — CARE PLAN
Problem: Skin  Goal: Participates in plan/prevention/treatment measures  Outcome: Progressing     Problem: Skin  Goal: Promote skin healing  Outcome: Progressing   The patient's goals for the shift include      The clinical goals for the shift include Maintain free from falls and injury this shift.    No acute events. MD notified of low blood pressures, BP closely monitored. BP meds held per orders. IV access re-established this shift. Incontinence care provided as needed. Patient encouraged to work with PT tomorrow. Awaiting final sputum culture. Discharge planning ongoing. Patient resting comfortably at present.

## 2024-09-15 NOTE — PROGRESS NOTES
"Sandra Velasquez \"Miss Velasquez\" is a 74 y.o. female on day 5 of admission presenting with Community acquired pneumonia due to Chlamydia species.    SW consulted due to assist with discharge planning- SW contacted patient to discuss facility options.  Patient is currently declining SNF placement.   left for [facility] management staff, asking if patient had been officially issued an eviction notice.  According to patient, she was unaware that she was unable to return home.  [facility] following per other SW note.   SW follow-up needed    Luzmaria KRAMERW   "

## 2024-09-15 NOTE — PROGRESS NOTES
Assessment/Plan   74 year old female with PMH of CAD, Fibromyalgia, Diverticulitis, CHF, and pyoderma gangrenosum presenting due to concern for falls at home. Fall are mechanical, but having multiple a day. Falls are usually preceded by dizziness. Recent admission noted to [facility] in July for CHF and there was concern for bronchiolitis and pneumonia with possible left lung abscess and was discharged with 3 weeks of augmentin with ID follow up. IN the ED she was HDS, afebrile. Labs showed leukocytosis and PRISCILLA. CXR with left lung infiltrate consistent with prior imaging. Plan to obtain repeat CT scan. Orthostatics and pt/ot consult for falls.. Currently treating unasyn, azitrho. Ruling out tb. With persistent wbc elevation down to around around 13K noted. ID consulted.  Pt lost her IV, on Augmentin. Having sinus congestion and headache.  BP was low s/p fluids with improvement. IV is back, will monitor for need to change to iv abx.     #CAP v. Lung abscess  #leukocytosis  - [facility] in July for CHF and there was concern for bronchiolitis and pneumonia with possible left lung abscess and was discharged with 3 weeks of augmentin with ID follow up  - cxr with left lung infiltrate  - CT chest ordered, c/w LLL pneumonia  - leukocytosis noted and priscilla. Both down trending.   - changde to unasyn, continue azithro per ID on admission.   - concern for CAP based on CXR and self-reported year of fevers and cough; however presentation is not very convincing other than persistently elevated WBC  - continuous pulse ox  - sputum culture, strep and legionella ag, sputum for rsv  - ID consulted due to persistent WBC elevation and recent history of lung abscess.  - obtain differential with next cbc. Wbc downtrending to around 12.   - lost iv on 9/13, changed to augmentin and azithro oral intermittantly pending id rec. Will need further clinical assessment to determine if if needs line.   - continuing NTM work up with sputum studies, on isolation  precautions, but low suspicon, had neg tspot per outside records as well.   - has collected 2 specimens, needs one more in the am.        #Falls  - likely element of decondition, recent hospitalization, active acute illness (pna/abscess)  - Orthostatics ordered, not completed. Will reorder if pt will cooperate.  Asked rn for supine and sitting as pt cannot stand, pending.   - PT/OT  - SW/tcc consult  - Per tcc/sw has been concern of her ability to care for herself at home.   - pt report chronic low bp, had dizziness and tachy this am, resolved with fluids x1L.     #Sinus headache  - continue flonase  - add ocean spray  - toradol x1, pseudophed x1. Will limit use, assess response.    #PRISCILLA  - fluid repletion  - repeat rfp downtrending to 1.2. now stable    #Essential Tremor  - continue home Propranolol 20mg BID     #Allergic rhinitis  - continue home Cetirizine 10mg daily  - continue home Fluticasone nasal spray    #Anxiety and depression  - continue home Duloxetine 60mg    #urinary retention  - continue home Tamsulosin 0.4mg daily    Continued social concenrs for ability to care for self. Geriatrics consulted to assist with dispo planning. PT/OT rec mod intensity          Scheduled outpatient appointments in system:   Future Appointments   Date Time Provider Department Center   9/25/2024  1:00 PM Rose Shah MD [id number]   10/7/2024 10:45 AM Mani Gonzales MD [facility]     ---------------------------------------------------------------------------------------------------  Subjective   No events.  Dizziness this am resolved after fluids. Reports chornic low bp. Had tachy and dizzines this am, resolved with fluids. Continue sto have cough. Reports nasal congestion, frontal headache.     ---------------------------------------------------------------------------------------------------  Objective   Last Recorded Vitals  Blood pressure 92/57, pulse 74, temperature 36.4 °C (97.5 °F), temperature  "source Temporal, resp. rate 17, height 1.626 m (5' 4\"), weight 68 kg (150 lb), SpO2 94%.  Intake/Output last 3 Shifts:  I/O last 3 completed shifts:  In: - (0 mL/kg)   Out: 1200 (17.6 mL/kg) [Urine:850 (0.3 mL/kg/hr); Stool:350]  Weight: 68 kg     Physical Exam  Vitals and nursing note reviewed.   Constitutional:       General: She is not in acute distress.     Appearance: Normal appearance. She is not ill-appearing or toxic-appearing.      Comments: Laying in bed flat   HENT:      Head: Normocephalic and atraumatic.      Comments: Ttp noted on palpation of temporal sinuses     Nose: Congestion present.      Mouth/Throat:      Mouth: Mucous membranes are moist.   Eyes:      General: No scleral icterus.     Extraocular Movements: Extraocular movements intact.      Conjunctiva/sclera: Conjunctivae normal.   Cardiovascular:      Rate and Rhythm: Normal rate and regular rhythm.      Heart sounds: S1 normal and S2 normal. No murmur heard.  Pulmonary:      Effort: Pulmonary effort is normal. No respiratory distress.      Breath sounds: No wheezing, rhonchi or rales.      Comments: Diminished left sided lung sounds.   Abdominal:      General: Bowel sounds are normal. There is no distension.      Palpations: Abdomen is soft.      Tenderness: There is no abdominal tenderness. There is no guarding or rebound.   Musculoskeletal:         General: No swelling or deformity.      Cervical back: Neck supple.   Skin:     General: Skin is warm and dry.      Findings: No rash.   Neurological:      General: No focal deficit present.      Mental Status: She is alert. Mental status is at baseline.   Psychiatric:         Mood and Affect: Mood normal.         Relevant Results  Lab Results   Component Value Date    WBC 13.8 (H) 09/15/2024    HGB 8.4 (L) 09/15/2024    HCT 29.8 (L) 09/15/2024    MCV 82 09/15/2024     09/15/2024      Lab Results   Component Value Date    GLUCOSE 94 09/15/2024    CALCIUM 8.9 09/15/2024     (L) " 09/15/2024    K 4.5 09/15/2024    CO2 28 09/15/2024     09/15/2024    BUN 15 09/15/2024    CREATININE 1.25 (H) 09/15/2024     Scheduled medications  amoxicillin-pot clavulanate, 1 tablet, oral, q12h [facility]  [Held by provider] ampicillin-sulbactam, 3 g, intravenous, q6h  DULoxetine, 60 mg, oral, Daily  enoxaparin, 40 mg, subcutaneous, Daily  fluticasone, 1 spray, Each Nostril, Daily  melatonin, 10 mg, oral, Nightly  propranolol, 20 mg, oral, BID  tamsulosin, 0.4 mg, oral, Daily      Continuous medications     PRN medications  PRN medications: acetaminophen, polyethylene glycol, QUEtiapine, traMADol    Ariel Tejada MD

## 2024-09-16 LAB
25(OH)D3 SERPL-MCNC: 20 NG/ML (ref 30–100)
ALBUMIN SERPL BCP-MCNC: 2.2 G/DL (ref 3.4–5)
ANION GAP SERPL CALC-SCNC: 11 MMOL/L (ref 10–20)
ANNOTATION COMMENT IMP: NORMAL
ANNOTATION COMMENT IMP: NORMAL
BASOPHILS # BLD AUTO: 0.07 X10*3/UL (ref 0–0.1)
BASOPHILS NFR BLD AUTO: 0.7 %
BUN SERPL-MCNC: 15 MG/DL (ref 6–23)
CALCIUM SERPL-MCNC: 8.9 MG/DL (ref 8.6–10.6)
CHLORIDE SERPL-SCNC: 105 MMOL/L (ref 98–107)
CO2 SERPL-SCNC: 27 MMOL/L (ref 21–32)
CREAT SERPL-MCNC: 1.06 MG/DL (ref 0.5–1.05)
EGFRCR SERPLBLD CKD-EPI 2021: 55 ML/MIN/1.73M*2
EOSINOPHIL # BLD AUTO: 0.29 X10*3/UL (ref 0–0.4)
EOSINOPHIL NFR BLD AUTO: 2.7 %
ERYTHROCYTE [DISTWIDTH] IN BLOOD BY AUTOMATED COUNT: 17.4 % (ref 11.5–14.5)
GLUCOSE SERPL-MCNC: 110 MG/DL (ref 74–99)
HCT VFR BLD AUTO: 27.2 % (ref 36–46)
HGB BLD-MCNC: 7.9 G/DL (ref 12–16)
HOLD SPECIMEN: NORMAL
IMM GRANULOCYTES # BLD AUTO: 0.06 X10*3/UL (ref 0–0.5)
IMM GRANULOCYTES NFR BLD AUTO: 0.6 % (ref 0–0.9)
LYMPHOCYTES # BLD AUTO: 3.81 X10*3/UL (ref 0.8–3)
LYMPHOCYTES NFR BLD AUTO: 35.5 %
M TB DNA SPT QL NAA+PROBE: NOT DETECTED
M TB DNA SPT QL NAA+PROBE: NOT DETECTED
MCH RBC QN AUTO: 23.3 PG (ref 26–34)
MCHC RBC AUTO-ENTMCNC: 29 G/DL (ref 32–36)
MCV RBC AUTO: 80 FL (ref 80–100)
MONOCYTES # BLD AUTO: 1.11 X10*3/UL (ref 0.05–0.8)
MONOCYTES NFR BLD AUTO: 10.3 %
MTB AND RIF RESISTANCE PNL ISLT/SPM: NORMAL
MTB AND RIF RESISTANCE PNL ISLT/SPM: NORMAL
NEUTROPHILS # BLD AUTO: 5.4 X10*3/UL (ref 1.6–5.5)
NEUTROPHILS NFR BLD AUTO: 50.2 %
NRBC BLD-RTO: 0 /100 WBCS (ref 0–0)
PHOSPHATE SERPL-MCNC: 4.6 MG/DL (ref 2.5–4.9)
PLATELET # BLD AUTO: 405 X10*3/UL (ref 150–450)
POTASSIUM SERPL-SCNC: 4.3 MMOL/L (ref 3.5–5.3)
RBC # BLD AUTO: 3.39 X10*6/UL (ref 4–5.2)
SODIUM SERPL-SCNC: 139 MMOL/L (ref 136–145)
TSH SERPL-ACNC: 1.61 MIU/L (ref 0.44–3.98)
VIT B12 SERPL-MCNC: 764 PG/ML (ref 211–911)
WBC # BLD AUTO: 10.7 X10*3/UL (ref 4.4–11.3)

## 2024-09-16 PROCEDURE — 99232 SBSQ HOSP IP/OBS MODERATE 35: CPT

## 2024-09-16 PROCEDURE — 99233 SBSQ HOSP IP/OBS HIGH 50: CPT | Performed by: INTERNAL MEDICINE

## 2024-09-16 PROCEDURE — 87040 BLOOD CULTURE FOR BACTERIA: CPT | Performed by: STUDENT IN AN ORGANIZED HEALTH CARE EDUCATION/TRAINING PROGRAM

## 2024-09-16 PROCEDURE — 99418 PROLNG IP/OBS E/M EA 15 MIN: CPT | Performed by: INTERNAL MEDICINE

## 2024-09-16 PROCEDURE — 2500000001 HC RX 250 WO HCPCS SELF ADMINISTERED DRUGS (ALT 637 FOR MEDICARE OP): Performed by: STUDENT IN AN ORGANIZED HEALTH CARE EDUCATION/TRAINING PROGRAM

## 2024-09-16 PROCEDURE — 80069 RENAL FUNCTION PANEL: CPT | Performed by: STUDENT IN AN ORGANIZED HEALTH CARE EDUCATION/TRAINING PROGRAM

## 2024-09-16 PROCEDURE — 2500000002 HC RX 250 W HCPCS SELF ADMINISTERED DRUGS (ALT 637 FOR MEDICARE OP, ALT 636 FOR OP/ED): Performed by: STUDENT IN AN ORGANIZED HEALTH CARE EDUCATION/TRAINING PROGRAM

## 2024-09-16 PROCEDURE — 82306 VITAMIN D 25 HYDROXY: CPT | Performed by: INTERNAL MEDICINE

## 2024-09-16 PROCEDURE — 1100000001 HC PRIVATE ROOM DAILY

## 2024-09-16 PROCEDURE — 85025 COMPLETE CBC W/AUTO DIFF WBC: CPT | Performed by: STUDENT IN AN ORGANIZED HEALTH CARE EDUCATION/TRAINING PROGRAM

## 2024-09-16 PROCEDURE — 87116 MYCOBACTERIA CULTURE: CPT | Performed by: STUDENT IN AN ORGANIZED HEALTH CARE EDUCATION/TRAINING PROGRAM

## 2024-09-16 PROCEDURE — 2500000004 HC RX 250 GENERAL PHARMACY W/ HCPCS (ALT 636 FOR OP/ED): Performed by: STUDENT IN AN ORGANIZED HEALTH CARE EDUCATION/TRAINING PROGRAM

## 2024-09-16 PROCEDURE — 36415 COLL VENOUS BLD VENIPUNCTURE: CPT | Performed by: STUDENT IN AN ORGANIZED HEALTH CARE EDUCATION/TRAINING PROGRAM

## 2024-09-16 PROCEDURE — 97530 THERAPEUTIC ACTIVITIES: CPT | Mod: GP

## 2024-09-16 PROCEDURE — 99233 SBSQ HOSP IP/OBS HIGH 50: CPT | Performed by: STUDENT IN AN ORGANIZED HEALTH CARE EDUCATION/TRAINING PROGRAM

## 2024-09-16 PROCEDURE — 84443 ASSAY THYROID STIM HORMONE: CPT | Performed by: INTERNAL MEDICINE

## 2024-09-16 PROCEDURE — 82607 VITAMIN B-12: CPT | Performed by: INTERNAL MEDICINE

## 2024-09-16 ASSESSMENT — COGNITIVE AND FUNCTIONAL STATUS - GENERAL
MOBILITY SCORE: 13
TURNING FROM BACK TO SIDE WHILE IN FLAT BAD: A LITTLE
MOVING TO AND FROM BED TO CHAIR: A LOT
WALKING IN HOSPITAL ROOM: A LOT
MOVING FROM LYING ON BACK TO SITTING ON SIDE OF FLAT BED WITH BEDRAILS: A LITTLE
STANDING UP FROM CHAIR USING ARMS: A LOT
CLIMB 3 TO 5 STEPS WITH RAILING: TOTAL

## 2024-09-16 ASSESSMENT — PAIN - FUNCTIONAL ASSESSMENT: PAIN_FUNCTIONAL_ASSESSMENT: 0-10

## 2024-09-16 ASSESSMENT — PAIN SCALES - GENERAL
PAINLEVEL_OUTOF10: 7
PAINLEVEL_OUTOF10: 8
PAINLEVEL_OUTOF10: 4

## 2024-09-16 NOTE — CARE PLAN
Problem: Skin  Goal: Decreased wound size/increased tissue granulation at next dressing change  Outcome: Progressing     Problem: Skin  Goal: Promote/optimize nutrition  Outcome: Progressing   The patient's goals for the shift include      The clinical goals for the shift include safety, o2 saturation in high 90s    No acute events. Hypotensive this shift, asymptomatic. MD notified and bolus administered as ordered. BP since improved slightly. Awaiting final RT-induced sputum sample. Airborne precautions maintained. Assisted with turning and repositioning as needed. Patient resting between care.

## 2024-09-16 NOTE — NURSING NOTE
"The patient has had multiple AFB and Rifampin PCR tests ordered for rule out TB.  None of the tests have come back with results to date. In reviewing the chart ID documented a note on 9/13/2024 where it was stated \" We will Continue AFB of sputum for total of 3 specimens. In review of Care Everywhere, she recently had a negative T-spot test (1/31/22) as well as a negative AFB of sputum x 1 measurement. We will maintain isolation until results are known.\"     Per policy IC-3 in order to take the patient out of Airborne isolation for TB rule out the following must be met.     \"Isolation can be discontinued when:   In patients without known TB, when we have 3 negative AFB smears on 3 different days or 2 negative TB Rif PCR at least 8 hours apart.    In patients with a diagnosis of TB and are on treatment for at least 2 weeks, when we have 3 negative AFB smears on 3 different days.    When TB disease is considered unlikely because another diagnosis is made that explains the clinical syndrome  Positive AFB smear and nucleic acid probe is negative for TB and/or for a non-tuberculous mycobacterium.\"     In order to remove Airborne Isolation on this patient we would need to wait for the 2 PCR tests to come back and / or order another AFB since the 3 that have been ordered were not from 3 separate days.     The tests that are awaiting results are below:  9/13/2024 @ 1348 AFB   9/13/2024 @1410 PCR  9/13/2024 @ 1602 AFB  9/15/2024 @ 1333 AFB  9/15/2024 @ 1333 PCR    Thank you and please reach out with any questions / concerns  Sasha Gonzalez RN Infection Prevention   On call pager: [phone]    Addendum:   PCR tests resulted during documentation of this note.   9/13/2024 at 1410 PCR = negative  9/15/2024 at 1333 PCR = negative   Based on policy the patient can be removed from Airborne Isolation.     Sasha Gonzalez RN Infection Prevention     "

## 2024-09-16 NOTE — PROGRESS NOTES
Assessment/Plan     Ms Dove is a 74 year old female with PMH of CAD, Fibromyalgia, Diverticulitis, CHF, and pyoderma gangrenosum presenting due to concern for falls at home. Fall are mechanical, but having multiple a day. Falls are usually preceded by dizziness. Recent admission noted to [facility] in July for CHF and there was concern for bronchiolitis and pneumonia with possible left lung abscess and was discharged with 3 weeks of augmentin with ID follow up. IN the ED she was HDS, afebrile. Labs showed leukocytosis and PRISCILLA. CXR with left lung infiltrate consistent with prior imaging. Plan to obtain repeat CT scan. Orthostatics and pt/ot consult for falls.. Currently treating unasyn, azitrho. Ruling out TB. With persistent wbc elevation down to around around 13K noted. ID consulted.  Pt lost her IV, on Augmentin. Having sinus congestion and headache.  BP was low s/p fluids with improvement. IV is back, will monitor for need to change to iv abx.   Monisha consulted for dispo, cannot take care of self and I think aps was called on her  Follow up make sure we have 3 sputums for tb rule out, low supscion. Its been difficult. Follow up ID recs. Still with wbc count. Has low bp better after fluids. New sinus headache it appears.   plan is for discharge to [facility] when ready    50 min     ------------------------------------    74 year old female with PMH of CAD, Fibromyalgia, Diverticulitis, CHF, and pyoderma gangrenosum presenting due to concern for falls at home. Fall are mechanical, but having multiple a day. Falls are usually preceded by dizziness. Recent admission noted to [facility] in July for CHF and there was concern for bronchiolitis and pneumonia with possible left lung abscess and was discharged with 3 weeks of augmentin with ID follow up. IN the ED she was HDS, afebrile. Labs showed leukocytosis and PRISCILLA. CXR with left lung infiltrate consistent with prior imaging. Plan to obtain repeat CT scan.  Orthostatics and pt/ot consult for falls.. Currently treating unasyn, azitrho. Ruling out tb. With persistent wbc elevation down to around around 13K noted. ID consulted.  Pt lost her IV, on Augmentin. Having sinus congestion and headache.  BP was low s/p fluids with improvement. IV is back, will monitor for need to change to iv abx.     #CAP v. Lung abscess  #leukocytosis  - [facility] in July for CHF and there was concern for bronchiolitis and pneumonia with possible left lung abscess and was discharged with 3 weeks of augmentin with ID follow up  - cxr with left lung infiltrate  - CT chest ordered, c/w LLL pneumonia  - leukocytosis noted and rosalva. Both down trending.   - changde to unasyn, continue azithro per ID on admission.   - concern for CAP based on CXR and self-reported year of fevers and cough; however presentation is not very convincing other than persistently elevated WBC  - continuous pulse ox  - sputum culture, strep and legionella ag, sputum for rsv  - ID consulted due to persistent WBC elevation and recent history of lung abscess.  - obtain differential with next cbc. Wbc downtrending to around 12.   - lost iv on 9/13, changed to augmentin and azithro oral intermittantly pending id rec. Will need further clinical assessment to determine if if needs line.   - continuing NTM work up with sputum studies, on isolation precautions, but low suspicon, had neg tspot per outside records as well.   - has collected 2 specimens, needs one more in the am.        #Falls  - likely element of decondition, recent hospitalization, active acute illness (pna/abscess)  - Orthostatics ordered, not completed. Will reorder if pt will cooperate.  Asked rn for supine and sitting as pt cannot stand, pending.   - PT/OT  - SW/tcc consult  - Per tcc/sw has been concern of her ability to care for herself at home.   - pt report chronic low bp, had dizziness and tachy this am, resolved with fluids x1L.     #Sinus headache  - continue  "flonase  - add ocean spray  - toradol x1, pseudophed x1. Will limit use, assess response.    #PRISCILLA  - fluid repletion  - repeat rfp downtrending to 1.2. now stable    #Essential Tremor  - continue home Propranolol 20mg BID     #Allergic rhinitis  - continue home Cetirizine 10mg daily  - continue home Fluticasone nasal spray    #Anxiety and depression  - continue home Duloxetine 60mg    #urinary retention  - continue home Tamsulosin 0.4mg daily    Continued social concenrs for ability to care for self. Geriatrics consulted to assist with dispo planning. PT/OT rec mod intensity          Scheduled outpatient appointments in system:   Future Appointments   Date Time Provider Department Center   9/25/2024  1:00 PM Rose Shah MD [id number]   10/7/2024 10:45 AM Mani Gonzales MD [facility]     ---------------------------------------------------------------------------------------------------  Subjective   No events.  Dizziness this am resolved after fluids. Reports chornic low bp. Had tachy and dizzines this am, resolved with fluids. Continue sto have cough. Reports nasal congestion, frontal headache.     ---------------------------------------------------------------------------------------------------  Objective   Last Recorded Vitals  Blood pressure 95/57, pulse 71, temperature 36.8 °C (98.2 °F), resp. rate 14, height 1.626 m (5' 4\"), weight 68 kg (150 lb), SpO2 95%.  Intake/Output last 3 Shifts:  I/O last 3 completed shifts:  In: 1733.8 (25.5 mL/kg) [P.O.:240; IV Piggyback:1493.8]  Out: 950 (14 mL/kg) [Urine:950 (0.4 mL/kg/hr)]  Weight: 68 kg     Physical Exam  Vitals and nursing note reviewed.   Constitutional:       General: She is not in acute distress.     Appearance: Normal appearance. She is not ill-appearing or toxic-appearing.      Comments: Laying in bed flat   HENT:      Head: Normocephalic and atraumatic.      Comments: Ttp noted on palpation of temporal sinuses     Nose: Congestion " present.      Mouth/Throat:      Mouth: Mucous membranes are moist.   Eyes:      General: No scleral icterus.     Extraocular Movements: Extraocular movements intact.      Conjunctiva/sclera: Conjunctivae normal.   Cardiovascular:      Rate and Rhythm: Normal rate and regular rhythm.      Heart sounds: S1 normal and S2 normal. No murmur heard.  Pulmonary:      Effort: Pulmonary effort is normal. No respiratory distress.      Breath sounds: No wheezing, rhonchi or rales.      Comments: Diminished left sided lung sounds.   Abdominal:      General: Bowel sounds are normal. There is no distension.      Palpations: Abdomen is soft.      Tenderness: There is no abdominal tenderness. There is no guarding or rebound.   Musculoskeletal:         General: No swelling or deformity.      Cervical back: Neck supple.   Skin:     General: Skin is warm and dry.      Findings: No rash.   Neurological:      General: No focal deficit present.      Mental Status: She is alert. Mental status is at baseline.   Psychiatric:         Mood and Affect: Mood normal.         Relevant Results  Lab Results   Component Value Date    WBC 13.8 (H) 09/15/2024    HGB 8.4 (L) 09/15/2024    HCT 29.8 (L) 09/15/2024    MCV 82 09/15/2024     09/15/2024      Lab Results   Component Value Date    GLUCOSE 94 09/15/2024    CALCIUM 8.9 09/15/2024     (L) 09/15/2024    K 4.5 09/15/2024    CO2 28 09/15/2024     09/15/2024    BUN 15 09/15/2024    CREATININE 1.25 (H) 09/15/2024     Scheduled medications  amoxicillin-pot clavulanate, 1 tablet, oral, q12h [facility]  [Held by provider] ampicillin-sulbactam, 3 g, intravenous, q6h  DULoxetine, 60 mg, oral, Daily  enoxaparin, 40 mg, subcutaneous, Daily  fluticasone, 1 spray, Each Nostril, Daily  melatonin, 10 mg, oral, Nightly  propranolol, 20 mg, oral, BID  pseudoephedrine, 60 mg, oral, q6h  sodium chloride, 500 mL, intravenous, Once  sodium chloride, 3 mL, nebulization, Once  tamsulosin, 0.4 mg, oral,  Daily      Continuous medications     PRN medications  PRN medications: acetaminophen, polyethylene glycol, QUEtiapine, traMADol    Saida Mobley MD

## 2024-09-16 NOTE — PROGRESS NOTES
Subjective   NAEON  Today patient stated that she wants to talk to her building management because one bad night should not mean that they evict her and she also has a cat at home called rakesh.  She also stated that she wanted to get in touch with her psychologist.  She stated she has family but they dont like her and has a brother who has long covid because of which she does not see him often.       Objective     Current Facility-Administered Medications   Medication Dose Route Frequency Provider Last Rate Last Admin    acetaminophen (Tylenol) tablet 650 mg  650 mg oral q6h PRN Ariel Tejada MD        amoxicillin-pot clavulanate (Augmentin) 875-125 mg per tablet 1 tablet  1 tablet oral q12h VILMA Ariel Tejada MD   1 tablet at 09/15/24 2356    [Held by provider] ampicillin-sulbactam (Unasyn) in sodium chloride 0.9 % 100 mL 3 g  3 g intravenous q6h Ariel Tejada MD   Stopped at 09/12/24 1419    DULoxetine (Cymbalta) DR capsule 60 mg  60 mg oral Daily Ariel Tejada MD   60 mg at 09/15/24 0903    enoxaparin (Lovenox) syringe 40 mg  40 mg subcutaneous Daily Ariel Tejada MD   40 mg at 09/15/24 0903    fluticasone (Flonase) nasal spray 1 spray  1 spray Each Nostril Daily Ariel Tejada MD   1 spray at 09/15/24 0902    melatonin tablet 10 mg  10 mg oral Nightly Ariel Tejada MD   10 mg at 09/15/24 2133    polyethylene glycol (Glycolax, Miralax) packet 17 g  17 g oral Daily PRN Ariel Tejada MD   17 g at 09/10/24 1826    propranolol (Inderal) tablet 20 mg  20 mg oral BID Ariel Tejada MD   20 mg at 09/14/24 0935    pseudoephedrine (Sudafed) tablet 60 mg  60 mg oral q6h Mine Moraes DO   60 mg at 09/15/24 2356    QUEtiapine (SEROquel) tablet 12.5 mg  12.5 mg oral BID PRN Ariel Tejada MD   12.5 mg at 09/15/24 1447    sodium chloride 0.9 % bolus 500 mL  500 mL intravenous Once Mine Moraes,         sodium chloride 3 % nebulizer solution 3 mL  3 mL  nebulization Once Ariel Tejada MD        tamsulosin (Flomax) 24 hr capsule 0.4 mg  0.4 mg oral Daily Ariel Tejada MD   0.4 mg at 09/14/24 0933    traMADol (Ultram) tablet 50 mg  50 mg oral q8h PRN Ariel Tejada MD   50 mg at 09/15/24 1656       Physical Exam  Physical Exam  Vitals and nursing note reviewed.   Constitutional:       General: She is not in acute distress.     Appearance: Normal appearance. She is not ill-appearing or toxic-appearing.      Comments: Laying in bed flat   HENT:      Head: Normocephalic and atraumatic.      Comments: Ttp noted on palpation of temporal sinuses     Nose: Congestion present.      Mouth/Throat:      Mouth: Mucous membranes are moist.   Eyes:      General: No scleral icterus.     Extraocular Movements: Extraocular movements intact.      Conjunctiva/sclera: Conjunctivae normal.   Cardiovascular:      Rate and Rhythm: Normal rate and regular rhythm.      Heart sounds: S1 normal and S2 normal. No murmur heard.  Pulmonary:      Effort: Pulmonary effort is normal. No respiratory distress.      Breath sounds: No wheezing, rhonchi or rales.   Abdominal:      General: Bowel sounds are normal. There is no distension.      Palpations: Abdomen is soft.      Tenderness: There is no abdominal tenderness. There is no guarding or rebound.   Musculoskeletal:         General: No swelling or deformity.      Cervical back: Neck supple.   Skin:     General: Skin is warm and dry.      Findings: No rash.   Neurological:      General: No focal deficit present.      Mental Status: She is alert. Mental status is at baseline.   Psychiatric:         Mood and Affect: Mood normal.       Confusion Assessment Method(CAM) for diagnosis of delirium:    1.  Acute onset or fluctuating course: absent  2.  Inattention: absent  3.  Disorganized thinking: absent  4.  Altered level of consciousness: absent  CAM: negative    AT Score For Assessment of Delirium and Cognitive  Impairment:    Alertness: 0  Normal(fully alert,but not agitated, throughout assessment)=0  Mild sleepiness for <10 seconds after walking, then normal=0  Clearly abnormal=4  2.  AMT4: 0  No mistakes=0  One mistake=1  Two or more mistakes/untestable=2  3.  Attention: 0  Achieves seven months or more correctly=0  Starts but scores <7 months/ refuses to start=1  Untestable(cannot start because unwell, drowsy, inattentive)=2  4.  Acute: 0  No=0  Yes=4    Total Score: 0  4 or above: Possible delirium +/- cognitive impairment  1-3: Possible cognitive impairment  0: Delirium or severe cognitive impairment unlikely(but delirium still possible if (4) information incomplete)      Last Recorded Vitals      9/15/2024     2:38 PM 9/15/2024     4:31 PM 9/15/2024     8:50 PM 9/15/2024    11:55 PM 9/16/2024    12:07 AM 9/16/2024     1:59 AM 9/16/2024     4:34 AM   Vitals   Systolic 102 116  92 88 95    Diastolic 67 70  51 48 57    Heart Rate 80 88  64  71    Temp 36.1 °C (97 °F) 37.2 °C (99 °F)     36.8 °C (98.2 °F)   Resp 16 16 16   14       Vitals:    09/10/24 1240   Weight: 68 kg (150 lb)        Relevant Results  Lab Results   Component Value Date    TSH 0.45 04/10/2024    IPHCEIZV77 1,139 (H) 05/23/2023    VITD25 38 12/18/2019    HGBA1C 5.7 (A) 05/23/2023     Results from last 7 days   Lab Units 09/15/24  0516 09/14/24  0524 09/13/24  0744 09/13/24  0743 09/12/24  0600 09/11/24  0856 09/10/24  1322   WBC AUTO x10*3/uL 13.8* 12.5* 12.1*  --    < > 17.5* 18.1*   HEMOGLOBIN g/dL 8.4* 7.9* 8.5*  --    < > 9.0* 10.1*   HEMATOCRIT % 29.8* 27.7* 28.6*  --    < > 31.1* 33.0*   ALT U/L  --   --   --   --   --  13 13   AST U/L  --   --   --   --   --  14 18   SODIUM mmol/L 134* 139  --  139  --  138  138 136   POTASSIUM mmol/L 4.5 3.9  --  3.5  --  3.2*  3.2* 3.2*   CHLORIDE mmol/L 101 106  --  104  --  103  103 99   CREATININE mg/dL 1.25* 1.04  --  1.13*  --  1.22*  1.22* 1.66*   BUN mg/dL 15 15  --  15  --  22  22 25*   CO2 mmol/L  28 28  --  27  --  28  28 26    < > = values in this interval not displayed.          CT chest wo IV contrast  Narrative: Interpreted By:  Stevie Arteaga,   STUDY:  CT CHEST WO IV CONTRAST;  9/11/2024 10:44 am      INDICATION:  Signs/Symptoms:eval of left lung abscess seen 3 weeks prior at [facility].          COMPARISON:  04/10/2024.      ACCESSION NUMBER(S):  [id number]      ORDERING CLINICIAN:  NIMESH WAKEFIELD      TECHNIQUE:  Helical data acquisition of the chest was obtained  without IV  contrast material.  Images were reformatted in axial, coronal, and  sagittal planes.      FINDINGS:  LUNGS AND AIRWAYS:  The trachea and central airways are patent. No endobronchial lesion.      There is multifocal consolidative opacities throughout the visualized  lung parenchyma. When compared to a study of 04/10/2024 there has  been interval improvement in right upper lobe airspace  opacities/consolidation. There is persistent lingular and left lower  lobe airspace disease with slight interval worsening consolidation in  the superior segment of the left lower lobe. There is mucoid  impaction within the left lower lobe. There is no evidence of  effusions.      MEDIASTINUM AND GREY, LOWER NECK AND AXILLA:  The visualized thyroid gland is within normal limits.      Scattered mediastinal lymph nodes are felt to be  inflammatory/reactive in nature.      Esophagus appears within normal limits as seen.      HEART AND VESSELS:  There is moderate atherosclerotic changes of the thoracic aorta.      Main pulmonary artery and its branches are normal in caliber.      There are severe coronary artery calcifications. The study is not  optimized for evaluation of coronary arteries.      The cardiac chambers are not enlarged.      No evidence of pericardial effusion.      UPPER ABDOMEN:  There is a small hiatal hernia. There is a mid polar left renal cyst.      CHEST WALL AND OSSEOUS STRUCTURES:  There is multilevel degenerative changes of  the thoracic spine.      Impression: 1.  There is significant consolidation within the superior segment of  the left lower lobe with persistent lingular airspace opacities.  Interval improvement in right upper lobe airspace  disease/consolidation. Correlate with a history of recurrent  aspiration or immunodeficiency. Extensive mediastinal lymphadenopathy  most likely reactive but correlate with any concern for  lymphoproliferative disorders.  2. Severe atherosclerotic changes of the aorta and coronary arteries.      MACRO:  None      Signed by: Stevie Arteaga 9/13/2024 6:50 PM  Dictation workstation:   [id number]        DATA:  EKG: QTC  Encounter Date: 09/10/24   ECG 12 lead   Result Value    Ventricular Rate 70    Atrial Rate 70    AR Interval 104    QRS Duration 108    QT Interval 424    QTC Calculation(Bazett) 457    P Axis 74    R Axis 97    T Axis 53    QRS Count 11    Q Onset 221    P Onset 169    P Offset 211    T Offset 433    QTC Fredericia 446    Narrative    Sinus rhythm with short AR  Rightward axis  Incomplete right bundle branch block  Possible Anterior infarct (cited on or before 10-APR-2024)  Abnormal ECG  When compared with ECG of 10-APR-2024 20:54,  Significant changes have occurred    See ED provider note for full interpretation and clinical correlation  Confirmed by Becca Doyle ([id number]) on 9/10/2024 11:09:20 PM      Anti-psychotics in 48 hours: No  Opioids/Benzodiazepines in 48 hours: No  Anticholinergics on board:No  Restraints:No  Indwelling catheters:Yes - external catheter  Last BM:  UO in 24 hours: 550 ml  Activity in the past 24 hours: no activity  Need for ambulatory devices: none          Assessment/Plan   This is a/an 74 y.o. year old female, with past medical history relevant for ith [facility] of CAD, Fibromyalgia, opioid use disorder, Diverticulitis s/p rodríguez procedure, CHF, and pyoderma gangrenosum, hidradenitis who presented to the hospital due to concern for falls at home.,  being  seen in geriatric consultation for suspected impaired cognition.         Principal Problem:    Community acquired pneumonia due to Chlamydia species     Paranoia/ Suspected Dementia  #PTSD/MDD/Chronic Pain Syndrome/ Opioid Use Disorder  :: Upon examination patient lacks medical decision making capacity and has underlying psych issues including MDD, PTSD, Anxiety and Opiod use disorder. Patient had an episode of paranoia on the floor but unable to get collateral information regarding her home situation from anyone since patient states she does not have family or friends. Unable to assess at this time how patient is outside of the hospital and patient refuses to answer most questions. No current NOK in our system or per the patient, however, per chart review has a sister and per nursing home has a brother as well. Since the patient states she does not have a  or children, the NOK is sister/brother. Currently SW is in contact with Juliano Roman (Maryse De Leon) to get more information regarding the patient's living situation. There is also concern for discharge planning since the patient is not able to take care of herself, she is also not letting the home healthcare come in and help. Patient also follows with psychology for PTSD, opiod use disorder (last appointment 5/24) however, note blocked per patient request.   SW was able to get a return call from brother who confirms being HCPOA. Geriatrics tried several times to get a hold of him for better history or to get a better understanding of patient's function in the outpatient  Discussed with Bella Kuhn, her passport SW/, who has been involved with patient for housing issues. Patient lives in a senior citizen apartment.   Patient has deficits in all her instrumental and some of her basic ADLS such as bathing. She has a colostomy bag with supplies at home.   She is usually very rude to home health staff and very particular in how she wants  things done for her. This results in the home health staff not returning to her case after a few days.   Bella has been working in getting her into an assisted living facility which will be best for patient.  She plans to follow-up with patient in her SNF and make the transition to assisted living from SNF.   :: Currently only takes duloxetine 60 mg at home  :: Previous MOCA in 2019 was 27/30. Patient has since had multiple concerns for cognitive decline per chart review  :: TSH was 0.45 on 4/11/24, Vitamin B12 was 1139 in 2023  Plan:  - Continue to hold cetirizine and ibuprofen; do not resume at discharge  - Can use Seroquel 12.5 mg q6 prn for agitation. Monitor EKG  - Will try to get in contact with her psychologist to figure out where she is at with her psych issues.  - Can continue home Duloxetine 60 mg  - Can continue melatonin 10 mg  - Please follow repeat TSH and Vit B12  -Will perform a MOCA tomorrow  - Discussed with SW, refer to JOSÉ, goldieerJameson for all complex medical decision making     Please consider the following general measures for minimizing delirium in a hospitalized patient:   -Bright lights during the day, keeps blinds up, switch all lights on   -avoid disturbances at night. Encourage at least 6 hours uninterrupted sleep. Consider d/c 4am vitals check  -avoid benzodiazepines, sedatives. Minimize opioids   -avoid anti-cholinergics    -avoid restraints. D/c fowler if possible.   - if requiring no to little insulin coverage, d/c sliding scale  -use low dose haldol 0.5mg PO (IM if PO not possible) only PRN severe agitation where pt exhibits volatile behavior and is a threat to self or others. EKGs to monitor QTc   -daily orientation to time and place by the staff   -out of bed to chair few hours everyday  - encourage stimulating activities during the day if possible     2. Falls  :: likely secondary to malnutrition and debilitation; low concern for arrythmia  - Orthostatic VS   - PT/OT     3.  "CAP  :: concern for CAP based on CXR and self-reported year of fevers and cough; however presentation is not very convincing other than persistently elevated WBC  - Continue Ceftriaxone   - Continue Azithromycin   - Continuous pulse ox     4. Essential Tremor  - Continue home Propranolol 20mg BID     5. Allergic rhinitis  Sinus headache  :: Currently on home Cetirizine 10mg daily and Fluticasone nasal spray  :: Recently added ocean spray and received a single dose of toradol, pseudophed   Plan:  - Discontinue cetirizine  - Can continue fluticasone     6. Urinary retention  - continue home Tamsulosin 0.4mg daily  - has an external catheter    Goals of Care:  -Health care power of : Irving Velasquez [phone]  -Living will: No  Code status: Presumed Full           4M AGE-FRIENDLY INITIATIVE:  What matters most to patient: \"not much\"  Medications: cymbalta  Mentation: A&O x 4  Mobility: currently not mobile    Geriatric medicine will continue to follow the patient. Thank you for allowing geriatric medicine to be involved in the care of your patient. Geriatric medicine consultation team is available during work hours Monday through Friday. For any emergency issues requiring immediate assistance over the weekend, please page Geriatrics pager [phone]    Irene Alex MD  Internal Medicine, PGY1    I saw and evaluated the patient.  I personally obtained the key and critical portions of the history and physical exam or was physically present for key and critical portions performed by the resident. I reviewed the resident’s documentation and discussed the patient with the resident.  I agree with the resident’s medical decision making as documented in their note with the exception/addition of the following:   See few notes in italics    Time spent coordinating care, talking to patient to reassess capacity, talking to Passport CM, discussing with care team: 75 minutes    Talia Rene MD    "

## 2024-09-16 NOTE — PROGRESS NOTES
SW attempted to discuss prior housing and living details with Maryse Youssef   but no answer. SW left vm. Per Geriatric consult, patient lacks medical decision making capacity. SW updated brother Jameson Barcenas  on moderate intensity PT recommendation and patients inability to make medical decisions at this time. Brother confirmed he is agreeable to SNF. Brother states he lives in [place], [place]. SW emailed SNF list to brother at [email or web address]. Brother reports patient has a Passport CM Bella Kuhn . Brother reports patient completed POA and living will on 1/4/20; brother states he is listed as primary agent and friend Isabel James  listed as secondary agent. Secondary agent and Passport CM contact info added to chart. SW will follow up with brother for SNF choices.     1031-Brother provided 3 choices in order as listed: 1. [facility] 2. [facility] 3. [facility]. Referrals submitted for review. SW will continue to follow.     1341-[facility] is the only accepting SNF choice. DOM confirmed they can accept once airborne precautions/TB is ruled out. Completed goldenrod is needed to complete Level of Care for discharge to SNF. TANISHA notified [facility]. Will continue to follow.       CHRISTINE Wheatley

## 2024-09-16 NOTE — PROGRESS NOTES
"Sandra Velasquez \"Miss Velasquez\" is a 74 y.o. female on day 6 of admission presenting with Community acquired pneumonia due to Chlamydia species.      Subjective   Patient had no acute events overnight. Remains afebrile and WBC has now downtrended to 10.7 and does not complain of any new symptoms.        Objective     Last Recorded Vitals  BP 97/60   Pulse 74   Temp 36.4 °C (97.5 °F) (Temporal)   Resp 18   Wt 68 kg (150 lb)   SpO2 98%   Intake/Output last 3 Shifts:    Intake/Output Summary (Last 24 hours) at 9/16/2024 1710  Last data filed at 9/16/2024 1600  Gross per 24 hour   Intake 477.08 ml   Output 100 ml   Net 377.08 ml       Admission Weight  Weight: 68 kg (150 lb) (09/10/24 1240)    Daily Weight  09/10/24 : 68 kg (150 lb)    Image Results  CT chest wo IV contrast  Narrative: Interpreted By:  Stevie Arteaga,   STUDY:  CT CHEST WO IV CONTRAST;  9/11/2024 10:44 am      INDICATION:  Signs/Symptoms:eval of left lung abscess seen 3 weeks prior at [facility].          COMPARISON:  04/10/2024.      ACCESSION NUMBER(S):  [id number]      ORDERING CLINICIAN:  NIMESH WAKEFIELD      TECHNIQUE:  Helical data acquisition of the chest was obtained  without IV  contrast material.  Images were reformatted in axial, coronal, and  sagittal planes.      FINDINGS:  LUNGS AND AIRWAYS:  The trachea and central airways are patent. No endobronchial lesion.      There is multifocal consolidative opacities throughout the visualized  lung parenchyma. When compared to a study of 04/10/2024 there has  been interval improvement in right upper lobe airspace  opacities/consolidation. There is persistent lingular and left lower  lobe airspace disease with slight interval worsening consolidation in  the superior segment of the left lower lobe. There is mucoid  impaction within the left lower lobe. There is no evidence of  effusions.      MEDIASTINUM AND GREY, LOWER NECK AND AXILLA:  The visualized thyroid gland is within normal " limits.      Scattered mediastinal lymph nodes are felt to be  inflammatory/reactive in nature.      Esophagus appears within normal limits as seen.      HEART AND VESSELS:  There is moderate atherosclerotic changes of the thoracic aorta.      Main pulmonary artery and its branches are normal in caliber.      There are severe coronary artery calcifications. The study is not  optimized for evaluation of coronary arteries.      The cardiac chambers are not enlarged.      No evidence of pericardial effusion.      UPPER ABDOMEN:  There is a small hiatal hernia. There is a mid polar left renal cyst.      CHEST WALL AND OSSEOUS STRUCTURES:  There is multilevel degenerative changes of the thoracic spine.      Impression: 1.  There is significant consolidation within the superior segment of  the left lower lobe with persistent lingular airspace opacities.  Interval improvement in right upper lobe airspace  disease/consolidation. Correlate with a history of recurrent  aspiration or immunodeficiency. Extensive mediastinal lymphadenopathy  most likely reactive but correlate with any concern for  lymphoproliferative disorders.  2. Severe atherosclerotic changes of the aorta and coronary arteries.      MACRO:  None      Signed by: Stevie Arteaga 9/13/2024 6:50 PM  Dictation workstation:   [id number]        Physical Exam  General: NAD   HEENT: poor dentition, otherwise atraumatic, no conjunctivitis   Cardio: RRR no bruits/rubs  Pulm: poor respiratory excursion, muffled lung sounds in left lower lobe and some crackles heard in the right lung base  Abdominal: recently cleaned colostomy tube, tender to palpation in right lower abdomen  : not examined   Skin/MSK: fecal matter on      Last Recorded Vitals  /73   Pulse 78   Temp 36.4 °C (97.5 °F)   Resp 18   Wt 68 kg (150 lb)   SpO2 97%      Relevant Results  COVID / influenza negative  Hx of MRSA bacteremia         Assessment/Plan  Miss KoenigEva Eva is a 74  y.o. female presenting with falls at home. Patient has a PMH of CAD , fibromyalgia, diverticulitis, CHF, pyoderma gangrenosum and recent hospitalization and treatment for pneumonia. Patient has had persistent cough, fevers, chills and night sweats and said that she has lost over 40lbs of weight unintentionally. Pt was previously admitted for CHF exacerbation and found to have pneumonia concerning for abscess vs bronchopneumonia and discharged with 3 weeks of augmentin which she said had been compliant with. ID was consulted for persistent leukocytosis iso pneumonia symptoms and lower lobe consolidation. Care is complicated by social issues.      #LLL Pneumonia aspiration vs abscess vs CAP  #Leukocytosis      ::Patient presents with productive cough, fevers, chills, and night sweats  ::CT and Chest xray concerning for LLL pneumonia  ::Given prolonged symptoms, can not rule out atypical pna   ::Poor dentition at risk for aspiration        Recommendations  -Augmentin 875 BID foe 3 weeks  -Recommend outpatient pulmonary consult for bronchoscopy for recurrent pneumonia   -We were not trying to exclude TB but trying to rule out MAC, however patient has clinically improved with current treatment  -ID will respectfully sign off at this time       Steven Grimaldo DO

## 2024-09-16 NOTE — PROGRESS NOTES
24 1600   Colostomy LUQ   Placement Date/Time: (c)  (c)    Placed by External Staff?: Clinic  Hand Hygiene Completed: Yes  Location: LUQ   Stomal Appliance 1 piece;Changed   Site/Stoma Assessment Clean;Intact;Red   Stoma Size (cm)   (1 1/2 oval)   Peristomal Assessment Clean;Intact   Treatment Pouch change;Site care   Drainage Characteristics Brown   Output (mL) 100 mL        Ostomy type: colostomy       size: 1 1/2 oval      color: red and moist      protruding: budded   Marcial: none  Functioning: soft brown stool   Mucocutaneous junction: intact   Peristomal skin: clean, dry and intact  Pouchin piece [employer] active life with a clip and barrier ring  Ostomy Education: Patient has knowledge about her ostomy care but will need assistance while admitted   Plan: assess stoma/pouching

## 2024-09-16 NOTE — CARE PLAN
Problem: Skin  Goal: Participates in plan/prevention/treatment measures  Outcome: Progressing  Goal: Prevent/manage excess moisture  Outcome: Progressing  Goal: Prevent/minimize sheer/friction injuries  Outcome: Progressing  Goal: Promote/optimize nutrition  Outcome: Progressing  Goal: Promote skin healing  Outcome: Progressing     Problem: Fall/Injury  Goal: Not fall by end of shift  Outcome: Progressing  Goal: Be free from injury by end of the shift  Outcome: Progressing  Goal: Verbalize understanding of personal risk factors for fall in the hospital  Outcome: Progressing     Problem: Pain - Adult  Goal: Verbalizes/displays adequate comfort level or baseline comfort level  Outcome: Progressing    The clinical goals for the shift include Patient's pain controlled to tolerable level. Patient compliant with DVT prophylaxis. Patient remains safe and free from falls. Patient compliant with skin prevention measures.

## 2024-09-16 NOTE — PROGRESS NOTES
"Physical Therapy    Physical Therapy Treatment    Patient Name: Sandra Velasquez \"Miss Velasquez\"  MRN: [id number]  Department: [place] [address]  Room: [address] [place] [address]  Today's Date: 9/16/2024  Time Calculation  Start Time: 1505  Stop Time: 1534  Time Calculation (min): 29 min    Assessment/Plan   PT Assessment  End of Session Communication: Bedside nurse  End of Session Patient Position: Up in chair, Alarm on     PT Plan  Treatment/Interventions: Bed mobility, Transfer training, Gait training, Balance training, Strengthening, Endurance training, Therapeutic exercise, Therapeutic activity  PT Plan: Ongoing PT  PT Frequency: 3 times per week  PT Discharge Recommendations: Moderate intensity level of continued care  Equipment Recommended upon Discharge:  (TBD)  PT Recommended Transfer Status: Assist x1 (to chair or BSC, x2 to bathroom)  PT - OK to Discharge: Yes    General Visit Information:   PT  Visit  PT Received On: 09/16/24  Response to Previous Treatment: Patient with no complaints from previous session.  General  Missed Visit: No  Missed Visit Reason:  (--)  Prior to Session Communication: Bedside nurse  Patient Position Received: Bed, 3 rail up, Alarm on  General Comment: Supine.  Pt pleasant, cooperative and agreeable to PT.  Pt had only been EOB since admission.  Eager to get OOB.  Able to transfer OOB to chair with Mod A today.  Tolerated well with stable vitals.    Subjective   Precautions:  Precautions  Medical Precautions: Fall precautions    Vital Signs (Past 2hrs)        Date/Time Vitals Session Patient Position Pulse Resp SpO2 BP MAP (mmHg)    09/16/24 1450 --  --  68  18  97 %  98/59  --     09/16/24 1505 --  Sitting  74  --  98 %  97/60  --                   Objective   Pain:  Pain Assessment  Pain Assessment:  (Pt reporting muscle cramps she attributes to being in bed.  Did not rate.  Reports improving with mobility)  Cognition:  Cognition  Arousal/Alertness: Appropriate responses to stimuli  Orientation " Level: Oriented X4  Impulsive: Mildly    Activity Tolerance:  Activity Tolerance  Endurance: Tolerates 30 min exercise with multiple rests  Treatments:    Bed Mobility 1  Bed Mobility 1: Sitting to supine  Level of Assistance 1: Minimum assistance (Able to get upright and EOB, but then needing assist to fully scoot to EOB)    Ambulation/Gait Training  Ambulation/Gait Training Performed: Yes  Ambulation/Gait Training 1  Surface 1: Level tile  Device 1: No device  Assistance 1: Moderate assistance, Moderate verbal cues, Moderate tactile cues  Quality of Gait 1: Narrow base of support, Diminished heel strike, Decreased step length, Shuffling gait, Forward flexed posture, Soft knee(s)  Transfers  Transfer: Yes  Transfer 1  Transfer From 1: Sit to, Stand to  Transfer to 1: Sit  Transfer Device 1:  (PT in front of pt supporting trunk.)  Transfer Level of Assistance 1: Moderate assistance, Moderate verbal cues, Moderate tactile cues  Transfers 2  Transfer From 2: Bed to  Transfer to 2: Chair with arms  Transfer Device 2:  (PT in front of pt supporting trunk.)  Transfer Level of Assistance 2: Moderate assistance, Moderate verbal cues, Moderate tactile cues    Outcome Measures:     [facility] Basic Mobility  Turning from your back to your side while in a flat bed without using bedrails: A little  Moving from lying on your back to sitting on the side of a flat bed without using bedrails: A little  Moving to and from bed to chair (including a wheelchair): A lot  Standing up from a chair using your arms (e.g. wheelchair or bedside chair): A lot  To walk in hospital room: A lot  Climbing 3-5 steps with railing: Total  Basic Mobility - Total Score: 13    Education Documentation  Mobility Training, taught by Carter Nuno, PT at 9/16/2024  3:54 PM.  Learner: Patient  Readiness: Acceptance  Method: Explanation  Response: Verbalizes Understanding    Education Comments  No comments found.        OP EDUCATION:       Encounter Problems        Encounter Problems (Active)       Balance       Pt will score >/=24/28 on Tinetti to demonstrate decreased falls risk (Progressing)       Start:  09/11/24    Expected End:  09/25/24               Mobility       Pt will be [place] for ambulation 25 ft with LRAD (Progressing)       Start:  09/11/24    Expected End:  09/25/24               PT Transfers       Pt will be [place] for sit to stand and bed to chair transfers with LRAD (Progressing)       Start:  09/11/24    Expected End:  09/25/24            Pt will be [place] with bed mobility (Progressing)       Start:  09/11/24    Expected End:  09/25/24               Pain - Adult

## 2024-09-17 LAB
ALBUMIN SERPL BCP-MCNC: 2.4 G/DL (ref 3.4–5)
ANION GAP SERPL CALC-SCNC: 10 MMOL/L (ref 10–20)
BASOPHILS # BLD AUTO: 0.1 X10*3/UL (ref 0–0.1)
BASOPHILS NFR BLD AUTO: 0.8 %
BUN SERPL-MCNC: 13 MG/DL (ref 6–23)
CALCIUM SERPL-MCNC: 9.8 MG/DL (ref 8.6–10.6)
CHLORIDE SERPL-SCNC: 102 MMOL/L (ref 98–107)
CO2 SERPL-SCNC: 29 MMOL/L (ref 21–32)
CREAT SERPL-MCNC: 1.46 MG/DL (ref 0.5–1.05)
EGFRCR SERPLBLD CKD-EPI 2021: 38 ML/MIN/1.73M*2
EOSINOPHIL # BLD AUTO: 0.3 X10*3/UL (ref 0–0.4)
EOSINOPHIL NFR BLD AUTO: 2.4 %
ERYTHROCYTE [DISTWIDTH] IN BLOOD BY AUTOMATED COUNT: 17.4 % (ref 11.5–14.5)
GLUCOSE SERPL-MCNC: 106 MG/DL (ref 74–99)
HCT VFR BLD AUTO: 29.1 % (ref 36–46)
HGB BLD-MCNC: 8.4 G/DL (ref 12–16)
IMM GRANULOCYTES # BLD AUTO: 0.06 X10*3/UL (ref 0–0.5)
IMM GRANULOCYTES NFR BLD AUTO: 0.5 % (ref 0–0.9)
LYMPHOCYTES # BLD AUTO: 4.31 X10*3/UL (ref 0.8–3)
LYMPHOCYTES NFR BLD AUTO: 33.9 %
MCH RBC QN AUTO: 23.1 PG (ref 26–34)
MCHC RBC AUTO-ENTMCNC: 28.9 G/DL (ref 32–36)
MCV RBC AUTO: 80 FL (ref 80–100)
MONOCYTES # BLD AUTO: 0.91 X10*3/UL (ref 0.05–0.8)
MONOCYTES NFR BLD AUTO: 7.2 %
NEUTROPHILS # BLD AUTO: 7.03 X10*3/UL (ref 1.6–5.5)
NEUTROPHILS NFR BLD AUTO: 55.2 %
NRBC BLD-RTO: 0 /100 WBCS (ref 0–0)
PHOSPHATE SERPL-MCNC: 3.7 MG/DL (ref 2.5–4.9)
PLATELET # BLD AUTO: 452 X10*3/UL (ref 150–450)
POTASSIUM SERPL-SCNC: 4.4 MMOL/L (ref 3.5–5.3)
RBC # BLD AUTO: 3.63 X10*6/UL (ref 4–5.2)
SODIUM SERPL-SCNC: 137 MMOL/L (ref 136–145)
WBC # BLD AUTO: 12.7 X10*3/UL (ref 4.4–11.3)

## 2024-09-17 PROCEDURE — 80069 RENAL FUNCTION PANEL: CPT | Performed by: STUDENT IN AN ORGANIZED HEALTH CARE EDUCATION/TRAINING PROGRAM

## 2024-09-17 PROCEDURE — 2500000004 HC RX 250 GENERAL PHARMACY W/ HCPCS (ALT 636 FOR OP/ED): Performed by: STUDENT IN AN ORGANIZED HEALTH CARE EDUCATION/TRAINING PROGRAM

## 2024-09-17 PROCEDURE — 85025 COMPLETE CBC W/AUTO DIFF WBC: CPT | Performed by: STUDENT IN AN ORGANIZED HEALTH CARE EDUCATION/TRAINING PROGRAM

## 2024-09-17 PROCEDURE — 2500000002 HC RX 250 W HCPCS SELF ADMINISTERED DRUGS (ALT 637 FOR MEDICARE OP, ALT 636 FOR OP/ED): Performed by: STUDENT IN AN ORGANIZED HEALTH CARE EDUCATION/TRAINING PROGRAM

## 2024-09-17 PROCEDURE — 99233 SBSQ HOSP IP/OBS HIGH 50: CPT | Performed by: STUDENT IN AN ORGANIZED HEALTH CARE EDUCATION/TRAINING PROGRAM

## 2024-09-17 PROCEDURE — 1100000001 HC PRIVATE ROOM DAILY

## 2024-09-17 PROCEDURE — 2500000001 HC RX 250 WO HCPCS SELF ADMINISTERED DRUGS (ALT 637 FOR MEDICARE OP): Performed by: STUDENT IN AN ORGANIZED HEALTH CARE EDUCATION/TRAINING PROGRAM

## 2024-09-17 PROCEDURE — 36415 COLL VENOUS BLD VENIPUNCTURE: CPT | Performed by: STUDENT IN AN ORGANIZED HEALTH CARE EDUCATION/TRAINING PROGRAM

## 2024-09-17 ASSESSMENT — PAIN SCALES - GENERAL
PAINLEVEL_OUTOF10: 10 - WORST POSSIBLE PAIN
PAINLEVEL_OUTOF10: 8
PAINLEVEL_OUTOF10: 8
PAINLEVEL_OUTOF10: 0 - NO PAIN
PAINLEVEL_OUTOF10: 7
PAINLEVEL_OUTOF10: 10 - WORST POSSIBLE PAIN

## 2024-09-17 ASSESSMENT — PAIN - FUNCTIONAL ASSESSMENT
PAIN_FUNCTIONAL_ASSESSMENT: 0-10

## 2024-09-17 ASSESSMENT — PAIN DESCRIPTION - LOCATION: LOCATION: HEAD

## 2024-09-17 NOTE — CARE PLAN
Problem: Skin  Goal: Participates in plan/prevention/treatment measures  Outcome: Progressing  Goal: Prevent/manage excess moisture  Outcome: Progressing  Goal: Prevent/minimize sheer/friction injuries  Outcome: Progressing  Goal: Promote/optimize nutrition  Outcome: Progressing  Goal: Promote skin healing  Outcome: Progressing     Problem: Fall/Injury  Goal: Not fall by end of shift  Outcome: Progressing  Goal: Be free from injury by end of the shift  Outcome: Progressing  Goal: Verbalize understanding of personal risk factors for fall in the hospital  Outcome: Progressing  Goal: Use assistive devices by end of the shift  Outcome: Progressing  Goal: Pace activities to prevent fatigue by end of the shift  Outcome: Progressing     Problem: Pain - Adult  Goal: Verbalizes/displays adequate comfort level or baseline comfort level  Outcome: Progressing    The clinical goals for the shift include Patient's pain controlled to tolerable level. Patient compliant with DVT prophylaxis. Patient remains safe and free from falls. Patient compliant with skin prevention measures.

## 2024-09-17 NOTE — CARE PLAN
The patient's goals for the shift include      The clinical goals for the shift include Pt will remain safe and SBP remain above 95 during night.    Pt remained safe and free of injury during night. Restless and seroquel administered. SBP runs low and no further intervention added. No other distress noted. Call light in reach. Resting quietly at this time.     Aliyah Savage RN

## 2024-09-17 NOTE — PROGRESS NOTES
Assessment/Plan     Ms Dove is a 74 year old female with PMH of CAD, Fibromyalgia, Diverticulitis, CHF, and pyoderma gangrenosum presenting due to concern for falls at home. Fall are mechanical, but having multiple a day. Falls are usually preceded by dizziness. Recent admission noted to [facility] in July for CHF and there was concern for bronchiolitis and pneumonia with possible left lung abscess and was discharged with 3 weeks of augmentin with ID follow up. IN the ED she was HDS, afebrile. Labs showed leukocytosis and PRISCILLA. CXR with left lung infiltrate consistent with prior imaging. Plan to obtain repeat CT scan. Orthostatics and pt/ot consult for falls.. Currently treating unasyn, azitrho. Ruling out TB. With persistent wbc elevation down to around around 13K noted. ID consulted.  Pt lost her IV, on Augmentin. Having sinus congestion and headache.  BP was low s/p fluids with improvement. IV is back, will monitor for need to change to iv abx.   Monisha consulted for dispo, cannot take care of self and I think aps was called on her  Follow up make sure we have 3 sputums for tb rule out, low supscion. Its been difficult. Follow up ID recs. Still with wbc count. Has low bp better after fluids. New sinus headache it appears.   plan is for discharge to [facility] when ready      Today she has discomfort in her back and her abdomen. Her abdomen around her colostomy is distended and rounded. She said it's been like that for months and it's from a hernia. She wants to talk to someone about getting her colostomy reversed. OP referral requested     TCC spoke with the  today she states patient is no longer appropriate for independent living as she was found with feces all over the floor and urine soaked linen. The  said she has a meeting at 330 and will email me an official letter.  reports being in contact with brother during admission and he is aware. I can update  patient on this but I have concerns it may have a big impact on her mood and how she talks/treats staff . [facility] the brothers facility of choice. Patient has Medicaid it includes long term care coverage. Brother states he is in contact with patients passport  for assistance with assistance living. Safe dc plan is in place        50 min     ------------------------------------    74 year old female with PMH of CAD, Fibromyalgia, Diverticulitis, CHF, and pyoderma gangrenosum presenting due to concern for falls at home. Fall are mechanical, but having multiple a day. Falls are usually preceded by dizziness. Recent admission noted to [facility] in July for CHF and there was concern for bronchiolitis and pneumonia with possible left lung abscess and was discharged with 3 weeks of augmentin with ID follow up. IN the ED she was HDS, afebrile. Labs showed leukocytosis and PRISCILLA. CXR with left lung infiltrate consistent with prior imaging. Plan to obtain repeat CT scan. Orthostatics and pt/ot consult for falls.. Currently treating unasyn, azitrho. Ruling out tb. With persistent wbc elevation down to around around 13K noted. ID consulted.  Pt lost her IV, on Augmentin. Having sinus congestion and headache.  BP was low s/p fluids with improvement. IV is back, will monitor for need to change to iv abx.     #CAP v. Lung abscess  #leukocytosis  - [facility] in July for CHF and there was concern for bronchiolitis and pneumonia with possible left lung abscess and was discharged with 3 weeks of augmentin with ID follow up  - cxr with left lung infiltrate  - CT chest ordered, c/w LLL pneumonia  - leukocytosis noted and priscilla. Both down trending.   - changde to unasyn, continue azithro per ID on admission.   - concern for CAP based on CXR and self-reported year of fevers and cough; however presentation is not very convincing other than persistently elevated WBC  - continuous pulse ox  - sputum culture, strep and legionella ag,  sputum for rsv  - ID consulted due to persistent WBC elevation and recent history of lung abscess.  - obtain differential with next cbc. Wbc downtrending to around 12.   - lost iv on 9/13, changed to augmentin and azithro oral intermittantly pending id rec. Will need further clinical assessment to determine if if needs line.   - continuing NTM work up with sputum studies, on isolation precautions, but low suspicon, had neg tspot per outside records as well.   - has collected 2 specimens, needs one more in the am.        #Falls  - likely element of decondition, recent hospitalization, active acute illness (pna/abscess)  - Orthostatics ordered, not completed. Will reorder if pt will cooperate.  Asked rn for supine and sitting as pt cannot stand, pending.   - PT/OT  - SW/tcc consult  - Per tcc/sw has been concern of her ability to care for herself at home.   - pt report chronic low bp, had dizziness and tachy this am, resolved with fluids x1L.     #Sinus headache  - continue flonase  - add ocean spray  - toradol x1, pseudophed x1. Will limit use, assess response.    #PRISCILLA  - fluid repletion  - repeat rfp downtrending to 1.2. now stable    #Essential Tremor  - continue home Propranolol 20mg BID     #Allergic rhinitis  - continue home Cetirizine 10mg daily  - continue home Fluticasone nasal spray    #Anxiety and depression  - continue home Duloxetine 60mg    #urinary retention  - continue home Tamsulosin 0.4mg daily    Continued social concenrs for ability to care for self. Geriatrics consulted to assist with dispo planning. PT/OT rec mod intensity          Scheduled outpatient appointments in system:   Future Appointments   Date Time Provider Department Center   9/25/2024  1:00 PM Rose Shah MD [id number]   10/7/2024 10:45 AM Mani Gonzales MD [facility]     ---------------------------------------------------------------------------------------------------  Subjective   No events.  Dizziness this am  "resolved after fluids. Reports chornic low bp. Had tachy and dizzines this am, resolved with fluids. Continue sto have cough. Reports nasal congestion, frontal headache.     ---------------------------------------------------------------------------------------------------  Objective   Last Recorded Vitals  Blood pressure 109/71, pulse 103, temperature 36.4 °C (97.5 °F), temperature source Temporal, resp. rate 20, height 1.626 m (5' 4\"), weight 68 kg (150 lb), SpO2 95%.  Intake/Output last 3 Shifts:  I/O last 3 completed shifts:  In: 477.1 (7 mL/kg) [IV Piggyback:477.1]  Out: 500 (7.3 mL/kg) [Urine:400 (0.2 mL/kg/hr); Stool:100]  Weight: 68 kg     Physical Exam  Vitals and nursing note reviewed.   Constitutional:       General: She is not in acute distress.     Appearance: Normal appearance. She is not ill-appearing or toxic-appearing.      Comments: Laying in bed flat   HENT:      Head: Normocephalic and atraumatic.      Comments: Ttp noted on palpation of temporal sinuses     Nose: Congestion present.      Mouth/Throat:      Mouth: Mucous membranes are moist.   Eyes:      General: No scleral icterus.     Extraocular Movements: Extraocular movements intact.      Conjunctiva/sclera: Conjunctivae normal.   Cardiovascular:      Rate and Rhythm: Normal rate and regular rhythm.      Heart sounds: S1 normal and S2 normal. No murmur heard.  Pulmonary:      Effort: Pulmonary effort is normal. No respiratory distress.      Breath sounds: No wheezing, rhonchi or rales.      Comments: Diminished left sided lung sounds.   Abdominal:      General: Bowel sounds are normal. There is no distension.      Palpations: Abdomen is soft.      Tenderness: There is no abdominal tenderness. There is no guarding or rebound.   Musculoskeletal:         General: No swelling or deformity.      Cervical back: Neck supple.   Skin:     General: Skin is warm and dry.      Findings: No rash.   Neurological:      General: No focal deficit present.    "   Mental Status: She is alert. Mental status is at baseline.   Psychiatric:         Mood and Affect: Mood normal.         Relevant Results  Lab Results   Component Value Date    WBC 12.7 (H) 09/17/2024    HGB 8.4 (L) 09/17/2024    HCT 29.1 (L) 09/17/2024    MCV 80 09/17/2024     (H) 09/17/2024      Lab Results   Component Value Date    GLUCOSE 106 (H) 09/17/2024    CALCIUM 9.8 09/17/2024     09/17/2024    K 4.4 09/17/2024    CO2 29 09/17/2024     09/17/2024    BUN 13 09/17/2024    CREATININE 1.46 (H) 09/17/2024     Scheduled medications  amoxicillin-pot clavulanate, 1 tablet, oral, q12h VILMA  [Held by provider] ampicillin-sulbactam, 3 g, intravenous, q6h  DULoxetine, 60 mg, oral, Daily  enoxaparin, 40 mg, subcutaneous, Daily  fluticasone, 1 spray, Each Nostril, Daily  melatonin, 10 mg, oral, Nightly  [Held by provider] propranolol, 20 mg, oral, BID  sodium chloride, 500 mL, intravenous, Once  sodium chloride, 3 mL, nebulization, Once      Continuous medications     PRN medications  PRN medications: acetaminophen, polyethylene glycol, QUEtiapine, traMADol    Saida Mobley MD

## 2024-09-17 NOTE — PROGRESS NOTES
"TANISHA followed up with Maryse Youssef  Lisa to inquire about patient current and future housing status at the senior living apartments. Lisa referred SW to  [facility] . TANISHA called and left a vm. TANISHA will continue to follow. Patient expected to discharge to [facility] when medically ready. Level of Care needed for discharge.    1534-TANISHA received updates that patient is expressing concerns about the status of her apartment. TANISHA spoke with  [facility].  reports patient has been a resident there for four years. Manger reports patient has had anger issues that have worsened of the past few years. Manager reports patient has a history of \"being disrespectful to others\". Manager states patient is estranged from family and friend by choice. Manager reports she did a wellness check on patient and found patients apartment with feces all over the ground and urine soaked lined. Manager states patient is no longer appropriate for independent living and there is a meeting at 1530 today to discuss patients housing status. Manager states she can provide an update in a letter after the meeting. TANISHA provided email address to . TANISHA updated patient that contact has been made with Maryse Youssef [facility] and awaiting update from meeting today. Patient requested TANISHA to call her . TANISHA advised patient that SW cannot contact . Patient states \"then what are you good for\" TANISHA exited patient room and updated TCC and MD. TANISHA will update brother Asad on housing and discharge developments.     CHRISTINE Wheatley      "

## 2024-09-18 LAB
ALBUMIN SERPL BCP-MCNC: 2.3 G/DL (ref 3.4–5)
ANION GAP SERPL CALC-SCNC: 10 MMOL/L (ref 10–20)
BASOPHILS # BLD AUTO: 0.08 X10*3/UL (ref 0–0.1)
BASOPHILS NFR BLD AUTO: 0.8 %
BUN SERPL-MCNC: 15 MG/DL (ref 6–23)
CALCIUM SERPL-MCNC: 9 MG/DL (ref 8.6–10.6)
CHLORIDE SERPL-SCNC: 103 MMOL/L (ref 98–107)
CO2 SERPL-SCNC: 29 MMOL/L (ref 21–32)
CREAT SERPL-MCNC: 1.16 MG/DL (ref 0.5–1.05)
EGFRCR SERPLBLD CKD-EPI 2021: 50 ML/MIN/1.73M*2
EOSINOPHIL # BLD AUTO: 0.33 X10*3/UL (ref 0–0.4)
EOSINOPHIL NFR BLD AUTO: 3.2 %
ERYTHROCYTE [DISTWIDTH] IN BLOOD BY AUTOMATED COUNT: 17.2 % (ref 11.5–14.5)
GLUCOSE SERPL-MCNC: 106 MG/DL (ref 74–99)
HCT VFR BLD AUTO: 28.6 % (ref 36–46)
HGB BLD-MCNC: 8.2 G/DL (ref 12–16)
IMM GRANULOCYTES # BLD AUTO: 0.05 X10*3/UL (ref 0–0.5)
IMM GRANULOCYTES NFR BLD AUTO: 0.5 % (ref 0–0.9)
LYMPHOCYTES # BLD AUTO: 3.46 X10*3/UL (ref 0.8–3)
LYMPHOCYTES NFR BLD AUTO: 33.9 %
MCH RBC QN AUTO: 22.8 PG (ref 26–34)
MCHC RBC AUTO-ENTMCNC: 28.7 G/DL (ref 32–36)
MCV RBC AUTO: 80 FL (ref 80–100)
MONOCYTES # BLD AUTO: 0.81 X10*3/UL (ref 0.05–0.8)
MONOCYTES NFR BLD AUTO: 7.9 %
NEUTROPHILS # BLD AUTO: 5.47 X10*3/UL (ref 1.6–5.5)
NEUTROPHILS NFR BLD AUTO: 53.7 %
NRBC BLD-RTO: 0 /100 WBCS (ref 0–0)
PHOSPHATE SERPL-MCNC: 4.9 MG/DL (ref 2.5–4.9)
PLATELET # BLD AUTO: 438 X10*3/UL (ref 150–450)
POTASSIUM SERPL-SCNC: 4.3 MMOL/L (ref 3.5–5.3)
RBC # BLD AUTO: 3.59 X10*6/UL (ref 4–5.2)
SODIUM SERPL-SCNC: 138 MMOL/L (ref 136–145)
WBC # BLD AUTO: 10.2 X10*3/UL (ref 4.4–11.3)

## 2024-09-18 PROCEDURE — 36415 COLL VENOUS BLD VENIPUNCTURE: CPT | Performed by: STUDENT IN AN ORGANIZED HEALTH CARE EDUCATION/TRAINING PROGRAM

## 2024-09-18 PROCEDURE — 2500000004 HC RX 250 GENERAL PHARMACY W/ HCPCS (ALT 636 FOR OP/ED): Performed by: STUDENT IN AN ORGANIZED HEALTH CARE EDUCATION/TRAINING PROGRAM

## 2024-09-18 PROCEDURE — 85025 COMPLETE CBC W/AUTO DIFF WBC: CPT | Performed by: STUDENT IN AN ORGANIZED HEALTH CARE EDUCATION/TRAINING PROGRAM

## 2024-09-18 PROCEDURE — 2500000002 HC RX 250 W HCPCS SELF ADMINISTERED DRUGS (ALT 637 FOR MEDICARE OP, ALT 636 FOR OP/ED): Performed by: STUDENT IN AN ORGANIZED HEALTH CARE EDUCATION/TRAINING PROGRAM

## 2024-09-18 PROCEDURE — 1100000001 HC PRIVATE ROOM DAILY

## 2024-09-18 PROCEDURE — 2500000001 HC RX 250 WO HCPCS SELF ADMINISTERED DRUGS (ALT 637 FOR MEDICARE OP): Performed by: STUDENT IN AN ORGANIZED HEALTH CARE EDUCATION/TRAINING PROGRAM

## 2024-09-18 PROCEDURE — 80069 RENAL FUNCTION PANEL: CPT | Performed by: STUDENT IN AN ORGANIZED HEALTH CARE EDUCATION/TRAINING PROGRAM

## 2024-09-18 RX ORDER — ACETAMINOPHEN, DIPHENHYDRAMINE HCL, PHENYLEPHRINE HCL 325; 25; 5 MG/1; MG/1; MG/1
10 TABLET ORAL NIGHTLY
Start: 2024-09-18

## 2024-09-18 RX ORDER — QUETIAPINE FUMARATE 25 MG/1
12.5 TABLET, FILM COATED ORAL 2 TIMES DAILY PRN
Start: 2024-09-18

## 2024-09-18 RX ORDER — ACETAMINOPHEN 325 MG/1
650 TABLET ORAL EVERY 6 HOURS PRN
Start: 2024-09-18 | End: 2024-09-19

## 2024-09-18 RX ORDER — POLYETHYLENE GLYCOL 3350 17 G/17G
17 POWDER, FOR SOLUTION ORAL DAILY PRN
Start: 2024-09-18

## 2024-09-18 RX ORDER — AMOXICILLIN AND CLAVULANATE POTASSIUM 875; 125 MG/1; MG/1
1 TABLET, FILM COATED ORAL EVERY 12 HOURS SCHEDULED
Start: 2024-09-18 | End: 2024-10-03

## 2024-09-18 ASSESSMENT — COGNITIVE AND FUNCTIONAL STATUS - GENERAL
DRESSING REGULAR UPPER BODY CLOTHING: A LITTLE
DRESSING REGULAR UPPER BODY CLOTHING: A LITTLE
STANDING UP FROM CHAIR USING ARMS: A LITTLE
PERSONAL GROOMING: A LITTLE
DAILY ACTIVITIY SCORE: 19
PERSONAL GROOMING: A LITTLE
MOBILITY SCORE: 18
MOVING TO AND FROM BED TO CHAIR: A LITTLE
STANDING UP FROM CHAIR USING ARMS: A LITTLE
DAILY ACTIVITIY SCORE: 19
CLIMB 3 TO 5 STEPS WITH RAILING: A LITTLE
HELP NEEDED FOR BATHING: A LITTLE
MOVING FROM LYING ON BACK TO SITTING ON SIDE OF FLAT BED WITH BEDRAILS: A LITTLE
TURNING FROM BACK TO SIDE WHILE IN FLAT BAD: A LITTLE
MOBILITY SCORE: 18
DRESSING REGULAR LOWER BODY CLOTHING: A LITTLE
TOILETING: A LITTLE
HELP NEEDED FOR BATHING: A LITTLE
TOILETING: A LITTLE
WALKING IN HOSPITAL ROOM: A LITTLE
MOVING FROM LYING ON BACK TO SITTING ON SIDE OF FLAT BED WITH BEDRAILS: A LITTLE
TURNING FROM BACK TO SIDE WHILE IN FLAT BAD: A LITTLE
DRESSING REGULAR LOWER BODY CLOTHING: A LITTLE
MOVING TO AND FROM BED TO CHAIR: A LITTLE
WALKING IN HOSPITAL ROOM: A LITTLE
CLIMB 3 TO 5 STEPS WITH RAILING: A LITTLE

## 2024-09-18 ASSESSMENT — PAIN SCALES - GENERAL
PAINLEVEL_OUTOF10: 0 - NO PAIN
PAINLEVEL_OUTOF10: 7

## 2024-09-18 ASSESSMENT — PAIN DESCRIPTION - LOCATION: LOCATION: HEAD

## 2024-09-18 NOTE — CARE PLAN
The patient's goals for the shift include rest    The clinical goals for the shift include remain safe and free from injury

## 2024-09-18 NOTE — PROGRESS NOTES
"Spiritual Care Visit    Clinical Encounter Type  Visited With: Patient  Routine Visit: Follow-up  Continue Visiting: Yes  Referral From: Patient  Referral To:     Sikhism Encounters  Sikhism Needs: Prayer    Values/Beliefs  Spiritual Requests During Hospitalization: prayer, support         Patient Spiritual Care Encounters  Fear Level: Moderate  Feelings of Loneliness: Good  Feelings of Hopelessness: Moderate  Coping: Often demonstrated              PC-7 Assessment (Level of Unmet Needs)  Existential Struggle: Some  Spiritual/Sikhism Struggle: Some  Legacy: Some  Relationships: Some  Fear of Death/Dying: Further assess  Values/Medical Decision Making: Some  Ritual/Other: Further assess  PC-7 Score: 5    SDAT (Spiritual Distress Assessment Tool)  Need for Life Balance: Some evidence of unmet spiritual need  Need for Connection: Substancial evidence of unmet spiritual need  Need for Values Acknowledgement: Some evidence of unmet spiritual need  Need to Maintain Control: Substancial evidence of unmet spiritual need  Need to Maintain Identity: Substancial evidence of unmet spiritual need  SDAT Score: 8  SDAT Average Score: 1.6    Taxonomy  Intended Effects: Convey a calming presence, Build relationship of care and support, Demonstrate caring and concern, Lessen anxiety, Promote sense of peace, Preserve dignity and respect  Methods: Demonstrate acceptance, Encourage sharing of feelings, Explore nature of God, Offer emotional support, Offer spiritual/Sabianism support  Interventions: Active listening, Discuss concerns, Crisis intervention, Identify supportive relationship(s), Kaycee  Follow up spiritual care visit with patient. Patient called  and said she was having some \"spiritual concerns\". Patient was tearful during our visit and said she is \"being evicted without my knowledge\".  listened as patient expressed her fears and anxieties. She said her brother is her POA.  encouraged " "patient and assured her that the hospital would not discharge her without a safe plan in place. Patient seemed encouraged by this. It is clear patient has some memory issues (she admits she forgets things).  notices that patient feels better with someone to talk with. She expresses loneliness and \"despair\" at times. Patient has had an interesting life, she said. 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 She also said she has \"made mistakes too\". We talked about that and we also talked about forgiveness and about God. Patient was raised Confucianism, but does not align with that Anabaptist for the most part. Patient is always eager for  to pray for her, which this  did today.  will continue to follow as needed.    "

## 2024-09-18 NOTE — PROGRESS NOTES
Per MD, patient is medically ready for discharge. [place] complete and sent to [facility]. [facility] requested to submit for LOC. SW left vm for brother Asad to notify that patient is ready pending LOC. SW will continue to follow. Stretcher transport in [facility].    1533-HENS complete, [facility] confirmed transport for 10pm, too late for dc to SNF. SW put in a new transport request. [facility] confirmed 9/19 0900 stretcher transport for discharge to [place]. Report # . TANISHA updated MD MARQUEZ, brother/POA Asad, SNF, and the floor.     CHRISTINE Wheatley

## 2024-09-18 NOTE — CARE PLAN
The patient's goals for the shift include rest    The clinical goals for the shift include negative 3rd sputum r/o TB    Over the shift, the patient did not make progress toward the following goals. Barriers to progression include pending results. Recommendations to address these barriers include n/a.

## 2024-09-18 NOTE — SIGNIFICANT EVENT
[place] Cognitive Assessment Test    Visuospatial/Executive: 3/5  Naming: 3/3  Memory (Score '0' as this is an Unscored Section): 0  Attention: Read List of Digits: 1/2  Attention: Read List of Letters: 0/1  Attention: Serial Sevens: 0/3  Language: Repeat: 2/2  Language: Fluency: 0/1  Abstraction: 1/2  Delayed Recall: 0/5  Orientation: 2/6  MOCA Total Score: 12/30      During the assessment, it was evident that the patient displayed a lack of engagement and motivation in completing the MoCA test. It is plausible that a greater effort on the patient's part could have resulted in a higher score. Furthermore, the patient's educational background is unclear as she did not wish to disclose this information, which may also influence her performance and the interpretation of the results.    Test performed by-  ELIZABETH Cooney MD  Internal Medicine PGY1

## 2024-09-18 NOTE — CARE PLAN
The patient's goals for the shift include rest    The clinical goals for the shift include negative 3rd sputum r/o TB    Over the shift, the patient did not make progress toward the following goals. Barriers to progression include n/a. Recommendations to address these barriers include n/a.

## 2024-09-18 NOTE — PROGRESS NOTES
Assessment/Plan     Ms Dove is a 74 year old female with PMH of CAD, Fibromyalgia, Diverticulitis, CHF, and pyoderma gangrenosum presenting due to concern for falls at home. Fall are mechanical, but having multiple a day. Falls are usually preceded by dizziness. Recent admission noted to [facility] in July for CHF and there was concern for bronchiolitis and pneumonia with possible left lung abscess and was discharged with 3 weeks of augmentin with ID follow up. IN the ED she was HDS, afebrile. Labs showed leukocytosis and PRISCILLA. CXR with left lung infiltrate consistent with prior imaging. Plan to obtain repeat CT scan. Orthostatics and pt/ot consult for falls.. Currently treating unasyn, azitrho. Ruling out TB. With persistent wbc elevation down to around around 13K noted. ID consulted.  Pt lost her IV, on Augmentin. Having sinus congestion and headache.  BP was low s/p fluids with improvement. IV is back, will monitor for need to change to iv abx.   Monisha consulted for dispo, cannot take care of self and I think aps was called on her  Follow up make sure we have 3 sputums for tb rule out, low supscion. Its been difficult. Follow up ID recs. Still with wbc count. Has low bp better after fluids. New sinus headache it appears.   plan is for discharge to [facility] when ready      Today she has discomfort in her back and her abdomen. Her abdomen around her colostomy is distended and rounded. She said it's been like that for months and it's from a hernia. She wants to talk to someone about getting her colostomy reversed. OP referral requested     TCC spoke with the  today she states patient is no longer appropriate for independent living as she was found with feces all over the floor and urine soaked linen. The  said she has a meeting at 330 and will email me an official letter.  reports being in contact with brother during admission and he is aware. I can update  patient on this but I have concerns it may have a big impact on her mood and how she talks/treats staff . [facility], the brothers facility of choice. Patient has Medicaid it includes long term care coverage. Brother states he is in contact with patients passport  for assistance with assistance living. Safe dc plan is in place        50 min     ------------------------------------    74 year old female with PMH of CAD, Fibromyalgia, Diverticulitis, CHF, and pyoderma gangrenosum presenting due to concern for falls at home. Fall are mechanical, but having multiple a day. Falls are usually preceded by dizziness. Recent admission noted to [facility] in July for CHF and there was concern for bronchiolitis and pneumonia with possible left lung abscess and was discharged with 3 weeks of augmentin with ID follow up. IN the ED she was HDS, afebrile. Labs showed leukocytosis and PRISCILLA. CXR with left lung infiltrate consistent with prior imaging. Plan to obtain repeat CT scan. Orthostatics and pt/ot consult for falls.. Currently treating unasyn, azitrho. Ruling out tb. With persistent wbc elevation down to around around 13K noted. ID consulted.  Pt lost her IV, on Augmentin. Having sinus congestion and headache.  BP was low s/p fluids with improvement. IV is back, will monitor for need to change to iv abx.     #CAP v. Lung abscess  #leukocytosis  - [facility] in July for CHF and there was concern for bronchiolitis and pneumonia with possible left lung abscess and was discharged with 3 weeks of augmentin with ID follow up  - cxr with left lung infiltrate  - CT chest ordered, c/w LLL pneumonia  - leukocytosis noted and priscilla. Both down trending.   - changde to unasyn, continue azithro per ID on admission.   - concern for CAP based on CXR and self-reported year of fevers and cough; however presentation is not very convincing other than persistently elevated WBC  - continuous pulse ox  - sputum culture, strep and legionella ag,  sputum for rsv  - ID consulted due to persistent WBC elevation and recent history of lung abscess.  - obtain differential with next cbc. Wbc downtrending to around 12.   - lost iv on 9/13, changed to augmentin and azithro oral intermittantly pending id rec. Will need further clinical assessment to determine if if needs line.   - continuing NTM work up with sputum studies, on isolation precautions, but low suspicon, had neg tspot per outside records as well.   - has collected 2 specimens, needs one more in the am.        #Falls  - likely element of decondition, recent hospitalization, active acute illness (pna/abscess)  - Orthostatics ordered, not completed. Will reorder if pt will cooperate.  Asked rn for supine and sitting as pt cannot stand, pending.   - PT/OT  - SW/tcc consult  - Per tcc/sw has been concern of her ability to care for herself at home.   - pt report chronic low bp, had dizziness and tachy this am, resolved with fluids x1L.     #Sinus headache  - continue flonase  - add ocean spray  - toradol x1, pseudophed x1. Will limit use, assess response.    #PRISCILLA  - fluid repletion  - repeat rfp downtrending to 1.2. now stable    #Essential Tremor  - continue home Propranolol 20mg BID     #Allergic rhinitis  - continue home Cetirizine 10mg daily  - continue home Fluticasone nasal spray    #Anxiety and depression  - continue home Duloxetine 60mg    #urinary retention  - continue home Tamsulosin 0.4mg daily    Continued social concenrs for ability to care for self. Geriatrics consulted to assist with dispo planning. PT/OT rec mod intensity          Scheduled outpatient appointments in system:   Future Appointments   Date Time Provider Department Center   9/25/2024  1:00 PM Rose Shah MD [id number]   10/7/2024 10:45 AM Mani Gonzales MD [facility]     ---------------------------------------------------------------------------------------------------  Subjective   No events.  Dizziness this am  "resolved after fluids. Reports chornic low bp. Had tachy and dizzines this am, resolved with fluids. Continue sto have cough. Reports nasal congestion, frontal headache.     ---------------------------------------------------------------------------------------------------  Objective   Last Recorded Vitals  Blood pressure 117/71, pulse 76, temperature 36.6 °C (97.9 °F), temperature source Temporal, resp. rate 17, height 1.626 m (5' 4\"), weight 68 kg (150 lb), SpO2 94%.  Intake/Output last 3 Shifts:  I/O last 3 completed shifts:  In: - (0 mL/kg)   Out: 1550 (22.8 mL/kg) [Urine:1450 (0.6 mL/kg/hr); Stool:100]  Weight: 68 kg     Physical Exam  Vitals and nursing note reviewed.   Constitutional:       General: She is not in acute distress.     Appearance: Normal appearance. She is not ill-appearing or toxic-appearing.      Comments: Laying in bed flat   HENT:      Head: Normocephalic and atraumatic.      Comments: Ttp noted on palpation of temporal sinuses     Nose: Congestion present.      Mouth/Throat:      Mouth: Mucous membranes are moist.   Eyes:      General: No scleral icterus.     Extraocular Movements: Extraocular movements intact.      Conjunctiva/sclera: Conjunctivae normal.   Cardiovascular:      Rate and Rhythm: Normal rate and regular rhythm.      Heart sounds: S1 normal and S2 normal. No murmur heard.  Pulmonary:      Effort: Pulmonary effort is normal. No respiratory distress.      Breath sounds: No wheezing, rhonchi or rales.      Comments: Diminished left sided lung sounds.   Abdominal:      General: Bowel sounds are normal. There is no distension.      Palpations: Abdomen is soft.      Tenderness: There is no abdominal tenderness. There is no guarding or rebound.   Musculoskeletal:         General: No swelling or deformity.      Cervical back: Neck supple.   Skin:     General: Skin is warm and dry.      Findings: No rash.   Neurological:      General: No focal deficit present.      Mental Status: She " is alert. Mental status is at baseline.   Psychiatric:         Mood and Affect: Mood normal.         Relevant Results  Lab Results   Component Value Date    WBC 10.2 09/18/2024    HGB 8.2 (L) 09/18/2024    HCT 28.6 (L) 09/18/2024    MCV 80 09/18/2024     09/18/2024      Lab Results   Component Value Date    GLUCOSE 106 (H) 09/18/2024    CALCIUM 9.0 09/18/2024     09/18/2024    K 4.3 09/18/2024    CO2 29 09/18/2024     09/18/2024    BUN 15 09/18/2024    CREATININE 1.16 (H) 09/18/2024     Scheduled medications  amoxicillin-pot clavulanate, 1 tablet, oral, q12h VILMA  [Held by provider] ampicillin-sulbactam, 3 g, intravenous, q6h  DULoxetine, 60 mg, oral, Daily  enoxaparin, 40 mg, subcutaneous, Daily  fluticasone, 1 spray, Each Nostril, Daily  melatonin, 10 mg, oral, Nightly  [Held by provider] propranolol, 20 mg, oral, BID  sodium chloride, 500 mL, intravenous, Once  sodium chloride, 3 mL, nebulization, Once      Continuous medications     PRN medications  PRN medications: acetaminophen, polyethylene glycol, QUEtiapine, traMADol    Saida Mobley MD

## 2024-09-19 ENCOUNTER — APPOINTMENT (OUTPATIENT)
Dept: CARDIOLOGY | Facility: HOSPITAL | Age: 74
End: 2024-09-19
Payer: MEDICAID

## 2024-09-19 ENCOUNTER — TELEPHONE (OUTPATIENT)
Dept: OTHER | Age: 74
End: 2024-09-19
Payer: MEDICAID

## 2024-09-19 LAB
ACID FAST STN SPEC: NORMAL
ALBUMIN SERPL BCP-MCNC: 2.5 G/DL (ref 3.4–5)
ANION GAP SERPL CALC-SCNC: 10 MMOL/L (ref 10–20)
BASOPHILS # BLD AUTO: 0.1 X10*3/UL (ref 0–0.1)
BASOPHILS NFR BLD AUTO: 0.9 %
BUN SERPL-MCNC: 19 MG/DL (ref 6–23)
CALCIUM SERPL-MCNC: 9.3 MG/DL (ref 8.6–10.6)
CHLORIDE SERPL-SCNC: 102 MMOL/L (ref 98–107)
CO2 SERPL-SCNC: 28 MMOL/L (ref 21–32)
CREAT SERPL-MCNC: 1.12 MG/DL (ref 0.5–1.05)
EGFRCR SERPLBLD CKD-EPI 2021: 52 ML/MIN/1.73M*2
EOSINOPHIL # BLD AUTO: 0.24 X10*3/UL (ref 0–0.4)
EOSINOPHIL NFR BLD AUTO: 2.2 %
ERYTHROCYTE [DISTWIDTH] IN BLOOD BY AUTOMATED COUNT: 17.2 % (ref 11.5–14.5)
GLUCOSE SERPL-MCNC: 127 MG/DL (ref 74–99)
HCT VFR BLD AUTO: 28.2 % (ref 36–46)
HGB BLD-MCNC: 8.2 G/DL (ref 12–16)
IMM GRANULOCYTES # BLD AUTO: 0.06 X10*3/UL (ref 0–0.5)
IMM GRANULOCYTES NFR BLD AUTO: 0.5 % (ref 0–0.9)
LYMPHOCYTES # BLD AUTO: 3.43 X10*3/UL (ref 0.8–3)
LYMPHOCYTES NFR BLD AUTO: 31.4 %
MCH RBC QN AUTO: 22.9 PG (ref 26–34)
MCHC RBC AUTO-ENTMCNC: 29.1 G/DL (ref 32–36)
MCV RBC AUTO: 79 FL (ref 80–100)
MONOCYTES # BLD AUTO: 0.84 X10*3/UL (ref 0.05–0.8)
MONOCYTES NFR BLD AUTO: 7.7 %
MYCOBACTERIUM SPEC CULT: NORMAL
NEUTROPHILS # BLD AUTO: 6.27 X10*3/UL (ref 1.6–5.5)
NEUTROPHILS NFR BLD AUTO: 57.3 %
NRBC BLD-RTO: 0 /100 WBCS (ref 0–0)
PHOSPHATE SERPL-MCNC: 4.8 MG/DL (ref 2.5–4.9)
PLATELET # BLD AUTO: 472 X10*3/UL (ref 150–450)
POTASSIUM SERPL-SCNC: 4.4 MMOL/L (ref 3.5–5.3)
RBC # BLD AUTO: 3.58 X10*6/UL (ref 4–5.2)
SODIUM SERPL-SCNC: 136 MMOL/L (ref 136–145)
WBC # BLD AUTO: 10.9 X10*3/UL (ref 4.4–11.3)

## 2024-09-19 PROCEDURE — 1100000001 HC PRIVATE ROOM DAILY

## 2024-09-19 PROCEDURE — 85025 COMPLETE CBC W/AUTO DIFF WBC: CPT | Performed by: STUDENT IN AN ORGANIZED HEALTH CARE EDUCATION/TRAINING PROGRAM

## 2024-09-19 PROCEDURE — 2500000004 HC RX 250 GENERAL PHARMACY W/ HCPCS (ALT 636 FOR OP/ED): Performed by: STUDENT IN AN ORGANIZED HEALTH CARE EDUCATION/TRAINING PROGRAM

## 2024-09-19 PROCEDURE — 93005 ELECTROCARDIOGRAM TRACING: CPT

## 2024-09-19 PROCEDURE — 80069 RENAL FUNCTION PANEL: CPT | Performed by: STUDENT IN AN ORGANIZED HEALTH CARE EDUCATION/TRAINING PROGRAM

## 2024-09-19 PROCEDURE — 2500000001 HC RX 250 WO HCPCS SELF ADMINISTERED DRUGS (ALT 637 FOR MEDICARE OP): Performed by: STUDENT IN AN ORGANIZED HEALTH CARE EDUCATION/TRAINING PROGRAM

## 2024-09-19 PROCEDURE — 36415 COLL VENOUS BLD VENIPUNCTURE: CPT | Performed by: STUDENT IN AN ORGANIZED HEALTH CARE EDUCATION/TRAINING PROGRAM

## 2024-09-19 PROCEDURE — 2500000002 HC RX 250 W HCPCS SELF ADMINISTERED DRUGS (ALT 637 FOR MEDICARE OP, ALT 636 FOR OP/ED): Performed by: STUDENT IN AN ORGANIZED HEALTH CARE EDUCATION/TRAINING PROGRAM

## 2024-09-19 RX ORDER — SODIUM CHLORIDE 9 MG/ML
100 INJECTION, SOLUTION INTRAVENOUS CONTINUOUS
Status: DISCONTINUED | OUTPATIENT
Start: 2024-09-19 | End: 2024-09-20 | Stop reason: HOSPADM

## 2024-09-19 RX ORDER — GABAPENTIN 100 MG/1
100 CAPSULE ORAL 2 TIMES DAILY
Start: 2024-09-19

## 2024-09-19 RX ORDER — MECLIZINE HYDROCHLORIDE 25 MG/1
25 TABLET ORAL 3 TIMES DAILY PRN
Status: DISCONTINUED | OUTPATIENT
Start: 2024-09-19 | End: 2024-09-20 | Stop reason: HOSPADM

## 2024-09-19 RX ORDER — GABAPENTIN 100 MG/1
100 CAPSULE ORAL 2 TIMES DAILY
Status: DISCONTINUED | OUTPATIENT
Start: 2024-09-19 | End: 2024-09-19

## 2024-09-19 RX ORDER — MIDODRINE HYDROCHLORIDE 10 MG/1
10 TABLET ORAL 3 TIMES DAILY PRN
Status: DISCONTINUED | OUTPATIENT
Start: 2024-09-19 | End: 2024-09-20

## 2024-09-19 RX ORDER — MIDODRINE HYDROCHLORIDE 10 MG/1
10 TABLET ORAL 3 TIMES DAILY PRN
Start: 2024-09-19 | End: 2024-09-20

## 2024-09-19 RX ORDER — ACETAMINOPHEN 325 MG/1
650 TABLET ORAL EVERY 6 HOURS PRN
Status: DISCONTINUED | OUTPATIENT
Start: 2024-09-19 | End: 2024-09-20 | Stop reason: HOSPADM

## 2024-09-19 RX ORDER — MECLIZINE HYDROCHLORIDE 25 MG/1
25 TABLET ORAL 3 TIMES DAILY PRN
Start: 2024-09-19

## 2024-09-19 RX ORDER — ACETAMINOPHEN 325 MG/1
650 TABLET ORAL EVERY 6 HOURS PRN
Qty: 30 TABLET | Refills: 0 | Status: SHIPPED | OUTPATIENT
Start: 2024-09-19

## 2024-09-19 ASSESSMENT — COGNITIVE AND FUNCTIONAL STATUS - GENERAL
DRESSING REGULAR LOWER BODY CLOTHING: A LITTLE
MOVING TO AND FROM BED TO CHAIR: A LITTLE
TOILETING: A LITTLE
TURNING FROM BACK TO SIDE WHILE IN FLAT BAD: A LITTLE
STANDING UP FROM CHAIR USING ARMS: A LITTLE
MOVING TO AND FROM BED TO CHAIR: A LITTLE
MOBILITY SCORE: 18
HELP NEEDED FOR BATHING: A LITTLE
DAILY ACTIVITIY SCORE: 19
STANDING UP FROM CHAIR USING ARMS: A LITTLE
WALKING IN HOSPITAL ROOM: A LITTLE
DRESSING REGULAR UPPER BODY CLOTHING: A LITTLE
MOVING FROM LYING ON BACK TO SITTING ON SIDE OF FLAT BED WITH BEDRAILS: A LITTLE
PERSONAL GROOMING: A LITTLE
CLIMB 3 TO 5 STEPS WITH RAILING: A LITTLE
HELP NEEDED FOR BATHING: A LITTLE
PERSONAL GROOMING: A LITTLE
CLIMB 3 TO 5 STEPS WITH RAILING: A LITTLE
DRESSING REGULAR UPPER BODY CLOTHING: A LITTLE
DAILY ACTIVITIY SCORE: 19
TOILETING: A LITTLE
MOBILITY SCORE: 18
TURNING FROM BACK TO SIDE WHILE IN FLAT BAD: A LITTLE
DRESSING REGULAR LOWER BODY CLOTHING: A LITTLE
MOVING FROM LYING ON BACK TO SITTING ON SIDE OF FLAT BED WITH BEDRAILS: A LITTLE
WALKING IN HOSPITAL ROOM: A LITTLE

## 2024-09-19 ASSESSMENT — PAIN SCALES - GENERAL
PAINLEVEL_OUTOF10: 0 - NO PAIN

## 2024-09-19 NOTE — NURSING NOTE
Geriatric Nursing Rounds Summary  Patient presented by nursing staff in morning huddle concerns with her refusing to go to SNF this am  Admitted from [facility] (independent living) with failure to thrive after being observed by  in her apt.  PMH 74 year old h/o CAD diverticulitis s/p rodríguez procedure,heart failure and hidradenitis suppurativa  She has since received eviction notice from [facility]  Age friendly  What matters- Patient wants to go back to apt (independent living) although currently not feasible,  dizzy with postural bp and needs help with her colostomy  She is full code. Concerned about her cat ,her art supplies and other belongings. She complains of ha refusing tylenal  Mentation -cam neg, able to concentrate but had trouble remembering her nurse had been in room throughout the day.Questionable insight into her lack of ability to walk and care for herself currently  Meds holding inderal on cymbalta refusing tylenal on tramadol  Mobility-Complaints for HA and dizziness while out of bed have been barriers to increasing mobility, refusing walker but uses cane. Remains high falls risk.  Plan: Geriatrics to re evaluate in am.

## 2024-09-19 NOTE — CARE PLAN
"Capacity Evaluation Background    \"Capacity' is the \"ability' to make a decision. The decision in question must be specific (one decision), relevant to a patient's current condition (appropriate), and timely (neither prospective nor retrospective). Capacity varies based on knowledge base (explanation/understanding of clinical information), cognitive processing, acute psychiatric illness, and other clinical conditions. In order to be deemed \"capacitated\" to make a single decision at one pint in time, a patient must demonstrate all 4 of the following elements: *Ability to consistently communicate a choice (consistent over time with adequate information) *Ability to understand the relevant information (accurate knowledge of condition) *Ability to appreciate the situation and its consequences (risks/benefits, pros/cons) *Ability to reason about treatment options (without undue influence of a person or condition, eg suicidality or acute psychosis)     Current Decision Clinical Issue (eg, AMA discharge, surgery, medication, etc):         Did the Appropriate Team Address Relevant Information with the Patient     yes      Capacity Evaluation     Pt Demonstrates Ability to Consistently Communicate Choice     yes      Pt Demonstrates Ability to Appreciate the Situation and its Consequences       yes      Pt Demonstrates Ability to Understand the Relevant Information     yes      Pt Demonstrates Ability to Reason About Treatment Options     yes      If ANY of the above items are answered \"NO' the patient LACKS CAPACITY for that specific decision at hand, at that specific time. Further capacity evaluations can be done as needed.                "

## 2024-09-19 NOTE — PROGRESS NOTES
9Am  set for discharge to [facility]. Per the floor, patient is not agreeing to discharge. MD confirmed patient lacks capacity to make health care decisions. TANISHA spoke with Marlene at [facility] to update that patient may be resistant during transport. SW left a vm for [employer] CONTRERAS Kuhn  to notify on discharge details. Brother and SNF was notified yesterday. DC confirmed.     1156-MD states a capacity eval was done today and states patient now has capacity to make own healthcare decisions. Patient is refusing discharge to [facility] and wants to discharge home. TANISHA spoke with [facility]  Tatum Deleon  who confirmed patient has had several lease violations, unable to independently care for self, and has a history of being aggressive towards staff and other residents.  reports patient has ran into residents with her electric chair, and expressed  concern for patients and residents safety.  states she is drafting a letter that will include details of multiple incidents and a notice to vacate the unit.  reports lease violations are grounds for lease termination.  confirmed patients belongings are still in the unit. LSW provided  with email address to send letter to. LSW will provide patient with letter once it is received. LSW will continue to follow.     2513-LSW received email from [facility] that includes two 30-day notices to vacate the premises, one dated 10/23/23 and 9/19/24.  states the patient was to complete an anger manager class to remain complaint when the first notice was served. TANISHA provided patient with the documents. Patient states she wants to talk to a  prior to discharge. Patient confirmed she has previously been to the [facility] when it was called [facility]. TANISHA  spoke with ethics. Ethics suggested to allow patient to consult with a  and to proceed with discharge to SNF after phone consult has taken place. SW emailed CHW to request legal resources. SW will provide info to patient and will attempt to make an agreement for patient to discharge to [facility] after she speaks with a . Care transitions will continue to follow.     CHRISTINE Wheatley

## 2024-09-19 NOTE — PROGRESS NOTES
"Spiritual Care Visit    Clinical Encounter Type  Visited With: Patient  Routine Visit: Follow-up  Continue Visiting: Yes  Referral From: Patient, Nurse,   Referral To:          Values/Beliefs  Cultural Requests During Hospitalization: comfort, support, crisis intervention  Spiritual Requests During Hospitalization: prayer         Patient Spiritual Care Encounters  Suffering Severity: Moderate  Fear Level: Substantial  Feelings of Loneliness: Good  Coping: Sometimes demonstrated    Family Spiritual Care Encounters  Family Participation in Care: Sometimes demonstrated  Family Support During Treatment: Sometimes demonstrated  Caregiver-Patient Relationship: Moderately compromised         PC-7 Assessment (Level of Unmet Needs)  Existential Struggle: Substantial  Spiritual/Mosque Struggle: Substantial  Legacy: Substantial  Relationships: Substantial  Fear of Death/Dying: Substantial  Values/Medical Decision Making: Substantial  Ritual/Other: Substantial  PC-7 Score: 14    SDAT (Spiritual Distress Assessment Tool)  Need for Life Balance: Substancial evidence of unmet spiritual need  Need for Connection: Substancial evidence of unmet spiritual need  Need for Values Acknowledgement: Substancial evidence of unmet spiritual need  Need to Maintain Control: Substancial evidence of unmet spiritual need  Need to Maintain Identity: Substancial evidence of unmet spiritual need  SDAT Score: 10  SDAT Average Score: 2    Taxonomy  Intended Effects: Convey a calming presence, Demonstrate caring and concern, Lessen anxiety, De-esclate emotionally charged situations  Methods: Offer spiritual/Buddhism support, Offer emotional support, Offer support  Interventions: Active listening, Crisis intervention, Discuss concerns, Facilitate communication, [place]  Follow up spiritual care visit with patient. Patient very upset this morning and refusing to go to [facility]. Patient insisting that she wants to \"go " "back to my apartment\". Patient is not allowed to go back to her apartment due to the fact that the manager found her with feces on the floor and urine. Patient shared with  that she was \"trying to clean it up\". Patient has little supports in place. Her brother is her POA however, patient told  today that she \"does not want him involved anymore.\"  is able to get patient to soften a bit and commit to working together. Patient was tearful at times during our time today. She has fears and this  believes she has used anger to push people away so she does not get hurt. Patient has a cat that she is worried about and she is concerned about her belongings. I assured patient that we will hopefully work things out. Patient asked to see pictures of the [facility]. TANISHA Handley printed out some material about the facility and  gave it to patient. She was reading it and seemed interested in some of the things they have there - such as an art room. 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  encouraged patient and provided a non anxious, supportive and comforting presence.  held patients hand and provided prayer.  will continue to follow.    "

## 2024-09-19 NOTE — CARE PLAN
Problem: Skin  Goal: Decreased wound size/increased tissue granulation at next dressing change  Outcome: Progressing  Goal: Participates in plan/prevention/treatment measures  Outcome: Progressing  Goal: Prevent/manage excess moisture  Outcome: Progressing  Goal: Prevent/minimize sheer/friction injuries  Outcome: Progressing  Flowsheets (Taken 9/19/2024 0542)  Prevent/minimize sheer/friction injuries: Use pull sheet  Goal: Promote/optimize nutrition  Outcome: Progressing  Goal: Promote skin healing  Outcome: Progressing     Problem: Fall/Injury  Goal: Not fall by end of shift  Outcome: Progressing  Goal: Be free from injury by end of the shift  Outcome: Progressing  Goal: Verbalize understanding of personal risk factors for fall in the hospital  Outcome: Progressing  Goal: Verbalize understanding of risk factor reduction measures to prevent injury from fall in the home  Outcome: Progressing  Goal: Use assistive devices by end of the shift  Outcome: Progressing  Goal: Pace activities to prevent fatigue by end of the shift  Outcome: Progressing     Problem: Pain - Adult  Goal: Verbalizes/displays adequate comfort level or baseline comfort level  Outcome: Progressing     Problem: Safety - Adult  Goal: Free from fall injury  Outcome: Progressing     Problem: Discharge Planning  Goal: Discharge to home or other facility with appropriate resources  Outcome: Progressing     Problem: Chronic Conditions and Co-morbidities  Goal: Patient's chronic conditions and co-morbidity symptoms are monitored and maintained or improved  Outcome: Progressing

## 2024-09-19 NOTE — TELEPHONE ENCOUNTER
Admitted 2 weeks ago and would like to speak to you:  [phone]  [facility]  Room 7780

## 2024-09-19 NOTE — CARE PLAN
"-Pt is medically clear and ready to be discharged to SNF.   -Pt has an accepting facility.   -On my exam, her answers were consistent. She is decisional and has capacity at this time.   -Pt is pretty upset after she knew from her landlord that she is not allowed to return to her apartment.  -Pt is refusing to leave unless the medical staff \"gets my apartment back\". She asked the SW to hire a  to start the process. Otherwise, she said she will stay in the hospital.  -Pt is at a risk of fall and can not ambulate on her own  -Of note, Pt`s capacity can change from time to time and is always subjective to change based on reassessment.       Full note to follow        "

## 2024-09-19 NOTE — NURSING NOTE
Dr Mobley notified that the patient has continued complaints of dizziness that increases with standing and pain on the right side of her face and head that she has had for the last few days. Per Dr Mobley he will address these concerns

## 2024-09-19 NOTE — PROGRESS NOTES
24 1700   Colostomy LUQ   Placement Date/Time: (c)  (c)    Placed by External Staff?: Clinic  Hand Hygiene Completed: Yes  Location: LUQ   Stomal Appliance 1 piece;Changed   Site/Stoma Assessment Clean;Intact   Stoma Size (cm)   (1 1/2 oval)   Peristomal Assessment Clean;Intact   Treatment Pouch change;Site care   Drainage Characteristics Brown   Output (mL) 100 mL       Ostomy type: colostomy       size: 1 1/2 oval      color: red and moist      protruding: budded   Marcial: none  Functioning: soft brown stool   Mucocutaneous junction: intact   Peristomal skin: clean, dry and intact  Pouchin piece [employer] active life with a clip and barrier ring  Ostomy Education:Patient has knowledge about her ostomy care and is independent with her ostomy care at home. Patient requested assistance today for a pouch change   Plan: assess stoma/pouching

## 2024-09-20 VITALS
RESPIRATION RATE: 16 BRPM | SYSTOLIC BLOOD PRESSURE: 102 MMHG | BODY MASS INDEX: 25.61 KG/M2 | OXYGEN SATURATION: 94 % | HEART RATE: 98 BPM | DIASTOLIC BLOOD PRESSURE: 60 MMHG | HEIGHT: 64 IN | TEMPERATURE: 97.5 F | WEIGHT: 150 LBS

## 2024-09-20 LAB
ALBUMIN SERPL BCP-MCNC: 2.6 G/DL (ref 3.4–5)
ANION GAP SERPL CALC-SCNC: 12 MMOL/L (ref 10–20)
BACTERIA BLD CULT: NORMAL
BACTERIA BLD CULT: NORMAL
BASOPHILS # BLD AUTO: 0.11 X10*3/UL (ref 0–0.1)
BASOPHILS NFR BLD AUTO: 0.9 %
BUN SERPL-MCNC: 20 MG/DL (ref 6–23)
CALCIUM SERPL-MCNC: 9.3 MG/DL (ref 8.6–10.6)
CHLORIDE SERPL-SCNC: 105 MMOL/L (ref 98–107)
CO2 SERPL-SCNC: 27 MMOL/L (ref 21–32)
CREAT SERPL-MCNC: 1.18 MG/DL (ref 0.5–1.05)
EGFRCR SERPLBLD CKD-EPI 2021: 49 ML/MIN/1.73M*2
EOSINOPHIL # BLD AUTO: 0.3 X10*3/UL (ref 0–0.4)
EOSINOPHIL NFR BLD AUTO: 2.6 %
ERYTHROCYTE [DISTWIDTH] IN BLOOD BY AUTOMATED COUNT: 17.1 % (ref 11.5–14.5)
GLUCOSE SERPL-MCNC: 99 MG/DL (ref 74–99)
HCT VFR BLD AUTO: 31.3 % (ref 36–46)
HGB BLD-MCNC: 8.7 G/DL (ref 12–16)
IMM GRANULOCYTES # BLD AUTO: 0.06 X10*3/UL (ref 0–0.5)
IMM GRANULOCYTES NFR BLD AUTO: 0.5 % (ref 0–0.9)
LYMPHOCYTES # BLD AUTO: 4.26 X10*3/UL (ref 0.8–3)
LYMPHOCYTES NFR BLD AUTO: 36.2 %
MCH RBC QN AUTO: 22.9 PG (ref 26–34)
MCHC RBC AUTO-ENTMCNC: 27.8 G/DL (ref 32–36)
MCV RBC AUTO: 82 FL (ref 80–100)
MONOCYTES # BLD AUTO: 0.77 X10*3/UL (ref 0.05–0.8)
MONOCYTES NFR BLD AUTO: 6.5 %
NEUTROPHILS # BLD AUTO: 6.26 X10*3/UL (ref 1.6–5.5)
NEUTROPHILS NFR BLD AUTO: 53.3 %
NRBC BLD-RTO: 0 /100 WBCS (ref 0–0)
PHOSPHATE SERPL-MCNC: 3.8 MG/DL (ref 2.5–4.9)
PLATELET # BLD AUTO: 497 X10*3/UL (ref 150–450)
POTASSIUM SERPL-SCNC: 4.5 MMOL/L (ref 3.5–5.3)
RBC # BLD AUTO: 3.8 X10*6/UL (ref 4–5.2)
SODIUM SERPL-SCNC: 139 MMOL/L (ref 136–145)
WBC # BLD AUTO: 11.8 X10*3/UL (ref 4.4–11.3)

## 2024-09-20 PROCEDURE — 97129 THER IVNTJ 1ST 15 MIN: CPT | Mod: GO

## 2024-09-20 PROCEDURE — 36415 COLL VENOUS BLD VENIPUNCTURE: CPT | Performed by: STUDENT IN AN ORGANIZED HEALTH CARE EDUCATION/TRAINING PROGRAM

## 2024-09-20 PROCEDURE — 80069 RENAL FUNCTION PANEL: CPT | Performed by: STUDENT IN AN ORGANIZED HEALTH CARE EDUCATION/TRAINING PROGRAM

## 2024-09-20 PROCEDURE — 97530 THERAPEUTIC ACTIVITIES: CPT | Mod: GP | Performed by: PHYSICAL THERAPIST

## 2024-09-20 PROCEDURE — 85025 COMPLETE CBC W/AUTO DIFF WBC: CPT | Performed by: STUDENT IN AN ORGANIZED HEALTH CARE EDUCATION/TRAINING PROGRAM

## 2024-09-20 PROCEDURE — 2500000001 HC RX 250 WO HCPCS SELF ADMINISTERED DRUGS (ALT 637 FOR MEDICARE OP): Performed by: STUDENT IN AN ORGANIZED HEALTH CARE EDUCATION/TRAINING PROGRAM

## 2024-09-20 PROCEDURE — 2500000002 HC RX 250 W HCPCS SELF ADMINISTERED DRUGS (ALT 637 FOR MEDICARE OP, ALT 636 FOR OP/ED): Performed by: STUDENT IN AN ORGANIZED HEALTH CARE EDUCATION/TRAINING PROGRAM

## 2024-09-20 PROCEDURE — 2500000004 HC RX 250 GENERAL PHARMACY W/ HCPCS (ALT 636 FOR OP/ED): Performed by: STUDENT IN AN ORGANIZED HEALTH CARE EDUCATION/TRAINING PROGRAM

## 2024-09-20 PROCEDURE — 97110 THERAPEUTIC EXERCISES: CPT | Mod: GP | Performed by: PHYSICAL THERAPIST

## 2024-09-20 PROCEDURE — 97130 THER IVNTJ EA ADDL 15 MIN: CPT | Mod: GO

## 2024-09-20 RX ORDER — FLUTICASONE PROPIONATE 50 MCG
2 SPRAY, SUSPENSION (ML) NASAL DAILY
Status: DISCONTINUED | OUTPATIENT
Start: 2024-09-20 | End: 2024-09-20 | Stop reason: HOSPADM

## 2024-09-20 RX ORDER — MIDODRINE HYDROCHLORIDE 10 MG/1
10 TABLET ORAL 3 TIMES DAILY PRN
Status: DISCONTINUED | OUTPATIENT
Start: 2024-09-20 | End: 2024-09-20 | Stop reason: HOSPADM

## 2024-09-20 RX ORDER — MIDODRINE HYDROCHLORIDE 10 MG/1
10 TABLET ORAL 3 TIMES DAILY PRN
Start: 2024-09-20

## 2024-09-20 RX ORDER — FLUTICASONE PROPIONATE 50 MCG
2 SPRAY, SUSPENSION (ML) NASAL DAILY
Qty: 16 G | Refills: 6 | Status: SHIPPED | OUTPATIENT
Start: 2024-09-20

## 2024-09-20 ASSESSMENT — COGNITIVE AND FUNCTIONAL STATUS - GENERAL
STANDING UP FROM CHAIR USING ARMS: A LITTLE
MOVING FROM LYING ON BACK TO SITTING ON SIDE OF FLAT BED WITH BEDRAILS: A LITTLE
MOBILITY SCORE: 16
WALKING IN HOSPITAL ROOM: A LITTLE
MOVING TO AND FROM BED TO CHAIR: A LITTLE
CLIMB 3 TO 5 STEPS WITH RAILING: TOTAL
TURNING FROM BACK TO SIDE WHILE IN FLAT BAD: A LITTLE

## 2024-09-20 ASSESSMENT — PAIN - FUNCTIONAL ASSESSMENT: PAIN_FUNCTIONAL_ASSESSMENT: 0-10

## 2024-09-20 ASSESSMENT — PAIN DESCRIPTION - LOCATION: LOCATION: BACK

## 2024-09-20 ASSESSMENT — PAIN SCALES - GENERAL
PAINLEVEL_OUTOF10: 6
PAINLEVEL_OUTOF10: 7
PAINLEVEL_OUTOF10: 0 - NO PAIN

## 2024-09-20 NOTE — PROGRESS NOTES
Another capacity eval was completed by Geriatrics this morning. Geriatrics, Primary MD, LSW, , and RN  present for duration of eval. Results indicate patient lacks capacity to make medical decisions. Brother/JUDSON Kamara was on speaker phone and listened to the developments. At the end of the eval, patient confirmed she is agreeable to discharge to [facility] with a plan to transition to assisted living housing after the completion of SNF. SW forwarded [facility]'s 30-day notice to vacate the premises to brothcarlos Asad [email or web address] and [employer] [email or web address]. Brothcarlos confirmed he will work with [facility] to coordinate retrieving patients belongings.  and Bella to collaborate on AL housing. [facility] confirmed 1300 stretcher transport for discharge to [facility]. Report # . TCC,Mds, the floor, SNF, /JUDSON, Bella CHAIREZ notified. Assistant RN manager states she notified patient. Safe discharge confirmed.     CHRISTINE Wheatley

## 2024-09-20 NOTE — PROGRESS NOTES
Subjective   Follow-up on cognitive impairment:    Patient was seen and examined.  Noted that her vitals were improved but reportedly was orthostatic this morning.  Patient was seen sitting at the bedside.  The multidisciplinary meeting was held at the bedside with primary hospitalist, geriatrics, social work, nursing,  were presented.      Patient's capacity was assessed.  She could not say for sure why she came to the hospital.  She thought that the building people sent her over because he wanted to get control.  She could not say what exactly had happened in the hospital or what the teams were telling her was her medical condition.  She complained about pain in the right side of her head which she believes was due to her sinuses.  When asked about what is a discharge plan, she appears fixated on her 30-day eviction notice.  She also stated that she did not believe that she was 1-2-person assist, as evaluated by physical therapy.  She thinks that she will be okay at home as she has arranged her furniture and she can furniture walk.    Discussed in detail her fears of being stuck in a nursing home.  Noted that her brother, POA and her possible social work were working on an assisted living facility.  She stated that she wanted a place that would allow her cats to be with her as well as some of her belongings.  She also stated that she preferably wanted a place close to where she grew up in that [facility]    Objective     Current Facility-Administered Medications   Medication Dose Route Frequency Provider Last Rate Last Admin    acetaminophen (Tylenol) tablet 650 mg  650 mg oral q6h PRN Saida Mobley MD   650 mg at 09/20/24 0019    amoxicillin-pot clavulanate (Augmentin) 875-125 mg per tablet 1 tablet  1 tablet oral q12h [facility] Ariel Tejada MD   1 tablet at 09/20/24 0019    [Held by provider] ampicillin-sulbactam (Unasyn) in sodium chloride 0.9 % 100 mL 3 g  3 g intravenous q6h  Ariel Tejada MD   Stopped at 09/12/24 1419    DULoxetine (Cymbalta) DR capsule 60 mg  60 mg oral Daily Ariel Tejada MD   60 mg at 09/20/24 0814    enoxaparin (Lovenox) syringe 40 mg  40 mg subcutaneous Daily Ariel Tejada MD   40 mg at 09/20/24 0814    fluticasone (Flonase) nasal spray 1 spray  1 spray Each Nostril Daily Ariel Tejada MD   1 spray at 09/18/24 0853    meclizine (Antivert) tablet 25 mg  25 mg oral TID PRN Saida Mobley MD        melatonin tablet 10 mg  10 mg oral Nightly Ariel Tejada MD   10 mg at 09/19/24 2035    midodrine (Proamatine) tablet 10 mg  10 mg oral TID PRN Saida Mobley MD        polyethylene glycol (Glycolax, Miralax) packet 17 g  17 g oral Daily PRN Ariel Tejada MD   17 g at 09/10/24 1826    [Held by provider] propranolol (Inderal) tablet 20 mg  20 mg oral BID Ariel Tejada MD   20 mg at 09/14/24 0935    QUEtiapine (SEROquel) tablet 12.5 mg  12.5 mg oral BID PRN Ariel Tejada MD   12.5 mg at 09/16/24 2153    sodium chloride 0.9 % bolus 500 mL  500 mL intravenous Once Mine Moraes DO        sodium chloride 0.9% infusion  100 mL/hr intravenous Continuous Saida Mobley  mL/hr at 09/20/24 0825 100 mL/hr at 09/20/24 0825    sodium chloride 3 % nebulizer solution 3 mL  3 mL nebulization Once Ariel Tejada MD        traMADol (Ultram) tablet 50 mg  50 mg oral q8h PRN Ariel Tejada MD   50 mg at 09/20/24 0823       Physical Exam  GEN: Alert, oriented to person, place and time, not pale, not jaundiced, well hydrated  CVS: HS I +II, regular, no murmurs  RESP: Diminished air entry  ABD: Full, soft, BS present, nontender, no palpable organs,   EXT: No bilateral leg edema    Confusion Assessment Method(CAM) for diagnosis of delirium:    1.  Acute onset or fluctuating course: absent/present: Absent  2.  Inattention: absent/present: Absent  3.  Disorganized thinking: absent/present: Present  4.  Altered level of  consciousness: absent/present: Absent  CAM: negative    AT Score For Assessment of Delirium and Cognitive Impairment:    Alertness: 0  Normal(fully alert,but not agitated, throughout assessment)=0  Mild sleepiness for <10 seconds after walking, then normal=0  Clearly abnormal=4  2.  AMT4: 0  No mistakes=0  One mistake=1  Two or more mistakes/untestable=2  3.  Attention: 0  Achieves seven months or more correctly=0  Starts but scores <7 months/ refuses to start=1  Untestable(cannot start because unwell, drowsy, inattentive)=2  4.  Acute: 0  No=0  Yes=4    Total Score: 0  4 or above: Possible delirium +/- cognitive impairment  1-3: Possible cognitive impairment  0: Delirium or severe cognitive impairment unlikely(but delirium still possible if (4) information incomplete)      Last Recorded Vitals      9/19/2024     3:30 PM 9/19/2024     4:57 PM 9/19/2024     5:00 PM 9/19/2024     5:06 PM 9/19/2024     8:19 PM 9/19/2024    11:21 PM 9/20/2024     7:32 AM   Vitals   Systolic 117 121 117 98 114 104 120   Diastolic 71 70 70 46 66 58 68   Heart Rate 92 94 94 120 83 72 75   Temp 36.1 °C (97 °F) 36.5 °C (97.7 °F) 36.5 °C (97.7 °F) 36.5 °C (97.7 °F) 36.7 °C (98.1 °F) 36.7 °C (98.1 °F) 36.7 °C (98.1 °F)   Resp     14 16 14      Vitals:    09/10/24 1240   Weight: 68 kg (150 lb)        Relevant Results  Lab Results   Component Value Date    TSH 1.61 09/16/2024    HTRMJQMM90 764 09/16/2024    VITD25 20 (L) 09/16/2024    HGBA1C 5.7 (A) 05/23/2023     Results from last 7 days   Lab Units 09/19/24  0743 09/18/24  0727 09/17/24  1638   WBC AUTO x10*3/uL 10.9 10.2 12.7*   HEMOGLOBIN g/dL 8.2* 8.2* 8.4*   HEMATOCRIT % 28.2* 28.6* 29.1*   SODIUM mmol/L 136 138 137   POTASSIUM mmol/L 4.4 4.3 4.4   CHLORIDE mmol/L 102 103 102   CREATININE mg/dL 1.12* 1.16* 1.46*   BUN mg/dL 19 15 13   CO2 mmol/L 28 29 29          CT chest wo IV contrast  Narrative: Interpreted By:  Stevie Arteaga,   STUDY:  CT CHEST WO IV CONTRAST;  9/11/2024 10:44 am       INDICATION:  Signs/Symptoms:eval of left lung abscess seen 3 weeks prior at [facility].          COMPARISON:  04/10/2024.      ACCESSION NUMBER(S):  [id number]      ORDERING CLINICIAN:  NIMESH WAKEFIELD      TECHNIQUE:  Helical data acquisition of the chest was obtained  without IV  contrast material.  Images were reformatted in axial, coronal, and  sagittal planes.      FINDINGS:  LUNGS AND AIRWAYS:  The trachea and central airways are patent. No endobronchial lesion.      There is multifocal consolidative opacities throughout the visualized  lung parenchyma. When compared to a study of 04/10/2024 there has  been interval improvement in right upper lobe airspace  opacities/consolidation. There is persistent lingular and left lower  lobe airspace disease with slight interval worsening consolidation in  the superior segment of the left lower lobe. There is mucoid  impaction within the left lower lobe. There is no evidence of  effusions.      MEDIASTINUM AND GREY, LOWER NECK AND AXILLA:  The visualized thyroid gland is within normal limits.      Scattered mediastinal lymph nodes are felt to be  inflammatory/reactive in nature.      Esophagus appears within normal limits as seen.      HEART AND VESSELS:  There is moderate atherosclerotic changes of the thoracic aorta.      Main pulmonary artery and its branches are normal in caliber.      There are severe coronary artery calcifications. The study is not  optimized for evaluation of coronary arteries.      The cardiac chambers are not enlarged.      No evidence of pericardial effusion.      UPPER ABDOMEN:  There is a small hiatal hernia. There is a mid polar left renal cyst.      CHEST WALL AND OSSEOUS STRUCTURES:  There is multilevel degenerative changes of the thoracic spine.      Impression: 1.  There is significant consolidation within the superior segment of  the left lower lobe with persistent lingular airspace opacities.  Interval improvement in right upper lobe  "airspace  disease/consolidation. Correlate with a history of recurrent  aspiration or immunodeficiency. Extensive mediastinal lymphadenopathy  most likely reactive but correlate with any concern for  lymphoproliferative disorders.  2. Severe atherosclerotic changes of the aorta and coronary arteries.      MACRO:  None      Signed by: Stevie Arteaga 9/13/2024 6:50 PM  Dictation workstation:   [id number]    DATA:  EKG: QTC  Encounter Date: 09/10/24   ECG 12 lead   Result Value    Ventricular Rate 70    Atrial Rate 70    RI Interval 104    QRS Duration 108    QT Interval 424    QTC Calculation(Bazett) 457    P Axis 74    R Axis 97    T Axis 53    QRS Count 11    Q Onset 221    P Onset 169    P Offset 211    T Offset 433    QTC Fredericia 446    Narrative    Sinus rhythm with short RI  Rightward axis  Incomplete right bundle branch block  Possible Anterior infarct (cited on or before 10-APR-2024)  Abnormal ECG  When compared with ECG of 10-APR-2024 20:54,  Significant changes have occurred    See ED provider note for full interpretation and clinical correlation  Confirmed by Becca Doyle ([id number]) on 9/10/2024 11:09:20 PM      Anti-psychotics in 48 hours: No  Opioids/Benzodiazepines in 48 hours: No  Anticholinergics on board:No  Restraints:No  Indwelling catheters:No  Last BM: 9/20/24 via colostomy  UO in 24 hours: 1150 mls  Activity in the past 24 hours: Out of bed to chair  Need for ambulatory devices: Cane/walker at baseline      Assessment/Plan   Sandra Velasquez \"Miss Marshall" is a 74 y.o. female on day 10 of admission presenting with Community acquired pneumonia due to Chlamydia species.    Patient has past medical history of suspected cognitive impairment, CAD, history of diverticulitis status post Dora procedure, pyoderma granulosum/hidradenitis who presented to the hospital with concern for recurrent falls at home and was found to have pneumonia.  Geriatrics was concerned over suspicion of cognitive " impairment, patient's refusal to be discharged to moderate intensity rehab and general discharge planning.    Concern for cognitive impairment, suspected mild dementia  Patient has underlying PTSD/DDD for which she is on Cymbalta and follows up with psychology. She has cognitive concerns, noted even in the hospital.  She forgets recent discussions/conversations.  She cannot remember why she came to the hospital.  Patient with deficits in her instrumental ADLs with inability to take care of her apartments-apartment has feces, and disorganized, unable to take care of her laundry, grocery, cooking.  Patient reportedly lives in a senior living apartment  Adult Protective Services involved in patient's case  Patient lacks capacity to make complex medical decisions such as discharge planning. Patient lacks understanding and appreciation of her current medical conditions.  She has a power of , brother Jameson who has been helping with discharge planning  Social work/care transitions encouraged to continue to work with patient  Patient initially scored a 13 out of 30 on her MoCA when done by the geriatrics team.  She however putting for minimal effort.  Noted that repeat [place] today was 21 out of 30  Recommend strongly that patient follows up with geriatrics in the outpatient within 2 to 4 weeks  Recommend:  Referral to Geriatric Outpatient clinic: [facility], [facility], [address], [phone]  Recommend that she follows up with her psychologist in 1 week to assess for worsening mood/depression.     4M AGE-FRIENDLY INITIATIVE:  What matters most to patient: Being independent  Medications: Cymbalta  Mentation: CAM negative, 4AT 0  Mobility: Assist 1-2    Geriatric medicine will continue to follow the patient. Thank you for allowing geriatric medicine to be involved in the care of your patient. Geriatric medicine consultation team is available during work hours Monday through Friday. For any  emergency issues requiring immediate assistance over the weekend, please page Geriatrics pager [phone]    Talia Rene MD

## 2024-09-20 NOTE — NURSING NOTE
RN attempted twice to give report to [facility]. Report has been called at [phone]. RN will try again later.

## 2024-09-20 NOTE — NURSING NOTE
RN notified Leandra pt's BP dropped when working with PT.   Laying BP: 111/64 HR: 80  Sitting BP: 116/66 HR: 90  Standing BP: 83/53 HR: 105    1118 RN rechecked vitals and MD Leandra requested to give midodrine to the patient. PT tolerated the medication. RN continue to monitor.

## 2024-09-20 NOTE — PROGRESS NOTES
"Physical Therapy    Physical Therapy Treatment    Patient Name: Sandra Velasquez \"Miss Velasquez\"  MRN: [id number]  Department: [place] [address]  Room: [address] [place] [address]  Today's Date: 9/20/2024  Time Calculation  Start Time: 0913  Stop Time: 0944  Time Calculation (min): 31 min         Assessment/Plan   PT Assessment  PT Assessment Results: Decreased strength, Decreased endurance, Impaired balance, Decreased mobility, Decreased safety awareness, Decreased cognition, Impaired judgement  Rehab Prognosis: Good  Evaluation/Treatment Tolerance: Patient tolerated treatment well  Strengths: Attitude of self  Barriers to Participation: Comorbidities  End of Session Communication: Bedside nurse  Assessment Comment: pt able to progress to chair but with some complaint o dizziness. pt +orthostatics and Rn aware  End of Session Patient Position: Up in chair, Alarm on  PT Plan  Inpatient/Swing Bed or Outpatient: Inpatient  PT Plan  Treatment/Interventions: Bed mobility, Transfer training, Gait training, Stair training, Balance training, Strengthening, Endurance training, Range of motion, Therapeutic exercise, Therapeutic activity, Home exercise program  PT Plan: Ongoing PT  PT Frequency: 3 times per week  PT Discharge Recommendations: Moderate intensity level of continued care  Equipment Recommended upon Discharge:  (TBD)  PT Recommended Transfer Status: Assist x1  PT - OK to Discharge: Yes      General Visit Information:   PT  Visit  PT Received On: 09/20/24  General  Reason for Referral: presenting with falls at home.  Past Medical History Relevant to Rehab: PMH of CAD , fibromyalgia, diverticulitis, CHF, pyoderma gangrenosum and recent hospitalization and treatment for pneumonia. Alternating intermittent exotropia (2023), Asymmetrical sensorineural hearing loss(2023), anxiety, Arthralgia of left knee  Family/Caregiver Present: No  Prior to Session Communication: Bedside nurse  Patient Position Received: Bed, 3 rail up, Alarm on  General " "Comment: pt supine in bed and williing to participate. eager to get up to chair.    Subjective   Precautions:  Precautions  Medical Precautions: Fall precautions    Vital Signs (Past 2hrs)        Date/Time Vitals Session Patient Position Pulse Resp SpO2 BP MAP (mmHg)    09/20/24 1118 --  --  98  16  94 %  102/60  --                   Vital Signs Comment: when returned to sitting     Objective   Pain:  Pain Assessment  Pain Assessment: 0-10  0-10 (Numeric) Pain Score: 0 - No pain  Cognition:  Cognition  Orientation Level: Oriented X4  Coordination:  Movements are Fluid and Coordinated: Yes  Postural Control:  Postural Control  Postural Control: Within Functional Limits  Static Sitting Balance  Static Sitting-Balance Support: Feet supported  Static Sitting-Level of Assistance: Close supervision  Static Standing Balance  Static Standing-Balance Support: Bilateral upper extremity supported  Static Standing-Level of Assistance: Minimum assistance  Static Standing-Comment/Number of Minutes: HHA and cane    Activity Tolerance:  Activity Tolerance  Endurance: Tolerates 10 - 20 min exercise with multiple rests  Treatments:  Therapeutic Exercise  Therapeutic Exercise Performed: Yes  Therapeutic Exercise Activity 1: supine : AP, HS, hip abd/add 1 x 10 each B sitting : marches and SAQ 1 x 10 each  B    Therapeutic Activity  Therapeutic Activity Performed: Yes  Therapeutic Activity 1: transferred to chair with assist from PT and cane. Educated on use of ww but pt refusing ww stating \" it will make me fall\"    Bed Mobility  Bed Mobility: Yes  Bed Mobility 1  Bed Mobility 1: Sitting to supine  Level of Assistance 1: Minimum assistance    Ambulation/Gait Training  Ambulation/Gait Training Performed: Yes  Ambulation/Gait Training 1  Surface 1: Level tile  Device 1: Single point cane  Assistance 1: Minimum assistance, Hand held assistance  Quality of Gait 1: Narrow base of support, Diminished heel strike, Decreased step length, " Shuffling gait, Forward flexed posture, Soft knee(s)  Comments/Distance (ft) 1: pt ambulated 4 ft to chair  Transfers  Transfer: Yes  Transfer 1  Transfer From 1: Sit to, Stand to  Transfer to 1: Stand, Sit  Technique 1: Sit to stand, Stand to sit  Transfer Device 1: Cane  Transfer Level of Assistance 1: Minimum assistance, Hand held assistance  Transfers 2  Transfer From 2: Bed to  Transfer to 2: Chair with arms  Technique 2: Sit to stand, Stand to sit  Transfer Device 2: Cane  Transfer Level of Assistance 2: Minimum assistance, Hand held assistance              Outcome Measures:  [facility] Basic Mobility  Turning from your back to your side while in a flat bed without using bedrails: A little  Moving from lying on your back to sitting on the side of a flat bed without using bedrails: A little  Moving to and from bed to chair (including a wheelchair): A little  Standing up from a chair using your arms (e.g. wheelchair or bedside chair): A little  To walk in hospital room: A little  Climbing 3-5 steps with railing: Total  Basic Mobility - Total Score: 16    Education Documentation  Precautions, taught by Bambi Valenzuela, PT at 9/20/2024 12:20 PM.  Learner: Patient  Readiness: Eager  Method: Explanation  Response: Verbalizes Understanding    Mobility Training, taught by Bambi Valenzuela, PT at 9/20/2024 12:20 PM.  Learner: Patient  Readiness: Eager  Method: Explanation  Response: Verbalizes Understanding    Education Comments  No comments found.        OP EDUCATION:       Encounter Problems       Encounter Problems (Active)       Balance       Pt will score >/=24/28 on [place] to demonstrate decreased falls risk (Progressing)       Start:  09/11/24    Expected End:  09/25/24               Mobility       Pt will be [place] for ambulation 25 ft with LRAD (Progressing)       Start:  09/11/24    Expected End:  09/25/24               PT Transfers       Pt will be [place] for sit to stand and bed to chair transfers with  LRAD (Progressing)       Start:  09/11/24    Expected End:  09/25/24            Pt will be [place] with bed mobility (Progressing)       Start:  09/11/24    Expected End:  09/25/24               Pain - Adult

## 2024-09-20 NOTE — CARE PLAN
Problem: Skin  Goal: Decreased wound size/increased tissue granulation at next dressing change  Outcome: Progressing  Goal: Participates in plan/prevention/treatment measures  Outcome: Progressing  Goal: Prevent/manage excess moisture  Outcome: Progressing  Goal: Prevent/minimize sheer/friction injuries  Outcome: Progressing  Flowsheets (Taken 9/19/2024 2155)  Prevent/minimize sheer/friction injuries: Use pull sheet  Goal: Promote/optimize nutrition  Outcome: Progressing  Goal: Promote skin healing  Outcome: Progressing     Problem: Fall/Injury  Goal: Not fall by end of shift  Outcome: Progressing  Goal: Be free from injury by end of the shift  Outcome: Progressing  Goal: Verbalize understanding of personal risk factors for fall in the hospital  Outcome: Progressing  Goal: Verbalize understanding of risk factor reduction measures to prevent injury from fall in the home  Outcome: Progressing  Goal: Use assistive devices by end of the shift  Outcome: Progressing  Goal: Pace activities to prevent fatigue by end of the shift  Outcome: Progressing     Problem: Pain - Adult  Goal: Verbalizes/displays adequate comfort level or baseline comfort level  Outcome: Progressing     Problem: Safety - Adult  Goal: Free from fall injury  Outcome: Progressing     Problem: Discharge Planning  Goal: Discharge to home or other facility with appropriate resources  Outcome: Progressing     Problem: Chronic Conditions and Co-morbidities  Goal: Patient's chronic conditions and co-morbidity symptoms are monitored and maintained or improved  Outcome: Progressing

## 2024-09-20 NOTE — DISCHARGE SUMMARY
Discharge Diagnosis  Community acquired pneumonia due to Chlamydia species    Issues Requiring Follow-Up  Fu with PCP    Discharge Meds     Medication List      START taking these medications     acetaminophen 325 mg tablet; Commonly known as: Tylenol; Take 2 tablets   (650 mg) by mouth every 6 hours if needed for mild pain (1 - 3), headaches   or fever (temp greater than 38.0 C).   amoxicillin-pot clavulanate 875-125 mg tablet; Commonly known as:   Augmentin; Take 1 tablet by mouth every 12 hours for 15 days.   gabapentin 100 mg capsule; Commonly known as: Neurontin; Take 1 capsule   (100 mg) by mouth 2 times a day.   meclizine 25 mg tablet; Commonly known as: Antivert; Take 1 tablet (25   mg) by mouth 3 times a day as needed (Dizziness).   melatonin 10 mg tablet; Take 1 tablet (10 mg) by mouth once daily at   bedtime.   midodrine 10 mg tablet; Commonly known as: Proamatine; Take 1 tablet (10   mg) by mouth 3 times a day as needed (SBP<90).   polyethylene glycol 17 gram packet; Commonly known as: Glycolax,   Miralax; Take 17 g by mouth once daily as needed (constipation).   QUEtiapine 25 mg tablet; Commonly known as: SEROquel; Take 0.5 tablets   (12.5 mg) by mouth 2 times a day as needed (agitation).     CHANGE how you take these medications     fluticasone 50 mcg/actuation nasal spray; Commonly known as: Flonase;   Administer 2 sprays into each nostril once daily.; What changed: how much   to take     CONTINUE taking these medications     cetirizine 10 mg tablet; Commonly known as: ZyrTEC; TAKE 1 TABLET BY   MOUTH (10MG) BY MOUTH ONCE DAILY   DULoxetine 60 mg DR capsule; Commonly known as: Cymbalta; Take 1 capsule   (60 mg) by mouth once daily. Do not crush or chew. Do not start before   November 8, 2023.   guaiFENesin 600 mg 12 hr tablet; Commonly known as: Mucinex; Take 2   tablets (1,200 mg) by mouth 2 times a day. Do not crush, chew, or split.   Ocean Nasal 0.65 % nasal spray; Generic drug: sodium chloride;    Administer 1 spray into each nostril if needed for congestion.   propranolol 20 mg tablet; Commonly known as: Inderal; Take 1 tablet (20   mg) by mouth 2 times a day. Take 1 tablet by mouth every morning and take   2 tablets by mouth every evening.   tamsulosin 0.4 mg 24 hr capsule; Commonly known as: Flomax; TAKE 1   CAPSULE (0.4 MG) BY MOUTH ONCE DAILY.   traMADol 50 mg tablet; Commonly known as: Ultram; Take 2 tablets (100   mg) by mouth every 8 hours if needed for severe pain (7 - 10) for up to 28   days.   Vitamins B Complex tablet; Generic drug: vitamin B complex; Take 1   tablet by mouth once daily.       Test Results Pending At Discharge  Pending Labs       Order Current Status    AFB Culture/Smear Preliminary result    AFB Culture/Smear Preliminary result    AFB Culture/Smear Preliminary result    AFB Culture/Smear Preliminary result    AFB Processed Preliminary result            Hospital Course       Ms Dove is a 74 year old female with PMH of CAD, Fibromyalgia, Diverticulitis, CHF, and pyoderma gangrenosum presenting due to concern for falls at home. Fall are mechanical, but having multiple a day. Falls are usually preceded by dizziness. Recent admission noted to [facility] in July for CHF and there was concern for bronchiolitis and pneumonia with possible left lung abscess and was discharged with 3 weeks of augmentin with ID follow up. IN the ED she was HDS, afebrile. Labs showed leukocytosis and PRISCILLA. CXR with left lung infiltrate consistent with prior imaging. Plan was to do Orthostatics and pt/ot consult for falls.. she was treated with Unasyn, azithro, transitioned later to PO. TB ws negative. BP was low s/p fluids with improvement. Monisha consulted for dispo, cannot take care of self and was deemed to lack capacity by geriatric team.  Plan is for discharge to [facility]   -------------------  TCC spoke with the  she states patient is no longer appropriate for independent  living as she was found with feces all over the floor and urine soaked linen.  reports being in contact with brother during admission and he is aware. Updated patient which had significant negative impact on her mood and how she talks/treats staff . Family was updated.            Time 200 min      Colostomy care requested   Psych OP appt requested for next Friday 9/27.       Pertinent Physical Exam At Time of Discharge  Physical Exam    Outpatient Follow-Up  Future Appointments   Date Time Provider Department Center   9/25/2024  1:00 PM Rose Shah MD [id number]   10/7/2024 10:45 AM [facility]         Saida Mobley MD

## 2024-09-20 NOTE — PROGRESS NOTES
"Occupational Therapy    Occupational Therapy Treatment    Name: Sandra Velasquez \"Miss Marshall"  MRN: [id number]  : 1950  Date: 24  Room: [address] [place] [address]A      Time Calculation  Start Time: 940  Stop Time: 1008  Time Calculation (min): 28 min    Assessment:  OT Assessment: Pt is a 75 y/o female with decreased ADL status;Decreased safe judgment during ADL;Decreased cognition;Decreased fine motor control;Decreased functional mobility;Decreased IADLs. Pt. was accepting to treatment and needed verbal cueing for ADLs. Pt. would benefit from skilled OT services to address deficits.  Plan:  Treatment Interventions: ADL retraining, Functional transfer training, Cognitive reorientation, Fine motor coordination activities  OT Frequency: 3 times per week  OT Discharge Recommendations: Moderate intensity level of continued care  Equipment Recommended upon Discharge:  (TBD)  OT Recommended Transfer Status:  (NT)  OT - OK to Discharge: Yes    Subjective   General:  OT Last Visit  OT Received On: 24  Reason for Referral: presenting with falls at home.  Past Medical History Relevant to Rehab: PMH of CAD , fibromyalgia, diverticulitis, CHF, pyoderma gangrenosum and recent hospitalization and treatment for pneumonia. Alternating intermittent exotropia (), Asymmetrical sensorineural hearing loss(), anxiety, Arthralgia of left knee  Prior to Session Communication: Bedside nurse  Patient Position Received: Up in chair, Alarm on  Family/Caregiver Present: No  General Comment: Pt is resting in chair s/p PT on approach. Pleasant and agreeable to OT services. Pt reports feeling understimulated, message sent to inpatient team inquiring about art therapy per pt request, as well as possible aditional literature more allighned w/ pt preferences.   Precautions:  Medical Precautions: Fall precautions    Objective     Cognitive Skill Development:  Cognitive Skill Development  Cognitive Skill Development Activity 1: MoCA " completed on this date w/ pt scoring 21/30 indicating Mild Cognitive deficits and a Memory Index Score of 6/15.  Cognitive Skill Development Activity 1: MoCA completed on this date w/ pt scoring 21/30 indicating Mild Cognitive deficits and a Memory Index Score of 6/15.    Education Documentation  No documentation found.  Education Comments  No comments found.        Goals:  Encounter Problems       Encounter Problems (Active)       ADLs       Patient will perform UB and LB bathing with modified independent level of assistance and shower chair. (Progressing)       Start:  09/11/24    Expected End:  09/27/24            Patient with complete upper body dressing with supervision level of assistance donning and doffing all UE clothes while seated. (Progressing)       Start:  09/11/24    Expected End:  09/27/24            Patient with complete lower body dressing with supervision level of assistance donning and doffing all LE clothes while seated. (Progressing)       Start:  09/11/24    Expected End:  09/27/24            Patient will complete daily grooming tasks brushing teeth and washing face/hair, with supervision level of assistance and PRN adaptive equipment at sink. (Progressing)       Start:  09/11/24    Expected End:  09/27/24            Patient will complete toileting including hygiene clothing management/hygiene with modified independent level of assistance. (Progressing)       Start:  09/11/24    Expected End:  09/27/24               BALANCE       Pt will maintain dynamic standing balance during ADL task with modified independent level of assistance in order to demonstrate decreased risk of falling and improved postural control. (Progressing)       Start:  09/11/24    Expected End:  09/27/24            Patientt will maintain static standing balance during ADL task with modified independent level of assistance drop down in order to demonstrate decreased risk of falling and improved postural control.  (Progressing)       Start:  09/11/24    Expected End:  09/27/24               COGNITION/SAFETY       Patient will recall and adhere to fall precautions during all functional mobility/ADL tasks in order to demonstrate improved understanding. (Progressing)       Start:  09/11/24    Expected End:  09/27/24            Patient will score WFL on standardized cognitive assessment with verbal cues and within reasonable time frame. (Progressing)       Start:  09/11/24    Expected End:  09/27/24            Pt will follow Complex tasks time during OT tx session using 2 or less verbal cues. (Progressing)       Start:  09/11/24    Expected End:  09/27/24            Patient will demonstrated orientation x 4 with verbal cues. (Progressing)       Start:  09/11/24    Expected End:  09/27/24       ORIENTATION            MOBILITY       Patient will perform Functional mobility around Household distances/Community Distances with modified independent level of assistance and least restrictive device in order to improve safety and functional mobility. (Progressing)       Start:  09/11/24    Expected End:  09/27/24               TRANSFERS       Patient will complete functional transfer with least restrictive device with supervision level of assistance. (Progressing)       Start:  09/11/24    Expected End:  09/27/24 09/20/24 at 10:35 AM   UMER MOON, OT   [phone]

## 2024-09-20 NOTE — PROGRESS NOTES
Spiritual Care Visit    Clinical Encounter Type  Visited With: Patient  Routine Visit: Follow-up  Continue Visiting: Yes  Referral From: Nurse, Physician, Patient  Referral To:          Values/Beliefs  Spiritual Requests During Hospitalization: support, comfort and prayer    Sacramental Encounters  Communion: Does not want communion  Communion Given Indicator: No    Patient Spiritual Care Encounters  Suffering Severity: Moderate  Fear Level: Moderate  Feelings of Loneliness: Good  Feelings of Hopelessness: Good  Coping: Sometimes demonstrated    Family Spiritual Care Encounters  Family Participation in Care: Often demonstrated  Family Support During Treatment: Often demonstrated  Caregiver-Patient Relationship: Substantially compromised         PC-7 Assessment (Level of Unmet Needs)  Existential Struggle: Substantial  Spiritual/Christian Struggle: Substantial  Legacy: Substantial  Relationships: Substantial  Fear of Death/Dying: Substantial  Values/Medical Decision Making: Substantial  Ritual/Other: Some  PC-7 Score: 13    SDAT (Spiritual Distress Assessment Tool)  Need for Life Balance: Substancial evidence of unmet spiritual need  Need for Connection: Substancial evidence of unmet spiritual need  Need for Values Acknowledgement: Substancial evidence of unmet spiritual need  Need to Maintain Control: Substancial evidence of unmet spiritual need  Need to Maintain Identity: Substancial evidence of unmet spiritual need  SDAT Score: 10  SDAT Average Score: 2    Taxonomy  Intended Effects: Convey a calming presence, Demonstrate caring and concern, Lessen anxiety, Lessen someone's feelings of isolation, Preserve dignity and respect, Promote sense of peace  Methods: Demonstrate acceptance, Encourage sharing of feelings, Encourage someone to recognize their strengths, Exploring hope, Offer emotional support, Offer spiritual/Episcopalian support, Offer support, Setting boundaries  Interventions: Ask guided questions,  Active listening, Ask guided questions about susan, Ask guided questions about purpose, Ask questions to bring forth feelings, Assist with identifying strengths, Communicate patient's needs/concerns to others, Discuss concerns, Crisis intervention, Prayer for healing, [place]    Follow up visit alone and after collaborative meeting with TANISHA, Geriatrics, Hospitalist, ANM. Please Note: It is VERY IMPORTANT that patient be able to Have her CAT at whichever facility she ends up in after REHAB. This is her primary .  wants to be sure this is documented. TANISHA will work with Passport person, Bella Kuhn and Bella will work with patient's brother to be sure she gets her belongings and her Cat.  Patient seems to be comforted by this  and  visits.  will remain available to support and comfort.

## 2024-09-23 NOTE — PROGRESS NOTES
LSW notified Rajeev Muniz  [phone] on 9/20 discharge to [facility]. LSW faxed capacity eval to  [phone]. LSW forwarded 30day notice to vacate and discharge orders to [email or web address].     CHRISTINE Wheatley

## 2024-09-25 ENCOUNTER — APPOINTMENT (OUTPATIENT)
Dept: PRIMARY CARE | Facility: CLINIC | Age: 74
End: 2024-09-25
Payer: MEDICAID

## 2024-09-25 LAB
ACID FAST STN SPEC: NORMAL
MYCOBACTERIUM SPEC CULT: NORMAL

## 2024-09-27 ENCOUNTER — APPOINTMENT (OUTPATIENT)
Dept: BEHAVIORAL HEALTH | Facility: CLINIC | Age: 74
End: 2024-09-27
Payer: MEDICAID

## 2024-10-02 LAB
ACID FAST STN SPEC: NORMAL
MYCOBACTERIUM SPEC CULT: NORMAL

## 2024-10-04 ENCOUNTER — APPOINTMENT (OUTPATIENT)
Dept: BEHAVIORAL HEALTH | Facility: CLINIC | Age: 74
End: 2024-10-04
Payer: MEDICAID

## 2024-10-07 ENCOUNTER — APPOINTMENT (OUTPATIENT)
Dept: UROLOGY | Facility: CLINIC | Age: 74
End: 2024-10-07
Payer: MEDICAID

## 2024-10-09 LAB
ACID FAST STN SPEC: NORMAL
MYCOBACTERIUM SPEC CULT: NORMAL

## 2024-10-16 LAB
ACID FAST STN SPEC: NORMAL
MYCOBACTERIUM SPEC CULT: NORMAL

## 2024-10-23 LAB
ACID FAST STN SPEC: NORMAL
ACID FAST STN SPEC: NORMAL
MYCOBACTERIUM SPEC CULT: NORMAL
MYCOBACTERIUM SPEC CULT: NORMAL

## 2024-10-30 LAB
ACID FAST STN SPEC: NORMAL
MYCOBACTERIUM SPEC CULT: NORMAL

## 2024-11-05 ENCOUNTER — APPOINTMENT (OUTPATIENT)
Dept: BEHAVIORAL HEALTH | Facility: CLINIC | Age: 74
End: 2024-11-05
Payer: MEDICAID

## 2024-11-05 DIAGNOSIS — F11.21 OPIOID USE DISORDER, MODERATE, IN SUSTAINED REMISSION (MULTI): ICD-10-CM

## 2024-11-05 DIAGNOSIS — F43.10 PTSD (POST-TRAUMATIC STRESS DISORDER): Primary | ICD-10-CM

## 2024-11-05 NOTE — PROGRESS NOTES
PHONE SESSION    START TIME:  7 AM  END TIME:  7:55 AM    Diagnoses/Problems  PTSD  Opioid (heroin) use disorder, in sustained remission (since 3/25/99)      Likely narcissistic personality disorder traits, borderline personality disorder      Orders  Patient agreed to return for individual psychotherapy with this provider.    Note dictated with [email or web address] transcription software. Completed without full typed error editing and sent to avoid delay.      Past Medical History  Problems    · History of Abdominal pain, RUQ (right upper quadrant) (789.01) (R10.11)   · Resolved Date: 10 Apr 2019   · History of Acute cystitis without hematuria (595.0) (N30.00)   · Resolved Date: 10 Apr 2019   · History of Allergic rhinitis due to pollen, unspecified seasonality (477.0) (J30.1)   · Resolved Date: 10 Apr 2019   · History of Arteriolosclerosis (440.9) (I70.90)   · History of Bilateral dry eyes (375.15) (H04.123)   · History of Blepharitis, ulcerative (373.01) (H01.019)   · Resolved Date: 02 Aug 2019   · History of Bluish skin discoloration (782.5) (R23.0)   · Resolved Date: 11 Apr 2019   · History of Cataract of left eye (366.9) (H26.9)   · History of Cataract of left eye (366.9) (H26.9)   · History of Cataract of right eye (366.9) (H26.9)   · History of Cataract of right eye (366.9) (H26.9)   · History of Cataract, nuclear sclerotic, left eye (366.16) (H25.12)   · Resolved Date: 02 Aug 2019   · History of Cataract, nuclear sclerotic, right eye (366.16) (H25.11)   · Resolved Date: 02 Aug 2019   · History of Change in bowel habits (787.99) (R19.4)   · Resolved Date: 02 Aug 2019   · History of Combined form of age-related cataract, left eye (366.19) (H25.812)   · History of Combined form of age-related cataract, left eye (366.19) (H25.812)   · History of Cortical age-related cataract of left eye (366.15) (H25.012)   · Resolved Date: 02 Aug 2019   · History of Dentalgia (525.9) (K08.89)   · Resolved Date: 02 Aug 2019   ·  History of Dry eyes, bilateral (375.15) (H04.123)   · Resolved Date: 02 Aug 2019   · History of actinic keratosis (V13.3) (Z87.2)   · Resolved Date: 11 Apr 2019   · History of acute sinusitis (V12.69) (Z87.09)   · Resolved Date: 03 Feb 2017   · History of acute sinusitis (V12.69) (Z87.09)   · Resolved Date: 04 Sep 2015   · History of acute sinusitis (V12.69) (Z87.09)   · Resolved Date: 10 Apr 2019   · History of acute sinusitis (V12.69) (Z87.09)   · Resolved Date: 29 Dec 2017   · History of anemia (V12.3) (Z86.2)   · Resolved Date: 04 Sep 2015   · Added by Problem List Migration; 2013-6-13; Moved to Suppressed Nov 23 2013  9:02PM   · History of chest pain (V13.89) (Z87.898)   · Resolved Date: 11 Apr 2019   · Added by Problem List Migration; 2013-6-13; Moved to Suppressed Nov 23 2013  9:02PM   · History of constipation (V12.79) (Z87.19)   · Resolved Date: 11 Apr 2019   · History of dizziness (V13.89) (Z87.898)   · Resolved Date: 04 Sep 2015   · History of dyspnea (V12.69) (Z87.898)   · Resolved Date: 10 Aug 2017   · History of epistaxis (V12.69) (Z87.898)   · Resolved Date: 01 Jul 2019   · History of fall (V15.88) (Z91.81)   · Resolved Date: 10 Apr 2019   · History of gastroesophageal reflux (GERD) (V12.79) (Z87.19)   · History of hemoptysis (V12.69) (Z87.898)   · Resolved Date: 11 Apr 2019   · History of hypertension (V12.59) (Z86.79)   · History of influenza vaccination (V49.89) (Z92.29)   · Resolved Date: 11 Apr 2019   · History of nasal discharge (V12.69) (Z87.09)   · Resolved Date: 10 Apr 2019   · History of paranasal sinus congestion (V12.69) (Z87.09)   · Resolved Date: 11 Apr 2019   · History of pneumococcal vaccination (V49.89) (Z92.29)   · Resolved Date: 01 Jul 2019   · History of pneumonia (V12.61) (Z87.01)   · Resolved Date: 02 Aug 2019   · History of sore throat (V12.69) (Z87.09)   · Resolved Date: 11 Apr 2019   · History of tinea corporis (V12.09) (Z86.19)   · Resolved Date: 02 Aug 2019   · History of  urinary tract infection (V13.02) (Z87.440)   · Resolved Date: 02 Aug 2019   · Hyperlipidemia (272.4) (E78.5)   · History of Immunization due (V05.9) (Z23)   · Resolved Date: 02 Aug 2019   · History of Leg pain (729.5) (M79.606)   · Resolved Date: 02 Aug 2019   · History of Light stools (792.1) (R19.5)   · Resolved Date: 02 Aug 2019   · History of MGD (meibomian gland disease) (373.00) (H02.889)   · Resolved Date: 02 Aug 2019   · History of Moderate opioid use disorder (304.00) (F11.20)   · Resolved Date: 09 Mar 2020   · History of Nasal crusting (478.19) (J34.89)   · Resolved Date: 11 Apr 2019   · History of Nasal septal perforation (478.19) (J34.89)   · Resolved Date: 24 Oct 2018   · History of Nasal septal perforation (478.19) (J34.89)   · Resolved Date: 02 Aug 2019   · History of Nausea in adult (787.02) (R11.0)   · Resolved Date: 11 Apr 2019   · History of Need for vaccination for zoster (V04.89) (Z23)   · Resolved Date: 02 Aug 2019   · History of Other chronic sinusitis (473.8) (J32.8)   · Resolved Date: 15 Gus 2018   · Personal history of arthritis (V13.4) (Z87.39)   · History of Pyuria (791.9) (R82.81)   · Resolved Date: 10 Apr 2019   · History of Radial nerve irritation (354.3) (G56.30)   · Resolved Date: 02 Aug 2019   · History of Right nephrolithiasis (592.0) (N20.0)   · History of Throat pain (784.1) (R07.0)   · Resolved Date: 11 Apr 2019    Family History  Mother    · Family history of alcoholism (V17.0) (Z81.1)   · Family history of cardiac disorder (V17.49) (Z82.49)   · Family history of diabetes mellitus (V18.0) (Z83.3)   · Family history of glaucoma (V19.11) (Z83.511)   · Family history of Mesothelioma  Father    · Family history of alcoholism (V17.0) (Z81.1)   · Family history of melanoma (V16.8) (Z80.8)  Sister    · Family history of macular degeneration (V19.19) (Z83.518)   · Family history of malignant neoplasm of breast (V16.3) (Z80.3)  Aunt    · Family history of macular degeneration (V19.19)  (Z83.518)  Family History    · Family history of Malignant Melanoma Of The Skin (V16.8)    Social History  Problems    · Does not use illicit drugs (V49.89) (Z78.9)   · Ex-cigarette smoker (V15.82) (Z87.891)   · Feels safe at home   · No alcohol use   · Denied: History of Social alcohol use    Mental Status Exam  No suicidal ideation nor intent.       Narrative  Patient was reached via phone today.  Patient reported that she feels much more stable now although she is not currently in an apartment.  She said that she is still in the hospital.  Patient has been there for several months now.  She said that none of her family members are showing any sort of support to her.  She stated that she is very disappointed in the food there.  She reported that she has not had any more physical therapy and it is unclear why.  Patient stated that she has been told that she will not be kicked out there because she would be homeless.  Patient was apparently evicted from her apartment.  It is unclear exactly why and I am unsure if the patient is unclear why or if she is not admitting her part in it.  Patient was irritable at times understandably.  She is very bored and having difficulty keeping herself entertained.  She is still looking for apartments but has been unable to find anything reasonable.  Patient would like to stay in [place] but she cannot go and look at apartments.  This makes it very limited for her.  We talked about social work.  It sounds like the patient has a  there but they are not especially helpful.  We agreed to talk about this some more in the next session and continue to figure out a way to find her housing.

## 2024-11-06 LAB
ACID FAST STN SPEC: NORMAL
MYCOBACTERIUM SPEC CULT: NORMAL

## 2024-11-11 ENCOUNTER — APPOINTMENT (OUTPATIENT)
Dept: SURGERY | Facility: CLINIC | Age: 74
End: 2024-11-11
Payer: MEDICAID

## 2024-11-12 ENCOUNTER — APPOINTMENT (OUTPATIENT)
Dept: BEHAVIORAL HEALTH | Facility: CLINIC | Age: 74
End: 2024-11-12
Payer: MEDICAID

## 2024-11-12 DIAGNOSIS — F11.21 OPIOID USE DISORDER, MODERATE, IN SUSTAINED REMISSION (MULTI): ICD-10-CM

## 2024-11-12 DIAGNOSIS — F43.10 PTSD (POST-TRAUMATIC STRESS DISORDER): Primary | ICD-10-CM

## 2024-11-12 NOTE — PROGRESS NOTES
PHONE SESSION    START TIME:  7:20 AM  END TIME:  7:52 AM    Diagnoses/Problems  PTSD  Opioid (heroin) use disorder, in sustained remission (since 3/25/99)      Likely narcissistic personality disorder traits, borderline personality disorder      Orders  Patient agreed to return for individual psychotherapy with this provider.    Note dictated with Pan[employer] transcription software. Completed without full typed error editing and sent to avoid delay.      Past Medical History  Problems    · History of Abdominal pain, RUQ (right upper quadrant) (789.01) (R10.11)   · Resolved Date: 10 Apr 2019   · History of Acute cystitis without hematuria (595.0) (N30.00)   · Resolved Date: 10 Apr 2019   · History of Allergic rhinitis due to pollen, unspecified seasonality (477.0) (J30.1)   · Resolved Date: 10 Apr 2019   · History of Arteriolosclerosis (440.9) (I70.90)   · History of Bilateral dry eyes (375.15) (H04.123)   · History of Blepharitis, ulcerative (373.01) (H01.019)   · Resolved Date: 02 Aug 2019   · History of Bluish skin discoloration (782.5) (R23.0)   · Resolved Date: 11 Apr 2019   · History of Cataract of left eye (366.9) (H26.9)   · History of Cataract of left eye (366.9) (H26.9)   · History of Cataract of right eye (366.9) (H26.9)   · History of Cataract of right eye (366.9) (H26.9)   · History of Cataract, nuclear sclerotic, left eye (366.16) (H25.12)   · Resolved Date: 02 Aug 2019   · History of Cataract, nuclear sclerotic, right eye (366.16) (H25.11)   · Resolved Date: 02 Aug 2019   · History of Change in bowel habits (787.99) (R19.4)   · Resolved Date: 02 Aug 2019   · History of Combined form of age-related cataract, left eye (366.19) (H25.812)   · History of Combined form of age-related cataract, left eye (366.19) (H25.812)   · History of Cortical age-related cataract of left eye (366.15) (H25.012)   · Resolved Date: 02 Aug 2019   · History of Dentalgia (525.9) (K08.89)   · Resolved Date: 02 Aug 2019    · History of Dry eyes, bilateral (375.15) (H04.123)   · Resolved Date: 02 Aug 2019   · History of actinic keratosis (V13.3) (Z87.2)   · Resolved Date: 11 Apr 2019   · History of acute sinusitis (V12.69) (Z87.09)   · Resolved Date: 03 Feb 2017   · History of acute sinusitis (V12.69) (Z87.09)   · Resolved Date: 04 Sep 2015   · History of acute sinusitis (V12.69) (Z87.09)   · Resolved Date: 10 Apr 2019   · History of acute sinusitis (V12.69) (Z87.09)   · Resolved Date: 29 Dec 2017   · History of anemia (V12.3) (Z86.2)   · Resolved Date: 04 Sep 2015   · Added by Problem List Migration; 2013-6-13; Moved to Suppressed Nov 23 2013  9:02PM   · History of chest pain (V13.89) (Z87.898)   · Resolved Date: 11 Apr 2019   · Added by Problem List Migration; 2013-6-13; Moved to Suppressed Nov 23 2013  9:02PM   · History of constipation (V12.79) (Z87.19)   · Resolved Date: 11 Apr 2019   · History of dizziness (V13.89) (Z87.898)   · Resolved Date: 04 Sep 2015   · History of dyspnea (V12.69) (Z87.898)   · Resolved Date: 10 Aug 2017   · History of epistaxis (V12.69) (Z87.898)   · Resolved Date: 01 Jul 2019   · History of fall (V15.88) (Z91.81)   · Resolved Date: 10 Apr 2019   · History of gastroesophageal reflux (GERD) (V12.79) (Z87.19)   · History of hemoptysis (V12.69) (Z87.898)   · Resolved Date: 11 Apr 2019   · History of hypertension (V12.59) (Z86.79)   · History of influenza vaccination (V49.89) (Z92.29)   · Resolved Date: 11 Apr 2019   · History of nasal discharge (V12.69) (Z87.09)   · Resolved Date: 10 Apr 2019   · History of paranasal sinus congestion (V12.69) (Z87.09)   · Resolved Date: 11 Apr 2019   · History of pneumococcal vaccination (V49.89) (Z92.29)   · Resolved Date: 01 Jul 2019   · History of pneumonia (V12.61) (Z87.01)   · Resolved Date: 02 Aug 2019   · History of sore throat (V12.69) (Z87.09)   · Resolved Date: 11 Apr 2019   · History of tinea corporis (V12.09) (Z86.19)   · Resolved Date: 02 Aug 2019   · History of  urinary tract infection (V13.02) (Z87.440)   · Resolved Date: 02 Aug 2019   · Hyperlipidemia (272.4) (E78.5)   · History of Immunization due (V05.9) (Z23)   · Resolved Date: 02 Aug 2019   · History of Leg pain (729.5) (M79.606)   · Resolved Date: 02 Aug 2019   · History of Light stools (792.1) (R19.5)   · Resolved Date: 02 Aug 2019   · History of MGD (meibomian gland disease) (373.00) (H02.889)   · Resolved Date: 02 Aug 2019   · History of Moderate opioid use disorder (304.00) (F11.20)   · Resolved Date: 09 Mar 2020   · History of Nasal crusting (478.19) (J34.89)   · Resolved Date: 11 Apr 2019   · History of Nasal septal perforation (478.19) (J34.89)   · Resolved Date: 24 Oct 2018   · History of Nasal septal perforation (478.19) (J34.89)   · Resolved Date: 02 Aug 2019   · History of Nausea in adult (787.02) (R11.0)   · Resolved Date: 11 Apr 2019   · History of Need for vaccination for zoster (V04.89) (Z23)   · Resolved Date: 02 Aug 2019   · History of Other chronic sinusitis (473.8) (J32.8)   · Resolved Date: 15 Gus 2018   · Personal history of arthritis (V13.4) (Z87.39)   · History of Pyuria (791.9) (R82.81)   · Resolved Date: 10 Apr 2019   · History of Radial nerve irritation (354.3) (G56.30)   · Resolved Date: 02 Aug 2019   · History of Right nephrolithiasis (592.0) (N20.0)   · History of Throat pain (784.1) (R07.0)   · Resolved Date: 11 Apr 2019    Family History  Mother    · Family history of alcoholism (V17.0) (Z81.1)   · Family history of cardiac disorder (V17.49) (Z82.49)   · Family history of diabetes mellitus (V18.0) (Z83.3)   · Family history of glaucoma (V19.11) (Z83.511)   · Family history of Mesothelioma  Father    · Family history of alcoholism (V17.0) (Z81.1)   · Family history of melanoma (V16.8) (Z80.8)  Sister    · Family history of macular degeneration (V19.19) (Z83.518)   · Family history of malignant neoplasm of breast (V16.3) (Z80.3)  Aunt    · Family history of macular degeneration (V19.19)  "(Z83.518)  Family History    · Family history of Malignant Melanoma Of The Skin (V16.8)    Social History  Problems    · Does not use illicit drugs (V49.89) (Z78.9)   · Ex-cigarette smoker (V15.82) (Z87.891)   · Feels safe at home   · No alcohol use   · Denied: History of Social alcohol use    Mental Status Exam  No suicidal ideation nor intent.       Narrative  Patient was reached via phone today.  Patient reported that not much has changed.  She said that her brother visits her once a week.  She is still in the hospital of course.  Patient has no way of looking at apartments.  She said that her things are in storage and that it is paid for for 2 months by her brother but there is only 1 month left.  Patient is concerned about what will happen to her things after a month.  She said that she has been getting along well with the nurses.  We were both thankful for this.  Patient stated that the people \"who are supposed to like me-do not.\"  She is referring to her family.  This was in reference to the nurses who were treating her well.  Patient is unsure what to do.  She seemed somewhat anxious and frustrated in addition to being irritable but all of this is understandable given her circumstances.  She said that she wanted to make an appointment with her primary care provider but cannot do so because she is currently inpatient and would be charged for this appointment if she could even make it to it.  She plans to call her PCP today to talk about different options.  We agreed that we would schedule appointments a bit later in the day and agreed to follow up on these things in the next session.  "

## 2024-11-13 ENCOUNTER — APPOINTMENT (OUTPATIENT)
Dept: SURGERY | Facility: CLINIC | Age: 74
End: 2024-11-13
Payer: MEDICAID

## 2024-11-19 ENCOUNTER — APPOINTMENT (OUTPATIENT)
Dept: PRIMARY CARE | Facility: CLINIC | Age: 74
End: 2024-11-19
Payer: MEDICAID

## 2024-11-26 ENCOUNTER — APPOINTMENT (OUTPATIENT)
Dept: BEHAVIORAL HEALTH | Facility: CLINIC | Age: 74
End: 2024-11-26
Payer: MEDICAID

## 2025-01-01 ENCOUNTER — TELEPHONE (OUTPATIENT)
Facility: HOSPITAL | Age: 75
End: 2025-01-01

## 2025-01-01 ENCOUNTER — HOSPITAL ENCOUNTER (INPATIENT)
Facility: HOSPITAL | Age: 75
LOS: 1 days | End: 2025-02-06
Attending: EMERGENCY MEDICINE | Admitting: INTERNAL MEDICINE
Payer: MEDICAID

## 2025-01-01 ENCOUNTER — APPOINTMENT (OUTPATIENT)
Dept: RADIOLOGY | Facility: HOSPITAL | Age: 75
End: 2025-01-01
Payer: MEDICAID

## 2025-01-01 ENCOUNTER — APPOINTMENT (OUTPATIENT)
Dept: CARDIOLOGY | Facility: HOSPITAL | Age: 75
End: 2025-01-01
Payer: MEDICAID

## 2025-01-01 VITALS
SYSTOLIC BLOOD PRESSURE: 135 MMHG | HEIGHT: 62 IN | TEMPERATURE: 98.2 F | HEART RATE: 79 BPM | RESPIRATION RATE: 18 BRPM | OXYGEN SATURATION: 95 % | BODY MASS INDEX: 25.88 KG/M2 | DIASTOLIC BLOOD PRESSURE: 73 MMHG | WEIGHT: 140.65 LBS

## 2025-01-01 DIAGNOSIS — U07.1 COVID: ICD-10-CM

## 2025-01-01 DIAGNOSIS — J18.9 PNEUMONIA DUE TO INFECTIOUS ORGANISM, UNSPECIFIED LATERALITY, UNSPECIFIED PART OF LUNG: ICD-10-CM

## 2025-01-01 DIAGNOSIS — J18.9 HEALTHCARE-ASSOCIATED PNEUMONIA: Primary | ICD-10-CM

## 2025-01-01 LAB
ALBUMIN SERPL BCP-MCNC: 2.5 G/DL (ref 3.4–5)
ALP SERPL-CCNC: 94 U/L (ref 33–136)
ALT SERPL W P-5'-P-CCNC: 12 U/L (ref 7–45)
ANION GAP SERPL CALC-SCNC: 15 MMOL/L (ref 10–20)
ANION GAP SERPL CALC-SCNC: 16 MMOL/L (ref 10–20)
APTT PPP: 30 SECONDS (ref 27–38)
AST SERPL W P-5'-P-CCNC: 21 U/L (ref 9–39)
ATRIAL RATE: 75 BPM
BASOPHILS # BLD AUTO: 0.09 X10*3/UL (ref 0–0.1)
BASOPHILS NFR BLD AUTO: 0.4 %
BILIRUB SERPL-MCNC: 0.4 MG/DL (ref 0–1.2)
BNP SERPL-MCNC: 402 PG/ML (ref 0–99)
BUN SERPL-MCNC: 25 MG/DL (ref 6–23)
BUN SERPL-MCNC: 27 MG/DL (ref 6–23)
CALCIUM SERPL-MCNC: 8.7 MG/DL (ref 8.6–10.3)
CALCIUM SERPL-MCNC: 8.9 MG/DL (ref 8.6–10.3)
CARDIAC TROPONIN I PNL SERPL HS: 12 NG/L (ref 0–13)
CARDIAC TROPONIN I PNL SERPL HS: 12 NG/L (ref 0–13)
CHLORIDE SERPL-SCNC: 105 MMOL/L (ref 98–107)
CHLORIDE SERPL-SCNC: 105 MMOL/L (ref 98–107)
CO2 SERPL-SCNC: 22 MMOL/L (ref 21–32)
CO2 SERPL-SCNC: 24 MMOL/L (ref 21–32)
CREAT SERPL-MCNC: 1.1 MG/DL (ref 0.5–1.05)
CREAT SERPL-MCNC: 1.16 MG/DL (ref 0.5–1.05)
EGFRCR SERPLBLD CKD-EPI 2021: 50 ML/MIN/1.73M*2
EGFRCR SERPLBLD CKD-EPI 2021: 53 ML/MIN/1.73M*2
EOSINOPHIL # BLD AUTO: 0.01 X10*3/UL (ref 0–0.4)
EOSINOPHIL NFR BLD AUTO: 0 %
ERYTHROCYTE [DISTWIDTH] IN BLOOD BY AUTOMATED COUNT: 19.9 % (ref 11.5–14.5)
ERYTHROCYTE [DISTWIDTH] IN BLOOD BY AUTOMATED COUNT: 19.9 % (ref 11.5–14.5)
FLUAV RNA RESP QL NAA+PROBE: NOT DETECTED
FLUBV RNA RESP QL NAA+PROBE: NOT DETECTED
GLUCOSE BLD MANUAL STRIP-MCNC: 138 MG/DL (ref 74–99)
GLUCOSE SERPL-MCNC: 115 MG/DL (ref 74–99)
GLUCOSE SERPL-MCNC: 116 MG/DL (ref 74–99)
HCT VFR BLD AUTO: 32.9 % (ref 36–46)
HCT VFR BLD AUTO: 33.8 % (ref 36–46)
HGB BLD-MCNC: 9.3 G/DL (ref 12–16)
HGB BLD-MCNC: 9.7 G/DL (ref 12–16)
IMM GRANULOCYTES # BLD AUTO: 0.22 X10*3/UL (ref 0–0.5)
IMM GRANULOCYTES NFR BLD AUTO: 0.9 % (ref 0–0.9)
INR PPP: 1.4 (ref 0.9–1.1)
LYMPHOCYTES # BLD AUTO: 2.57 X10*3/UL (ref 0.8–3)
LYMPHOCYTES NFR BLD AUTO: 10.3 %
MAGNESIUM SERPL-MCNC: 2.03 MG/DL (ref 1.6–2.4)
MCH RBC QN AUTO: 20.5 PG (ref 26–34)
MCH RBC QN AUTO: 21.3 PG (ref 26–34)
MCHC RBC AUTO-ENTMCNC: 28.3 G/DL (ref 32–36)
MCHC RBC AUTO-ENTMCNC: 28.7 G/DL (ref 32–36)
MCV RBC AUTO: 73 FL (ref 80–100)
MCV RBC AUTO: 74 FL (ref 80–100)
MONOCYTES # BLD AUTO: 1.98 X10*3/UL (ref 0.05–0.8)
MONOCYTES NFR BLD AUTO: 7.9 %
NEUTROPHILS # BLD AUTO: 20.13 X10*3/UL (ref 1.6–5.5)
NEUTROPHILS NFR BLD AUTO: 80.5 %
NRBC BLD-RTO: 0.1 /100 WBCS (ref 0–0)
NRBC BLD-RTO: 0.1 /100 WBCS (ref 0–0)
P AXIS: 55 DEGREES
P OFFSET: 207 MS
P ONSET: 160 MS
PLATELET # BLD AUTO: 463 X10*3/UL (ref 150–450)
PLATELET # BLD AUTO: 481 X10*3/UL (ref 150–450)
POTASSIUM SERPL-SCNC: 4.4 MMOL/L (ref 3.5–5.3)
POTASSIUM SERPL-SCNC: 4.8 MMOL/L (ref 3.5–5.3)
PR INTERVAL: 122 MS
PROCALCITONIN SERPL-MCNC: 0.88 NG/ML
PROT SERPL-MCNC: 7.3 G/DL (ref 6.4–8.2)
PROTHROMBIN TIME: 15.8 SECONDS (ref 9.8–12.8)
Q ONSET: 221 MS
QRS COUNT: 13 BEATS
QRS DURATION: 68 MS
QT INTERVAL: 382 MS
QTC CALCULATION(BAZETT): 426 MS
QTC FREDERICIA: 411 MS
R AXIS: 104 DEGREES
RBC # BLD AUTO: 4.53 X10*6/UL (ref 4–5.2)
RBC # BLD AUTO: 4.55 X10*6/UL (ref 4–5.2)
SARS-COV-2 RNA RESP QL NAA+PROBE: DETECTED
SODIUM SERPL-SCNC: 138 MMOL/L (ref 136–145)
SODIUM SERPL-SCNC: 140 MMOL/L (ref 136–145)
T AXIS: 67 DEGREES
T OFFSET: 412 MS
VENTRICULAR RATE: 75 BPM
WBC # BLD AUTO: 23.8 X10*3/UL (ref 4.4–11.3)
WBC # BLD AUTO: 25 X10*3/UL (ref 4.4–11.3)

## 2025-01-01 PROCEDURE — 94640 AIRWAY INHALATION TREATMENT: CPT

## 2025-01-01 PROCEDURE — 94668 MNPJ CHEST WALL SBSQ: CPT

## 2025-01-01 PROCEDURE — 87075 CULTR BACTERIA EXCEPT BLOOD: CPT | Mod: AHULAB | Performed by: INTERNAL MEDICINE

## 2025-01-01 PROCEDURE — 84145 PROCALCITONIN (PCT): CPT | Mod: AHULAB | Performed by: STUDENT IN AN ORGANIZED HEALTH CARE EDUCATION/TRAINING PROGRAM

## 2025-01-01 PROCEDURE — 93005 ELECTROCARDIOGRAM TRACING: CPT

## 2025-01-01 PROCEDURE — 83735 ASSAY OF MAGNESIUM: CPT | Performed by: EMERGENCY MEDICINE

## 2025-01-01 PROCEDURE — 2500000004 HC RX 250 GENERAL PHARMACY W/ HCPCS (ALT 636 FOR OP/ED): Performed by: STUDENT IN AN ORGANIZED HEALTH CARE EDUCATION/TRAINING PROGRAM

## 2025-01-01 PROCEDURE — 99285 EMERGENCY DEPT VISIT HI MDM: CPT | Performed by: EMERGENCY MEDICINE

## 2025-01-01 PROCEDURE — 2500000004 HC RX 250 GENERAL PHARMACY W/ HCPCS (ALT 636 FOR OP/ED): Performed by: INTERNAL MEDICINE

## 2025-01-01 PROCEDURE — 2500000005 HC RX 250 GENERAL PHARMACY W/O HCPCS: Performed by: INTERNAL MEDICINE

## 2025-01-01 PROCEDURE — 82374 ASSAY BLOOD CARBON DIOXIDE: CPT | Performed by: INTERNAL MEDICINE

## 2025-01-01 PROCEDURE — 9420000001 HC RT PATIENT EDUCATION 5 MIN

## 2025-01-01 PROCEDURE — 84484 ASSAY OF TROPONIN QUANT: CPT | Performed by: EMERGENCY MEDICINE

## 2025-01-01 PROCEDURE — 85730 THROMBOPLASTIN TIME PARTIAL: CPT | Performed by: EMERGENCY MEDICINE

## 2025-01-01 PROCEDURE — 99239 HOSP IP/OBS DSCHRG MGMT >30: CPT | Performed by: STUDENT IN AN ORGANIZED HEALTH CARE EDUCATION/TRAINING PROGRAM

## 2025-01-01 PROCEDURE — 82947 ASSAY GLUCOSE BLOOD QUANT: CPT

## 2025-01-01 PROCEDURE — 83880 ASSAY OF NATRIURETIC PEPTIDE: CPT | Performed by: EMERGENCY MEDICINE

## 2025-01-01 PROCEDURE — 3E0333Z INTRODUCTION OF ANTI-INFLAMMATORY INTO PERIPHERAL VEIN, PERCUTANEOUS APPROACH: ICD-10-PCS | Performed by: INTERNAL MEDICINE

## 2025-01-01 PROCEDURE — 36415 COLL VENOUS BLD VENIPUNCTURE: CPT | Performed by: EMERGENCY MEDICINE

## 2025-01-01 PROCEDURE — 85025 COMPLETE CBC W/AUTO DIFF WBC: CPT | Performed by: EMERGENCY MEDICINE

## 2025-01-01 PROCEDURE — 94667 MNPJ CHEST WALL 1ST: CPT

## 2025-01-01 PROCEDURE — 2500000004 HC RX 250 GENERAL PHARMACY W/ HCPCS (ALT 636 FOR OP/ED): Performed by: EMERGENCY MEDICINE

## 2025-01-01 PROCEDURE — 1100000001 HC PRIVATE ROOM DAILY

## 2025-01-01 PROCEDURE — 85610 PROTHROMBIN TIME: CPT | Performed by: EMERGENCY MEDICINE

## 2025-01-01 PROCEDURE — 2500000002 HC RX 250 W HCPCS SELF ADMINISTERED DRUGS (ALT 637 FOR MEDICARE OP, ALT 636 FOR OP/ED): Performed by: INTERNAL MEDICINE

## 2025-01-01 PROCEDURE — 5A12012 PERFORMANCE OF CARDIAC OUTPUT, SINGLE, MANUAL: ICD-10-PCS | Performed by: INTERNAL MEDICINE

## 2025-01-01 PROCEDURE — 71045 X-RAY EXAM CHEST 1 VIEW: CPT

## 2025-01-01 PROCEDURE — 2500000001 HC RX 250 WO HCPCS SELF ADMINISTERED DRUGS (ALT 637 FOR MEDICARE OP): Performed by: INTERNAL MEDICINE

## 2025-01-01 PROCEDURE — 84075 ASSAY ALKALINE PHOSPHATASE: CPT | Performed by: EMERGENCY MEDICINE

## 2025-01-01 PROCEDURE — 71045 X-RAY EXAM CHEST 1 VIEW: CPT | Performed by: STUDENT IN AN ORGANIZED HEALTH CARE EDUCATION/TRAINING PROGRAM

## 2025-01-01 PROCEDURE — 87040 BLOOD CULTURE FOR BACTERIA: CPT | Mod: AHULAB | Performed by: INTERNAL MEDICINE

## 2025-01-01 PROCEDURE — XW033E5 INTRODUCTION OF REMDESIVIR ANTI-INFECTIVE INTO PERIPHERAL VEIN, PERCUTANEOUS APPROACH, NEW TECHNOLOGY GROUP 5: ICD-10-PCS | Performed by: INTERNAL MEDICINE

## 2025-01-01 PROCEDURE — 85027 COMPLETE CBC AUTOMATED: CPT | Performed by: INTERNAL MEDICINE

## 2025-01-01 PROCEDURE — 87636 SARSCOV2 & INF A&B AMP PRB: CPT | Performed by: EMERGENCY MEDICINE

## 2025-01-01 RX ORDER — QUETIAPINE FUMARATE 25 MG/1
12.5 TABLET, FILM COATED ORAL 2 TIMES DAILY PRN
Status: DISCONTINUED | OUTPATIENT
Start: 2025-01-01 | End: 2025-01-01 | Stop reason: HOSPADM

## 2025-01-01 RX ORDER — LEVOFLOXACIN 5 MG/ML
500 INJECTION, SOLUTION INTRAVENOUS ONCE
Status: DISCONTINUED | OUTPATIENT
Start: 2025-01-01 | End: 2025-01-01

## 2025-01-01 RX ORDER — VITAMIN B COMPLEX
1 CAPSULE ORAL DAILY
Status: DISCONTINUED | OUTPATIENT
Start: 2025-01-01 | End: 2025-01-01 | Stop reason: CLARIF

## 2025-01-01 RX ORDER — ENOXAPARIN SODIUM 100 MG/ML
40 INJECTION SUBCUTANEOUS EVERY 24 HOURS
Status: DISCONTINUED | OUTPATIENT
Start: 2025-01-01 | End: 2025-01-01 | Stop reason: HOSPADM

## 2025-01-01 RX ORDER — GABAPENTIN 100 MG/1
100 CAPSULE ORAL 2 TIMES DAILY
Status: DISCONTINUED | OUTPATIENT
Start: 2025-01-01 | End: 2025-01-01 | Stop reason: HOSPADM

## 2025-01-01 RX ORDER — VANCOMYCIN HYDROCHLORIDE 1 G/20ML
INJECTION, POWDER, LYOPHILIZED, FOR SOLUTION INTRAVENOUS DAILY PRN
Status: DISCONTINUED | OUTPATIENT
Start: 2025-01-01 | End: 2025-01-01

## 2025-01-01 RX ORDER — PANTOPRAZOLE SODIUM 40 MG/1
40 TABLET, DELAYED RELEASE ORAL
Status: DISCONTINUED | OUTPATIENT
Start: 2025-01-01 | End: 2025-01-01 | Stop reason: HOSPADM

## 2025-01-01 RX ORDER — PROPRANOLOL HYDROCHLORIDE 10 MG/1
20 TABLET ORAL 2 TIMES DAILY
Status: DISCONTINUED | OUTPATIENT
Start: 2025-01-01 | End: 2025-01-01 | Stop reason: HOSPADM

## 2025-01-01 RX ORDER — ONDANSETRON HYDROCHLORIDE 2 MG/ML
4 INJECTION, SOLUTION INTRAVENOUS EVERY 8 HOURS PRN
Status: DISCONTINUED | OUTPATIENT
Start: 2025-01-01 | End: 2025-01-01 | Stop reason: HOSPADM

## 2025-01-01 RX ORDER — TALC
6 POWDER (GRAM) TOPICAL NIGHTLY
Status: DISCONTINUED | OUTPATIENT
Start: 2025-01-01 | End: 2025-01-01 | Stop reason: HOSPADM

## 2025-01-01 RX ORDER — GUAIFENESIN 600 MG/1
1200 TABLET, EXTENDED RELEASE ORAL 2 TIMES DAILY
Status: DISCONTINUED | OUTPATIENT
Start: 2025-01-01 | End: 2025-01-01 | Stop reason: HOSPADM

## 2025-01-01 RX ORDER — PANTOPRAZOLE SODIUM 40 MG/10ML
40 INJECTION, POWDER, LYOPHILIZED, FOR SOLUTION INTRAVENOUS
Status: DISCONTINUED | OUTPATIENT
Start: 2025-01-01 | End: 2025-01-01 | Stop reason: HOSPADM

## 2025-01-01 RX ORDER — CETIRIZINE HYDROCHLORIDE 10 MG/1
10 TABLET ORAL DAILY
Status: DISCONTINUED | OUTPATIENT
Start: 2025-01-01 | End: 2025-01-01 | Stop reason: HOSPADM

## 2025-01-01 RX ORDER — DULOXETIN HYDROCHLORIDE 30 MG/1
60 CAPSULE, DELAYED RELEASE ORAL DAILY
Status: DISCONTINUED | OUTPATIENT
Start: 2025-01-01 | End: 2025-01-01 | Stop reason: HOSPADM

## 2025-01-01 RX ORDER — BENZONATATE 100 MG/1
100 CAPSULE ORAL 3 TIMES DAILY PRN
Status: DISCONTINUED | OUTPATIENT
Start: 2025-01-01 | End: 2025-01-01 | Stop reason: HOSPADM

## 2025-01-01 RX ORDER — DEXAMETHASONE 6 MG/1
6 TABLET ORAL DAILY
Status: DISCONTINUED | OUTPATIENT
Start: 2025-01-01 | End: 2025-01-01

## 2025-01-01 RX ORDER — ACETAMINOPHEN 160 MG/5ML
650 SUSPENSION ORAL EVERY 4 HOURS PRN
Status: DISCONTINUED | OUTPATIENT
Start: 2025-01-01 | End: 2025-01-01 | Stop reason: HOSPADM

## 2025-01-01 RX ORDER — ACETAMINOPHEN 325 MG/1
650 TABLET ORAL EVERY 4 HOURS PRN
Status: DISCONTINUED | OUTPATIENT
Start: 2025-01-01 | End: 2025-01-01 | Stop reason: HOSPADM

## 2025-01-01 RX ORDER — POLYETHYLENE GLYCOL 3350 17 G/17G
17 POWDER, FOR SOLUTION ORAL DAILY PRN
Status: DISCONTINUED | OUTPATIENT
Start: 2025-01-01 | End: 2025-01-01 | Stop reason: HOSPADM

## 2025-01-01 RX ORDER — OLANZAPINE 10 MG/2ML
5 INJECTION, POWDER, FOR SOLUTION INTRAMUSCULAR ONCE AS NEEDED
Status: COMPLETED | OUTPATIENT
Start: 2025-01-01 | End: 2025-01-01

## 2025-01-01 RX ORDER — INDOMETHACIN 25 MG/1
CAPSULE ORAL CODE/TRAUMA/SEDATION MEDICATION
Status: COMPLETED | OUTPATIENT
Start: 2025-01-01 | End: 2025-01-01

## 2025-01-01 RX ORDER — ALBUTEROL SULFATE 90 UG/1
2 INHALANT RESPIRATORY (INHALATION)
Status: DISCONTINUED | OUTPATIENT
Start: 2025-01-01 | End: 2025-01-01 | Stop reason: HOSPADM

## 2025-01-01 RX ORDER — EPINEPHRINE 0.1 MG/ML
INJECTION INTRACARDIAC; INTRAVENOUS CODE/TRAUMA/SEDATION MEDICATION
Status: COMPLETED | OUTPATIENT
Start: 2025-01-01 | End: 2025-01-01

## 2025-01-01 RX ORDER — B-COMPLEX WITH VITAMIN C
1 TABLET ORAL DAILY
Status: DISCONTINUED | OUTPATIENT
Start: 2025-01-01 | End: 2025-01-01 | Stop reason: HOSPADM

## 2025-01-01 RX ORDER — ALBUTEROL SULFATE 90 UG/1
2 INHALANT RESPIRATORY (INHALATION) EVERY 2 HOUR PRN
Status: DISCONTINUED | OUTPATIENT
Start: 2025-01-01 | End: 2025-01-01 | Stop reason: HOSPADM

## 2025-01-01 RX ORDER — TAMSULOSIN HYDROCHLORIDE 0.4 MG/1
0.4 CAPSULE ORAL DAILY
Status: DISCONTINUED | OUTPATIENT
Start: 2025-01-01 | End: 2025-01-01 | Stop reason: HOSPADM

## 2025-01-01 RX ORDER — ACETAMINOPHEN 650 MG/1
650 SUPPOSITORY RECTAL EVERY 4 HOURS PRN
Status: DISCONTINUED | OUTPATIENT
Start: 2025-01-01 | End: 2025-01-01 | Stop reason: HOSPADM

## 2025-01-01 RX ORDER — ALBUTEROL SULFATE 90 UG/1
2 INHALANT RESPIRATORY (INHALATION) EVERY 6 HOURS PRN
Status: DISCONTINUED | OUTPATIENT
Start: 2025-01-01 | End: 2025-01-01

## 2025-01-01 RX ORDER — ONDANSETRON 4 MG/1
4 TABLET, FILM COATED ORAL EVERY 8 HOURS PRN
Status: DISCONTINUED | OUTPATIENT
Start: 2025-01-01 | End: 2025-01-01 | Stop reason: HOSPADM

## 2025-01-01 RX ORDER — FLUTICASONE PROPIONATE 50 MCG
2 SPRAY, SUSPENSION (ML) NASAL DAILY PRN
Status: DISCONTINUED | OUTPATIENT
Start: 2025-01-01 | End: 2025-01-01 | Stop reason: HOSPADM

## 2025-01-01 RX ADMIN — DEXAMETHASONE 6 MG: 6 TABLET ORAL at 09:18

## 2025-01-01 RX ADMIN — EPINEPHRINE 1 MG: 0.1 INJECTION INTRACARDIAC; INTRAVENOUS at 14:12

## 2025-01-01 RX ADMIN — CETIRIZINE HYDROCHLORIDE 10 MG: 10 TABLET, FILM COATED ORAL at 09:18

## 2025-01-01 RX ADMIN — EPINEPHRINE 1 MG: 0.1 INJECTION INTRACARDIAC; INTRAVENOUS at 14:14

## 2025-01-01 RX ADMIN — SODIUM BICARBONATE 50 MEQ: 84 INJECTION, SOLUTION INTRAVENOUS at 14:13

## 2025-01-01 RX ADMIN — EPINEPHRINE 1 MG: 0.1 INJECTION INTRACARDIAC; INTRAVENOUS at 14:08

## 2025-01-01 RX ADMIN — TAMSULOSIN HYDROCHLORIDE 0.4 MG: 0.4 CAPSULE ORAL at 09:19

## 2025-01-01 RX ADMIN — ALBUTEROL SULFATE 2 PUFF: 90 AEROSOL, METERED RESPIRATORY (INHALATION) at 12:00

## 2025-01-01 RX ADMIN — EPINEPHRINE 1 MG: 0.1 INJECTION INTRACARDIAC; INTRAVENOUS at 14:10

## 2025-01-01 RX ADMIN — Medication 1 TABLET: at 09:18

## 2025-01-01 RX ADMIN — DULOXETINE HYDROCHLORIDE 60 MG: 30 CAPSULE, DELAYED RELEASE ORAL at 09:18

## 2025-01-01 RX ADMIN — OLANZAPINE 5 MG: 10 INJECTION, POWDER, FOR SOLUTION INTRAMUSCULAR at 06:37

## 2025-01-01 RX ADMIN — GUAIFENESIN 1200 MG: 600 TABLET ORAL at 09:18

## 2025-01-01 RX ADMIN — VANCOMYCIN HYDROCHLORIDE 1500 MG: 5 INJECTION, POWDER, LYOPHILIZED, FOR SOLUTION INTRAVENOUS at 06:59

## 2025-01-01 RX ADMIN — GABAPENTIN 100 MG: 100 CAPSULE ORAL at 09:18

## 2025-01-01 RX ADMIN — QUETIAPINE FUMARATE 12.5 MG: 25 TABLET ORAL at 11:57

## 2025-01-01 RX ADMIN — EPINEPHRINE 1 MG: 0.1 INJECTION INTRACARDIAC; INTRAVENOUS at 14:05

## 2025-01-01 RX ADMIN — PROPRANOLOL HYDROCHLORIDE 20 MG: 10 TABLET ORAL at 09:19

## 2025-01-01 RX ADMIN — ALBUTEROL SULFATE 2 PUFF: 90 AEROSOL, METERED RESPIRATORY (INHALATION) at 05:56

## 2025-01-01 SDOH — SOCIAL STABILITY: SOCIAL INSECURITY: ABUSE: ADULT

## 2025-01-01 SDOH — ECONOMIC STABILITY: FOOD INSECURITY: WITHIN THE PAST 12 MONTHS, THE FOOD YOU BOUGHT JUST DIDN'T LAST AND YOU DIDN'T HAVE MONEY TO GET MORE.: NEVER TRUE

## 2025-01-01 SDOH — SOCIAL STABILITY: SOCIAL INSECURITY: DO YOU FEEL UNSAFE GOING BACK TO THE PLACE WHERE YOU ARE LIVING?: UNABLE TO ASSESS

## 2025-01-01 SDOH — SOCIAL STABILITY: SOCIAL INSECURITY: HAVE YOU HAD THOUGHTS OF HARMING ANYONE ELSE?: NO

## 2025-01-01 SDOH — HEALTH STABILITY: MENTAL HEALTH: HOW OFTEN DO YOU HAVE SIX OR MORE DRINKS ON ONE OCCASION?: NEVER

## 2025-01-01 SDOH — SOCIAL STABILITY: SOCIAL INSECURITY
WITHIN THE LAST YEAR, HAVE YOU BEEN RAPED OR FORCED TO HAVE ANY KIND OF SEXUAL ACTIVITY BY YOUR PARTNER OR EX-PARTNER?: NO

## 2025-01-01 SDOH — ECONOMIC STABILITY: INCOME INSECURITY: IN THE PAST 12 MONTHS HAS THE ELECTRIC, GAS, OIL, OR WATER COMPANY THREATENED TO SHUT OFF SERVICES IN YOUR HOME?: NO

## 2025-01-01 SDOH — SOCIAL STABILITY: SOCIAL INSECURITY: WITHIN THE LAST YEAR, HAVE YOU BEEN HUMILIATED OR EMOTIONALLY ABUSED IN OTHER WAYS BY YOUR PARTNER OR EX-PARTNER?: NO

## 2025-01-01 SDOH — ECONOMIC STABILITY: HOUSING INSECURITY: IN THE LAST 12 MONTHS, WAS THERE A TIME WHEN YOU WERE NOT ABLE TO PAY THE MORTGAGE OR RENT ON TIME?: NO

## 2025-01-01 SDOH — SOCIAL STABILITY: SOCIAL INSECURITY: DO YOU FEEL ANYONE HAS EXPLOITED OR TAKEN ADVANTAGE OF YOU FINANCIALLY OR OF YOUR PERSONAL PROPERTY?: UNABLE TO ASSESS

## 2025-01-01 SDOH — ECONOMIC STABILITY: HOUSING INSECURITY: AT ANY TIME IN THE PAST 12 MONTHS, WERE YOU HOMELESS OR LIVING IN A SHELTER (INCLUDING NOW)?: NO

## 2025-01-01 SDOH — ECONOMIC STABILITY: FOOD INSECURITY: WITHIN THE PAST 12 MONTHS, YOU WORRIED THAT YOUR FOOD WOULD RUN OUT BEFORE YOU GOT THE MONEY TO BUY MORE.: NEVER TRUE

## 2025-01-01 SDOH — ECONOMIC STABILITY: FOOD INSECURITY: HOW HARD IS IT FOR YOU TO PAY FOR THE VERY BASICS LIKE FOOD, HOUSING, MEDICAL CARE, AND HEATING?: NOT HARD AT ALL

## 2025-01-01 SDOH — SOCIAL STABILITY: SOCIAL INSECURITY: DOES ANYONE TRY TO KEEP YOU FROM HAVING/CONTACTING OTHER FRIENDS OR DOING THINGS OUTSIDE YOUR HOME?: UNABLE TO ASSESS

## 2025-01-01 SDOH — SOCIAL STABILITY: SOCIAL INSECURITY: WITHIN THE LAST YEAR, HAVE YOU BEEN AFRAID OF YOUR PARTNER OR EX-PARTNER?: NO

## 2025-01-01 SDOH — HEALTH STABILITY: MENTAL HEALTH: HOW OFTEN DO YOU HAVE A DRINK CONTAINING ALCOHOL?: NEVER

## 2025-01-01 SDOH — SOCIAL STABILITY: SOCIAL INSECURITY: ARE YOU OR HAVE YOU BEEN THREATENED OR ABUSED PHYSICALLY, EMOTIONALLY, OR SEXUALLY BY ANYONE?: UNABLE TO ASSESS

## 2025-01-01 SDOH — ECONOMIC STABILITY: TRANSPORTATION INSECURITY: IN THE PAST 12 MONTHS, HAS LACK OF TRANSPORTATION KEPT YOU FROM MEDICAL APPOINTMENTS OR FROM GETTING MEDICATIONS?: YES

## 2025-01-01 SDOH — SOCIAL STABILITY: SOCIAL INSECURITY: ARE THERE ANY APPARENT SIGNS OF INJURIES/BEHAVIORS THAT COULD BE RELATED TO ABUSE/NEGLECT?: UNABLE TO ASSESS

## 2025-01-01 SDOH — SOCIAL STABILITY: SOCIAL INSECURITY: HAS ANYONE EVER THREATENED TO HURT YOUR FAMILY OR YOUR PETS?: UNABLE TO ASSESS

## 2025-01-01 SDOH — HEALTH STABILITY: MENTAL HEALTH: HOW MANY DRINKS CONTAINING ALCOHOL DO YOU HAVE ON A TYPICAL DAY WHEN YOU ARE DRINKING?: PATIENT DOES NOT DRINK

## 2025-01-01 SDOH — SOCIAL STABILITY: SOCIAL INSECURITY: HAVE YOU HAD ANY THOUGHTS OF HARMING ANYONE ELSE?: UNABLE TO ASSESS

## 2025-01-01 ASSESSMENT — COGNITIVE AND FUNCTIONAL STATUS - GENERAL
PERSONAL GROOMING: TOTAL
STANDING UP FROM CHAIR USING ARMS: A LOT
MOVING TO AND FROM BED TO CHAIR: A LOT
DRESSING REGULAR LOWER BODY CLOTHING: TOTAL
MOBILITY SCORE: 12
EATING MEALS: TOTAL
HELP NEEDED FOR BATHING: TOTAL
WALKING IN HOSPITAL ROOM: A LOT
TURNING FROM BACK TO SIDE WHILE IN FLAT BAD: A LOT
MOVING FROM LYING ON BACK TO SITTING ON SIDE OF FLAT BED WITH BEDRAILS: A LOT
CLIMB 3 TO 5 STEPS WITH RAILING: A LOT
DAILY ACTIVITIY SCORE: 6
PATIENT BASELINE BEDBOUND: NO
DRESSING REGULAR UPPER BODY CLOTHING: TOTAL
TOILETING: TOTAL

## 2025-01-01 ASSESSMENT — ACTIVITIES OF DAILY LIVING (ADL)
HEARING - RIGHT EAR: FUNCTIONAL
BATHING: NEEDS ASSISTANCE
LACK_OF_TRANSPORTATION: YES
GROOMING: NEEDS ASSISTANCE
TOILETING: NEEDS ASSISTANCE
LACK_OF_TRANSPORTATION: YES
ADEQUATE_TO_COMPLETE_ADL: YES
PATIENT'S MEMORY ADEQUATE TO SAFELY COMPLETE DAILY ACTIVITIES?: YES
JUDGMENT_ADEQUATE_SAFELY_COMPLETE_DAILY_ACTIVITIES: NO
WALKS IN HOME: NEEDS ASSISTANCE
FEEDING YOURSELF: INDEPENDENT
DRESSING YOURSELF: NEEDS ASSISTANCE
LACK_OF_TRANSPORTATION: YES
HEARING - LEFT EAR: FUNCTIONAL

## 2025-01-01 ASSESSMENT — LIFESTYLE VARIABLES
SKIP TO QUESTIONS 9-10: 0
AUDIT-C TOTAL SCORE: -1
AUDIT-C TOTAL SCORE: 0
HOW MANY STANDARD DRINKS CONTAINING ALCOHOL DO YOU HAVE ON A TYPICAL DAY: PATIENT DECLINED
HOW OFTEN DO YOU HAVE A DRINK CONTAINING ALCOHOL: PATIENT DECLINED
AUDIT-C TOTAL SCORE: -1
SKIP TO QUESTIONS 9-10: 1
HOW OFTEN DO YOU HAVE 6 OR MORE DRINKS ON ONE OCCASION: PATIENT DECLINED

## 2025-01-01 ASSESSMENT — PAIN SCALES - GENERAL
PAINLEVEL_OUTOF10: 5 - MODERATE PAIN
PAINLEVEL_OUTOF10: 0 - NO PAIN

## 2025-01-01 ASSESSMENT — ENCOUNTER SYMPTOMS
EYES NEGATIVE: 1
MUSCULOSKELETAL NEGATIVE: 1
FATIGUE: 1
COUGH: 1
NEUROLOGICAL NEGATIVE: 1
CARDIOVASCULAR NEGATIVE: 1
GASTROINTESTINAL NEGATIVE: 1
ENDOCRINE NEGATIVE: 1
ALLERGIC/IMMUNOLOGIC NEGATIVE: 1
ACTIVITY CHANGE: 1
HEMATOLOGIC/LYMPHATIC NEGATIVE: 1
APPETITE CHANGE: 1
SHORTNESS OF BREATH: 1
PSYCHIATRIC NEGATIVE: 1

## 2025-01-01 ASSESSMENT — PATIENT HEALTH QUESTIONNAIRE - PHQ9
1. LITTLE INTEREST OR PLEASURE IN DOING THINGS: NOT AT ALL
2. FEELING DOWN, DEPRESSED OR HOPELESS: NOT AT ALL
SUM OF ALL RESPONSES TO PHQ9 QUESTIONS 1 & 2: 0

## 2025-01-01 ASSESSMENT — PAIN DESCRIPTION - PAIN TYPE: TYPE: ACUTE PAIN

## 2025-01-01 ASSESSMENT — PAIN - FUNCTIONAL ASSESSMENT: PAIN_FUNCTIONAL_ASSESSMENT: 0-10

## 2025-01-01 ASSESSMENT — PAIN DESCRIPTION - LOCATION: LOCATION: BACK

## 2025-01-08 ENCOUNTER — APPOINTMENT (OUTPATIENT)
Dept: SURGERY | Facility: CLINIC | Age: 75
End: 2025-01-08
Payer: MEDICAID

## 2025-01-09 ENCOUNTER — APPOINTMENT (OUTPATIENT)
Dept: RADIOLOGY | Facility: HOSPITAL | Age: 75
End: 2025-01-09
Payer: MEDICAID

## 2025-01-09 ENCOUNTER — HOSPITAL ENCOUNTER (EMERGENCY)
Facility: HOSPITAL | Age: 75
Discharge: ANOTHER HOSPITAL | End: 2025-01-09
Attending: EMERGENCY MEDICINE
Payer: MEDICAID

## 2025-01-09 ENCOUNTER — APPOINTMENT (OUTPATIENT)
Dept: CARDIOLOGY | Facility: HOSPITAL | Age: 75
End: 2025-01-09
Payer: MEDICAID

## 2025-01-09 ENCOUNTER — HOSPITAL ENCOUNTER (INPATIENT)
Facility: HOSPITAL | Age: 75
LOS: 2 days | Discharge: NURSING, REHAB OR LONG-TERM CARE | End: 2025-01-11
Attending: EMERGENCY MEDICINE | Admitting: STUDENT IN AN ORGANIZED HEALTH CARE EDUCATION/TRAINING PROGRAM
Payer: MEDICAID

## 2025-01-09 VITALS
DIASTOLIC BLOOD PRESSURE: 82 MMHG | RESPIRATION RATE: 24 BRPM | SYSTOLIC BLOOD PRESSURE: 131 MMHG | TEMPERATURE: 99 F | OXYGEN SATURATION: 97 % | HEART RATE: 86 BPM

## 2025-01-09 DIAGNOSIS — J18.9 COMMUNITY ACQUIRED PNEUMONIA OF RIGHT LOWER LOBE OF LUNG: Primary | ICD-10-CM

## 2025-01-09 DIAGNOSIS — J16.0 COMMUNITY ACQUIRED PNEUMONIA DUE TO CHLAMYDIA SPECIES: ICD-10-CM

## 2025-01-09 DIAGNOSIS — J12.1 RSV (RESPIRATORY SYNCYTIAL VIRUS PNEUMONIA): ICD-10-CM

## 2025-01-09 DIAGNOSIS — J18.9 COMMUNITY ACQUIRED PNEUMONIA OF RIGHT LOWER LOBE OF LUNG: ICD-10-CM

## 2025-01-09 DIAGNOSIS — J21.0 RSV BRONCHIOLITIS: Primary | ICD-10-CM

## 2025-01-09 LAB
ALBUMIN SERPL BCP-MCNC: 2.7 G/DL (ref 3.4–5)
ALP SERPL-CCNC: 79 U/L (ref 33–136)
ALT SERPL W P-5'-P-CCNC: 8 U/L (ref 7–45)
ANION GAP BLDV CALCULATED.4IONS-SCNC: 12 MMOL/L (ref 10–25)
ANION GAP SERPL CALC-SCNC: 9 MMOL/L (ref 10–20)
AST SERPL W P-5'-P-CCNC: 9 U/L (ref 9–39)
ATRIAL RATE: 90 BPM
BASE EXCESS BLDV CALC-SCNC: 2.5 MMOL/L (ref -2–3)
BASOPHILS # BLD AUTO: 0.09 X10*3/UL (ref 0–0.1)
BASOPHILS NFR BLD AUTO: 0.4 %
BILIRUB DIRECT SERPL-MCNC: 0.1 MG/DL (ref 0–0.3)
BILIRUB SERPL-MCNC: 0.4 MG/DL (ref 0–1.2)
BNP SERPL-MCNC: 116 PG/ML (ref 0–99)
BODY TEMPERATURE: 37 DEGREES CELSIUS
BUN SERPL-MCNC: 13 MG/DL (ref 6–23)
CA-I BLDV-SCNC: 1.3 MMOL/L (ref 1.1–1.33)
CALCIUM SERPL-MCNC: 8.9 MG/DL (ref 8.6–10.3)
CARDIAC TROPONIN I PNL SERPL HS: 5 NG/L (ref 0–13)
CARDIAC TROPONIN I PNL SERPL HS: 8 NG/L (ref 0–13)
CHLORIDE BLDV-SCNC: 99 MMOL/L (ref 98–107)
CHLORIDE SERPL-SCNC: 100 MMOL/L (ref 98–107)
CO2 SERPL-SCNC: 29 MMOL/L (ref 21–32)
CREAT SERPL-MCNC: 0.99 MG/DL (ref 0.5–1.05)
EGFRCR SERPLBLD CKD-EPI 2021: 60 ML/MIN/1.73M*2
EOSINOPHIL # BLD AUTO: 0.11 X10*3/UL (ref 0–0.4)
EOSINOPHIL NFR BLD AUTO: 0.5 %
ERYTHROCYTE [DISTWIDTH] IN BLOOD BY AUTOMATED COUNT: 16.6 % (ref 11.5–14.5)
FLUAV RNA RESP QL NAA+PROBE: NOT DETECTED
FLUBV RNA RESP QL NAA+PROBE: NOT DETECTED
GLUCOSE BLDV-MCNC: 120 MG/DL (ref 74–99)
GLUCOSE SERPL-MCNC: 116 MG/DL (ref 74–99)
HCO3 BLDV-SCNC: 28.3 MMOL/L (ref 22–26)
HCT VFR BLD AUTO: 29.1 % (ref 36–46)
HCT VFR BLD EST: 28 % (ref 36–46)
HGB BLD-MCNC: 8.7 G/DL (ref 12–16)
HGB BLDV-MCNC: 9.3 G/DL (ref 12–16)
IMM GRANULOCYTES # BLD AUTO: 0.09 X10*3/UL (ref 0–0.5)
IMM GRANULOCYTES NFR BLD AUTO: 0.4 % (ref 0–0.9)
INHALED O2 CONCENTRATION: 21 %
INR PPP: 1.4 (ref 0.9–1.1)
LACTATE BLDV-SCNC: 1.2 MMOL/L (ref 0.4–2)
LYMPHOCYTES # BLD AUTO: 3.27 X10*3/UL (ref 0.8–3)
LYMPHOCYTES NFR BLD AUTO: 16 %
MAGNESIUM SERPL-MCNC: 1.89 MG/DL (ref 1.6–2.4)
MCH RBC QN AUTO: 21.2 PG (ref 26–34)
MCHC RBC AUTO-ENTMCNC: 29.9 G/DL (ref 32–36)
MCV RBC AUTO: 71 FL (ref 80–100)
MONOCYTES # BLD AUTO: 1.84 X10*3/UL (ref 0.05–0.8)
MONOCYTES NFR BLD AUTO: 9 %
NEUTROPHILS # BLD AUTO: 15.02 X10*3/UL (ref 1.6–5.5)
NEUTROPHILS NFR BLD AUTO: 73.7 %
NRBC BLD-RTO: 0 /100 WBCS (ref 0–0)
OXYHGB MFR BLDV: 60.2 % (ref 45–75)
P AXIS: 43 DEGREES
P OFFSET: 213 MS
P ONSET: 162 MS
PCO2 BLDV: 49 MM HG (ref 41–51)
PH BLDV: 7.37 PH (ref 7.33–7.43)
PLATELET # BLD AUTO: 448 X10*3/UL (ref 150–450)
PO2 BLDV: 37 MM HG (ref 35–45)
POTASSIUM BLDV-SCNC: 4.3 MMOL/L (ref 3.5–5.3)
POTASSIUM SERPL-SCNC: 4.1 MMOL/L (ref 3.5–5.3)
PR INTERVAL: 120 MS
PROT SERPL-MCNC: 7.4 G/DL (ref 6.4–8.2)
PROTHROMBIN TIME: 15.5 SECONDS (ref 9.8–12.8)
Q ONSET: 222 MS
QRS COUNT: 15 BEATS
QRS DURATION: 72 MS
QT INTERVAL: 356 MS
QTC CALCULATION(BAZETT): 435 MS
QTC FREDERICIA: 407 MS
R AXIS: 117 DEGREES
RBC # BLD AUTO: 4.11 X10*6/UL (ref 4–5.2)
RSV RNA RESP QL NAA+PROBE: DETECTED
SAO2 % BLDV: 62 % (ref 45–75)
SARS-COV-2 RNA RESP QL NAA+PROBE: NOT DETECTED
SODIUM BLDV-SCNC: 135 MMOL/L (ref 136–145)
SODIUM SERPL-SCNC: 134 MMOL/L (ref 136–145)
T AXIS: 24 DEGREES
T OFFSET: 400 MS
VENTRICULAR RATE: 90 BPM
WBC # BLD AUTO: 20.4 X10*3/UL (ref 4.4–11.3)

## 2025-01-09 PROCEDURE — 99285 EMERGENCY DEPT VISIT HI MDM: CPT | Mod: 25 | Performed by: EMERGENCY MEDICINE

## 2025-01-09 PROCEDURE — 36415 COLL VENOUS BLD VENIPUNCTURE: CPT | Performed by: EMERGENCY MEDICINE

## 2025-01-09 PROCEDURE — 82805 BLOOD GASES W/O2 SATURATION: CPT | Performed by: EMERGENCY MEDICINE

## 2025-01-09 PROCEDURE — 96368 THER/DIAG CONCURRENT INF: CPT

## 2025-01-09 PROCEDURE — 87637 SARSCOV2&INF A&B&RSV AMP PRB: CPT | Performed by: EMERGENCY MEDICINE

## 2025-01-09 PROCEDURE — 84132 ASSAY OF SERUM POTASSIUM: CPT | Performed by: EMERGENCY MEDICINE

## 2025-01-09 PROCEDURE — 2500000004 HC RX 250 GENERAL PHARMACY W/ HCPCS (ALT 636 FOR OP/ED): Performed by: EMERGENCY MEDICINE

## 2025-01-09 PROCEDURE — 87040 BLOOD CULTURE FOR BACTERIA: CPT | Mod: AHULAB | Performed by: EMERGENCY MEDICINE

## 2025-01-09 PROCEDURE — 96375 TX/PRO/DX INJ NEW DRUG ADDON: CPT

## 2025-01-09 PROCEDURE — 94762 N-INVAS EAR/PLS OXIMTRY CONT: CPT

## 2025-01-09 PROCEDURE — 2500000004 HC RX 250 GENERAL PHARMACY W/ HCPCS (ALT 636 FOR OP/ED): Performed by: STUDENT IN AN ORGANIZED HEALTH CARE EDUCATION/TRAINING PROGRAM

## 2025-01-09 PROCEDURE — 85610 PROTHROMBIN TIME: CPT | Performed by: EMERGENCY MEDICINE

## 2025-01-09 PROCEDURE — 84484 ASSAY OF TROPONIN QUANT: CPT | Performed by: EMERGENCY MEDICINE

## 2025-01-09 PROCEDURE — 2500000002 HC RX 250 W HCPCS SELF ADMINISTERED DRUGS (ALT 637 FOR MEDICARE OP, ALT 636 FOR OP/ED): Performed by: EMERGENCY MEDICINE

## 2025-01-09 PROCEDURE — 93005 ELECTROCARDIOGRAM TRACING: CPT

## 2025-01-09 PROCEDURE — 84295 ASSAY OF SERUM SODIUM: CPT | Performed by: EMERGENCY MEDICINE

## 2025-01-09 PROCEDURE — 87075 CULTR BACTERIA EXCEPT BLOOD: CPT | Mod: AHULAB | Performed by: EMERGENCY MEDICINE

## 2025-01-09 PROCEDURE — 85025 COMPLETE CBC W/AUTO DIFF WBC: CPT | Performed by: EMERGENCY MEDICINE

## 2025-01-09 PROCEDURE — 99223 1ST HOSP IP/OBS HIGH 75: CPT | Performed by: STUDENT IN AN ORGANIZED HEALTH CARE EDUCATION/TRAINING PROGRAM

## 2025-01-09 PROCEDURE — 2500000001 HC RX 250 WO HCPCS SELF ADMINISTERED DRUGS (ALT 637 FOR MEDICARE OP): Performed by: EMERGENCY MEDICINE

## 2025-01-09 PROCEDURE — 94640 AIRWAY INHALATION TREATMENT: CPT

## 2025-01-09 PROCEDURE — 82248 BILIRUBIN DIRECT: CPT | Performed by: EMERGENCY MEDICINE

## 2025-01-09 PROCEDURE — 71045 X-RAY EXAM CHEST 1 VIEW: CPT

## 2025-01-09 PROCEDURE — 96365 THER/PROPH/DIAG IV INF INIT: CPT

## 2025-01-09 PROCEDURE — 71045 X-RAY EXAM CHEST 1 VIEW: CPT | Performed by: RADIOLOGY

## 2025-01-09 PROCEDURE — 83735 ASSAY OF MAGNESIUM: CPT | Performed by: EMERGENCY MEDICINE

## 2025-01-09 PROCEDURE — 94760 N-INVAS EAR/PLS OXIMETRY 1: CPT

## 2025-01-09 PROCEDURE — 1100000001 HC PRIVATE ROOM DAILY

## 2025-01-09 PROCEDURE — 83880 ASSAY OF NATRIURETIC PEPTIDE: CPT | Performed by: EMERGENCY MEDICINE

## 2025-01-09 RX ORDER — IPRATROPIUM BROMIDE AND ALBUTEROL SULFATE 2.5; .5 MG/3ML; MG/3ML
3 SOLUTION RESPIRATORY (INHALATION) ONCE
Status: COMPLETED | OUTPATIENT
Start: 2025-01-09 | End: 2025-01-09

## 2025-01-09 RX ORDER — BENZONATATE 100 MG/1
100 CAPSULE ORAL 3 TIMES DAILY PRN
Status: DISCONTINUED | OUTPATIENT
Start: 2025-01-09 | End: 2025-01-11 | Stop reason: HOSPADM

## 2025-01-09 RX ORDER — AZITHROMYCIN 250 MG/1
250 TABLET, FILM COATED ORAL
Status: DISCONTINUED | OUTPATIENT
Start: 2025-01-10 | End: 2025-01-11 | Stop reason: HOSPADM

## 2025-01-09 RX ORDER — ONDANSETRON HYDROCHLORIDE 2 MG/ML
4 INJECTION, SOLUTION INTRAVENOUS EVERY 4 HOURS PRN
Status: DISCONTINUED | OUTPATIENT
Start: 2025-01-09 | End: 2025-01-11 | Stop reason: HOSPADM

## 2025-01-09 RX ORDER — CALCIUM CARBONATE 200(500)MG
500 TABLET,CHEWABLE ORAL 4 TIMES DAILY PRN
Status: DISCONTINUED | OUTPATIENT
Start: 2025-01-09 | End: 2025-01-11 | Stop reason: HOSPADM

## 2025-01-09 RX ORDER — TRAMADOL HYDROCHLORIDE 50 MG/1
100 TABLET ORAL ONCE
Status: COMPLETED | OUTPATIENT
Start: 2025-01-09 | End: 2025-01-09

## 2025-01-09 RX ORDER — TALC
3 POWDER (GRAM) TOPICAL NIGHTLY PRN
Status: DISCONTINUED | OUTPATIENT
Start: 2025-01-09 | End: 2025-01-10

## 2025-01-09 RX ORDER — ENOXAPARIN SODIUM 100 MG/ML
40 INJECTION SUBCUTANEOUS EVERY 24 HOURS
Status: DISCONTINUED | OUTPATIENT
Start: 2025-01-09 | End: 2025-01-11 | Stop reason: HOSPADM

## 2025-01-09 RX ORDER — GUAIFENESIN 600 MG/1
600 TABLET, EXTENDED RELEASE ORAL 2 TIMES DAILY PRN
Status: DISCONTINUED | OUTPATIENT
Start: 2025-01-09 | End: 2025-01-10

## 2025-01-09 RX ORDER — POLYETHYLENE GLYCOL 3350 17 G/17G
17 POWDER, FOR SOLUTION ORAL DAILY
Status: DISCONTINUED | OUTPATIENT
Start: 2025-01-10 | End: 2025-01-11 | Stop reason: HOSPADM

## 2025-01-09 RX ORDER — IPRATROPIUM BROMIDE AND ALBUTEROL SULFATE 2.5; .5 MG/3ML; MG/3ML
3 SOLUTION RESPIRATORY (INHALATION) EVERY 4 HOURS PRN
Status: DISCONTINUED | OUTPATIENT
Start: 2025-01-09 | End: 2025-01-11 | Stop reason: HOSPADM

## 2025-01-09 RX ORDER — ACETAMINOPHEN 325 MG/1
650 TABLET ORAL EVERY 6 HOURS PRN
Status: DISCONTINUED | OUTPATIENT
Start: 2025-01-09 | End: 2025-01-11 | Stop reason: HOSPADM

## 2025-01-09 RX ORDER — SIMETHICONE 80 MG
80 TABLET,CHEWABLE ORAL 4 TIMES DAILY PRN
Status: DISCONTINUED | OUTPATIENT
Start: 2025-01-09 | End: 2025-01-11 | Stop reason: HOSPADM

## 2025-01-09 RX ORDER — CEFTRIAXONE 1 G/50ML
1 INJECTION, SOLUTION INTRAVENOUS EVERY 24 HOURS
Status: DISCONTINUED | OUTPATIENT
Start: 2025-01-10 | End: 2025-01-11 | Stop reason: HOSPADM

## 2025-01-09 RX ORDER — DIPHENHYDRAMINE HCL 25 MG
25 TABLET ORAL EVERY 6 HOURS PRN
Status: DISCONTINUED | OUTPATIENT
Start: 2025-01-09 | End: 2025-01-11 | Stop reason: HOSPADM

## 2025-01-09 RX ADMIN — TRAMADOL HYDROCHLORIDE 100 MG: 50 TABLET, COATED ORAL at 17:34

## 2025-01-09 RX ADMIN — METHYLPREDNISOLONE SODIUM SUCCINATE 125 MG: 125 INJECTION, POWDER, LYOPHILIZED, FOR SOLUTION INTRAMUSCULAR; INTRAVENOUS at 17:37

## 2025-01-09 RX ADMIN — AZITHROMYCIN MONOHYDRATE 490.2 MG: 500 INJECTION, POWDER, LYOPHILIZED, FOR SOLUTION INTRAVENOUS at 15:38

## 2025-01-09 RX ADMIN — IPRATROPIUM BROMIDE AND ALBUTEROL SULFATE 3 ML: 2.5; .5 SOLUTION RESPIRATORY (INHALATION) at 17:40

## 2025-01-09 RX ADMIN — ENOXAPARIN SODIUM 40 MG: 40 INJECTION SUBCUTANEOUS at 21:54

## 2025-01-09 RX ADMIN — CEFTRIAXONE 2 G: 2 INJECTION, POWDER, FOR SOLUTION INTRAMUSCULAR; INTRAVENOUS at 15:14

## 2025-01-09 SDOH — SOCIAL STABILITY: SOCIAL INSECURITY: ARE YOU MARRIED, WIDOWED, DIVORCED, SEPARATED, NEVER MARRIED, OR LIVING WITH A PARTNER?: NEVER MARRIED

## 2025-01-09 SDOH — HEALTH STABILITY: MENTAL HEALTH
DO YOU FEEL STRESS - TENSE, RESTLESS, NERVOUS, OR ANXIOUS, OR UNABLE TO SLEEP AT NIGHT BECAUSE YOUR MIND IS TROUBLED ALL THE TIME - THESE DAYS?: TO SOME EXTENT

## 2025-01-09 SDOH — SOCIAL STABILITY: SOCIAL INSECURITY: ARE THERE ANY APPARENT SIGNS OF INJURIES/BEHAVIORS THAT COULD BE RELATED TO ABUSE/NEGLECT?: NO

## 2025-01-09 SDOH — HEALTH STABILITY: MENTAL HEALTH: HOW MANY DRINKS CONTAINING ALCOHOL DO YOU HAVE ON A TYPICAL DAY WHEN YOU ARE DRINKING?: PATIENT DOES NOT DRINK

## 2025-01-09 SDOH — HEALTH STABILITY: MENTAL HEALTH: HOW OFTEN DO YOU HAVE SIX OR MORE DRINKS ON ONE OCCASION?: NEVER

## 2025-01-09 SDOH — SOCIAL STABILITY: SOCIAL INSECURITY: HAVE YOU HAD ANY THOUGHTS OF HARMING ANYONE ELSE?: NO

## 2025-01-09 SDOH — SOCIAL STABILITY: SOCIAL INSECURITY: WITHIN THE LAST YEAR, HAVE YOU BEEN HUMILIATED OR EMOTIONALLY ABUSED IN OTHER WAYS BY YOUR PARTNER OR EX-PARTNER?: NO

## 2025-01-09 SDOH — HEALTH STABILITY: PHYSICAL HEALTH
HOW OFTEN DO YOU NEED TO HAVE SOMEONE HELP YOU WHEN YOU READ INSTRUCTIONS, PAMPHLETS, OR OTHER WRITTEN MATERIAL FROM YOUR DOCTOR OR PHARMACY?: SOMETIMES

## 2025-01-09 SDOH — ECONOMIC STABILITY: INCOME INSECURITY: IN THE PAST 12 MONTHS HAS THE ELECTRIC, GAS, OIL, OR WATER COMPANY THREATENED TO SHUT OFF SERVICES IN YOUR HOME?: NO

## 2025-01-09 SDOH — HEALTH STABILITY: PHYSICAL HEALTH: ON AVERAGE, HOW MANY DAYS PER WEEK DO YOU ENGAGE IN MODERATE TO STRENUOUS EXERCISE (LIKE A BRISK WALK)?: 0 DAYS

## 2025-01-09 SDOH — SOCIAL STABILITY: SOCIAL NETWORK: HOW OFTEN DO YOU ATTEND MEETINGS OF THE CLUBS OR ORGANIZATIONS YOU BELONG TO?: MORE THAN 4 TIMES PER YEAR

## 2025-01-09 SDOH — ECONOMIC STABILITY: FOOD INSECURITY: HOW HARD IS IT FOR YOU TO PAY FOR THE VERY BASICS LIKE FOOD, HOUSING, MEDICAL CARE, AND HEATING?: NOT HARD AT ALL

## 2025-01-09 SDOH — ECONOMIC STABILITY: HOUSING INSECURITY: IN THE LAST 12 MONTHS, WAS THERE A TIME WHEN YOU WERE NOT ABLE TO PAY THE MORTGAGE OR RENT ON TIME?: NO

## 2025-01-09 SDOH — ECONOMIC STABILITY: FOOD INSECURITY: WITHIN THE PAST 12 MONTHS, YOU WORRIED THAT YOUR FOOD WOULD RUN OUT BEFORE YOU GOT THE MONEY TO BUY MORE.: NEVER TRUE

## 2025-01-09 SDOH — SOCIAL STABILITY: SOCIAL INSECURITY: HAS ANYONE EVER THREATENED TO HURT YOUR FAMILY OR YOUR PETS?: NO

## 2025-01-09 SDOH — SOCIAL STABILITY: SOCIAL NETWORK: HOW OFTEN DO YOU GET TOGETHER WITH FRIENDS OR RELATIVES?: NEVER

## 2025-01-09 SDOH — HEALTH STABILITY: MENTAL HEALTH
DO YOU FEEL STRESS - TENSE, RESTLESS, NERVOUS, OR ANXIOUS, OR UNABLE TO SLEEP AT NIGHT BECAUSE YOUR MIND IS TROUBLED ALL THE TIME - THESE DAYS?: VERY MUCH

## 2025-01-09 SDOH — ECONOMIC STABILITY: HOUSING INSECURITY: AT ANY TIME IN THE PAST 12 MONTHS, WERE YOU HOMELESS OR LIVING IN A SHELTER (INCLUDING NOW)?: NO

## 2025-01-09 SDOH — SOCIAL STABILITY: SOCIAL INSECURITY: ARE YOU OR HAVE YOU BEEN THREATENED OR ABUSED PHYSICALLY, EMOTIONALLY, OR SEXUALLY BY ANYONE?: NO

## 2025-01-09 SDOH — HEALTH STABILITY: MENTAL HEALTH: HOW OFTEN DO YOU HAVE A DRINK CONTAINING ALCOHOL?: NEVER

## 2025-01-09 SDOH — SOCIAL STABILITY: SOCIAL INSECURITY: ABUSE: ADULT

## 2025-01-09 SDOH — HEALTH STABILITY: PHYSICAL HEALTH: ON AVERAGE, HOW MANY MINUTES DO YOU ENGAGE IN EXERCISE AT THIS LEVEL?: 0 MIN

## 2025-01-09 SDOH — SOCIAL STABILITY: SOCIAL INSECURITY: DO YOU FEEL UNSAFE GOING BACK TO THE PLACE WHERE YOU ARE LIVING?: NO

## 2025-01-09 SDOH — SOCIAL STABILITY: SOCIAL NETWORK
DO YOU BELONG TO ANY CLUBS OR ORGANIZATIONS SUCH AS CHURCH GROUPS, UNIONS, FRATERNAL OR ATHLETIC GROUPS, OR SCHOOL GROUPS?: YES

## 2025-01-09 SDOH — ECONOMIC STABILITY: TRANSPORTATION INSECURITY: IN THE PAST 12 MONTHS, HAS LACK OF TRANSPORTATION KEPT YOU FROM MEDICAL APPOINTMENTS OR FROM GETTING MEDICATIONS?: YES

## 2025-01-09 SDOH — SOCIAL STABILITY: SOCIAL INSECURITY: WERE YOU ABLE TO COMPLETE ALL THE BEHAVIORAL HEALTH SCREENINGS?: YES

## 2025-01-09 SDOH — SOCIAL STABILITY: SOCIAL INSECURITY: POSSIBLE ABUSE REPORTED TO:: OTHER (COMMENT)

## 2025-01-09 SDOH — ECONOMIC STABILITY: HOUSING INSECURITY: IN THE PAST 12 MONTHS, HOW MANY TIMES HAVE YOU MOVED WHERE YOU WERE LIVING?: 2

## 2025-01-09 SDOH — ECONOMIC STABILITY: FOOD INSECURITY: WITHIN THE PAST 12 MONTHS, THE FOOD YOU BOUGHT JUST DIDN'T LAST AND YOU DIDN'T HAVE MONEY TO GET MORE.: NEVER TRUE

## 2025-01-09 SDOH — SOCIAL STABILITY: SOCIAL NETWORK: HOW OFTEN DO YOU ATTEND CHURCH OR RELIGIOUS SERVICES?: NEVER

## 2025-01-09 SDOH — SOCIAL STABILITY: SOCIAL INSECURITY: DO YOU FEEL ANYONE HAS EXPLOITED OR TAKEN ADVANTAGE OF YOU FINANCIALLY OR OF YOUR PERSONAL PROPERTY?: NO

## 2025-01-09 SDOH — SOCIAL STABILITY: SOCIAL INSECURITY: HAVE YOU HAD THOUGHTS OF HARMING ANYONE ELSE?: NO

## 2025-01-09 SDOH — SOCIAL STABILITY: SOCIAL INSECURITY: DOES ANYONE TRY TO KEEP YOU FROM HAVING/CONTACTING OTHER FRIENDS OR DOING THINGS OUTSIDE YOUR HOME?: NO

## 2025-01-09 SDOH — SOCIAL STABILITY: SOCIAL NETWORK: IN A TYPICAL WEEK, HOW MANY TIMES DO YOU TALK ON THE PHONE WITH FAMILY, FRIENDS, OR NEIGHBORS?: NEVER

## 2025-01-09 SDOH — HEALTH STABILITY: MENTAL HEALTH: EXPERIENCED ANY OF THE FOLLOWING LIFE EVENTS: OTHER (COMMENT)

## 2025-01-09 ASSESSMENT — COGNITIVE AND FUNCTIONAL STATUS - GENERAL
TURNING FROM BACK TO SIDE WHILE IN FLAT BAD: A LITTLE
DRESSING REGULAR UPPER BODY CLOTHING: A LITTLE
MOVING TO AND FROM BED TO CHAIR: A LITTLE
CLIMB 3 TO 5 STEPS WITH RAILING: A LITTLE
MOBILITY SCORE: 19
DRESSING REGULAR LOWER BODY CLOTHING: A LITTLE
PATIENT BASELINE BEDBOUND: NO
TOILETING: A LITTLE
HELP NEEDED FOR BATHING: A LITTLE
STANDING UP FROM CHAIR USING ARMS: A LITTLE
DAILY ACTIVITIY SCORE: 19
PERSONAL GROOMING: A LITTLE
WALKING IN HOSPITAL ROOM: A LITTLE

## 2025-01-09 ASSESSMENT — ACTIVITIES OF DAILY LIVING (ADL)
JUDGMENT_ADEQUATE_SAFELY_COMPLETE_DAILY_ACTIVITIES: YES
HEARING - LEFT EAR: FUNCTIONAL
LACK_OF_TRANSPORTATION: YES
FEEDING YOURSELF: INDEPENDENT
BATHING: NEEDS ASSISTANCE
HEARING - RIGHT EAR: FUNCTIONAL
HEARING - LEFT EAR: FUNCTIONAL
WALKS IN HOME: NEEDS ASSISTANCE
TOILETING: NEEDS ASSISTANCE
WALKS IN HOME: NEEDS ASSISTANCE
GROOMING: NEEDS ASSISTANCE
HEARING - RIGHT EAR: FUNCTIONAL
LACK_OF_TRANSPORTATION: YES
FEEDING YOURSELF: INDEPENDENT
DRESSING YOURSELF: NEEDS ASSISTANCE
BATHING: NEEDS ASSISTANCE
ADEQUATE_TO_COMPLETE_ADL: NO
ADEQUATE_TO_COMPLETE_ADL: NO
DRESSING YOURSELF: NEEDS ASSISTANCE
JUDGMENT_ADEQUATE_SAFELY_COMPLETE_DAILY_ACTIVITIES: YES
PATIENT'S MEMORY ADEQUATE TO SAFELY COMPLETE DAILY ACTIVITIES?: YES
TOILETING: NEEDS ASSISTANCE
LACK_OF_TRANSPORTATION: YES
PATIENT'S MEMORY ADEQUATE TO SAFELY COMPLETE DAILY ACTIVITIES?: YES
GROOMING: NEEDS ASSISTANCE

## 2025-01-09 ASSESSMENT — PATIENT HEALTH QUESTIONNAIRE - PHQ9
SUM OF ALL RESPONSES TO PHQ9 QUESTIONS 1 & 2: 0
2. FEELING DOWN, DEPRESSED OR HOPELESS: NOT AT ALL
1. LITTLE INTEREST OR PLEASURE IN DOING THINGS: NOT AT ALL

## 2025-01-09 ASSESSMENT — COLUMBIA-SUICIDE SEVERITY RATING SCALE - C-SSRS
1. IN THE PAST MONTH, HAVE YOU WISHED YOU WERE DEAD OR WISHED YOU COULD GO TO SLEEP AND NOT WAKE UP?: NO
6. HAVE YOU EVER DONE ANYTHING, STARTED TO DO ANYTHING, OR PREPARED TO DO ANYTHING TO END YOUR LIFE?: NO
6. HAVE YOU EVER DONE ANYTHING, STARTED TO DO ANYTHING, OR PREPARED TO DO ANYTHING TO END YOUR LIFE?: NO
1. IN THE PAST MONTH, HAVE YOU WISHED YOU WERE DEAD OR WISHED YOU COULD GO TO SLEEP AND NOT WAKE UP?: NO
2. HAVE YOU ACTUALLY HAD ANY THOUGHTS OF KILLING YOURSELF?: NO
2. HAVE YOU ACTUALLY HAD ANY THOUGHTS OF KILLING YOURSELF?: NO

## 2025-01-09 ASSESSMENT — LIFESTYLE VARIABLES
AUDIT-C TOTAL SCORE: 0
SKIP TO QUESTIONS 9-10: 1
SKIP TO QUESTIONS 9-10: 1
HOW OFTEN DO YOU HAVE A DRINK CONTAINING ALCOHOL: NEVER
SUBSTANCE_ABUSE_PAST_12_MONTHS: NO
PRESCIPTION_ABUSE_PAST_12_MONTHS: NO
HOW MANY STANDARD DRINKS CONTAINING ALCOHOL DO YOU HAVE ON A TYPICAL DAY: PATIENT DOES NOT DRINK
AUDIT-C TOTAL SCORE: 0
HOW OFTEN DO YOU HAVE 6 OR MORE DRINKS ON ONE OCCASION: NEVER
AUDIT-C TOTAL SCORE: 0

## 2025-01-09 ASSESSMENT — PAIN SCALES - GENERAL
PAINLEVEL_OUTOF10: 0 - NO PAIN
PAINLEVEL_OUTOF10: 0 - NO PAIN

## 2025-01-09 ASSESSMENT — PAIN - FUNCTIONAL ASSESSMENT
PAIN_FUNCTIONAL_ASSESSMENT: 0-10
PAIN_FUNCTIONAL_ASSESSMENT: 0-10

## 2025-01-09 NOTE — ED PROVIDER NOTES
HPI   Chief Complaint   Patient presents with   • Shortness of Breath   • Flu Symptoms       HPI: []  74-year-old white female with a history of hypertension, arthritis, kidney stones, CAD comes in with increasing cough and trouble breathing.  Patient RSV positive.  Was prescribed moxifloxacin but it has not arrived yet.  Cough nonproductive.  No chest pressure heaviness.  No fever or chills.  No hemoptysis no PND orthopnea no syncope or near syncope no recent travel or hospitalization.  No hematemesis melena or hematochezia.    Past history: Hypertension, arthritis, kidney stones, CAD  Social: Patient denies current tobacco alcohol drug use  REVIEW OF SYSTEMS:    GENERAL.: No weight loss, fatigue, anorexia, insomnia, fever.    EYES: No vision loss, double vision, drainage, eye pain.    ENT: No pharyngitis, dry mouth.    CARDIOPULMONARY: No chest pain, palpitations, syncope, near syncope.  Positive for shortness of breath, positive cough, hemoptysis.    GI: No abdominal pain, change in bowel habits, melena, hematemesis, hematochezia, nausea, vomiting, diarrhea.    : No discharge, dysuria, frequency, urgency, hematuria.    MS: No limb pain, joint pain, joint swelling.    SKIN: No rashes.    PSYCH: No depression, anxiety, suicidality, homicidality.    Review of systems is otherwise negative unless stated above or in history of present illness.  Social history, family history, allergies reviewed.  PHYSICAL EXAM:    GENERAL: Vitals noted, no distress. Alert and oriented  x 3. Non-toxic.      EENT: TMs clear. Posterior oropharynx unremarkable. No meningismus. No LAD.     NECK: Supple. Nontender. No midline tenderness.     CARDIAC: Regular, rate, rhythm.  Grade 2 x 6 ejection systolic murmur best heard at the left sternal border, no rubs or gallops. No JVD    PULMONARY: Coarse bibasilar crackles, scattered rhonchi and wheezing, no respiratory distress.  No tachypnea stridor or retractions able to speak in full  sentences    ABDOMEN: Soft, nonsurgical. Nontender. No peritoneal signs. Normoactive bowel sounds. No pulsatile masses.     EXTREMITIES: Trace bilateral symmetric nonpitting peripheral edema. Negative Homans bilaterally, no cords.  2+ bounding pulses well-perfused.    SKIN: No rash. Intact.     NEURO: No focal neurologic deficits, NIH score of 0. Cranial nerves normal as tested from II through XII.     MEDICAL DECISION MAKING:  EKG on my interpretation shows normal sinus rhythm right axis deviation rate in the mid 70s with no acute ischemic changes.  CBC did show leukocytosis 21,000, stable anemia chemistries LFTs unremarkable  elevated troponin x 2 is negative chest ray shows mild congestion and right lower lobe infiltrate.  RSV positive venous gas shows no hypercapnia.    Treatment: IV established and cardiac monitor given supplemental oxygen nebulizer treatments also received intravenous Rocephin and azithromycin and IV Solu-Medrol    ED course: Patient remained stable hemodynamic.    Impression: #1 RSV bronchiolitis, #2 right lower lobe pneumonia    Plan/MDM: 74-year-old female history of hypertension comes in with ulcerative colitis with superimposed possible bacterial pneumonia low concern for STEMI NSTEMI congestive heart failure septic shock or hypercapnia, patient will be transferred to be treatment center due to capacity patient consented to be transferred.              Patient History   Past Medical History:   Diagnosis Date   • Acute cystitis without hematuria 01/03/2019    Acute cystitis without hematuria   • Age-related nuclear cataract, left eye 10/31/2018    Cataract, nuclear sclerotic, left eye   • Age-related nuclear cataract, right eye 10/31/2018    Cataract, nuclear sclerotic, right eye   • Allergic rhinitis due to pollen 09/19/2018    Allergic rhinitis due to pollen, unspecified seasonality   • Calculus of kidney 12/11/2018    Right nephrolithiasis   • Combined forms of age-related  cataract, left eye 05/08/2017    Combined form of age-related cataract, left eye   • Combined forms of age-related cataract, left eye 05/08/2017    Combined form of age-related cataract, left eye   • Cortical age-related cataract, left eye 10/31/2018    Cortical age-related cataract of left eye   • Cyanosis 08/10/2017    Bluish skin discoloration   • Dry eye syndrome of bilateral lacrimal glands 10/31/2018    Bilateral dry eyes   • Dry eye syndrome of bilateral lacrimal glands 10/31/2018    Dry eyes, bilateral   • History of falling 01/03/2019    History of fall   • Hyperlipidemia, unspecified 08/26/2020    Hyperlipidemia   • Lesion of radial nerve, unspecified upper limb 03/05/2016    Radial nerve irritation   • Meibomian gland dysfunction of unspecified eye, 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/31/2018    MGD (meibomian gland disease)   • Nausea 11/27/2018    Nausea in adult   • Opioid dependence, uncomplicated (Multi) 03/09/2020    Moderate opioid use disorder   • Other chronic sinusitis 06/15/2018    Other chronic sinusitis   • Other fecal abnormalities 11/27/2018    Light stools   • Other specified disorders of nose and nasal sinuses 06/15/2018    Nasal septal perforation   • Other specified disorders of nose and nasal sinuses 10/24/2018    Nasal crusting   • Other specified disorders of nose and nasal sinuses 02/03/2017    Nasal septal perforation   • Other specified disorders of teeth and supporting structures 09/04/2015    Dentalgia   • Pain in leg, unspecified 03/11/2019    Leg pain   • Personal history of diseases of the blood and blood-forming organs and certain disorders involving the immune mechanism 02/03/2015    History of anemia   • Personal history of diseases of the skin and subcutaneous tissue 08/16/2013    History of actinic keratosis   • Personal history of other diseases of the circulatory system     History of hypertension   • Personal history of other diseases of the digestive system     History of  constipation   • Personal history of other diseases of the digestive system 07/02/2014    History of gastroesophageal reflux (GERD)   • Personal history of other diseases of the musculoskeletal system and connective tissue 07/02/2014    Personal history of arthritis   • Personal history of other diseases of the respiratory system 12/18/2017    History of sore throat   • Personal history of other diseases of the respiratory system 12/29/2017    History of paranasal sinus congestion   • Personal history of other diseases of the respiratory system 01/03/2019    History of acute sinusitis   • Personal history of other diseases of the respiratory system 02/03/2017    History of acute sinusitis   • Personal history of other diseases of the respiratory system 12/29/2017    History of acute sinusitis   • Personal history of other diseases of the respiratory system 06/15/2018    History of nasal discharge   • Personal history of other diseases of the respiratory system 08/11/2015    History of acute sinusitis   • Personal history of other drug therapy 02/08/2017    History of pneumococcal vaccination   • Personal history of other infectious and parasitic diseases 03/25/2019    History of tinea corporis   • Personal history of other specified conditions 07/25/2014    History of dizziness   • Personal history of other specified conditions 12/18/2017    History of hemoptysis   • Personal history of other specified conditions 06/18/2014    History of dyspnea   • Personal history of other specified conditions 12/29/2017    History of epistaxis   • Personal history of other specified conditions 08/28/2017    History of chest pain   • Personal history of pneumonia (recurrent) 03/10/2017    History of pneumonia   • Personal history of urinary (tract) infections 01/03/2019    History of urinary tract infection   • Pyuria 08/22/2016    Pyuria   • Right upper quadrant pain 02/03/2017    Abdominal pain, RUQ (right upper quadrant)   •  Ulcerative blepharitis unspecified eye, 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/31/2018    Blepharitis, ulcerative   • Unspecified atherosclerosis 07/02/2014    Arteriolosclerosis   • Unspecified cataract 06/09/2015    Cataract of left eye   • Unspecified cataract 06/09/2015    Cataract of right eye     Past Surgical History:   Procedure Laterality Date   • COLON SURGERY  04/06/2020    open sigmoid resection (Dora's  procedure), end colostomy creation, excision of subcutaneous abscess cavity   • CT GUIDED PERCUTANEOUS PERITONEAL OR RETROPERITONEAL FLUID COLLECTION DRAINAGE  04/07/2020    CT GUIDED PERCUTANEOUS PERITONEAL OR RETROPERITONEAL FLUID COLLECTION DRAINAGE 4/7/2020 [facility] CLINICAL LEGACY   • CT GUIDED PERCUTANEOUS PERITONEAL OR RETROPERITONEAL FLUID COLLECTION DRAINAGE  04/22/2020    CT GUIDED PERCUTANEOUS PERITONEAL OR RETROPERITONEAL FLUID COLLECTION DRAINAGE 4/22/2020 [facility] CLINICAL LEGACY   • IR CVC PICC  05/11/2020    IR CVC PICC 5/11/2020 [facility] CLINICAL LEGACY   • LITHOTRIPSY  07/31/2013    Renal Lithotripsy   • OTHER SURGICAL HISTORY  07/02/2014    Arterial Catheterization   • REFRACTIVE SURGERY  06/09/2015    Corneal LASIK   • TONSILLECTOMY  07/02/2014    Tonsillectomy     Family History   Problem Relation Name Age of Onset   • Alcohol abuse Mother     • Other (CARDIAC DISORDER) Mother     • Diabetes Mother     • Glaucoma Mother     • Other (MESOTHELIOMA) Mother     • Alcohol abuse Father     • Melanoma Father     • Macular degeneration Sister     • Breast cancer Sister     • Macular degeneration Other AUNT    • Skin cancer Other Family hx      Social History     Tobacco Use   • Smoking status: Never   • Smokeless tobacco: Never   Substance Use Topics   • Alcohol use: Never   • Drug use: Never       Physical Exam   ED Triage Vitals   Temperature Heart Rate Respirations BP   01/09/25 1235 01/09/25 1231 01/09/25 1231 01/09/25 1200   37.2 °C (99 °F) 86 (!) 24 113/64      Pulse Ox Temp src Heart Rate Source Patient  Position   01/09/25 1200 -- -- --   95 %         BP Location FiO2 (%)     -- --             Physical Exam      ED Course & [facility]   ED Course as of 01/09/25 1709   Thu Jan 09, 2025 1704 Patient is a CBC with shows leukocytosis 21,000, chemistries are unremarkable LFTs are unremarkable troponin x 2 is negative  RSV positive influenza COVID-negative chest ray shows a evolving infiltrate patient pancultured given IV Rocephin and azithromycin and patient will be transferred to [facility] due to bed capacity patient did consented to be transferred to inpatient Medical Center. [MT]      ED Course User Index  [MT] Cornelio Saucedo MD         Diagnoses as of 01/09/25 1709   RSV bronchiolitis   Community acquired pneumonia of right lower lobe of lung                 No data recorded                                 Medical Decision Making      Procedure  Procedures     Cornelio Saucedo MD  01/09/25 1709

## 2025-01-09 NOTE — ED TRIAGE NOTES
"Pt to ed with c/o sob. Pt was diagnosed with rsv and has not had the sob resolve. Per ems pt has not started abx that were prescribed yet. Pt was given prednisone. A o x 4 but states she feels \"foggy\". EKG obtained  "

## 2025-01-10 ENCOUNTER — APPOINTMENT (OUTPATIENT)
Dept: RADIOLOGY | Facility: HOSPITAL | Age: 75
End: 2025-01-10
Payer: MEDICAID

## 2025-01-10 LAB
ANION GAP SERPL CALC-SCNC: 14 MMOL/L (ref 10–20)
BUN SERPL-MCNC: 21 MG/DL (ref 6–23)
CALCIUM SERPL-MCNC: 9.4 MG/DL (ref 8.6–10.3)
CHLORIDE SERPL-SCNC: 98 MMOL/L (ref 98–107)
CO2 SERPL-SCNC: 26 MMOL/L (ref 21–32)
CREAT SERPL-MCNC: 1.03 MG/DL (ref 0.5–1.05)
EGFRCR SERPLBLD CKD-EPI 2021: 57 ML/MIN/1.73M*2
ERYTHROCYTE [DISTWIDTH] IN BLOOD BY AUTOMATED COUNT: 16.5 % (ref 11.5–14.5)
GLUCOSE SERPL-MCNC: 153 MG/DL (ref 74–99)
HCT VFR BLD AUTO: 31.9 % (ref 36–46)
HGB BLD-MCNC: 9.4 G/DL (ref 12–16)
MCH RBC QN AUTO: 20.9 PG (ref 26–34)
MCHC RBC AUTO-ENTMCNC: 29.5 G/DL (ref 32–36)
MCV RBC AUTO: 71 FL (ref 80–100)
NRBC BLD-RTO: ABNORMAL /100{WBCS}
PLATELET # BLD AUTO: 461 X10*3/UL (ref 150–450)
POTASSIUM SERPL-SCNC: 4.1 MMOL/L (ref 3.5–5.3)
RBC # BLD AUTO: 4.5 X10*6/UL (ref 4–5.2)
SODIUM SERPL-SCNC: 134 MMOL/L (ref 136–145)
WBC # BLD AUTO: 13.9 X10*3/UL (ref 4.4–11.3)

## 2025-01-10 PROCEDURE — 97162 PT EVAL MOD COMPLEX 30 MIN: CPT | Mod: GP

## 2025-01-10 PROCEDURE — 99232 SBSQ HOSP IP/OBS MODERATE 35: CPT | Performed by: EMERGENCY MEDICINE

## 2025-01-10 PROCEDURE — 2500000004 HC RX 250 GENERAL PHARMACY W/ HCPCS (ALT 636 FOR OP/ED): Performed by: STUDENT IN AN ORGANIZED HEALTH CARE EDUCATION/TRAINING PROGRAM

## 2025-01-10 PROCEDURE — 94762 N-INVAS EAR/PLS OXIMTRY CONT: CPT

## 2025-01-10 PROCEDURE — 80048 BASIC METABOLIC PNL TOTAL CA: CPT | Performed by: STUDENT IN AN ORGANIZED HEALTH CARE EDUCATION/TRAINING PROGRAM

## 2025-01-10 PROCEDURE — 97116 GAIT TRAINING THERAPY: CPT | Mod: GP

## 2025-01-10 PROCEDURE — 73200 CT UPPER EXTREMITY W/O DYE: CPT | Mod: LT

## 2025-01-10 PROCEDURE — 2500000002 HC RX 250 W HCPCS SELF ADMINISTERED DRUGS (ALT 637 FOR MEDICARE OP, ALT 636 FOR OP/ED): Performed by: STUDENT IN AN ORGANIZED HEALTH CARE EDUCATION/TRAINING PROGRAM

## 2025-01-10 PROCEDURE — 2500000005 HC RX 250 GENERAL PHARMACY W/O HCPCS: Performed by: STUDENT IN AN ORGANIZED HEALTH CARE EDUCATION/TRAINING PROGRAM

## 2025-01-10 PROCEDURE — 1100000001 HC PRIVATE ROOM DAILY

## 2025-01-10 PROCEDURE — 73200 CT UPPER EXTREMITY W/O DYE: CPT | Mod: LEFT SIDE | Performed by: RADIOLOGY

## 2025-01-10 PROCEDURE — 36415 COLL VENOUS BLD VENIPUNCTURE: CPT | Performed by: STUDENT IN AN ORGANIZED HEALTH CARE EDUCATION/TRAINING PROGRAM

## 2025-01-10 PROCEDURE — 2500000001 HC RX 250 WO HCPCS SELF ADMINISTERED DRUGS (ALT 637 FOR MEDICARE OP): Performed by: STUDENT IN AN ORGANIZED HEALTH CARE EDUCATION/TRAINING PROGRAM

## 2025-01-10 PROCEDURE — 85027 COMPLETE CBC AUTOMATED: CPT | Performed by: STUDENT IN AN ORGANIZED HEALTH CARE EDUCATION/TRAINING PROGRAM

## 2025-01-10 PROCEDURE — 97530 THERAPEUTIC ACTIVITIES: CPT | Mod: GP

## 2025-01-10 RX ORDER — ACETAMINOPHEN 500 MG
10 TABLET ORAL NIGHTLY
Status: DISCONTINUED | OUTPATIENT
Start: 2025-01-10 | End: 2025-01-11 | Stop reason: HOSPADM

## 2025-01-10 RX ORDER — LIDOCAINE HYDROCHLORIDE 10 MG/ML
5 INJECTION, SOLUTION INFILTRATION; PERINEURAL ONCE
Status: DISCONTINUED | OUTPATIENT
Start: 2025-01-10 | End: 2025-01-11 | Stop reason: HOSPADM

## 2025-01-10 RX ORDER — MIDODRINE HYDROCHLORIDE 5 MG/1
10 TABLET ORAL 3 TIMES DAILY PRN
Status: DISCONTINUED | OUTPATIENT
Start: 2025-01-10 | End: 2025-01-11 | Stop reason: HOSPADM

## 2025-01-10 RX ORDER — QUETIAPINE FUMARATE 25 MG/1
12.5 TABLET, FILM COATED ORAL 2 TIMES DAILY PRN
Status: DISCONTINUED | OUTPATIENT
Start: 2025-01-10 | End: 2025-01-10

## 2025-01-10 RX ORDER — DULOXETIN HYDROCHLORIDE 30 MG/1
60 CAPSULE, DELAYED RELEASE ORAL DAILY
Status: DISCONTINUED | OUTPATIENT
Start: 2025-01-10 | End: 2025-01-11 | Stop reason: HOSPADM

## 2025-01-10 RX ORDER — TAMSULOSIN HYDROCHLORIDE 0.4 MG/1
0.4 CAPSULE ORAL DAILY
Status: DISCONTINUED | OUTPATIENT
Start: 2025-01-10 | End: 2025-01-11 | Stop reason: HOSPADM

## 2025-01-10 RX ORDER — QUETIAPINE FUMARATE 25 MG/1
12.5 TABLET, FILM COATED ORAL 2 TIMES DAILY
Status: DISCONTINUED | OUTPATIENT
Start: 2025-01-10 | End: 2025-01-11 | Stop reason: HOSPADM

## 2025-01-10 RX ORDER — GUAIFENESIN 600 MG/1
1200 TABLET, EXTENDED RELEASE ORAL 2 TIMES DAILY
Status: DISCONTINUED | OUTPATIENT
Start: 2025-01-10 | End: 2025-01-11 | Stop reason: HOSPADM

## 2025-01-10 RX ORDER — PROPRANOLOL HYDROCHLORIDE 10 MG/1
20 TABLET ORAL 2 TIMES DAILY
Status: DISCONTINUED | OUTPATIENT
Start: 2025-01-10 | End: 2025-01-11 | Stop reason: HOSPADM

## 2025-01-10 RX ORDER — CETIRIZINE HYDROCHLORIDE 10 MG/1
10 TABLET ORAL DAILY
Status: DISCONTINUED | OUTPATIENT
Start: 2025-01-10 | End: 2025-01-11 | Stop reason: HOSPADM

## 2025-01-10 RX ORDER — FLUTICASONE PROPIONATE 50 MCG
2 SPRAY, SUSPENSION (ML) NASAL DAILY
Status: DISCONTINUED | OUTPATIENT
Start: 2025-01-10 | End: 2025-01-10 | Stop reason: RX

## 2025-01-10 RX ORDER — TRAMADOL HYDROCHLORIDE 50 MG/1
50 TABLET ORAL EVERY 8 HOURS PRN
Status: DISCONTINUED | OUTPATIENT
Start: 2025-01-10 | End: 2025-01-11 | Stop reason: HOSPADM

## 2025-01-10 RX ORDER — GABAPENTIN 100 MG/1
100 CAPSULE ORAL 2 TIMES DAILY
Status: DISCONTINUED | OUTPATIENT
Start: 2025-01-10 | End: 2025-01-11 | Stop reason: HOSPADM

## 2025-01-10 RX ADMIN — Medication 2 L/MIN: at 08:40

## 2025-01-10 RX ADMIN — TAMSULOSIN HYDROCHLORIDE 0.4 MG: 0.4 CAPSULE ORAL at 09:26

## 2025-01-10 RX ADMIN — CETIRIZINE HYDROCHLORIDE 10 MG: 10 TABLET, FILM COATED ORAL at 09:26

## 2025-01-10 RX ADMIN — AZITHROMYCIN DIHYDRATE 250 MG: 250 TABLET ORAL at 09:26

## 2025-01-10 RX ADMIN — POLYETHYLENE GLYCOL 3350 17 G: 17 POWDER, FOR SOLUTION ORAL at 09:26

## 2025-01-10 RX ADMIN — GABAPENTIN 100 MG: 100 CAPSULE ORAL at 09:26

## 2025-01-10 RX ADMIN — ENOXAPARIN SODIUM 40 MG: 40 INJECTION SUBCUTANEOUS at 20:53

## 2025-01-10 RX ADMIN — TRAMADOL HYDROCHLORIDE 50 MG: 50 TABLET ORAL at 15:17

## 2025-01-10 RX ADMIN — TRAMADOL HYDROCHLORIDE 50 MG: 50 TABLET ORAL at 03:31

## 2025-01-10 RX ADMIN — QUETIAPINE FUMARATE 12.5 MG: 25 TABLET ORAL at 10:32

## 2025-01-10 RX ADMIN — Medication 2 L/MIN: at 08:08

## 2025-01-10 RX ADMIN — GABAPENTIN 100 MG: 100 CAPSULE ORAL at 20:53

## 2025-01-10 RX ADMIN — PROPRANOLOL HYDROCHLORIDE 20 MG: 10 TABLET ORAL at 12:00

## 2025-01-10 RX ADMIN — GUAIFENESIN 1200 MG: 600 TABLET, EXTENDED RELEASE ORAL at 09:26

## 2025-01-10 RX ADMIN — DULOXETINE HYDROCHLORIDE 60 MG: 30 CAPSULE, DELAYED RELEASE ORAL at 09:26

## 2025-01-10 RX ADMIN — Medication 2 L/MIN: at 20:08

## 2025-01-10 RX ADMIN — Medication 2 L/MIN: at 17:30

## 2025-01-10 RX ADMIN — PROPRANOLOL HYDROCHLORIDE 20 MG: 10 TABLET ORAL at 20:57

## 2025-01-10 RX ADMIN — CEFTRIAXONE 1 G: 1 INJECTION, SOLUTION INTRAVENOUS at 15:23

## 2025-01-10 RX ADMIN — GUAIFENESIN 1200 MG: 600 TABLET, EXTENDED RELEASE ORAL at 20:53

## 2025-01-10 ASSESSMENT — COGNITIVE AND FUNCTIONAL STATUS - GENERAL
HELP NEEDED FOR BATHING: A LITTLE
MOVING TO AND FROM BED TO CHAIR: A LITTLE
MOBILITY SCORE: 19
WALKING IN HOSPITAL ROOM: A LITTLE
MOVING TO AND FROM BED TO CHAIR: A LITTLE
HELP NEEDED FOR BATHING: A LITTLE
MOBILITY SCORE: 19
CLIMB 3 TO 5 STEPS WITH RAILING: A LITTLE
STANDING UP FROM CHAIR USING ARMS: A LITTLE
DAILY ACTIVITIY SCORE: 21
DAILY ACTIVITIY SCORE: 21
TURNING FROM BACK TO SIDE WHILE IN FLAT BAD: A LITTLE
STANDING UP FROM CHAIR USING ARMS: A LITTLE
DRESSING REGULAR LOWER BODY CLOTHING: A LITTLE
WALKING IN HOSPITAL ROOM: A LITTLE
TOILETING: A LITTLE
TURNING FROM BACK TO SIDE WHILE IN FLAT BAD: A LITTLE
MOVING TO AND FROM BED TO CHAIR: A LITTLE
WALKING IN HOSPITAL ROOM: A LITTLE
DRESSING REGULAR LOWER BODY CLOTHING: A LITTLE
CLIMB 3 TO 5 STEPS WITH RAILING: A LITTLE
STANDING UP FROM CHAIR USING ARMS: A LITTLE
MOBILITY SCORE: 19
TOILETING: A LITTLE
CLIMB 3 TO 5 STEPS WITH RAILING: A LOT

## 2025-01-10 ASSESSMENT — PAIN - FUNCTIONAL ASSESSMENT
PAIN_FUNCTIONAL_ASSESSMENT: 0-10
PAIN_FUNCTIONAL_ASSESSMENT: UNABLE TO SELF-REPORT
PAIN_FUNCTIONAL_ASSESSMENT: UNABLE TO SELF-REPORT
PAIN_FUNCTIONAL_ASSESSMENT: 0-10
PAIN_FUNCTIONAL_ASSESSMENT: UNABLE TO SELF-REPORT

## 2025-01-10 ASSESSMENT — PAIN SCALES - GENERAL
PAINLEVEL_OUTOF10: 7
PAINLEVEL_OUTOF10: 7
PAINLEVEL_OUTOF10: 0 - NO PAIN
PAINLEVEL_OUTOF10: 0 - NO PAIN
PAINLEVEL_OUTOF10: 7
PAINLEVEL_OUTOF10: 0 - NO PAIN
PAINLEVEL_OUTOF10: 0 - NO PAIN

## 2025-01-10 ASSESSMENT — PAIN DESCRIPTION - LOCATION
LOCATION: GENERALIZED
LOCATION: BACK

## 2025-01-10 ASSESSMENT — ACTIVITIES OF DAILY LIVING (ADL): ADL_ASSISTANCE: NEEDS ASSISTANCE

## 2025-01-10 ASSESSMENT — PAIN DESCRIPTION - DESCRIPTORS: DESCRIPTORS: ACHING

## 2025-01-10 ASSESSMENT — PAIN DESCRIPTION - ORIENTATION: ORIENTATION: RIGHT;UPPER

## 2025-01-10 NOTE — NURSING NOTE
Patient uncooperative with hospital staff.Refusing most of ordered treatment. Colostomy changed for large amount soft formed brown stool.

## 2025-01-10 NOTE — NURSING NOTE
Resting in bed with call light within reach and bed alarm on.  Respirations even and unlabored on 2L nasal canula.  Oxygen saturation 96%.  Nonproductive cough noted.  Rales noted to right lower lobe upon auscultation.

## 2025-01-10 NOTE — NURSING NOTE
Patient received at this time from [facility] ED via ambulance.  Oriented to room and use of call light.  Voices no c/o pain at this time.  Dr. Tucker notified of patient arrival.

## 2025-01-10 NOTE — PROGRESS NOTES
Physical Therapy Evaluation & Treatment    Patient Name: Miss Eva Velasquez  MRN: [id number]  Department: [facility]  Room: [address] [place], [place] [address]  Today's Date: 1/10/2025   Time Calculation  Start Time: 0854  Stop Time: 0933  Time Calculation (min): 39 min    Assessment/Plan   PT Assessment  PT Assessment Results: Decreased strength, Decreased endurance, Impaired balance, Decreased mobility, Decreased coordination, Decreased cognition, Impaired judgement, Decreased safety awareness, Pain  Rehab Prognosis: Fair  Barriers to Discharge Home: Cognition needs  Cognition Needs: Recollection or understanding of precautions/restrictions limited, Recollection or understanding of home exercise program limited, Insight of patient limited regarding functional ability/needs, Cognition-related high falls risk, Medication and/or medical management daily assist needed, 24hr supervision for safety awareness needed  Evaluation/Treatment Tolerance: Patient tolerated treatment well  End of Session Communication: Bedside nurse  Assessment Comment: Pt presents with impaired functional mobility s/p community acquired PNA, including impaired balance, decreased strength/ROM, and decreased tolerance to activity. Recommend discharge back to LTC facility vs SNF (pending cognition/compliance) as next level of care. Pt would may benefit from OT evaluation if next level of care is SNF to address impaired ADL status.  End of Session Patient Position: Bed, 3 rail up, Alarm on, call light in reach, needs met, RN aware.  IP OR SWING BED PT PLAN  Inpatient or Swing Bed: Inpatient  PT Plan  Treatment/Interventions: Bed mobility, Transfer training, Gait training, Stair training, Balance training, Neuromuscular re-education, Strengthening, Endurance training, Therapeutic exercise, Therapeutic activity, Home exercise program, Positioning, Postural re-education, Wheelchair management  PT Plan: Ongoing PT  PT Frequency: 3 times per week  PT Discharge  Recommendations: Moderate intensity level of continued care  Equipment Recommended upon Discharge: Wheeled walker, Wheelchair  PT Recommended Transfer Status: Assist x1, Assistive device  PT - OK to Discharge: Yes (to LTC/SNF)      Subjective     General Visit Information:  General  Reason for Referral: +RSV with community acquired PNA  Referred By: Dr. Tucker  Past Medical History Relevant to Rehab: HTN, kidney stones, OA, CAD, fibromyalgia, depression, colostomy  Family/Caregiver Present: Yes  Prior to Session Communication: Bedside nurse  Patient Position Received: Bed, 3 rail up, Alarm on  Home Living:  Home Living  Type of Home: Long Term Care facility  Lives With:  (Care staff)  Home Adaptive Equipment: Walker rolling or standard, Wheelchair-manual (as available at facility)  Home Living Comments: care staff assists  Prior Level of Function:  Prior Function Per Pt/Caregiver Report  Level of [place]: Needs assistance with ADLs, Needs assistance with homemaking, Needs assistance with functional transfers  Receives Help From:  (care staff)  ADL Assistance: Needs assistance  Homemaking Assistance:  (lives at LTC facility)  Ambulatory Assistance: Needs assistance  Vocational: Retired  Prior Function Comments: Pt reports many falls prior to Sept 2024 hospital admission.  Precautions:  Precautions  Medical Precautions: Fall precautions      Objective   Pain:  Pain Assessment  Pain Assessment: 0-10  0-10 (Numeric) Pain Score: 7  Pain Type: Acute pain  Pain Location: Back  Pain Interventions: Repositioned  Cognition:  Cognition  Overall Cognitive Status: Impaired at baseline  Attention: Within Functional Limits  Memory: Exceptions to WFL  Problem Solving: Exceptions to WFL  Safety/Judgement: Exceptions to WFL    General Assessments:  Activity Tolerance  Endurance: Tolerates less than 10 min exercise, no significant change in vital signs (SOB, but SpO2 remains >92% on 2L.)    Sensation  Light Touch: No apparent  deficits    Coordination  Movements are Fluid and Coordinated: Yes    Postural Control  Postural Control: Impaired (in standing)    Static Sitting Balance  Static Sitting-Balance Support: Feet supported  Static Sitting-Level of Assistance: Independent  Dynamic Sitting Balance  Dynamic Sitting-Balance Support: Feet supported  Dynamic Sitting-Level of Assistance: Independent    Static Standing Balance  Static Standing-Balance Support: Right upper extremity supported (hand held assist)  Static Standing-Level of Assistance: Minimum assistance  Dynamic Standing Balance  Dynamic Standing-Balance Support: Right upper extremity supported (via hand held assist)  Dynamic Standing-Level of Assistance: Minimum assistance  Functional Assessments:       Bed Mobility  Bed Mobility: Yes  Bed Mobility 1  Bed Mobility 1: Supine to sitting, Sitting to supine  Level of Assistance 1: Close supervision    Transfers  Transfer: Yes  Transfer 1  Transfer From 1: Bed to, Chair without arms to  Transfer to 1: Bed, Chair without arms  Technique 1: Sit to stand, Stand to sit  Transfer Level of Assistance 1: Minimum assistance, Minimal verbal cues (cues for hand placement)    Ambulation/Gait Training  Ambulation/Gait Training Performed: Yes  Ambulation/Gait Training 1  Surface 1: Level tile  Device 1: No device  Assistance 1: Hand held assistance, Minimum assistance, Minimal verbal cues (cues for safety)  Quality of Gait 1: Decreased step length (decreased ankur, postural sway in all directions, pt reaching out for furniture, depsite cues from PT for safety.)  Comments/Distance (ft) 1: 50 feet x 1  Extremity/Trunk Assessments:  RUE   RUE : Within Functional Limits  LUE   LUE: Within Functional Limits  RLE   RLE : Exceptions to WFL, chronic weakness  LLE   LLE : Exceptions to WFL, chronic weakness    Outcome Measures:  [facility] Basic Mobility  Turning from your back to your side while in a flat bed without using bedrails: None  Moving from lying  on your back to sitting on the side of a flat bed without using bedrails: None  Moving to and from bed to chair (including a wheelchair): A little  Standing up from a chair using your arms (e.g. wheelchair or bedside chair): A little  To walk in hospital room: A little  Climbing 3-5 steps with railing: A lot  Basic Mobility - Total Score: 19    Encounter Problems       Encounter Problems (Active)       Balance       LTG - Patient will demonstrate Intervention to enhance balance for safe completion of daily activities (Progressing)       Start:  01/10/25            LTG - Patient will maintain balance to allow for safe mobility (Progressing)       Start:  01/10/25               Compromised Skin Integrity       LTG - Patient will be free from infection (Progressing)       Start:  01/10/25               Mobility       LTG - Patient will ambulate household distance with RW at a modified independent level.   (Progressing)       Start:  01/10/25               PT Transfers       LTG - Patient will demonstrate safe transfer techniques with RW at a modified independent level.   (Progressing)       Start:  01/10/25               Pain          Pain - Adult          Safety       LTG - Patient will demonstrate safety requirements appropriate to situation/environment (Progressing)       Start:  01/10/25                 Saritha Carmen, PT, DPT

## 2025-01-10 NOTE — CARE PLAN
The patient's goals for the shift include breathe easy    The clinical goals for the shift include breathe easy

## 2025-01-10 NOTE — CARE PLAN
Dr. Finnegan notified of VBG and BMP results. New orders received.    The patient's goals for the shift include breathe easy    The clinical goals for the shift include improved respiratory status.

## 2025-01-10 NOTE — CARE PLAN
Problem: Balance  Goal: LTG - Patient will demonstrate Intervention to enhance balance for safe completion of daily activities  Outcome: Progressing  Goal: LTG - Patient will maintain balance to allow for safe mobility  Outcome: Progressing     Problem: Mobility  Goal: LTG - Patient will ambulate household distance with RW at a modified independent level.    Outcome: Progressing     Problem: Safety  Goal: LTG - Patient will demonstrate safety requirements appropriate to situation/environment  Outcome: Progressing     Problem: PT Transfers  Goal: LTG - Patient will demonstrate safe transfer techniques with RW at a modified independent level.    Outcome: Progressing     Problem: Pain  Goal: LTG - Patient will manage pain with the appropriate technique/Intervention  Outcome: Progressing     Problem: Compromised Skin Integrity  Goal: LTG - Patient will be free from infection  Outcome: Progressing

## 2025-01-10 NOTE — NURSING NOTE
Resting in bed with eyes closed and call light within reach.  Bed alarm is on.  Respirations even and unlabored on 2L nasal canula.

## 2025-01-10 NOTE — PROGRESS NOTES
Message sent to [facility] via [employer] regarding pt return to facility and if precert will be needed.    Await confirmation.

## 2025-01-10 NOTE — PROGRESS NOTES
"Sandra Velasquez \"Miss Velasquez\" is a 74 y.o. female on day 1 of admission presenting with Community acquired pneumonia of right lower lobe of lung.    Subjective   Patient states she is feeling better       Objective     Physical Exam  Patient is alert in no acute distress  Lungs are clear to auscultation and percussion  Cardiac is regular rate and rhythm normal S1-S2 without murmur gallop rub click S3 or S4  Abdomen is soft nontender positive bowel sounds there is no hepatosplenomegaly or CVA tenderness appreciated  Extremities without cyanosis clubbing erythema or edema  Left humerus shows wound the patient states she has had for approximately a year unknown reason  Neurologically patient is alert to her self think she is at [facility] otherwise nonfocal  Last Recorded Vitals  Blood pressure 125/58, pulse 68, temperature 36.2 °C (97.2 °F), temperature source Temporal, resp. rate 18, height 1.574 m (5' 1.97\"), weight 58.5 kg (128 lb 15.5 oz), SpO2 95%.  Intake/Output last 3 Shifts:  No intake/output data recorded.    Relevant Results  Scheduled medications  azithromycin, 250 mg, oral, q24h VILMA  cefTRIAXone, 1 g, intravenous, q24h  cetirizine, 10 mg, oral, Daily  DULoxetine, 60 mg, oral, Daily  enoxaparin, 40 mg, subcutaneous, q24h  gabapentin, 100 mg, oral, BID  guaiFENesin, 1,200 mg, oral, BID  lidocaine, 5 mL, infiltration, Once  melatonin, 10 mg, oral, Nightly  oxygen, , inhalation, Continuous - Inhalation  polyethylene glycol, 17 g, oral, Daily  propranolol, 20 mg, oral, BID  QUEtiapine, 12.5 mg, oral, BID  tamsulosin, 0.4 mg, oral, Daily      Continuous medications     PRN medications  PRN medications: acetaminophen, alteplase, benzocaine-menthol, benzonatate, calcium carbonate, diphenhydrAMINE, ipratropium-albuteroL, lubricating eye drops, midodrine, ondansetron, simethicone, sodium chloride, traMADol    Results for orders placed or performed during the hospital encounter of 01/09/25 (from the past 24 hours) "   CBC   Result Value Ref Range    WBC 13.9 (H) 4.4 - 11.3 x10*3/uL    nRBC      RBC 4.50 4.00 - 5.20 x10*6/uL    Hemoglobin 9.4 (L) 12.0 - 16.0 g/dL    Hematocrit 31.9 (L) 36.0 - 46.0 %    MCV 71 (L) 80 - 100 fL    MCH 20.9 (L) 26.0 - 34.0 pg    MCHC 29.5 (L) 32.0 - 36.0 g/dL    RDW 16.5 (H) 11.5 - 14.5 %    Platelets 461 (H) 150 - 450 x10*3/uL   Basic metabolic panel   Result Value Ref Range    Glucose 153 (H) 74 - 99 mg/dL    Sodium 134 (L) 136 - 145 mmol/L    Potassium 4.1 3.5 - 5.3 mmol/L    Chloride 98 98 - 107 mmol/L    Bicarbonate 26 21 - 32 mmol/L    Anion Gap 14 10 - 20 mmol/L    Urea Nitrogen 21 6 - 23 mg/dL    Creatinine 1.03 0.50 - 1.05 mg/dL    eGFR 57 (L) >60 mL/min/1.73m*2    Calcium 9.4 8.6 - 10.3 mg/dL     *Note: Due to a large number of results and/or encounters for the requested time period, some results have not been displayed. A complete set of results can be found in Results Review.                Assessment/Plan   Assessment & Plan  Community acquired pneumonia of right lower lobe of lung  Patient tested positive for RSV pneumonia  Is clinically stable  Currently on 2 L per nasal cannula while at rest  Will continue current antibiotics ceftriaxone and azithromycin  If patient remains clinically stable will be discharged tomorrow back to skilled nursing    Psych issues  Review of chart patient had a geriatric assessment in September 2024; noted some cognitive decline; had been followed by psychology in the past has a diagnosis of narcissistic and borderline personality  Patient started on Seroquel 12.5 mg p.o. twice daily and it appears to have stabilized her mood  Will continue the Cymbalta as well    DVT prophylaxis  Lovenox    Wound on left humerus  CT of the humerus was done today shows a tunneling which is read is deep approximately 4 mm.  Culture taken of wound  Will refer to general surgery as outpatient             I spent 60 minutes in the professional and overall care of this  patient.      Anat Mcclain MD

## 2025-01-10 NOTE — NURSING NOTE
Resting in bed with eye closed and call light within reach.  Bed alarm is on. Respirations even and unlabored pulse ox 99%.

## 2025-01-10 NOTE — H&P
"History Of Present Illness  Sandra Velasquez \"Miss Marshall" is a 74 y.o. female with history of hidradenitis suppurativa, CAD, fibromyalgia, anxiety, depression, and diverticulitis s/p Dora procedure with colostomy who was sent to [facility] ED from her skilled nursing facility due to shortness of breath.  She was found to be RSV positive, and chest x-ray showed a right lower lobe infiltrate.  Patient is additionally complaining of chest pain, nonproductive cough, fevers, and nausea.  She has chronic wounds to her left upper extremity which she states drain purulent discharge, particularly at night.  At [facility], patient was started on Rocephin and azithromycin as well as Solu-Medrol and DuoNebs.  She is currently on 2 L nasal cannula.  Vital signs are stable at the time of arrival.  Past Medical History  Past Medical History:   Diagnosis Date    Acute cystitis without hematuria 01/03/2019    Acute cystitis without hematuria    Age-related nuclear cataract, left eye 10/31/2018    Cataract, nuclear sclerotic, left eye    Age-related nuclear cataract, right eye 10/31/2018    Cataract, nuclear sclerotic, right eye    Allergic rhinitis due to pollen 09/19/2018    Allergic rhinitis due to pollen, unspecified seasonality    Calculus of kidney 12/11/2018    Right nephrolithiasis    Combined forms of age-related cataract, left eye 05/08/2017    Combined form of age-related cataract, left eye    Combined forms of age-related cataract, left eye 05/08/2017    Combined form of age-related cataract, left eye    Cortical age-related cataract, left eye 10/31/2018    Cortical age-related cataract of left eye    Cyanosis 08/10/2017    Bluish skin discoloration    Dry eye syndrome of bilateral lacrimal glands 10/31/2018    Bilateral dry eyes    Dry eye syndrome of bilateral lacrimal glands 10/31/2018    Dry eyes, bilateral    History of falling 01/03/2019    History of fall    Hyperlipidemia, unspecified 08/26/2020    Hyperlipidemia "    Lesion of radial nerve, unspecified upper limb 03/05/2016    Radial nerve irritation    Meibomian gland dysfunction of unspecified eye, 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/31/2018    MGD (meibomian gland disease)    Nausea 11/27/2018    Nausea in adult    Opioid dependence, uncomplicated (Multi) 03/09/2020    Moderate opioid use disorder    Other chronic sinusitis 06/15/2018    Other chronic sinusitis    Other fecal abnormalities 11/27/2018    Light stools    Other specified disorders of nose and nasal sinuses 06/15/2018    Nasal septal perforation    Other specified disorders of nose and nasal sinuses 10/24/2018    Nasal crusting    Other specified disorders of nose and nasal sinuses 02/03/2017    Nasal septal perforation    Other specified disorders of teeth and supporting structures 09/04/2015    Dentalgia    Pain in leg, unspecified 03/11/2019    Leg pain    Personal history of diseases of the blood and blood-forming organs and certain disorders involving the immune mechanism 02/03/2015    History of anemia    Personal history of diseases of the skin and subcutaneous tissue 08/16/2013    History of actinic keratosis    Personal history of other diseases of the circulatory system     History of hypertension    Personal history of other diseases of the digestive system     History of constipation    Personal history of other diseases of the digestive system 07/02/2014    History of gastroesophageal reflux (GERD)    Personal history of other diseases of the musculoskeletal system and connective tissue 07/02/2014    Personal history of arthritis    Personal history of other diseases of the respiratory system 12/18/2017    History of sore throat    Personal history of other diseases of the respiratory system 12/29/2017    History of paranasal sinus congestion    Personal history of other diseases of the respiratory system 01/03/2019    History of acute sinusitis    Personal history of other diseases of the respiratory system  02/03/2017    History of acute sinusitis    Personal history of other diseases of the respiratory system 12/29/2017    History of acute sinusitis    Personal history of other diseases of the respiratory system 06/15/2018    History of nasal discharge    Personal history of other diseases of the respiratory system 08/11/2015    History of acute sinusitis    Personal history of other drug therapy 02/08/2017    History of pneumococcal vaccination    Personal history of other infectious and parasitic diseases 03/25/2019    History of tinea corporis    Personal history of other specified conditions 07/25/2014    History of dizziness    Personal history of other specified conditions 12/18/2017    History of hemoptysis    Personal history of other specified conditions 06/18/2014    History of dyspnea    Personal history of other specified conditions 12/29/2017    History of epistaxis    Personal history of other specified conditions 08/28/2017    History of chest pain    Personal history of pneumonia (recurrent) 03/10/2017    History of pneumonia    Personal history of urinary (tract) infections 01/03/2019    History of urinary tract infection    Pyuria 08/22/2016    Pyuria    Right upper quadrant pain 02/03/2017    Abdominal pain, RUQ (right upper quadrant)    Ulcerative blepharitis unspecified eye, 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/31/2018    Blepharitis, ulcerative    Unspecified atherosclerosis 07/02/2014    Arteriolosclerosis    Unspecified cataract 06/09/2015    Cataract of left eye    Unspecified cataract 06/09/2015    Cataract of right eye       Surgical History  Past Surgical History:   Procedure Laterality Date    COLON SURGERY  04/06/2020    open sigmoid resection (Dora's  procedure), end colostomy creation, excision of subcutaneous abscess cavity    CT GUIDED PERCUTANEOUS PERITONEAL OR RETROPERITONEAL FLUID COLLECTION DRAINAGE  04/07/2020    CT GUIDED PERCUTANEOUS PERITONEAL OR RETROPERITONEAL FLUID COLLECTION DRAINAGE  4/7/2020 [facility] CLINICAL LEGACY    CT GUIDED PERCUTANEOUS PERITONEAL OR RETROPERITONEAL FLUID COLLECTION DRAINAGE  04/22/2020    CT GUIDED PERCUTANEOUS PERITONEAL OR RETROPERITONEAL FLUID COLLECTION DRAINAGE 4/22/2020 [facility] CLINICAL LEGACY    IR CVC PICC  05/11/2020    IR CVC PICC 5/11/2020 [facility] CLINICAL LEGACY    LITHOTRIPSY  07/31/2013    Renal Lithotripsy    OTHER SURGICAL HISTORY  07/02/2014    Arterial Catheterization    REFRACTIVE SURGERY  06/09/2015    Corneal LASIK    TONSILLECTOMY  07/02/2014    Tonsillectomy        Social History  She reports that she has never smoked. She has never used smokeless tobacco. She reports that she does not drink alcohol and does not use drugs.    Family History  Family History   Problem Relation Name Age of Onset    Alcohol abuse Mother      Other (CARDIAC DISORDER) Mother      Diabetes Mother      Glaucoma Mother      Other (MESOTHELIOMA) Mother      Alcohol abuse Father      Melanoma Father      Macular degeneration Sister      Breast cancer Sister      Macular degeneration Other AUNT     Skin cancer Other Family hx         Allergies  Atorvastatin, House dust mite, and Erythromycin base    Review of Systems   All other systems reviewed and are negative.       Physical Exam  Constitutional:       General: She is not in acute distress.     Appearance: Normal appearance.   HENT:      Head: Normocephalic and atraumatic.      Nose: Nose normal.      Mouth/Throat:      Mouth: Mucous membranes are moist.      Pharynx: Oropharynx is clear.   Eyes:      Conjunctiva/sclera: Conjunctivae normal.      Pupils: Pupils are equal, round, and reactive to light.   Cardiovascular:      Rate and Rhythm: Normal rate and regular rhythm.   Pulmonary:      Effort: Pulmonary effort is normal. No respiratory distress.      Breath sounds: No stridor. Rales present. No wheezing.   Abdominal:      General: Bowel sounds are normal.      Palpations: Abdomen is soft.      Tenderness: There is no abdominal  "tenderness. There is no rebound.   Musculoskeletal:         General: No swelling, deformity or signs of injury.      Cervical back: Normal range of motion and neck supple.   Skin:     General: Skin is warm and dry.      Findings: Lesion present.   Neurological:      General: No focal deficit present.      Mental Status: She is alert and oriented to person, place, and time. Mental status is at baseline.   Psychiatric:         Attention and Perception: Attention and perception normal.         Mood and Affect: Mood normal.         Behavior: Behavior normal.         Judgment: Judgment normal.          Last Recorded Vitals  Blood pressure 130/60, pulse 82, temperature 36.5 °C (97.7 °F), temperature source Temporal, resp. rate 19, height 1.574 m (5' 1.97\"), weight 58.5 kg (128 lb 15.5 oz), SpO2 96%.    Relevant Results        Results for orders placed or performed during the hospital encounter of 01/09/25 (from the past 24 hours)   Sars-CoV-2 PCR   Result Value Ref Range    Coronavirus 2019, PCR Not Detected Not Detected   RSV PCR   Result Value Ref Range    RSV PCR Detected (A) Not Detected   Influenza A, and B PCR   Result Value Ref Range    Flu A Result Not Detected Not Detected    Flu B Result Not Detected Not Detected   ECG 12 lead   Result Value Ref Range    Ventricular Rate 90 BPM    Atrial Rate 90 BPM    OH Interval 120 ms    QRS Duration 72 ms    QT Interval 356 ms    QTC Calculation(Bazett) 435 ms    P Axis 43 degrees    R Axis 117 degrees    T Axis 24 degrees    QRS Count 15 beats    Q Onset 222 ms    P Onset 162 ms    P Offset 213 ms    T Offset 400 ms    QTC Fredericia 407 ms   CBC and Auto Differential   Result Value Ref Range    WBC 20.4 (H) 4.4 - 11.3 x10*3/uL    nRBC 0.0 0.0 - 0.0 /100 WBCs    RBC 4.11 4.00 - 5.20 x10*6/uL    Hemoglobin 8.7 (L) 12.0 - 16.0 g/dL    Hematocrit 29.1 (L) 36.0 - 46.0 %    MCV 71 (L) 80 - 100 fL    MCH 21.2 (L) 26.0 - 34.0 pg    MCHC 29.9 (L) 32.0 - 36.0 g/dL    RDW 16.6 (H) " 11.5 - 14.5 %    Platelets 448 150 - 450 x10*3/uL    Neutrophils % 73.7 40.0 - 80.0 %    Immature Granulocytes %, Automated 0.4 0.0 - 0.9 %    Lymphocytes % 16.0 13.0 - 44.0 %    Monocytes % 9.0 2.0 - 10.0 %    Eosinophils % 0.5 0.0 - 6.0 %    Basophils % 0.4 0.0 - 2.0 %    Neutrophils Absolute 15.02 (H) 1.60 - 5.50 x10*3/uL    Immature Granulocytes Absolute, Automated 0.09 0.00 - 0.50 x10*3/uL    Lymphocytes Absolute 3.27 (H) 0.80 - 3.00 x10*3/uL    Monocytes Absolute 1.84 (H) 0.05 - 0.80 x10*3/uL    Eosinophils Absolute 0.11 0.00 - 0.40 x10*3/uL    Basophils Absolute 0.09 0.00 - 0.10 x10*3/uL   Basic metabolic panel   Result Value Ref Range    Glucose 116 (H) 74 - 99 mg/dL    Sodium 134 (L) 136 - 145 mmol/L    Potassium 4.1 3.5 - 5.3 mmol/L    Chloride 100 98 - 107 mmol/L    Bicarbonate 29 21 - 32 mmol/L    Anion Gap 9 (L) 10 - 20 mmol/L    Urea Nitrogen 13 6 - 23 mg/dL    Creatinine 0.99 0.50 - 1.05 mg/dL    eGFR 60 (L) >60 mL/min/1.73m*2    Calcium 8.9 8.6 - 10.3 mg/dL   Magnesium   Result Value Ref Range    Magnesium 1.89 1.60 - 2.40 mg/dL   Hepatic function panel   Result Value Ref Range    Albumin 2.7 (L) 3.4 - 5.0 g/dL    Bilirubin, Total 0.4 0.0 - 1.2 mg/dL    Bilirubin, Direct 0.1 0.0 - 0.3 mg/dL    Alkaline Phosphatase 79 33 - 136 U/L    ALT 8 7 - 45 U/L    AST 9 9 - 39 U/L    Total Protein 7.4 6.4 - 8.2 g/dL   Protime-INR   Result Value Ref Range    Protime 15.5 (H) 9.8 - 12.8 seconds    INR 1.4 (H) 0.9 - 1.1   B-Type Natriuretic Peptide   Result Value Ref Range     (H) 0 - 99 pg/mL   Blood Gas Venous Full Panel   Result Value Ref Range    POCT pH, Venous 7.37 7.33 - 7.43 pH    POCT pCO2, Venous 49 41 - 51 mm Hg    POCT pO2, Venous 37 35 - 45 mm Hg    POCT SO2, Venous 62 45 - 75 %    POCT Oxy Hemoglobin, Venous 60.2 45.0 - 75.0 %    POCT Hematocrit Calculated, Venous 28.0 (L) 36.0 - 46.0 %    POCT Sodium, Venous 135 (L) 136 - 145 mmol/L    POCT Potassium, Venous 4.3 3.5 - 5.3 mmol/L    POCT  Chloride, Venous 99 98 - 107 mmol/L    POCT Ionized Calicum, Venous 1.30 1.10 - 1.33 mmol/L    POCT Glucose, Venous 120 (H) 74 - 99 mg/dL    POCT Lactate, Venous 1.2 0.4 - 2.0 mmol/L    POCT Base Excess, Venous 2.5 -2.0 - 3.0 mmol/L    POCT HCO3 Calculated, Venous 28.3 (H) 22.0 - 26.0 mmol/L    POCT Hemoglobin, Venous 9.3 (L) 12.0 - 16.0 g/dL    POCT Anion Gap, Venous 12.0 10.0 - 25.0 mmol/L    Patient Temperature 37.0 degrees Celsius    FiO2 21 %   Troponin I, High Sensitivity, Initial   Result Value Ref Range    Troponin I, High Sensitivity 8 0 - 13 ng/L   Troponin, High Sensitivity, 1 Hour   Result Value Ref Range    Troponin I, High Sensitivity 5 0 - 13 ng/L   Blood Culture    Specimen: Peripheral Venipuncture; Blood culture   Result Value Ref Range    Blood Culture Loaded on Instrument - Culture in progress    Blood Culture    Specimen: Peripheral Venipuncture; Blood culture   Result Value Ref Range    Blood Culture Loaded on Instrument - Culture in progress      *Note: Due to a large number of results and/or encounters for the requested time period, some results have not been displayed. A complete set of results can be found in Results Review.     ECG 12 lead    Result Date: 1/9/2025  Normal sinus rhythm Left posterior fascicular block Cannot rule out Anterior infarct (cited on or before 10-APR-2024) Abnormal ECG When compared with ECG of 10-SEP-2024 12:53, Non-specific change in ST segment in Lateral leads    XR chest 1 view    Result Date: 1/9/2025  Interpreted By:  Khanh Valdez, STUDY: XR CHEST 1 VIEW;  1/9/2025 11:32 am   INDICATION: Signs/Symptoms:sob.   COMPARISON: 09/10/2024   ACCESSION NUMBER(S): [id number]   ORDERING CLINICIAN: HELADIO ROSARIO   FINDINGS: CARDIOMEDIASTINAL SILHOUETTE AND VASCULATURE:   Cardiac size:  Within normal limits. Aortic shadow:  Within normal limits.   Mediastinal contours: Within normal limits.   Pulmonary vasculature:  The central vasculature is unremarkable   LUNGS: Some  persistent but improved atelectasis of the left lower lung and base. Opacity in the right lower lung laterally appears new since the previous examination, probably from infiltrate and small effusion, although also probably with some opacity due to overlying soft tissues. There is a linear band of fibrosis in the right lower lung similar to the previous exam.   ABDOMEN AND OTHER FINDINGS: No remarkable upper abdominal findings.   BONES: No acute osseous changes.       1.  As above.   Signed by: Khanh Valdez 1/9/2025 12:02 PM Dictation workstation:   [id number]        Assessment/Plan   Assessment & Plan  Community acquired pneumonia of right lower lobe of lung      RLL PNA  Continue Rocephin/azithromycin  Antitussives PRN  O2 protocol  Duonebs PRN  RSV  Supportive care  HTN  Stable  Continue propranolol  Fibromyalgia  Continue home meds           Emmanuelle Tucker MD

## 2025-01-10 NOTE — NURSING NOTE
Admitted to room 221 from PACU.Oriented to room. Call light given. Oriented to room. Denies pain at present.   Pharmacy Vancomycin Note  Date of Service 2018  Patient's  1972   46 year old, female    Indication: Skin and Soft Tissue Infection  Goal Trough Level: 10-15 mg/L  Day of Therapy: Started   Current Vancomycin regimen:  1000 mg IV q8h    Current estimated CrCl = Estimated Creatinine Clearance: 141.1 mL/min (based on Cr of 0.49).    Creatinine for last 3 days  2018:  6:10 PM Creatinine 0.75 mg/dL  2018:  5:33 AM Creatinine 0.65 mg/dL  2018:  5:46 AM Creatinine 0.49 mg/dL    Recent Vancomycin Levels (past 3 days)  2018:  4:50 AM Vancomycin Level 6.3 mg/L  2018:  5:46 AM Vancomycin Level 18.2 mg/L    Vancomycin IV Administrations (past 72 hours)                   vancomycin (VANCOCIN) 1000 mg in dextrose 5% 200 mL PREMIX (mg) 1,000 mg New Bag 18 2303     1,000 mg New Bag  1336     1,000 mg New Bag  0608     1,000 mg New Bag 18 2230     1,000 mg New Bag  1328    vancomycin (VANCOCIN) 1000 mg in dextrose 5% 200 mL PREMIX (mg) 1,000 mg New Bag 18 0525     1,000 mg New Bag 04/10/18 1613                Nephrotoxins and other renal medications (Future)    Start     Dose/Rate Route Frequency Ordered Stop    18 1000  vancomycin (VANCOCIN) 1,250 mg in sodium chloride 0.9 % 250 mL intermittent infusion      1,250 mg  over 90 Minutes Intravenous EVERY 12 HOURS 18 0745               Contrast Orders - past 72 hours     None          Interpretation of levels and current regimen:  Trough level is  Supratherapeutic    Has serum creatinine changed > 50% in last 72 hours: No    Urine output:  unable to determine    Renal Function: Stable    Plan:  1.  Decrease Dose to 1250 mg IV q 12 hours  2.  Pharmacy will check trough levels as appropriate in 1-3 Days.    3. Serum creatinine levels will be ordered a minimum of twice weekly.      Patricia Cardona        .

## 2025-01-10 NOTE — NURSING NOTE
Patient arrived from [facility] ED without IV access.  Attempted x2 without success to start an IV.  Patient refuses any further attempts at this time.  Patient states that ultra sound was used at [facility] to obtain IV access.

## 2025-01-10 NOTE — CARE PLAN
Transported patient to and from CT Scan with oxygen via E cylinder tank on 2 liter nasal cannula. Returned to patient room 201 without incident; SpO2 97%.

## 2025-01-10 NOTE — ASSESSMENT & PLAN NOTE
Patient tested positive for RSV pneumonia  Is clinically stable  Currently on 2 L per nasal cannula while at rest  Will continue current antibiotics ceftriaxone and azithromycin  If patient remains clinically stable will be discharged tomorrow back to skilled nursing    Psych issues  Review of chart patient had a geriatric assessment in September 2024; noted some cognitive decline; had been followed by psychology in the past has a diagnosis of narcissistic and borderline personality  Patient started on Seroquel 12.5 mg p.o. twice daily and it appears to have stabilized her mood  Will continue the Cymbalta as well    DVT prophylaxis  Lovenox    Wound on left humerus  CT of the humerus was done today shows a tunneling which is read is deep approximately 4 mm.  Culture taken of wound  Will refer to general surgery as outpatient

## 2025-01-10 NOTE — NURSING NOTE
Resting in bed with call light within reach and bed alarm on.  Oxygen saturation 99% on 2l nasal canula.

## 2025-01-10 NOTE — NURSING NOTE
Continues to be very anxious.Refused to take seroquel earlier but has now taken it. Patient is tearful at times and states she has suffered from bi polar disorder in the past.

## 2025-01-11 VITALS
WEIGHT: 128.97 LBS | TEMPERATURE: 97.3 F | OXYGEN SATURATION: 96 % | BODY MASS INDEX: 23.73 KG/M2 | HEIGHT: 62 IN | SYSTOLIC BLOOD PRESSURE: 110 MMHG | RESPIRATION RATE: 16 BRPM | HEART RATE: 58 BPM | DIASTOLIC BLOOD PRESSURE: 68 MMHG

## 2025-01-11 PROCEDURE — 94762 N-INVAS EAR/PLS OXIMTRY CONT: CPT

## 2025-01-11 PROCEDURE — 99239 HOSP IP/OBS DSCHRG MGMT >30: CPT | Performed by: EMERGENCY MEDICINE

## 2025-01-11 PROCEDURE — 2500000005 HC RX 250 GENERAL PHARMACY W/O HCPCS: Performed by: STUDENT IN AN ORGANIZED HEALTH CARE EDUCATION/TRAINING PROGRAM

## 2025-01-11 PROCEDURE — 2500000002 HC RX 250 W HCPCS SELF ADMINISTERED DRUGS (ALT 637 FOR MEDICARE OP, ALT 636 FOR OP/ED): Performed by: STUDENT IN AN ORGANIZED HEALTH CARE EDUCATION/TRAINING PROGRAM

## 2025-01-11 PROCEDURE — 2500000004 HC RX 250 GENERAL PHARMACY W/ HCPCS (ALT 636 FOR OP/ED): Performed by: STUDENT IN AN ORGANIZED HEALTH CARE EDUCATION/TRAINING PROGRAM

## 2025-01-11 PROCEDURE — 2500000001 HC RX 250 WO HCPCS SELF ADMINISTERED DRUGS (ALT 637 FOR MEDICARE OP): Performed by: STUDENT IN AN ORGANIZED HEALTH CARE EDUCATION/TRAINING PROGRAM

## 2025-01-11 RX ORDER — AZITHROMYCIN 250 MG/1
TABLET, FILM COATED ORAL
Qty: 6 TABLET | Refills: 0 | Status: SHIPPED | OUTPATIENT
Start: 2025-01-11 | End: 2025-01-16

## 2025-01-11 RX ORDER — IPRATROPIUM BROMIDE AND ALBUTEROL SULFATE 2.5; .5 MG/3ML; MG/3ML
3 SOLUTION RESPIRATORY (INHALATION) EVERY 4 HOURS PRN
Qty: 180 ML | Refills: 11 | Status: SHIPPED | OUTPATIENT
Start: 2025-01-11

## 2025-01-11 RX ORDER — TRAMADOL HYDROCHLORIDE 50 MG/1
50 TABLET ORAL EVERY 8 HOURS PRN
Qty: 10 TABLET | Refills: 0 | Status: SHIPPED | OUTPATIENT
Start: 2025-01-11 | End: 2025-01-14

## 2025-01-11 RX ORDER — BENZONATATE 100 MG/1
100 CAPSULE ORAL 3 TIMES DAILY PRN
Qty: 20 CAPSULE | Refills: 0 | Status: SHIPPED | OUTPATIENT
Start: 2025-01-11

## 2025-01-11 RX ADMIN — Medication 2 L/MIN: at 08:48

## 2025-01-11 RX ADMIN — DULOXETINE HYDROCHLORIDE 60 MG: 30 CAPSULE, DELAYED RELEASE ORAL at 09:59

## 2025-01-11 RX ADMIN — GABAPENTIN 100 MG: 100 CAPSULE ORAL at 09:59

## 2025-01-11 RX ADMIN — POLYETHYLENE GLYCOL 3350 17 G: 17 POWDER, FOR SOLUTION ORAL at 09:59

## 2025-01-11 RX ADMIN — CETIRIZINE HYDROCHLORIDE 10 MG: 10 TABLET, FILM COATED ORAL at 09:59

## 2025-01-11 RX ADMIN — TAMSULOSIN HYDROCHLORIDE 0.4 MG: 0.4 CAPSULE ORAL at 09:59

## 2025-01-11 RX ADMIN — AZITHROMYCIN DIHYDRATE 250 MG: 250 TABLET ORAL at 10:02

## 2025-01-11 SDOH — SOCIAL STABILITY: SOCIAL NETWORK: IN A TYPICAL WEEK, HOW MANY TIMES DO YOU TALK ON THE PHONE WITH FAMILY, FRIENDS, OR NEIGHBORS?: NEVER

## 2025-01-11 SDOH — HEALTH STABILITY: MENTAL HEALTH: HOW OFTEN DO YOU HAVE SIX OR MORE DRINKS ON ONE OCCASION?: NEVER

## 2025-01-11 SDOH — SOCIAL STABILITY: SOCIAL INSECURITY: WITHIN THE LAST YEAR, HAVE YOU BEEN AFRAID OF YOUR PARTNER OR EX-PARTNER?: NO

## 2025-01-11 SDOH — ECONOMIC STABILITY: FOOD INSECURITY: WITHIN THE PAST 12 MONTHS, YOU WORRIED THAT YOUR FOOD WOULD RUN OUT BEFORE YOU GOT THE MONEY TO BUY MORE.: NEVER TRUE

## 2025-01-11 SDOH — SOCIAL STABILITY: SOCIAL NETWORK: HOW OFTEN DO YOU ATTEND CHURCH OR RELIGIOUS SERVICES?: NEVER

## 2025-01-11 SDOH — SOCIAL STABILITY: SOCIAL INSECURITY: WITHIN THE LAST YEAR, HAVE YOU BEEN HUMILIATED OR EMOTIONALLY ABUSED IN OTHER WAYS BY YOUR PARTNER OR EX-PARTNER?: NO

## 2025-01-11 SDOH — SOCIAL STABILITY: SOCIAL NETWORK: HOW OFTEN DO YOU GET TOGETHER WITH FRIENDS OR RELATIVES?: NEVER

## 2025-01-11 SDOH — ECONOMIC STABILITY: HOUSING INSECURITY: AT ANY TIME IN THE PAST 12 MONTHS, WERE YOU HOMELESS OR LIVING IN A SHELTER (INCLUDING NOW)?: NO

## 2025-01-11 SDOH — HEALTH STABILITY: MENTAL HEALTH: HOW OFTEN DO YOU HAVE A DRINK CONTAINING ALCOHOL?: NEVER

## 2025-01-11 SDOH — SOCIAL STABILITY: SOCIAL INSECURITY: ARE YOU MARRIED, WIDOWED, DIVORCED, SEPARATED, NEVER MARRIED, OR LIVING WITH A PARTNER?: NEVER MARRIED

## 2025-01-11 SDOH — ECONOMIC STABILITY: HOUSING INSECURITY: IN THE LAST 12 MONTHS, WAS THERE A TIME WHEN YOU WERE NOT ABLE TO PAY THE MORTGAGE OR RENT ON TIME?: NO

## 2025-01-11 SDOH — HEALTH STABILITY: MENTAL HEALTH: HOW MANY DRINKS CONTAINING ALCOHOL DO YOU HAVE ON A TYPICAL DAY WHEN YOU ARE DRINKING?: PATIENT DOES NOT DRINK

## 2025-01-11 SDOH — ECONOMIC STABILITY: INCOME INSECURITY: IN THE PAST 12 MONTHS HAS THE ELECTRIC, GAS, OIL, OR WATER COMPANY THREATENED TO SHUT OFF SERVICES IN YOUR HOME?: NO

## 2025-01-11 SDOH — ECONOMIC STABILITY: FOOD INSECURITY: WITHIN THE PAST 12 MONTHS, THE FOOD YOU BOUGHT JUST DIDN'T LAST AND YOU DIDN'T HAVE MONEY TO GET MORE.: NEVER TRUE

## 2025-01-11 SDOH — ECONOMIC STABILITY: TRANSPORTATION INSECURITY: IN THE PAST 12 MONTHS, HAS LACK OF TRANSPORTATION KEPT YOU FROM MEDICAL APPOINTMENTS OR FROM GETTING MEDICATIONS?: YES

## 2025-01-11 SDOH — ECONOMIC STABILITY: HOUSING INSECURITY: IN THE PAST 12 MONTHS, HOW MANY TIMES HAVE YOU MOVED WHERE YOU WERE LIVING?: 2

## 2025-01-11 SDOH — ECONOMIC STABILITY: FOOD INSECURITY: HOW HARD IS IT FOR YOU TO PAY FOR THE VERY BASICS LIKE FOOD, HOUSING, MEDICAL CARE, AND HEATING?: NOT HARD AT ALL

## 2025-01-11 SDOH — SOCIAL STABILITY: SOCIAL NETWORK: HOW OFTEN DO YOU ATTEND MEETINGS OF THE CLUBS OR ORGANIZATIONS YOU BELONG TO?: MORE THAN 4 TIMES PER YEAR

## 2025-01-11 ASSESSMENT — COGNITIVE AND FUNCTIONAL STATUS - GENERAL
DAILY ACTIVITIY SCORE: 21
TOILETING: A LITTLE
TURNING FROM BACK TO SIDE WHILE IN FLAT BAD: A LITTLE
MOVING TO AND FROM BED TO CHAIR: A LITTLE
DRESSING REGULAR LOWER BODY CLOTHING: A LITTLE
WALKING IN HOSPITAL ROOM: A LITTLE
STANDING UP FROM CHAIR USING ARMS: A LITTLE
MOBILITY SCORE: 19
HELP NEEDED FOR BATHING: A LITTLE
CLIMB 3 TO 5 STEPS WITH RAILING: A LITTLE

## 2025-01-11 ASSESSMENT — PAIN - FUNCTIONAL ASSESSMENT
PAIN_FUNCTIONAL_ASSESSMENT: UNABLE TO SELF-REPORT
PAIN_FUNCTIONAL_ASSESSMENT: 0-10

## 2025-01-11 ASSESSMENT — ACTIVITIES OF DAILY LIVING (ADL): LACK_OF_TRANSPORTATION: YES

## 2025-01-11 ASSESSMENT — LIFESTYLE VARIABLES
SKIP TO QUESTIONS 9-10: 1
AUDIT-C TOTAL SCORE: 0

## 2025-01-11 ASSESSMENT — PAIN SCALES - GENERAL
PAINLEVEL_OUTOF10: 0 - NO PAIN

## 2025-01-11 ASSESSMENT — PAIN SCALES - WONG BAKER: WONGBAKER_NUMERICALRESPONSE: NO HURT

## 2025-01-11 NOTE — CARE PLAN
The patient's goals for the shift include breathe easy    The clinical goals for the shift include Improved respiratory status.

## 2025-01-11 NOTE — CARE PLAN
RT to pts room to evaluate for home oxygen needs. RT attempted to have pt sit up and even stand to test for desaturation on RA. Pt told RT that she was not feeling well and wanted to wait until a little later today to get up. RT tested pt on RA while at rest and pts SpO2 went from 89% to 91% on RA. Pt will more then likely need to go home on oxygen today but will know for sure upon further testing. Pt remains on 2L NC at this time with an SpO2 of 96%. RT will come back soon to finish pts home oxygen evaluation.

## 2025-01-11 NOTE — PROGRESS NOTES
01/11/25 0954   Discharge Planning   Type of Residence Skilled nursing facility   Who is requesting discharge planning? Provider   Home or Post Acute Services Post acute facilities (Rehab/SNF/etc)  (Pt returning to [facility])   Type of Post Acute Facility Services Skilled nursing   Expected Discharge Disposition SNF   Does the patient need discharge transport arranged? Yes   RoundTrip coordination needed? Yes   Has discharge transport been arranged? No   What day is the transport expected? 01/11/25   What time is the transport expected? 1300     Pt to DC today to SNF.   Roundtrip planned for 1pm.

## 2025-01-11 NOTE — PROGRESS NOTES
Careport message sent to [facility] that pt will dc today.   Await response so that a time can be confirmed.    Will need finalized dc and [facility].

## 2025-01-11 NOTE — NURSING NOTE
Resting in bed with eyes closed and call light within reach.  Bed alarm is on,  Arouses easily when name is called.  Voices no c/o pain at this time.  Respirations even and unlabored on 2L nasal canula.  Oxygen saturation is 99%. Warm blankets given for comfort.

## 2025-01-11 NOTE — PROGRESS NOTES
Patient sleeping at the time of rounds, chart reviewed.  Vital signs are stable.  Nursing notes no issues.           Emmanuelle Tucker MD

## 2025-01-11 NOTE — DISCHARGE SUMMARY
Discharge Diagnosis  Community acquired pneumonia of right lower lobe of lung    Issues Requiring Follow-Up  Take medications as directed and follow-up with your PCP    Discharge Meds     Medication List      ASK your doctor about these medications     acetaminophen 325 mg tablet; Commonly known as: Tylenol; Take 2 tablets   (650 mg) by mouth every 6 hours if needed for mild pain (1 - 3), headaches   or fever (temp greater than 38.0 C).   cetirizine 10 mg tablet; Commonly known as: ZyrTEC; TAKE 1 TABLET BY   MOUTH (10MG) BY MOUTH ONCE DAILY   DULoxetine 60 mg DR capsule; Commonly known as: Cymbalta; Take 1 capsule   (60 mg) by mouth once daily. Do not crush or chew. Do not start before   November 8, 2023.   fluticasone 50 mcg/actuation nasal spray; Commonly known as: Flonase;   Administer 2 sprays into each nostril once daily.   gabapentin 100 mg capsule; Commonly known as: Neurontin; Take 1 capsule   (100 mg) by mouth 2 times a day.   guaiFENesin 600 mg 12 hr tablet; Commonly known as: Mucinex; Take 2   tablets (1,200 mg) by mouth 2 times a day. Do not crush, chew, or split.   meclizine 25 mg tablet; Commonly known as: Antivert; Take 1 tablet (25   mg) by mouth 3 times a day as needed (Dizziness).   melatonin 10 mg tablet; Take 1 tablet (10 mg) by mouth once daily at   bedtime.   midodrine 10 mg tablet; Commonly known as: Proamatine; Take 1 tablet (10   mg) by mouth 3 times a day as needed (95).   Ocean Nasal 0.65 % nasal spray; Generic drug: sodium chloride;   Administer 1 spray into each nostril if needed for congestion.   polyethylene glycol 17 gram packet; Commonly known as: Glycolax,   Miralax; Take 17 g by mouth once daily as needed (constipation).   propranolol 20 mg tablet; Commonly known as: Inderal; Take 1 tablet (20   mg) by mouth 2 times a day. Take 1 tablet by mouth every morning and take   2 tablets by mouth every evening.   QUEtiapine 25 mg tablet; Commonly known as: SEROquel; Take 0.5 tablets    (12.5 mg) by mouth 2 times a day as needed (agitation).   tamsulosin 0.4 mg 24 hr capsule; Commonly known as: Flomax; TAKE 1   CAPSULE (0.4 MG) BY MOUTH ONCE DAILY.   Vitamins B Complex tablet; Generic drug: vitamin B complex; Take 1   tablet by mouth once daily.       Test Results Pending At Discharge  Pending Labs       No current pending labs.            Hospital Course   74-year-old female transferred from [facility] with a diagnosis of RSV pneumonia patient has been hemodynamically stable while at [facility]  Had some behavioral issues has been started on Seroquel 12.5 twice a day along with Cymbalta 30 mg at night this seems to have calm patient and she is more cooperative.  She will be evaluated for home O2 and will be discharged back to [facility].  She is hemodynamically stable for transfer back  Pertinent Physical Exam At Time of Discharge  Physical Exam  Patient is alert in no acute distress  Lungs are clear to auscultation and percussion  Cardiac is regular rate and rhythm normal S1-S2 without murmur gallop rub click S3 or S4  Abdomen is benign  Extremities without cyanosis clubbing erythema or edema-chronic wound on left humerus  Outpatient Follow-Up  Needs follow-up with general surgery for chronic wound on her left humerus      Anat Mcclain MD

## 2025-01-11 NOTE — NURSING NOTE
Rounded on client. She is sleeping comfortably. Vitals obtained, Asked if she needed anything she said no. I will continue to monitor and speak with her when she wakes up.

## 2025-01-11 NOTE — NURSING NOTE
Resting in bed with eyes closed and call light within reach.  Bed alarm is on.  Respirations even and unlabored on 2L nasal canula.  Pulse ox 99%.

## 2025-01-11 NOTE — NURSING NOTE
Resting in bed with eyes closed and call light within reach.  Bed alarm is on.  Respirations even and unlabored on 2L nasal canula.  Pulse ox 98%.

## 2025-01-11 NOTE — DISCHARGE SUMMARY
Discharge Diagnosis  Community acquired pneumonia of right lower lobe of lung    Issues Requiring Follow-Up  Take medication as directed and follow-up with your PCP for your pneumonia  You have a wound on your left arm and you need to follow-up with general surgery    Discharge Meds     Medication List      START taking these medications     azithromycin 250 mg tablet; Commonly known as: Zithromax; Take 2 tabs   (500 mg) by mouth today, then take 1 tab daily for 4 days.   benzonatate 100 mg capsule; Commonly known as: Tessalon; Take 1 capsule   (100 mg) by mouth 3 times a day as needed for cough. Do not crush or chew.   ipratropium-albuteroL 0.5-2.5 mg/3 mL nebulizer solution; Commonly known   as: Duo-Neb; Take 3 mL by nebulization every 4 hours if needed for   wheezing or shortness of breath.   traMADol 50 mg tablet; Commonly known as: Ultram; Take 1 tablet (50 mg)   by mouth every 8 hours if needed for moderate pain (4 - 6) for up to 3   days.     CONTINUE taking these medications     acetaminophen 325 mg tablet; Commonly known as: Tylenol; Take 2 tablets   (650 mg) by mouth every 6 hours if needed for mild pain (1 - 3), headaches   or fever (temp greater than 38.0 C).   cetirizine 10 mg tablet; Commonly known as: ZyrTEC; TAKE 1 TABLET BY   MOUTH (10MG) BY MOUTH ONCE DAILY   DULoxetine 60 mg DR capsule; Commonly known as: Cymbalta; Take 1 capsule   (60 mg) by mouth once daily. Do not crush or chew. Do not start before   November 8, 2023.   fluticasone 50 mcg/actuation nasal spray; Commonly known as: Flonase;   Administer 2 sprays into each nostril once daily.   gabapentin 100 mg capsule; Commonly known as: Neurontin; Take 1 capsule   (100 mg) by mouth 2 times a day.   guaiFENesin 600 mg 12 hr tablet; Commonly known as: Mucinex; Take 2   tablets (1,200 mg) by mouth 2 times a day. Do not crush, chew, or split.   meclizine 25 mg tablet; Commonly known as: Antivert; Take 1 tablet (25   mg) by mouth 3 times a day as  needed (Dizziness).   melatonin 10 mg tablet; Take 1 tablet (10 mg) by mouth once daily at   bedtime.   midodrine 10 mg tablet; Commonly known as: Proamatine; Take 1 tablet (10   mg) by mouth 3 times a day as needed (95).   Ocean Nasal 0.65 % nasal spray; Generic drug: sodium chloride;   Administer 1 spray into each nostril if needed for congestion.   polyethylene glycol 17 gram packet; Commonly known as: Glycolax,   Miralax; Take 17 g by mouth once daily as needed (constipation).   propranolol 20 mg tablet; Commonly known as: Inderal; Take 1 tablet (20   mg) by mouth 2 times a day. Take 1 tablet by mouth every morning and take   2 tablets by mouth every evening.   QUEtiapine 25 mg tablet; Commonly known as: SEROquel; Take 0.5 tablets   (12.5 mg) by mouth 2 times a day as needed (agitation).   tamsulosin 0.4 mg 24 hr capsule; Commonly known as: Flomax; TAKE 1   CAPSULE (0.4 MG) BY MOUTH ONCE DAILY.   Vitamins B Complex tablet; Generic drug: vitamin B complex; Take 1   tablet by mouth once daily.       Test Results Pending At Discharge  Pending Labs       No current pending labs.            Hospital Course   74-year-old female who was transferred from [facility] with RSV pneumonia.  She has done well without complications and she will be discharged to skilled nursing.  She is medically stable for transfer    Pertinent Physical Exam At Time of Discharge  Physical Exam  Patient is alert in no acute distress  Lungs are clear to auscultation and percussion  Cardiac is regular rate and rhythm normal S1-S2 without murmur gallop rub click S3 or S4  Abdomen is soft nontender positive bowel sounds there is no hepatosplenomegaly or CVA tenderness appreciated  Extremities without cyanosis clubbing erythema or edema-patient has chronic wound on left humerus will be referred to surgery  Outpatient Follow-Up  No future appointments.      Anat Mcclain MD

## 2025-01-11 NOTE — PROGRESS NOTES
Pt's discharge completed.    DC summary, goldenrod, facesheet printed and placed in packet for SNF.    RX placed into packet.  Facility notified of transport arranged for 1330 .    They confirmed.    RN updated with plan.   Pt is confused.  TCT brother who is contact in EMR and left message with callback number.

## 2025-01-11 NOTE — NURSING NOTE
Vitals:    01/11/25 0618   BP:    Pulse:    Resp:    Temp:    SpO2: 96%      Patient lying in bed. Vital Is stable. All needs met at this moment. Call light within reach. Bed at lowest position. Continue monitor.

## 2025-01-12 LAB
BACTERIA BLD CULT: NORMAL
BACTERIA BLD CULT: NORMAL

## 2025-01-13 LAB
BACTERIA BLD CULT: NORMAL
BACTERIA BLD CULT: NORMAL

## 2025-01-19 LAB
ATRIAL RATE: 90 BPM
P AXIS: 43 DEGREES
P OFFSET: 213 MS
P ONSET: 162 MS
PR INTERVAL: 120 MS
Q ONSET: 222 MS
QRS COUNT: 15 BEATS
QRS DURATION: 72 MS
QT INTERVAL: 356 MS
QTC CALCULATION(BAZETT): 435 MS
QTC FREDERICIA: 407 MS
R AXIS: 117 DEGREES
T AXIS: 24 DEGREES
T OFFSET: 400 MS
VENTRICULAR RATE: 90 BPM

## 2025-01-29 NOTE — TELEPHONE ENCOUNTER
A company called about this pt and they are going to fax over a form, but they stated they sent it back in November. They are re faxing it, so just be on the look out. Thank you!

## 2025-02-06 PROBLEM — J18.9 HEALTHCARE-ASSOCIATED PNEUMONIA: Status: ACTIVE | Noted: 2025-01-01

## 2025-02-06 NOTE — ED TRIAGE NOTES
Pt BIBA c/c of SOB x2 days. Pt has hx of pneumonia on antibiotics. Nursing home gave pt breathing treatment at 2200. When ems arrive pt was stating at 85%. Ems place pt on Nonrebreather 15L pt o2 went back up to 100%. Pt has productive cough     Pt A&Ox2, pt alert calm cooperative with care. Pt resp even and unlabored.     PMH: CHF should be on 2 L baseline at home depression

## 2025-02-06 NOTE — SIGNIFICANT EVENT
Called to see patient for unresponsiveness. 5 rounds of compression were performed. 5x of epi and bicarb.       Did the patient lose a pulse?  Yes    Was ACLS initiated?  Yes    What was the initial rhythm?  Asystole    How long was ACLS performed?  15 minutes     What interventions outside the scope of ACLS were performed?   Bicarbonate and epi     Was an advanced airway obtained?  No, unable to secure airway         Was ROSC achieved?  Time of death 2:15     Called to see patient for unresponsiveness. On exam the patient did not respond to verbal or physical stimuli. Absent heart and breath sounds for one minute, no response to noxious stimuli, and absent corneal reflex. Absent peripheral pulses. Pupils are fixed and dilated.   Patient pronounced dead at 2:15pm on 2/6/2025. Dr. Jessica Larson MD notified. son notified.

## 2025-02-06 NOTE — CONSULTS
"INFECTIOUS DISEASE INPATIENT INITIAL CONSULTATION    Referred By: Jorge Logan    Reason For Consult: Pneumonia, COVID     HPI:  This is a 74 y.o. female with PMH of dementia, NH resident who presented with shortness of breath.    She is not providing any history currently. Not wearing O2. Not taking any medications. Took out her pIV. She is not hypoxic off O2 right now. No fever. WBC is high but recently on steroids it would seem. Had RSV few weeks ago and now has COVID-19.     Allergies:  Atorvastatin, House dust mite, and Erythromycin base     Vitals (Last 24 Hours):  Heart Rate:  [75-88]   Temp:  [35.9 °C (96.6 °F)-37 °C (98.6 °F)]   Resp:  [18-25]   BP: (102-135)/()   Height:  [157.5 cm (5' 2\")]   Weight:  [63.8 kg (140 lb 10.5 oz)]   SpO2:  [92 %-98 %]      PHYSICAL EXAM:  Gen - NAD, demented, not giving any history  Heart - RRR, no murmurs  Lungs - clear bilaterally, no wh  Abd - soft, no ttp  Skin - no rash    MEDS:    Current Facility-Administered Medications:     acetaminophen (Tylenol) tablet 650 mg, 650 mg, oral, q4h PRN **OR** acetaminophen (Tylenol) suspension 650 mg, 650 mg, oral, q4h PRN **OR** acetaminophen (Tylenol) suppository 650 mg, 650 mg, rectal, q4h PRN, Jorge Logan, DO    albuterol 90 mcg/actuation inhaler 2 puff, 2 puff, inhalation, TID, Jorge Gilmoree, DO, 2 puff at 02/06/25 1200    albuterol 90 mcg/actuation inhaler 2 puff, 2 puff, inhalation, q2h PRN, Jorge Gilmoree, DO, 2 puff at 02/06/25 0556    B complex-vitamin C tablet 1 tablet, 1 tablet, oral, Daily, Jorge Logan, DO, 1 tablet at 02/06/25 0918    benzonatate (Tessalon) capsule 100 mg, 100 mg, oral, TID PRN, Jorge Gilmoree, DO    cetirizine (ZyrTEC) tablet 10 mg, 10 mg, oral, Daily, Jorge Logan DO, 10 mg at 02/06/25 0918    DULoxetine (Cymbalta) DR capsule 60 mg, 60 mg, oral, Daily, Jorge Logan DO, 60 mg at 02/06/25 0918    enoxaparin (Lovenox) syringe 40 mg, 40 mg, subcutaneous, q24h, Jorge Logan, " DO    fluticasone (Flonase) nasal spray 2 spray, 2 spray, Each Nostril, Daily PRN, Jorge Gilmoree, DO    gabapentin (Neurontin) capsule 100 mg, 100 mg, oral, BID, Jorge FAYE Salgaetanoe, DO, 100 mg at 02/06/25 0918    guaiFENesin (Mucinex) 12 hr tablet 1,200 mg, 1,200 mg, oral, BID, Jorge FAYE Mandoe, DO, 1,200 mg at 02/06/25 0918    melatonin tablet 6 mg, 6 mg, oral, Nightly, Jorge YUNIER Gilmoree, DO    [START ON 2/7/2025] nirmatrelvir-ritonavir (Paxlovid) tablet therapy pack 2 tablet, 2 tablet, oral, q12h VILMA, Anival Tarango MD    ondansetron (Zofran) tablet 4 mg, 4 mg, oral, q8h PRN **OR** ondansetron (Zofran) injection 4 mg, 4 mg, intravenous, q8h PRN, Jorge Gilmoree DO    pantoprazole (ProtoNix) EC tablet 40 mg, 40 mg, oral, Daily before breakfast **OR** pantoprazole (ProtoNix) injection 40 mg, 40 mg, intravenous, Daily before breakfast, Jorge Gilmoree DO    polyethylene glycol (Glycolax, Miralax) packet 17 g, 17 g, oral, Daily PRN, Jorge Gilmoree, DO    propranolol (Inderal) tablet 20 mg, 20 mg, oral, BID, Jorge FAYE Mandoe, DO, 20 mg at 02/06/25 0919    QUEtiapine (SEROquel) tablet 12.5 mg, 12.5 mg, oral, BID PRN, Jorge FAYE Salgaetanoe, DO, 12.5 mg at 02/06/25 1157    sodium chloride (Ocean) 0.65 % nasal spray 1 spray, 1 spray, Each Nostril, q4h PRN, Jorge Gilmoree DO    tamsulosin (Flomax) 24 hr capsule 0.4 mg, 0.4 mg, oral, Daily, Jorge FAYE Mandoe, DO, 0.4 mg at 02/06/25 0919     LABS:  Lab Results   Component Value Date    WBC 23.8 (H) 02/06/2025    HGB 9.7 (L) 02/06/2025    HCT 33.8 (L) 02/06/2025    MCV 74 (L) 02/06/2025     (H) 02/06/2025      Results from last 72 hours   Lab Units 02/06/25  0602   SODIUM mmol/L 140   POTASSIUM mmol/L 4.4   CHLORIDE mmol/L 105   CO2 mmol/L 24   BUN mg/dL 27*   CREATININE mg/dL 1.16*   GLUCOSE mg/dL 115*   CALCIUM mg/dL 8.9   ANION GAP mmol/L 15   EGFR mL/min/1.73m*2 50*     Results from last 72 hours   Lab Units 02/06/25  0208   ALK PHOS U/L 94   BILIRUBIN TOTAL mg/dL 0.4    PROTEIN TOTAL g/dL 7.3   ALT U/L 12   AST U/L 21   ALBUMIN g/dL 2.5*     Estimated Creatinine Clearance: 37.3 mL/min (A) (by C-G formula based on SCr of 1.16 mg/dL (H)).    IMAGING:  CXR personally reviewed - there are small bilateral pleural effusions and what seems more like edema to me than PNA    ASSESSMENT/PLAN:    COVID-19 Infection  Leukocytosis - suspected from recent steroid use    I don't think there is bacterial PNA - stopped abx. No need for Decadron or Remdesivir. Can be on Paxlovid for 5 day course - renally dosed given CKD.    Will sign off. Please call back with questions. D/w Dr. Neo Tarango MD  [facility]  Office #[phone]

## 2025-02-06 NOTE — CONSULTS
Vancomycin Dosing by Pharmacy- Cessation of Therapy    Consult to pharmacy for vancomycin dosing has been discontinued by the prescriber, pharmacy will sign off at this time.    Please call pharmacy if there are further questions or re-enter a consult if vancomycin is resumed.     Ct Wilson, CarlosD

## 2025-02-06 NOTE — CODE DOCUMENTATION
Date of code: 2025    Precipitating Events admitted as in-patient for COVID 19 infection with co-existing bacterial pneumonia   Loss of Pulse Yes  ACLS Initiated Yes  Initial Rhythm INITIAL RHYTMN: asystole  Length of ACLS Performed 14 min   Interventions Outside of ACLS: No   Advanced Airway Yes - see below  Return of Spontaneous Circulation No - patient    Cardiology Notified No    Family or Healthcare Power of  Notified Yes      Please refer to Code flowsheet for additional documentation.    Consent: implied secondary to emergent situation.   Sedation: n/a   NM Blockade: n/a    Technique: direct laryngoscopy  Cormack-Lehane grade: 1 - however, this was obtained with difficulty d/t presence of vanilla pudding obscuring visualization of the airway  A MAC 3 blade was inserted into the oropharynx at which time the vocal cords were visualized. A 7.5-Georgian endotracheal tube was inserted and visualized going through the vocal cords. The stylette was removed. Colorimetric change was visualized on the CO2 meter. Breath sounds were heard in both lung fields equally. The endotracheal tube was placed at 22 cm, measured at the teeth.    This procedure was performed in addition to and exclusive to any other face to face care time/initial management.

## 2025-02-06 NOTE — DISCHARGE SUMMARY
Discharge Diagnosis  Healthcare-associated pneumonia    Issues Requiring Follow-Up  N/a    Discharge Meds     Medication List      ASK your doctor about these medications     acetaminophen 325 mg tablet; Commonly known as: Tylenol; Take 2 tablets   (650 mg) by mouth every 6 hours if needed for mild pain (1 - 3), headaches   or fever (temp greater than 38.0 C).   benzonatate 100 mg capsule; Commonly known as: Tessalon; Take 1 capsule   (100 mg) by mouth 3 times a day as needed for cough. Do not crush or chew.   cetirizine 10 mg tablet; Commonly known as: ZyrTEC; TAKE 1 TABLET BY   MOUTH (10MG) BY MOUTH ONCE DAILY   DULoxetine 60 mg DR capsule; Commonly known as: Cymbalta; Take 1 capsule   (60 mg) by mouth once daily. Do not crush or chew. Do not start before   November 8, 2023.   fluticasone 50 mcg/actuation nasal spray; Commonly known as: Flonase;   Administer 2 sprays into each nostril once daily.   gabapentin 100 mg capsule; Commonly known as: Neurontin; Take 1 capsule   (100 mg) by mouth 2 times a day.   guaiFENesin 600 mg 12 hr tablet; Commonly known as: Mucinex; Take 2   tablets (1,200 mg) by mouth 2 times a day. Do not crush, chew, or split.   ipratropium-albuteroL 0.5-2.5 mg/3 mL nebulizer solution; Commonly known   as: Duo-Neb; Take 3 mL by nebulization every 4 hours if needed for   wheezing or shortness of breath.   meclizine 25 mg tablet; Commonly known as: Antivert; Take 1 tablet (25   mg) by mouth 3 times a day as needed (Dizziness).   melatonin 10 mg tablet; Take 1 tablet (10 mg) by mouth once daily at   bedtime.   midodrine 10 mg tablet; Commonly known as: Proamatine; Take 1 tablet (10   mg) by mouth 3 times a day as needed (95).   Ocean Nasal 0.65 % nasal spray; Generic drug: sodium chloride;   Administer 1 spray into each nostril if needed for congestion.   polyethylene glycol 17 gram packet; Commonly known as: Glycolax,   Miralax; Take 17 g by mouth once daily as needed (constipation).   propranolol  "20 mg tablet; Commonly known as: Inderal; Take 1 tablet (20   mg) by mouth 2 times a day. Take 1 tablet by mouth every morning and take   2 tablets by mouth every evening.   QUEtiapine 25 mg tablet; Commonly known as: SEROquel; Take 0.5 tablets   (12.5 mg) by mouth 2 times a day as needed (agitation).   tamsulosin 0.4 mg 24 hr capsule; Commonly known as: Flomax; TAKE 1   CAPSULE (0.4 MG) BY MOUTH ONCE DAILY.   Vitamins B Complex tablet; Generic drug: vitamin B complex; Take 1   tablet by mouth once daily.       Test Results Pending At Discharge  Pending Labs       Order Current Status    Procalcitonin In process    Blood Culture Preliminary result    Blood Culture Preliminary result            Hospital Course   Per admission HPI \"Sandra Velasquez \"Miss Velasquez\" is a 74 y.o. female with a past medical history of cognitive decline and other psychiatric issues, hidradenitis suppurative, CAD, fibromyalgia, anxiety, depression, diverticulitis s/p Dora procedure with colostomy and who recently January 9, 2025 had right lower lobe pneumonia and was found to be RSV positive.  Her chest x-ray during this admission showed a right lower lobe infiltrate and she had a nonproductive cough fever nausea chest pain and chronic wounds on her left upper extremity.  She was started on Rocephin and Zithromax as well as Solu-Medrol and DuoNebs.  She is currently on 2 L nasal cannula chronically since she was discharged from the hospital here in [facility] January 11, 2025.  However she was not wearing oxygen upon arrival to the ER and her O2 sat on room air was 85%.  She resides in a nursing facility and has underlying dementia with cognitive impairment.  The EMS was called to the nursing facility because she has been complaining of shortness of breath x 2 days.  When EMS arrived she was 85% on room air and EMS placed her on a nonrebreather 15 L and her O2 sat went back up to 100%.  She does endorse a productive cough " "with yellow sputum.  During her admission at [facility] in January 2025 her RSV was positive now her COVID-19 is positive.  No mention of any fever or chills chest pain abdominal pain nausea vomiting or diarrhea.  Chest x-ray shows slightly increased infiltrates consistent with a multifocal pneumonia and small bilateral pleural effusions\"    Pt was initially started on vanc/zosyn with appropriate aerosols PRN, in addition to decadron and remdesivir for COVID. She maintained otherwise stable vital signs including normal HR, BP, o2 sat, and RR (though RR was occasionally 21-25 which is to be expected while undergoing PNA treatment). In the morning, pt was maintained on 3L and satting well. ID was consulted with leukocytosis suspected from recent steroid use; Dr. Tarango (ID) did not think there was a component of bacterial PNA and that there was no need for decadron or remdesivir. He started her on 5 day course of paxlovid renally dosed and did not think there were any ID barriers to dc. During the morning, I was informed by nursing that pt was confused, not cooperative, refusing her medications, and there was consideration for a sitter. There is a documented hx of psychiatric issues and cognitive decline, which are better documented during her previous hospitalization at [facility] on 1/10/25, which is why she was started on seroquel 12.5mg PO BID; during that hospitalization, she responded well to this. Most recent EKG did not show prolonged QTC. She would repeatedly (< every 5 mins) take her oxygen off and desat; thus requiring respiratory therapy to increase O2 requirement to 6L. I was informed that her baseline oxygen requirement was 2L, which was started after pt was hospitalized for PNA and RSV during her previous admission.    When I personally went to assess the patient with bedside nurse Emmanuelle, pt was unresponsive and without a pulse. From my knowledge, pt was last seen alive and well about 10 " mins prior when she was visited by the patient advocate making his regular rounds. Code blue was called immediately, and compressions started. Dr. Puga was kind enough to run the code blue while I went to call family. I was personally able to reach pts brother Jameson, who informed me that pts facility [facility] had not even informed him that she was being sent to the hospital so he was unaware of her arrival to [facility] and was shocked by this information. He asked me why she was admitted. I did inform him that she was admitted for COVID PNA; however, during her admission, that she had lost a pulse and that chest compressions were ongoing. He said he would arrive in 10-15 mins and to continue compressions. After about 15 mins, pt did not show signs of ROSC after several rounds of epi and bicarb. I called her brother Jameson again and explained that I believed that further compressions would likely be causing more harm than good at this point. I discussed with him and answered all questions, and he gave me permission to tell the team to stop compressions as he believes this is what his sister would want at this point.     Patient was pronounced dead at 2:15pm on 2/6/25. May she rest in peace.    Pertinent Physical Exam At Time of Discharge  On exam the patient did not respond to verbal or physical stimuli. Absent heart and breath sounds for one minute, no response to noxious stimuli, and absent corneal reflex. Absent peripheral pulses. Pupils are fixed and dilated.     Outpatient Follow-Up  No future appointments.      Jessica Larson MD    I spent >30 minutes in the discharge planning and overall professional care of this patient.

## 2025-02-06 NOTE — PROGRESS NOTES
Spoke to admissions at [facility] keli Weeks to let them know that the patient had a code blue and passed away.

## 2025-02-06 NOTE — ED PROVIDER NOTES
HPI   Chief Complaint   Patient presents with    Shortness of Breath       Patient presents with shortness of breath and cough today.  Lives at SNF.  They unclear if she is on treatment for pneumonia or not.  But there are no antibiotics currently ordered.  Patient apparently is supposed to be on supplemental oxygen but did not have it at this time.  Was 85% on room air according to the EMS.      History limited by:  Mental status change and dementia          Patient History   Past Medical History:   Diagnosis Date    Acute cystitis without hematuria 01/03/2019    Acute cystitis without hematuria    Age-related nuclear cataract, left eye 10/31/2018    Cataract, nuclear sclerotic, left eye    Age-related nuclear cataract, right eye 10/31/2018    Cataract, nuclear sclerotic, right eye    Allergic rhinitis due to pollen 09/19/2018    Allergic rhinitis due to pollen, unspecified seasonality    Calculus of kidney 12/11/2018    Right nephrolithiasis    Combined forms of age-related cataract, left eye 05/08/2017    Combined form of age-related cataract, left eye    Combined forms of age-related cataract, left eye 05/08/2017    Combined form of age-related cataract, left eye    Cortical age-related cataract, left eye 10/31/2018    Cortical age-related cataract of left eye    Cyanosis 08/10/2017    Bluish skin discoloration    Dry eye syndrome of bilateral lacrimal glands 10/31/2018    Bilateral dry eyes    Dry eye syndrome of bilateral lacrimal glands 10/31/2018    Dry eyes, bilateral    History of falling 01/03/2019    History of fall    Hyperlipidemia, unspecified 08/26/2020    Hyperlipidemia    Lesion of radial nerve, unspecified upper limb 03/05/2016    Radial nerve irritation    Meibomian gland dysfunction of unspecified eye, 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/31/2018    MGD (meibomian gland disease)    Nausea 11/27/2018    Nausea in adult    Opioid dependence, uncomplicated (Multi) 03/09/2020    Moderate opioid use disorder     Other chronic sinusitis 06/15/2018    Other chronic sinusitis    Other fecal abnormalities 11/27/2018    Light stools    Other specified disorders of nose and nasal sinuses 06/15/2018    Nasal septal perforation    Other specified disorders of nose and nasal sinuses 10/24/2018    Nasal crusting    Other specified disorders of nose and nasal sinuses 02/03/2017    Nasal septal perforation    Other specified disorders of teeth and supporting structures 09/04/2015    Dentalgia    Pain in leg, unspecified 03/11/2019    Leg pain    Personal history of diseases of the blood and blood-forming organs and certain disorders involving the immune mechanism 02/03/2015    History of anemia    Personal history of diseases of the skin and subcutaneous tissue 08/16/2013    History of actinic keratosis    Personal history of other diseases of the circulatory system     History of hypertension    Personal history of other diseases of the digestive system     History of constipation    Personal history of other diseases of the digestive system 07/02/2014    History of gastroesophageal reflux (GERD)    Personal history of other diseases of the musculoskeletal system and connective tissue 07/02/2014    Personal history of arthritis    Personal history of other diseases of the respiratory system 12/18/2017    History of sore throat    Personal history of other diseases of the respiratory system 12/29/2017    History of paranasal sinus congestion    Personal history of other diseases of the respiratory system 01/03/2019    History of acute sinusitis    Personal history of other diseases of the respiratory system 02/03/2017    History of acute sinusitis    Personal history of other diseases of the respiratory system 12/29/2017    History of acute sinusitis    Personal history of other diseases of the respiratory system 06/15/2018    History of nasal discharge    Personal history of other diseases of the respiratory system 08/11/2015     History of acute sinusitis    Personal history of other drug therapy 02/08/2017    History of pneumococcal vaccination    Personal history of other infectious and parasitic diseases 03/25/2019    History of tinea corporis    Personal history of other specified conditions 07/25/2014    History of dizziness    Personal history of other specified conditions 12/18/2017    History of hemoptysis    Personal history of other specified conditions 06/18/2014    History of dyspnea    Personal history of other specified conditions 12/29/2017    History of epistaxis    Personal history of other specified conditions 08/28/2017    History of chest pain    Personal history of pneumonia (recurrent) 03/10/2017    History of pneumonia    Personal history of urinary (tract) infections 01/03/2019    History of urinary tract infection    Pyuria 08/22/2016    Pyuria    Right upper quadrant pain 02/03/2017    Abdominal pain, RUQ (right upper quadrant)    Ulcerative blepharitis unspecified eye, 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/31/2018    Blepharitis, ulcerative    Unspecified atherosclerosis 07/02/2014    Arteriolosclerosis    Unspecified cataract 06/09/2015    Cataract of left eye    Unspecified cataract 06/09/2015    Cataract of right eye     Past Surgical History:   Procedure Laterality Date    COLON SURGERY  04/06/2020    open sigmoid resection (Dora's  procedure), end colostomy creation, excision of subcutaneous abscess cavity    CT GUIDED PERCUTANEOUS PERITONEAL OR RETROPERITONEAL FLUID COLLECTION DRAINAGE  04/07/2020    CT GUIDED PERCUTANEOUS PERITONEAL OR RETROPERITONEAL FLUID COLLECTION DRAINAGE 4/7/2020 [facility]ICAL LEGAC[facility]    CT GUIDED PERCUTANEOUS PERITONEAL OR RETROPERITONEAL FLUID COLLECTION DRAINAGE  04/22/2020    CT GUIDED PERCUTANEOUS PERITONEAL OR RETROPERITONEAL FLUID COLLECTION DRAINAGE 4/22/2020 [facility] CLINICAL LEGACY    IR CVC PICC  05/11/2020    IR CVC PICC 5/11/2020 [facility] CLINICAL LEGAC[facility]    LITHOTRIPSY  07/31/2013    Renal  Lithotripsy    OTHER SURGICAL HISTORY  07/02/2014    Arterial Catheterization    REFRACTIVE SURGERY  06/09/2015    Corneal LASIK    TONSILLECTOMY  07/02/2014    Tonsillectomy     Family History   Problem Relation Name Age of Onset    Alcohol abuse Mother      Other (CARDIAC DISORDER) Mother      Diabetes Mother      Glaucoma Mother      Other (MESOTHELIOMA) Mother      Alcohol abuse Father      Melanoma Father      Macular degeneration Sister      Breast cancer Sister      Macular degeneration Other AUNT     Skin cancer Other Family hx      Social History     Tobacco Use    Smoking status: Never    Smokeless tobacco: Never   Substance Use Topics    Alcohol use: Never    Drug use: Never       Physical Exam   ED Triage Vitals [02/06/25 0152]   Temperature Heart Rate Respirations BP   36.8 °C (98.3 °F) 80 18 (!) 122/104      Pulse Ox Temp Source Heart Rate Source Patient Position   96 % Oral -- --      BP Location FiO2 (%)     -- --       Physical Exam  Vitals and nursing note reviewed.   Constitutional:       General: She is not in acute distress.     Appearance: She is well-developed.   HENT:      Head: Normocephalic and atraumatic.   Eyes:      Conjunctiva/sclera: Conjunctivae normal.   Cardiovascular:      Rate and Rhythm: Normal rate and regular rhythm.      Heart sounds: No murmur heard.  Pulmonary:      Effort: Pulmonary effort is normal. No respiratory distress.      Breath sounds: Examination of the right-upper field reveals wheezing. Examination of the left-upper field reveals wheezing. Examination of the right-lower field reveals wheezing. Examination of the left-lower field reveals wheezing. Wheezing present.   Abdominal:      Palpations: Abdomen is soft.      Tenderness: There is no abdominal tenderness.   Musculoskeletal:         General: No swelling.      Cervical back: Neck supple.   Skin:     General: Skin is warm and dry.      Capillary Refill: Capillary refill takes less than 2 seconds.    Neurological:      Mental Status: She is alert.   Psychiatric:         Mood and Affect: Mood normal.           ED Course & MDM   Diagnoses as of 02/07/25 4252   COVID   Pneumonia due to infectious organism, unspecified laterality, unspecified part of lung   Healthcare-associated pneumonia                 No data recorded     Brittnee Coma Scale Score: 15 (02/06/25 0156 : Isabel Carl RN)                           Medical Decision Making  EKG interpreted by myself.  Normal sinus rhythm at a rate of 75 bpm.  Normal intervals.  Normal axis.  No signs of acute ischemia.      Patient admitted for pneumonia and COVID.  Given antibiotics.  Patient admitted to the hospital.    Procedure  Procedures     Darrick Plata MD  02/07/25 0435

## 2025-02-06 NOTE — H&P
"History Of Present Illness  Sandra Velasquez \"Miss Marshall" is a 74 y.o. female with a past medical history of hidradenitis suppurative, CAD, fibromyalgia, anxiety, depression, diverticulitis s/p Dora procedure with colostomy and who recently January 9, 2025 had right lower lobe pneumonia and was found to be RSV positive.  Her chest x-ray during this admission showed a right lower lobe infiltrate and she had a nonproductive cough fever nausea chest pain and chronic wounds on her left upper extremity.  She was started on Rocephin and Zithromax as well as Solu-Medrol and DuoNebs.  She is currently on 2 L nasal cannula chronically since she was discharged from the hospital here in [facility] January 11, 2025.  However she was not wearing oxygen upon arrival to the ER and her O2 sat on room air was 85%.  She resides in a nursing facility and has underlying dementia with cognitive impairment.  The EMS was called to the nursing facility because she has been complaining of shortness of breath x 2 days.  When EMS arrived she was 85% on room air and EMS placed her on a nonrebreather 15 L and her O2 sat went back up to 100%.  She does endorse a productive cough with yellow sputum.  During her admission at [facility] in January 2025 her RSV was positive now her COVID-19 is positive.  No mention of any fever or chills chest pain abdominal pain nausea vomiting or diarrhea.  Chest x-ray shows slightly increased infiltrates consistent with a multifocal pneumonia and small bilateral pleural effusions     Past Medical History  Past Medical History:   Diagnosis Date    Acute cystitis without hematuria 01/03/2019    Acute cystitis without hematuria    Age-related nuclear cataract, left eye 10/31/2018    Cataract, nuclear sclerotic, left eye    Age-related nuclear cataract, right eye 10/31/2018    Cataract, nuclear sclerotic, right eye    Allergic rhinitis due to pollen 09/19/2018    Allergic rhinitis due " to pollen, unspecified seasonality    Calculus of kidney 12/11/2018    Right nephrolithiasis    Combined forms of age-related cataract, left eye 05/08/2017    Combined form of age-related cataract, left eye    Combined forms of age-related cataract, left eye 05/08/2017    Combined form of age-related cataract, left eye    Cortical age-related cataract, left eye 10/31/2018    Cortical age-related cataract of left eye    Cyanosis 08/10/2017    Bluish skin discoloration    Dry eye syndrome of bilateral lacrimal glands 10/31/2018    Bilateral dry eyes    Dry eye syndrome of bilateral lacrimal glands 10/31/2018    Dry eyes, bilateral    History of falling 01/03/2019    History of fall    Hyperlipidemia, unspecified 08/26/2020    Hyperlipidemia    Lesion of radial nerve, unspecified upper limb 03/05/2016    Radial nerve irritation    Meibomian gland dysfunction of unspecified eye, 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/31/2018    MGD (meibomian gland disease)    Nausea 11/27/2018    Nausea in adult    Opioid dependence, uncomplicated (Multi) 03/09/2020    Moderate opioid use disorder    Other chronic sinusitis 06/15/2018    Other chronic sinusitis    Other fecal abnormalities 11/27/2018    Light stools    Other specified disorders of nose and nasal sinuses 06/15/2018    Nasal septal perforation    Other specified disorders of nose and nasal sinuses 10/24/2018    Nasal crusting    Other specified disorders of nose and nasal sinuses 02/03/2017    Nasal septal perforation    Other specified disorders of teeth and supporting structures 09/04/2015    Dentalgia    Pain in leg, unspecified 03/11/2019    Leg pain    Personal history of diseases of the blood and blood-forming organs and certain disorders involving the immune mechanism 02/03/2015    History of anemia    Personal history of diseases of the skin and subcutaneous tissue 08/16/2013    History of actinic keratosis    Personal history of other diseases of the circulatory system      History of hypertension    Personal history of other diseases of the digestive system     History of constipation    Personal history of other diseases of the digestive system 07/02/2014    History of gastroesophageal reflux (GERD)    Personal history of other diseases of the musculoskeletal system and connective tissue 07/02/2014    Personal history of arthritis    Personal history of other diseases of the respiratory system 12/18/2017    History of sore throat    Personal history of other diseases of the respiratory system 12/29/2017    History of paranasal sinus congestion    Personal history of other diseases of the respiratory system 01/03/2019    History of acute sinusitis    Personal history of other diseases of the respiratory system 02/03/2017    History of acute sinusitis    Personal history of other diseases of the respiratory system 12/29/2017    History of acute sinusitis    Personal history of other diseases of the respiratory system 06/15/2018    History of nasal discharge    Personal history of other diseases of the respiratory system 08/11/2015    History of acute sinusitis    Personal history of other drug therapy 02/08/2017    History of pneumococcal vaccination    Personal history of other infectious and parasitic diseases 03/25/2019    History of tinea corporis    Personal history of other specified conditions 07/25/2014    History of dizziness    Personal history of other specified conditions 12/18/2017    History of hemoptysis    Personal history of other specified conditions 06/18/2014    History of dyspnea    Personal history of other specified conditions 12/29/2017    History of epistaxis    Personal history of other specified conditions 08/28/2017    History of chest pain    Personal history of pneumonia (recurrent) 03/10/2017    History of pneumonia    Personal history of urinary (tract) infections 01/03/2019    History of urinary tract infection    Pyuria 08/22/2016    Pyuria    Right  upper quadrant pain 02/03/2017    Abdominal pain, RUQ (right upper quadrant)    Ulcerative blepharitis unspecified eye, 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/31/2018    Blepharitis, ulcerative    Unspecified atherosclerosis 07/02/2014    Arteriolosclerosis    Unspecified cataract 06/09/2015    Cataract of left eye    Unspecified cataract 06/09/2015    Cataract of right eye        Surgical History  Past Surgical History:   Procedure Laterality Date    COLON SURGERY  04/06/2020    open sigmoid resection (Dora's  procedure), end colostomy creation, excision of subcutaneous abscess cavity    CT GUIDED PERCUTANEOUS PERITONEAL OR RETROPERITONEAL FLUID COLLECTION DRAINAGE  04/07/2020    CT GUIDED PERCUTANEOUS PERITONEAL OR RETROPERITONEAL FLUID COLLECTION DRAINAGE 4/7/2020 [facility] [place] CLINICAL LEGACY    CT GUIDED PERCUTANEOUS PERITONEAL OR RETROPERITONEAL FLUID COLLECTION DRAINAGE  04/22/2020    CT GUIDED PERCUTANEOUS PERITONEAL OR RETROPERITONEAL FLUID COLLECTION DRAINAGE 4/22/2020 [facility] [place] CLINICAL LEGACY    IR CVC PICC  05/11/2020    IR CVC PICC 5/11/2020 [facility] [place] CLINICAL LEGACY    LITHOTRIPSY  07/31/2013    Renal Lithotripsy    OTHER SURGICAL HISTORY  07/02/2014    Arterial Catheterization    REFRACTIVE SURGERY  06/09/2015    Corneal LASIK    TONSILLECTOMY  07/02/2014    Tonsillectomy         Social History  She reports that she has never smoked. She has never used smokeless tobacco. She reports that she does not drink alcohol and does not use drugs.    Family History  Family History   Problem Relation Name Age of Onset    Alcohol abuse Mother      Other (CARDIAC DISORDER) Mother      Diabetes Mother      Glaucoma Mother      Other (MESOTHELIOMA) Mother      Alcohol abuse Father      Melanoma Father      Macular degeneration Sister      Breast cancer Sister      Macular degeneration Other AUNT     Skin cancer Other Family hx         Allergies  Atorvastatin, House dust mite, and Erythromycin base    Review of Systems   Constitutional:  Positive  "for activity change, appetite change and fatigue.   HENT: Negative.     Eyes: Negative.    Respiratory:  Positive for cough and shortness of breath.    Cardiovascular: Negative.    Gastrointestinal: Negative.    Endocrine: Negative.    Genitourinary: Negative.    Musculoskeletal: Negative.    Skin: Negative.    Allergic/Immunologic: Negative.    Neurological: Negative.    Hematological: Negative.    Psychiatric/Behavioral: Negative.     All other systems reviewed and are negative.       Physical Exam  Vitals and nursing note reviewed.   Constitutional:       Appearance: Normal appearance. She is ill-appearing.   HENT:      Head: Normocephalic.      Right Ear: External ear normal.      Left Ear: External ear normal.      Nose: Nose normal.      Mouth/Throat:      Mouth: Mucous membranes are dry.      Pharynx: Oropharynx is clear.   Eyes:      Extraocular Movements: Extraocular movements intact.      Conjunctiva/sclera: Conjunctivae normal.      Pupils: Pupils are equal, round, and reactive to light.   Cardiovascular:      Rate and Rhythm: Normal rate and regular rhythm.   Pulmonary:      Effort: Respiratory distress present.      Breath sounds: Rales present.      Comments: Scattered rales bilaterally  Abdominal:      General: Abdomen is flat. Bowel sounds are normal.      Palpations: Abdomen is soft.   Musculoskeletal:         General: Normal range of motion.   Skin:     General: Skin is warm and dry.   Neurological:      General: No focal deficit present.      Mental Status: She is alert. Mental status is at baseline.   Psychiatric:         Mood and Affect: Mood normal.         Behavior: Behavior normal.          Last Recorded Vitals  Blood pressure 102/80, pulse 75, temperature 36.8 °C (98.3 °F), temperature source Oral, resp. rate (!) 22, height 1.575 m (5' 2\"), weight 63.8 kg (140 lb 10.5 oz), SpO2 94%.    Relevant Results  Meds:  Scheduled medications  b complex vitamins, 1 capsule, oral, Daily  cetirizine, 10 " mg, oral, Daily  dexAMETHasone, 6 mg, oral, Daily  DULoxetine, 60 mg, oral, Daily  enoxaparin, 40 mg, subcutaneous, q24h  fluticasone, 2 spray, Each Nostril, Daily  gabapentin, 100 mg, oral, BID  guaiFENesin, 1,200 mg, oral, BID  melatonin, 6 mg, oral, Nightly  pantoprazole, 40 mg, oral, Daily before breakfast   Or  pantoprazole, 40 mg, intravenous, Daily before breakfast  piperacillin-tazobactam, 3.375 g, intravenous, q6h  propranolol, 20 mg, oral, BID  tamsulosin, 0.4 mg, oral, Daily  vancomycin (Vancocin), 1,500 mg, intravenous, Once      Continuous medications     PRN medications  PRN medications: acetaminophen **OR** acetaminophen **OR** acetaminophen, benzonatate, OLANZapine, ondansetron **OR** ondansetron, polyethylene glycol, QUEtiapine, sodium chloride, vancomycin   Current Outpatient Medications   Medication Instructions    acetaminophen (TYLENOL) 650 mg, oral, Every 6 hours PRN    benzonatate (TESSALON) 100 mg, oral, 3 times daily PRN, Do not crush or chew.    cetirizine (ZyrTEC) 10 mg tablet TAKE 1 TABLET BY MOUTH (10MG) BY MOUTH ONCE DAILY    DULoxetine (Cymbalta) 60 mg DR capsule Take 1 capsule (60 mg) by mouth once daily. Do not crush or chew. Do not start before November 8, 2023.    fluticasone (Flonase) 50 mcg/actuation nasal spray 2 sprays, Each Nostril, Daily    gabapentin (NEURONTIN) 100 mg, oral, 2 times daily    guaiFENesin (MUCINEX) 1,200 mg, oral, 2 times daily, Do not crush, chew, or split.    ipratropium-albuteroL (Duo-Neb) 0.5-2.5 mg/3 mL nebulizer solution 3 mL, nebulization, Every 4 hours PRN    meclizine (ANTIVERT) 25 mg, oral, 3 times daily PRN    melatonin 10 mg, oral, Nightly    midodrine (PROAMATINE) 10 mg, oral, 3 times daily PRN    polyethylene glycol (GLYCOLAX, MIRALAX) 17 g, oral, Daily PRN    propranolol (INDERAL) 20 mg, oral, 2 times daily, Take 1 tablet by mouth every morning and take 2 tablets by mouth every evening.    QUEtiapine (SEROQUEL) 12.5 mg, oral, 2 times daily PRN     sodium chloride (Ocean Nasal) 0.65 % nasal spray 1 spray, Each Nostril, As needed    tamsulosin (Flomax) 0.4 mg 24 hr capsule TAKE 1 CAPSULE (0.4 MG) BY MOUTH ONCE DAILY.    vitamin B complex tablet 1 tablet, oral, Daily        Labs:  Results for orders placed or performed during the hospital encounter of 02/06/25 (from the past 24 hours)   CBC and Auto Differential   Result Value Ref Range    WBC 25.0 (H) 4.4 - 11.3 x10*3/uL    nRBC 0.1 (H) 0.0 - 0.0 /100 WBCs    RBC 4.53 4.00 - 5.20 x10*6/uL    Hemoglobin 9.3 (L) 12.0 - 16.0 g/dL    Hematocrit 32.9 (L) 36.0 - 46.0 %    MCV 73 (L) 80 - 100 fL    MCH 20.5 (L) 26.0 - 34.0 pg    MCHC 28.3 (L) 32.0 - 36.0 g/dL    RDW 19.9 (H) 11.5 - 14.5 %    Platelets 481 (H) 150 - 450 x10*3/uL    Neutrophils % 80.5 40.0 - 80.0 %    Immature Granulocytes %, Automated 0.9 0.0 - 0.9 %    Lymphocytes % 10.3 13.0 - 44.0 %    Monocytes % 7.9 2.0 - 10.0 %    Eosinophils % 0.0 0.0 - 6.0 %    Basophils % 0.4 0.0 - 2.0 %    Neutrophils Absolute 20.13 (H) 1.60 - 5.50 x10*3/uL    Immature Granulocytes Absolute, Automated 0.22 0.00 - 0.50 x10*3/uL    Lymphocytes Absolute 2.57 0.80 - 3.00 x10*3/uL    Monocytes Absolute 1.98 (H) 0.05 - 0.80 x10*3/uL    Eosinophils Absolute 0.01 0.00 - 0.40 x10*3/uL    Basophils Absolute 0.09 0.00 - 0.10 x10*3/uL   Comprehensive Metabolic Panel   Result Value Ref Range    Glucose 116 (H) 74 - 99 mg/dL    Sodium 138 136 - 145 mmol/L    Potassium 4.8 3.5 - 5.3 mmol/L    Chloride 105 98 - 107 mmol/L    Bicarbonate 22 21 - 32 mmol/L    Anion Gap 16 10 - 20 mmol/L    Urea Nitrogen 25 (H) 6 - 23 mg/dL    Creatinine 1.10 (H) 0.50 - 1.05 mg/dL    eGFR 53 (L) >60 mL/min/1.73m*2    Calcium 8.7 8.6 - 10.3 mg/dL    Albumin 2.5 (L) 3.4 - 5.0 g/dL    Alkaline Phosphatase 94 33 - 136 U/L    Total Protein 7.3 6.4 - 8.2 g/dL    AST 21 9 - 39 U/L    Bilirubin, Total 0.4 0.0 - 1.2 mg/dL    ALT 12 7 - 45 U/L   Magnesium   Result Value Ref Range    Magnesium 2.03 1.60 - 2.40 mg/dL    aPTT   Result Value Ref Range    aPTT 30 27 - 38 seconds   Protime-INR   Result Value Ref Range    Protime 15.8 (H) 9.8 - 12.8 seconds    INR 1.4 (H) 0.9 - 1.1   B-Type Natriuretic Peptide   Result Value Ref Range     (H) 0 - 99 pg/mL   Troponin I, High Sensitivity, Initial   Result Value Ref Range    Troponin I, High Sensitivity 12 0 - 13 ng/L   Sars-CoV-2 and Influenza A/B PCR   Result Value Ref Range    Flu A Result Not Detected Not Detected    Flu B Result Not Detected Not Detected    Coronavirus 2019, PCR Detected (A) Not Detected   Troponin, High Sensitivity, 1 Hour   Result Value Ref Range    Troponin I, High Sensitivity 12 0 - 13 ng/L     *Note: Due to a large number of results and/or encounters for the requested time period, some results have not been displayed. A complete set of results can be found in Results Review.      Imaging:  XR chest 1 view    Result Date: 2/6/2025  Interpreted By:  Alok Pretty, STUDY: XR CHEST 1 VIEW;  2/6/2025 2:30 am   INDICATION: Signs/Symptoms:Chest Pain.   COMPARISON: CXR 01/09/2025 Chest CT 09/11/2024   ACCESSION NUMBER(S): [id number]   ORDERING CLINICIAN: JAVIER MARTINS   FINDINGS: Small bilateral pleural effusions, similar to prior. Thickening of the airways with patchy opacities bilaterally, similar to slightly increased compared to 01/09/2025.   Similar cardiomediastinal contour with atherosclerotic calcification of the aortic arch.   No acute findings in the upper abdomen. No acute osseous abnormalities.       1. Thickening of the airways with patchy opacities bilaterally, similar to slightly increased compared to 01/09/2025 which may reflect multifocal pneumonia. 2. Small bilateral pleural effusions, similar to prior.   MACRO: None   Signed by: Alok Pretty 2/6/2025 2:56 AM Dictation workstation:   [id number]      Assessment/Plan   Multifocal hospital-acquired pneumonia  Plan:  Vancomycin  Zosyn  Proventil aerosols as needed  ID consultation to see  if antibiotics are warranted or if this is all just COVID-pneumonia    COVID viral infection with hypoxia  Plan:  Decadron 6 mg orally daily x 10 days  Remdesivir  COVID isolation    Hypoxia  Plan:  Supplemental O2 as needed    Wounds on her left upper extremity consistent with hidradenitis per  Plan:  Wound care    Hypertension  Continue propranolol    Fibromyalgia  Continue home meds    DVT prophylaxis  Lovenox 40 mg subcutaneous daily  SCDs    I spent 60 minutes in the professional and overall care of this patient.      Jorge Logan DO

## 2025-02-06 NOTE — NURSING NOTE
Patient was discovered unresponsive during a re-assessment with provider. Code was called and ACLS was performed for 15 minutes. TOD was 2:15 pm . The patients brother came to bedside and stated that the facility never notified him that she came to the hospital. Mortality checklist completed.

## 2025-02-06 NOTE — CARE PLAN
The patient's goals for the shift include   remain safe from falls/injury    The clinical goals for the shift include miantain oxygen saturation    Over the shift, the patient did make progress toward the following goals.

## 2025-02-09 LAB
BACTERIA BLD CULT: NORMAL
BACTERIA BLD CULT: NORMAL

## 2025-02-10 LAB
BACTERIA BLD CULT: NORMAL
BACTERIA BLD CULT: NORMAL

## 2025-02-12 NOTE — DOCUMENTATION CLARIFICATION NOTE
"    PATIENT:               RAMY RICH  ACCT #:                  [id number]  MRN:                       [id number]  :                       1950  ADMIT DATE:       2025 1:45 AM  DISCH DATE:        2025 5:15 PM  RESPONDING PROVIDER #:        [id number]          PROVIDER RESPONSE TEXT:    Metabolic encephalopathy 2/2 Covid Pneumonia superimposed on dementia    CDI QUERY TEXT:    Clarification    Instruction:   Based on your assessment of the patient and the clinical information, please provide the requested documentation by clicking on the appropriate radio button and enter any additional information if prompted.    Question: Please further clarify the most likely etiology of the altered mental status as:    When answering this query, please exercise your independent professional judgment. The fact that a question is being asked, does not imply that any particular answer is desired or expected.    The patient's clinical indicators include:  Clinical Information:   74 yof adm from NH w/2d history of SOB, recent history of pneumonia, found to be Covid +, O2 85% at the NH, pt placed on 15L NRB and pt improved to 100% and transported.    Clinical Indicators:    147 RN: Pt A&Ox2, pt alert calm cooperative with care. Pt resp even and unlabored.\"    207 ER \"History limited by:  Mental status change and dementia\"     141:  Patient .    1715 DC SUM \"pt was confused, not cooperative, refusing her medications, and there was consideration for a sitter. .. repeatedly (< every 5 mins) take her oxygen off and desat; thus requiring respiratory therapy to increase O2 requirement to 6L. baseline oxygen requirement was 2L\"    Treatment:  Vanco, Zosyn, ID consult, Decadron, Remdesivir, Covid isolation    Risk Factors: Recent pneumonia, recent RSV, SNF, now Covid +  Options provided:  -- Metabolic encephalopathy 2/2 Covid Pneumonia  -- Metabolic encephalopathy 2/2 Covid Pneumonia superimposed on " dementia  -- Worsening dementia  -- Other - I will add my own diagnosis  -- Refer to Clinical Documentation Reviewer    Query created by: Caryn Harrell on 2/12/2025 12:51 PM      Electronically signed by:  KRYSTIN CELIS MD 2/12/2025 1:39 PM

## 2025-02-13 NOTE — DOCUMENTATION CLARIFICATION NOTE
"    PATIENT:               RAMY RICH  ACCT #:                  [id number]  MRN:                       [id number]  :                       1950  ADMIT DATE:       2025 1:45 AM  DISCH DATE:        2025 5:15 PM  RESPONDING PROVIDER #:        [id number]          PROVIDER RESPONSE TEXT:    Patient treated for Covid without Sepsis    CDI QUERY TEXT:    Clarification    Instruction:   Based on your assessment of the patient and the clinical information, please provide the requested documentation by clicking on the appropriate radio button and enter any additional information if prompted.    Question: Is there a diagnosis indicative of a patient meeting SIRS criteria and with organ dysfunction in the setting of COVID:    When answering this query, please exercise your independent professional judgment. The fact that a question is being asked, does not imply that any particular answer is desired or expected.    The patient's clinical indicators include:  Clinical Information:   ER:  74 yof from SNF via EMS for mental status change and SOB, 85% on RA, placed on 15L NRB w/improvement.        Clinical Indicators:  COVID +  Resp 22   WBC 25.0, 23.8  / , 463 /  CRT 1.10, 1.16    CXR: \"Thickening of the airways with patchy opacities bilaterally, similar to slightly increased compared to 2025 which may reflect multifocal pneumonia.  2. Small bilateral pleural effusions, similar to prior\"    VS RANGE  (last 24hr) Heart Rate: 75-88   / Temp: 35.9 / 96.6  to 37 /  98.6   Resp: 18-25     BP: 102/56 to 135/104    SpO2: 92 - 98     1256 ID:  \"WBC is high but recently on steroids it would seem. Had RSV few weeks ago and now has COVID-19.\"    : CODE: Asystole:   A 7.5-Mexican endotracheal tube was inserted, 5 rounds of compression were performed. 5x of epi and bicarb     1415:  .    Treatment:   Vanco, Zosyn, O2, Decadron, Remdesivir, Albuterol, Mucinex, Tessalon,  ACLS for 15 min " no ROSC, several rounds of epi and bicarb    Risk Factors: SNF resident, recent RSV DC 1/11, now COVID+, Metabolic Encephalopathy  Options provided:  -- Viral Sepsis in the setting of Covid with neurological organ dysfunction of Metabolic Encephalopathy  -- Sepsis with other organ dysfunction, Please specify sepsis associated organ dysfunction below  -- Patient treated for Covid without Sepsis  -- Other - I will add my own diagnosis  -- Refer to Clinical Documentation Reviewer    Query created by: Caryn Harrell on 2/13/2025 10:59 AM      Electronically signed by:  KRYSTIN CELIS MD 2/13/2025 11:28 AM

## 2025-02-26 LAB
ATRIAL RATE: 75 BPM
P AXIS: 55 DEGREES
P OFFSET: 207 MS
P ONSET: 160 MS
PR INTERVAL: 122 MS
Q ONSET: 221 MS
QRS COUNT: 13 BEATS
QRS DURATION: 68 MS
QT INTERVAL: 382 MS
QTC CALCULATION(BAZETT): 426 MS
QTC FREDERICIA: 411 MS
R AXIS: 104 DEGREES
T AXIS: 67 DEGREES
T OFFSET: 412 MS
VENTRICULAR RATE: 75 BPM